# Patient Record
Sex: FEMALE | Race: BLACK OR AFRICAN AMERICAN | NOT HISPANIC OR LATINO | Employment: OTHER | URBAN - METROPOLITAN AREA
[De-identification: names, ages, dates, MRNs, and addresses within clinical notes are randomized per-mention and may not be internally consistent; named-entity substitution may affect disease eponyms.]

---

## 2017-04-27 ENCOUNTER — HISTORICAL (OUTPATIENT)
Dept: URGENT CARE | Facility: CLINIC | Age: 67
End: 2017-04-27

## 2017-07-05 ENCOUNTER — HISTORICAL (OUTPATIENT)
Dept: RADIOLOGY | Facility: HOSPITAL | Age: 67
End: 2017-07-05

## 2017-07-05 LAB — POC CREATININE: 0.9 MG/DL (ref 0.6–1.3)

## 2017-11-07 ENCOUNTER — HISTORICAL (OUTPATIENT)
Dept: ADMINISTRATIVE | Facility: HOSPITAL | Age: 67
End: 2017-11-07

## 2017-11-07 LAB
ALBUMIN SERPL-MCNC: 3.5 GM/DL (ref 3.4–5)
ALBUMIN/GLOB SERPL: 1 {RATIO}
ALP SERPL-CCNC: 63 UNIT/L (ref 38–126)
ALT SERPL-CCNC: 12 UNIT/L (ref 12–78)
APPEARANCE, UA: CLEAR
AST SERPL-CCNC: 7 UNIT/L (ref 15–37)
BACTERIA SPEC CULT: ABNORMAL /HPF
BILIRUB SERPL-MCNC: 0.3 MG/DL (ref 0.2–1)
BILIRUB UR QL STRIP: NEGATIVE
BILIRUBIN DIRECT+TOT PNL SERPL-MCNC: 0.1 MG/DL (ref 0–0.2)
BILIRUBIN DIRECT+TOT PNL SERPL-MCNC: 0.2 MG/DL (ref 0–0.8)
BUN SERPL-MCNC: 9 MG/DL (ref 7–18)
CALCIUM SERPL-MCNC: 9.3 MG/DL (ref 8.5–10.1)
CHLORIDE SERPL-SCNC: 106 MMOL/L (ref 98–107)
CHOLEST SERPL-MCNC: 203 MG/DL (ref 0–200)
CHOLEST/HDLC SERPL: 3.3 {RATIO} (ref 0–4)
CO2 SERPL-SCNC: 28 MMOL/L (ref 21–32)
COLOR UR: YELLOW
CREAT SERPL-MCNC: 0.78 MG/DL (ref 0.55–1.02)
CREAT UR-MCNC: 39.9 MG/DL
ERYTHROCYTE [DISTWIDTH] IN BLOOD BY AUTOMATED COUNT: 14.2 % (ref 11.5–17)
GLOBULIN SER-MCNC: 3.4 GM/DL (ref 2.4–3.5)
GLUCOSE (UA): NEGATIVE
GLUCOSE SERPL-MCNC: 84 MG/DL (ref 74–106)
HCT VFR BLD AUTO: 36 % (ref 37–47)
HDLC SERPL-MCNC: 61 MG/DL (ref 35–60)
HGB BLD-MCNC: 11.7 GM/DL (ref 12–16)
HGB UR QL STRIP: NEGATIVE
KETONES UR QL STRIP: NEGATIVE
LDLC SERPL CALC-MCNC: 121 MG/DL (ref 0–129)
LEUKOCYTE ESTERASE UR QL STRIP: NEGATIVE
MCH RBC QN AUTO: 29.8 PG (ref 27–31)
MCHC RBC AUTO-ENTMCNC: 32.5 GM/DL (ref 33–36)
MCV RBC AUTO: 91.8 FL (ref 80–94)
MICROALBUMIN UR-MCNC: <0.5 MG/DL
MICROALBUMIN/CREAT RATIO PNL UR: <12.5 MG/GM CR (ref 0–30)
NITRITE UR QL STRIP: NEGATIVE
PH UR STRIP: 6.5 [PH] (ref 5–9)
PLATELET # BLD AUTO: 360 X10(3)/MCL (ref 130–400)
PMV BLD AUTO: 8.5 FL (ref 9.4–12.4)
POTASSIUM SERPL-SCNC: 4.5 MMOL/L (ref 3.5–5.1)
PROT SERPL-MCNC: 6.9 GM/DL (ref 6.4–8.2)
PROT UR QL STRIP: NEGATIVE
RBC # BLD AUTO: 3.92 X10(6)/MCL (ref 4.2–5.4)
RBC #/AREA URNS HPF: ABNORMAL /[HPF]
SODIUM SERPL-SCNC: 143 MMOL/L (ref 136–145)
SP GR UR STRIP: 1.01 (ref 1–1.03)
SQUAMOUS EPITHELIAL, UA: ABNORMAL
TRIGL SERPL-MCNC: 104 MG/DL (ref 30–150)
UROBILINOGEN UR STRIP-ACNC: 0.2
VLDLC SERPL CALC-MCNC: 21 MG/DL
WBC # SPEC AUTO: 12.2 X10(3)/MCL (ref 4.5–11.5)
WBC #/AREA URNS HPF: ABNORMAL /[HPF]

## 2017-11-09 LAB — FINAL CULTURE: NO GROWTH

## 2017-12-11 LAB
INFLUENZA A ANTIGEN, POC: NEGATIVE
INFLUENZA B ANTIGEN, POC: NEGATIVE
RAPID GROUP A STREP (OHS): NEGATIVE

## 2017-12-20 ENCOUNTER — HISTORICAL (OUTPATIENT)
Dept: LAB | Facility: HOSPITAL | Age: 67
End: 2017-12-20

## 2017-12-21 LAB — GRAM STN SPEC: NORMAL

## 2017-12-23 LAB — FINAL CULTURE: NORMAL

## 2018-01-24 LAB
BILIRUB SERPL-MCNC: NEGATIVE MG/DL
BLOOD URINE, POC: NEGATIVE
CLARITY, POC UA: CLEAR
COLOR, POC UA: YELLOW
GLUCOSE UR QL STRIP: NEGATIVE
KETONES UR QL STRIP: NEGATIVE
LEUKOCYTE EST, POC UA: NEGATIVE
NITRITE, POC UA: NEGATIVE
PH, POC UA: 7
PROTEIN, POC: NEGATIVE
SPECIFIC GRAVITY, POC UA: 1
UROBILINOGEN, POC UA: NORMAL

## 2018-02-08 ENCOUNTER — HISTORICAL (OUTPATIENT)
Dept: LAB | Facility: HOSPITAL | Age: 68
End: 2018-02-08

## 2018-02-08 LAB
APPEARANCE, UA: ABNORMAL
BACTERIA #/AREA URNS AUTO: ABNORMAL /HPF
BILIRUB UR QL STRIP: NEGATIVE
COLOR UR: YELLOW
GLUCOSE (UA): NEGATIVE
HGB UR QL STRIP: NEGATIVE
KETONES UR QL STRIP: NEGATIVE
LEUKOCYTE ESTERASE UR QL STRIP: ABNORMAL
NITRITE UR QL STRIP.AUTO: NEGATIVE
PH UR STRIP: 6.5 [PH] (ref 5–9)
PROT UR QL STRIP: NEGATIVE
RBC #/AREA URNS HPF: 23 /HPF (ref 0–2)
SP GR UR STRIP: 1.01 (ref 1–1.03)
SQUAMOUS EPITHELIAL, UA: ABNORMAL
UROBILINOGEN UR STRIP-ACNC: 0.2
WBC #/AREA URNS AUTO: ABNORMAL /HPF (ref 0–3)

## 2018-02-10 LAB — FINAL CULTURE: NO GROWTH

## 2018-03-12 ENCOUNTER — HISTORICAL (OUTPATIENT)
Dept: RADIOLOGY | Facility: HOSPITAL | Age: 68
End: 2018-03-12

## 2018-03-16 ENCOUNTER — HISTORICAL (OUTPATIENT)
Dept: ADMINISTRATIVE | Facility: HOSPITAL | Age: 68
End: 2018-03-16

## 2018-03-16 LAB
ABS NEUT (OLG): 9.07 X10(3)/MCL (ref 2.1–9.2)
ALBUMIN SERPL-MCNC: 3.2 GM/DL (ref 3.4–5)
ALBUMIN/GLOB SERPL: 0.9 RATIO (ref 1.1–2)
ALP SERPL-CCNC: 50 UNIT/L (ref 38–126)
ALT SERPL-CCNC: 13 UNIT/L (ref 12–78)
AST SERPL-CCNC: 8 UNIT/L (ref 15–37)
BILIRUB SERPL-MCNC: 0.4 MG/DL (ref 0.2–1)
BILIRUBIN DIRECT+TOT PNL SERPL-MCNC: 0.1 MG/DL (ref 0–0.5)
BILIRUBIN DIRECT+TOT PNL SERPL-MCNC: 0.3 MG/DL (ref 0–0.8)
BUN SERPL-MCNC: 12 MG/DL (ref 7–18)
CALCIUM SERPL-MCNC: 9.3 MG/DL (ref 8.5–10.1)
CHLORIDE SERPL-SCNC: 105 MMOL/L (ref 98–107)
CO2 SERPL-SCNC: 33 MMOL/L (ref 21–32)
CREAT SERPL-MCNC: 0.88 MG/DL (ref 0.55–1.02)
ERYTHROCYTE [DISTWIDTH] IN BLOOD BY AUTOMATED COUNT: 14.6 % (ref 11.5–17)
GLOBULIN SER-MCNC: 3.4 GM/DL (ref 2.4–3.5)
GLUCOSE SERPL-MCNC: 85 MG/DL (ref 74–106)
HCT VFR BLD AUTO: 38.2 % (ref 37–47)
HGB BLD-MCNC: 12.1 GM/DL (ref 12–16)
LYMPHOCYTES NFR BLD MANUAL: 48 % (ref 13–40)
MCH RBC QN AUTO: 29.5 PG (ref 27–31)
MCHC RBC AUTO-ENTMCNC: 31.7 GM/DL (ref 33–36)
MCV RBC AUTO: 93.2 FL (ref 80–94)
MONOCYTES NFR BLD MANUAL: 1 % (ref 2–11)
NEUTROPHILS NFR BLD MANUAL: 52 % (ref 47–80)
PLATELET # BLD AUTO: 368 X10(3)/MCL (ref 130–400)
PLATELET # BLD EST: NORMAL 10*3/UL
PMV BLD AUTO: 8.3 FL (ref 7.4–10.4)
POTASSIUM SERPL-SCNC: 4.3 MMOL/L (ref 3.5–5.1)
PROT SERPL-MCNC: 6.6 GM/DL (ref 6.4–8.2)
RBC # BLD AUTO: 4.1 X10(6)/MCL (ref 4.2–5.4)
SODIUM SERPL-SCNC: 137 MMOL/L (ref 136–145)
TSH SERPL-ACNC: 1.66 MIU/ML (ref 0.36–3.74)
VIT B12 SERPL-MCNC: 370 PG/ML (ref 193–986)
WBC # SPEC AUTO: 15.6 X10(3)/MCL (ref 4.5–11.5)

## 2018-03-26 ENCOUNTER — HISTORICAL (OUTPATIENT)
Dept: ADMINISTRATIVE | Facility: HOSPITAL | Age: 68
End: 2018-03-26

## 2018-03-26 LAB
ABS NEUT (OLG): 8.43 X10(3)/MCL (ref 2.1–9.2)
BASOPHILS # BLD AUTO: 0 X10(3)/MCL (ref 0–0.2)
BASOPHILS NFR BLD AUTO: 0 %
BUN SERPL-MCNC: 15 MG/DL (ref 7–18)
CREAT SERPL-MCNC: 0.91 MG/DL (ref 0.55–1.02)
EOSINOPHIL # BLD AUTO: 0.1 X10(3)/MCL (ref 0–0.9)
EOSINOPHIL NFR BLD AUTO: 1 %
ERYTHROCYTE [DISTWIDTH] IN BLOOD BY AUTOMATED COUNT: 14.6 % (ref 11.5–17)
ERYTHROCYTE [SEDIMENTATION RATE] IN BLOOD: 14 MM/HR (ref 0–20)
HCT VFR BLD AUTO: 37 % (ref 37–47)
HGB BLD-MCNC: 11.6 GM/DL (ref 12–16)
LYMPHOCYTES # BLD AUTO: 2.8 X10(3)/MCL (ref 0.6–4.6)
LYMPHOCYTES NFR BLD AUTO: 22 %
MCH RBC QN AUTO: 29.2 PG (ref 27–31)
MCHC RBC AUTO-ENTMCNC: 31.4 GM/DL (ref 33–36)
MCV RBC AUTO: 93.2 FL (ref 80–94)
MONOCYTES # BLD AUTO: 1 X10(3)/MCL (ref 0.1–1.3)
MONOCYTES NFR BLD AUTO: 8 %
NEUTROPHILS # BLD AUTO: 8.43 X10(3)/MCL (ref 1.4–7.9)
NEUTROPHILS NFR BLD AUTO: 68 %
PLATELET # BLD AUTO: 344 X10(3)/MCL (ref 130–400)
PMV BLD AUTO: 8.6 FL (ref 9.4–12.4)
RBC # BLD AUTO: 3.97 X10(6)/MCL (ref 4.2–5.4)
WBC # SPEC AUTO: 12.4 X10(3)/MCL (ref 4.5–11.5)

## 2018-04-04 ENCOUNTER — HISTORICAL (OUTPATIENT)
Dept: LAB | Facility: HOSPITAL | Age: 68
End: 2018-04-04

## 2018-04-04 LAB
APPEARANCE, UA: ABNORMAL
BACTERIA SPEC CULT: ABNORMAL /HPF
BILIRUB UR QL STRIP: NEGATIVE
COLOR UR: YELLOW
GLUCOSE (UA): NEGATIVE
HGB UR QL STRIP: NEGATIVE
KETONES UR QL STRIP: NEGATIVE
LEUKOCYTE ESTERASE UR QL STRIP: ABNORMAL
NITRITE UR QL STRIP: NEGATIVE
PH UR STRIP: 7 [PH] (ref 5–9)
PROT UR QL STRIP: NEGATIVE
RBC #/AREA URNS HPF: ABNORMAL /[HPF]
SP GR UR STRIP: 1.01 (ref 1–1.03)
SQUAMOUS EPITHELIAL, UA: ABNORMAL
UA WBC MAN: ABNORMAL
UROBILINOGEN UR STRIP-ACNC: 0.2
WBC #/AREA URNS HPF: ABNORMAL /[HPF]

## 2018-04-06 LAB — FINAL CULTURE: NORMAL

## 2018-09-11 ENCOUNTER — HISTORICAL (OUTPATIENT)
Dept: LAB | Facility: HOSPITAL | Age: 68
End: 2018-09-11

## 2018-09-11 LAB
ABS NEUT (OLG): 6.98 X10(3)/MCL (ref 2.1–9.2)
APPEARANCE, UA: CLEAR
BACTERIA SPEC CULT: ABNORMAL /HPF
BASOPHILS # BLD AUTO: 0 X10(3)/MCL (ref 0–0.2)
BASOPHILS NFR BLD AUTO: 0 %
BILIRUB UR QL STRIP: NEGATIVE
COLOR UR: YELLOW
EOSINOPHIL # BLD AUTO: 0.1 X10(3)/MCL (ref 0–0.9)
EOSINOPHIL NFR BLD AUTO: 1 %
ERYTHROCYTE [DISTWIDTH] IN BLOOD BY AUTOMATED COUNT: 14.1 % (ref 11.5–17)
FERRITIN SERPL-MCNC: 62.3 NG/ML (ref 8–388)
GLUCOSE (UA): NEGATIVE
HCT VFR BLD AUTO: 40.6 % (ref 37–47)
HGB BLD-MCNC: 12.5 GM/DL (ref 12–16)
HGB UR QL STRIP: NEGATIVE
IRON SATN MFR SERPL: 16.8 % (ref 20–50)
IRON SERPL-MCNC: 48 MCG/DL (ref 50–175)
KETONES UR QL STRIP: NEGATIVE
LEUKOCYTE ESTERASE UR QL STRIP: ABNORMAL
LYMPHOCYTES # BLD AUTO: 2.4 X10(3)/MCL (ref 0.6–4.6)
LYMPHOCYTES NFR BLD AUTO: 24 %
MCH RBC QN AUTO: 29.4 PG (ref 27–31)
MCHC RBC AUTO-ENTMCNC: 30.8 GM/DL (ref 33–36)
MCV RBC AUTO: 95.5 FL (ref 80–94)
MONOCYTES # BLD AUTO: 0.7 X10(3)/MCL (ref 0.1–1.3)
MONOCYTES NFR BLD AUTO: 7 %
NEUTROPHILS # BLD AUTO: 6.98 X10(3)/MCL (ref 1.4–7.9)
NEUTROPHILS NFR BLD AUTO: 68 %
NITRITE UR QL STRIP: NEGATIVE
PH UR STRIP: 7 [PH] (ref 5–9)
PLATELET # BLD AUTO: 390 X10(3)/MCL (ref 130–400)
PMV BLD AUTO: 9.1 FL (ref 9.4–12.4)
PROT UR QL STRIP: NEGATIVE
RBC # BLD AUTO: 4.25 X10(6)/MCL (ref 4.2–5.4)
RBC #/AREA URNS HPF: ABNORMAL /[HPF]
SP GR UR STRIP: 1 (ref 1–1.03)
SQUAMOUS EPITHELIAL, UA: ABNORMAL
TIBC SERPL-MCNC: 285 MCG/DL (ref 250–450)
TRANSFERRIN SERPL-MCNC: 228 MG/DL (ref 200–360)
TSH SERPL-ACNC: 1.15 MIU/L (ref 0.36–3.74)
UROBILINOGEN UR STRIP-ACNC: 0.2
WBC # SPEC AUTO: 10.2 X10(3)/MCL (ref 4.5–11.5)
WBC #/AREA URNS HPF: ABNORMAL /[HPF]

## 2018-09-26 ENCOUNTER — HISTORICAL (OUTPATIENT)
Dept: ADMINISTRATIVE | Facility: HOSPITAL | Age: 68
End: 2018-09-26

## 2018-09-26 LAB
IRON SATN MFR SERPL: 27.3 % (ref 20–50)
IRON SERPL-MCNC: 72 MCG/DL (ref 50–175)
TIBC SERPL-MCNC: 264 MCG/DL (ref 250–450)
TRANSFERRIN SERPL-MCNC: 214 MG/DL (ref 200–360)

## 2018-10-03 ENCOUNTER — HISTORICAL (OUTPATIENT)
Dept: ADMINISTRATIVE | Facility: HOSPITAL | Age: 68
End: 2018-10-03

## 2018-10-03 LAB — VIT B12 SERPL-MCNC: 304 PG/ML (ref 193–986)

## 2018-10-05 ENCOUNTER — HISTORICAL (OUTPATIENT)
Dept: ADMINISTRATIVE | Facility: HOSPITAL | Age: 68
End: 2018-10-05

## 2018-10-08 ENCOUNTER — HISTORICAL (OUTPATIENT)
Dept: ADMINISTRATIVE | Facility: HOSPITAL | Age: 68
End: 2018-10-08

## 2018-10-27 LAB — RAPID GROUP A STREP (OHS): POSITIVE

## 2018-12-18 ENCOUNTER — HISTORICAL (OUTPATIENT)
Dept: ADMINISTRATIVE | Facility: HOSPITAL | Age: 68
End: 2018-12-18

## 2018-12-20 ENCOUNTER — HISTORICAL (OUTPATIENT)
Dept: ADMINISTRATIVE | Facility: HOSPITAL | Age: 68
End: 2018-12-20

## 2019-01-02 ENCOUNTER — HISTORICAL (OUTPATIENT)
Dept: LAB | Facility: HOSPITAL | Age: 69
End: 2019-01-02

## 2019-01-02 LAB
ABS NEUT (OLG): 7.21 X10(3)/MCL (ref 2.1–9.2)
ALBUMIN SERPL-MCNC: 2.7 GM/DL (ref 3.4–5)
ALBUMIN/GLOB SERPL: 0.5 RATIO (ref 1.1–2)
ALP SERPL-CCNC: 64 UNIT/L (ref 38–126)
ALT SERPL-CCNC: 12 UNIT/L (ref 12–78)
APPEARANCE, UA: CLEAR
AST SERPL-CCNC: 12 UNIT/L (ref 15–37)
BACTERIA SPEC CULT: ABNORMAL /HPF
BASOPHILS # BLD AUTO: 0 X10(3)/MCL (ref 0–0.2)
BASOPHILS NFR BLD AUTO: 0 %
BILIRUB SERPL-MCNC: 0.2 MG/DL (ref 0.2–1)
BILIRUB UR QL STRIP: NEGATIVE
BILIRUBIN DIRECT+TOT PNL SERPL-MCNC: 0.1 MG/DL (ref 0–0.5)
BILIRUBIN DIRECT+TOT PNL SERPL-MCNC: 0.1 MG/DL (ref 0–0.8)
BUN SERPL-MCNC: 9 MG/DL (ref 7–18)
CALCIUM SERPL-MCNC: 8.7 MG/DL (ref 8.5–10.1)
CHLORIDE SERPL-SCNC: 104 MMOL/L (ref 98–107)
CO2 SERPL-SCNC: 25 MMOL/L (ref 21–32)
COLOR UR: YELLOW
CREAT SERPL-MCNC: 0.74 MG/DL (ref 0.55–1.02)
EOSINOPHIL # BLD AUTO: 0 X10(3)/MCL (ref 0–0.9)
EOSINOPHIL NFR BLD AUTO: 0 %
ERYTHROCYTE [DISTWIDTH] IN BLOOD BY AUTOMATED COUNT: 13.5 % (ref 11.5–17)
FERRITIN SERPL-MCNC: 64.6 NG/ML (ref 8–388)
FOLATE SERPL-MCNC: 12.1 NG/ML (ref 3.1–17.5)
GLOBULIN SER-MCNC: 5 GM/DL (ref 2.4–3.5)
GLUCOSE (UA): NEGATIVE
GLUCOSE SERPL-MCNC: 65 MG/DL (ref 74–106)
HCT VFR BLD AUTO: 37.6 % (ref 37–47)
HGB BLD-MCNC: 11.8 GM/DL (ref 12–16)
HGB UR QL STRIP: NEGATIVE
IRON SATN MFR SERPL: 17.3 % (ref 20–50)
IRON SERPL-MCNC: 48 MCG/DL (ref 50–175)
KETONES UR QL STRIP: NEGATIVE
LEUKOCYTE ESTERASE UR QL STRIP: ABNORMAL
LYMPHOCYTES # BLD AUTO: 2.2 X10(3)/MCL (ref 0.6–4.6)
LYMPHOCYTES NFR BLD AUTO: 21 %
MCH RBC QN AUTO: 29.3 PG (ref 27–31)
MCHC RBC AUTO-ENTMCNC: 31.4 GM/DL (ref 33–36)
MCV RBC AUTO: 93.3 FL (ref 80–94)
MONOCYTES # BLD AUTO: 0.7 X10(3)/MCL (ref 0.1–1.3)
MONOCYTES NFR BLD AUTO: 7 %
NEUTROPHILS # BLD AUTO: 7.21 X10(3)/MCL (ref 2.1–9.2)
NEUTROPHILS NFR BLD AUTO: 71 %
NITRITE UR QL STRIP: NEGATIVE
PH UR STRIP: 7 [PH] (ref 5–9)
PLATELET # BLD AUTO: 359 X10(3)/MCL (ref 130–400)
PMV BLD AUTO: 9.7 FL (ref 9.4–12.4)
POTASSIUM SERPL-SCNC: 4.2 MMOL/L (ref 3.5–5.1)
PROT SERPL-MCNC: 7.7 GM/DL (ref 6.4–8.2)
PROT UR QL STRIP: NEGATIVE
RBC # BLD AUTO: 4.03 X10(6)/MCL (ref 4.2–5.4)
RBC #/AREA URNS HPF: 6 /HPF (ref 0–2)
SODIUM SERPL-SCNC: 132 MMOL/L (ref 136–145)
SP GR UR STRIP: 1.01 (ref 1–1.03)
SQUAMOUS EPITHELIAL, UA: 7 /HPF (ref 0–4)
TIBC SERPL-MCNC: 277 MCG/DL (ref 250–450)
TRANSFERRIN SERPL-MCNC: 164 MG/DL (ref 200–360)
TSH SERPL-ACNC: 0.51 MIU/L (ref 0.36–3.74)
UROBILINOGEN UR STRIP-ACNC: 0.2
VIT B12 SERPL-MCNC: 1261 PG/ML (ref 193–986)
WBC # SPEC AUTO: 10.2 X10(3)/MCL (ref 4.5–11.5)
WBC #/AREA URNS HPF: 5 /HPF (ref 0–3)

## 2019-01-04 ENCOUNTER — HISTORICAL (OUTPATIENT)
Dept: ADMINISTRATIVE | Facility: HOSPITAL | Age: 69
End: 2019-01-04

## 2019-01-04 LAB — FINAL CULTURE: NORMAL

## 2019-01-16 ENCOUNTER — HISTORICAL (OUTPATIENT)
Dept: RADIOLOGY | Facility: HOSPITAL | Age: 69
End: 2019-01-16

## 2019-01-30 ENCOUNTER — TELEPHONE (OUTPATIENT)
Dept: ENDOSCOPY | Facility: HOSPITAL | Age: 69
End: 2019-01-30

## 2019-01-30 DIAGNOSIS — K63.89 SMALL BOWEL MASS: Primary | ICD-10-CM

## 2019-01-30 NOTE — TELEPHONE ENCOUNTER
----- Message from Jodie Trevino sent at 1/30/2019 12:25 PM CST -----  Regarding: Outside patient referral  Patient being referred to Dr. Dorsey for eval for Double Balloon procedure. Referral and records scanned into .    Thanks!  Jodie

## 2019-01-31 NOTE — TELEPHONE ENCOUNTER
Records reviewed.  Patient had partial small bowel obstruction at the end of last year with CT showing small bowel mass.  VCE done following patency capsule evaluation.  The report indicates a large polypoid lesion in the mid-ileum with a couple of small lesions proximal to that.      Difficult to know that exact position of the lesion in the bowel.  I recommend we proceed with antegrade double-balloon enteroscopy to try and reach the lesion for diagnostic and marking purposes.      Having a digital copy of the VCE would be helpful, so we will request.

## 2019-01-31 NOTE — TELEPHONE ENCOUNTER
Spoke with patient's. Balloon EGD scheduled for 2/14 at 10a. Reviewed prep instructions. Mr Chan verbalized understanding.

## 2019-02-01 ENCOUNTER — TELEPHONE (OUTPATIENT)
Dept: ENDOSCOPY | Facility: HOSPITAL | Age: 69
End: 2019-02-01

## 2019-02-01 RX ORDER — FLUOXETINE 10 MG/1
10 CAPSULE ORAL DAILY
COMMUNITY
End: 2020-01-20

## 2019-02-01 RX ORDER — PANTOPRAZOLE SODIUM 20 MG/1
20 TABLET, DELAYED RELEASE ORAL DAILY
COMMUNITY

## 2019-02-01 RX ORDER — LAMOTRIGINE 5 MG/1
1 TABLET, CHEWABLE ORAL DAILY
COMMUNITY
End: 2019-02-01 | Stop reason: DRUGHIGH

## 2019-02-01 RX ORDER — CHOLECALCIFEROL (VITAMIN D3) 50 MCG
1 TABLET ORAL DAILY
COMMUNITY
End: 2020-01-20

## 2019-02-01 RX ORDER — DOCUSATE SODIUM 100 MG/1
100 CAPSULE, LIQUID FILLED ORAL DAILY PRN
COMMUNITY
End: 2020-01-20

## 2019-02-01 RX ORDER — SPIRONOLACTONE 25 MG/1
12.5 TABLET ORAL DAILY
COMMUNITY

## 2019-02-01 RX ORDER — CYANOCOBALAMIN 1000 UG/ML
1000 INJECTION, SOLUTION INTRAMUSCULAR; SUBCUTANEOUS
COMMUNITY

## 2019-02-01 RX ORDER — ESTRADIOL 10 UG/1
1 INSERT VAGINAL
COMMUNITY
End: 2020-01-20

## 2019-02-01 RX ORDER — LUBIPROSTONE 24 UG/1
24 CAPSULE ORAL
COMMUNITY
End: 2020-01-30 | Stop reason: SDUPTHER

## 2019-02-01 RX ORDER — PROPRANOLOL HYDROCHLORIDE 10 MG/1
10 TABLET ORAL DAILY PRN
COMMUNITY

## 2019-02-01 RX ORDER — ALBUTEROL SULFATE 90 UG/1
1 AEROSOL, METERED RESPIRATORY (INHALATION) EVERY 6 HOURS PRN
COMMUNITY
End: 2023-04-26 | Stop reason: SDUPTHER

## 2019-02-01 RX ORDER — CARVEDILOL 25 MG/1
25 TABLET ORAL 2 TIMES DAILY
COMMUNITY

## 2019-02-01 RX ORDER — LISINOPRIL 20 MG/1
10 TABLET ORAL DAILY
COMMUNITY

## 2019-02-01 RX ORDER — ALPRAZOLAM 2 MG/1
2 TABLET ORAL NIGHTLY
COMMUNITY

## 2019-02-01 RX ORDER — CARVEDILOL 3.12 MG/1
3.12 TABLET ORAL DAILY
COMMUNITY
End: 2019-02-01 | Stop reason: DRUGHIGH

## 2019-02-01 RX ORDER — POLYETHYLENE GLYCOL 3350 17 G/17G
17 POWDER, FOR SOLUTION ORAL 2 TIMES DAILY PRN
COMMUNITY
End: 2020-01-29

## 2019-02-01 RX ORDER — ESTRADIOL 0.1 MG/G
1 CREAM VAGINAL
COMMUNITY
End: 2020-01-20

## 2019-02-01 RX ORDER — LAMOTRIGINE 25 MG/1
75 TABLET ORAL NIGHTLY
COMMUNITY
End: 2019-02-01 | Stop reason: DRUGHIGH

## 2019-02-01 RX ORDER — DIGOXIN 125 MCG
125 TABLET ORAL DAILY
COMMUNITY
End: 2020-01-20

## 2019-02-01 RX ORDER — DICYCLOMINE HYDROCHLORIDE 20 MG/1
20 TABLET ORAL EVERY 6 HOURS PRN
COMMUNITY
End: 2020-02-17 | Stop reason: SDUPTHER

## 2019-02-01 RX ORDER — ONDANSETRON HYDROCHLORIDE 8 MG/1
4 TABLET, FILM COATED ORAL EVERY 4 HOURS PRN
COMMUNITY
End: 2020-05-08 | Stop reason: SDUPTHER

## 2019-02-01 RX ORDER — LAMOTRIGINE 100 MG/1
100 TABLET ORAL 2 TIMES DAILY
Status: ON HOLD | COMMUNITY
End: 2020-03-13 | Stop reason: HOSPADM

## 2019-02-01 NOTE — TELEPHONE ENCOUNTER
Spoke with patient about instructions for Device Assisted EGD scheduled 2/14/19 at 1000.  Instructions mailed.

## 2019-02-14 ENCOUNTER — HOSPITAL ENCOUNTER (OUTPATIENT)
Facility: HOSPITAL | Age: 69
Discharge: HOME | End: 2019-02-14
Attending: INTERNAL MEDICINE | Admitting: INTERNAL MEDICINE
Payer: MEDICARE

## 2019-02-14 ENCOUNTER — ANESTHESIA (OUTPATIENT)
Dept: ENDOSCOPY | Facility: HOSPITAL | Age: 69
End: 2019-02-14
Payer: MEDICARE

## 2019-02-14 ENCOUNTER — ANESTHESIA EVENT (OUTPATIENT)
Dept: ENDOSCOPY | Facility: HOSPITAL | Age: 69
End: 2019-02-14
Payer: MEDICARE

## 2019-02-14 VITALS
DIASTOLIC BLOOD PRESSURE: 63 MMHG | WEIGHT: 143 LBS | TEMPERATURE: 98 F | RESPIRATION RATE: 16 BRPM | OXYGEN SATURATION: 99 % | SYSTOLIC BLOOD PRESSURE: 123 MMHG | HEART RATE: 83 BPM | BODY MASS INDEX: 25.34 KG/M2 | HEIGHT: 63 IN

## 2019-02-14 DIAGNOSIS — K63.89 SMALL BOWEL MASS: Primary | ICD-10-CM

## 2019-02-14 PROCEDURE — 37000009 HC ANESTHESIA EA ADD 15 MINS: Performed by: INTERNAL MEDICINE

## 2019-02-14 PROCEDURE — D9220A PRA ANESTHESIA: Mod: ANES,,, | Performed by: ANESTHESIOLOGY

## 2019-02-14 PROCEDURE — 88305 TISSUE SPECIMEN TO PATHOLOGY - SURGERY: ICD-10-PCS | Mod: 26,,, | Performed by: PATHOLOGY

## 2019-02-14 PROCEDURE — 44377 SMALL BOWEL ENDOSCOPY/BIOPSY: CPT | Mod: ,,, | Performed by: INTERNAL MEDICINE

## 2019-02-14 PROCEDURE — 43236 UPPR GI SCOPE W/SUBMUC INJ: CPT | Performed by: INTERNAL MEDICINE

## 2019-02-14 PROCEDURE — 88305 TISSUE EXAM BY PATHOLOGIST: CPT | Performed by: PATHOLOGY

## 2019-02-14 PROCEDURE — 27201030 HC OVERTUBE: Performed by: INTERNAL MEDICINE

## 2019-02-14 PROCEDURE — 88305 TISSUE EXAM BY PATHOLOGIST: CPT | Mod: 26,,, | Performed by: PATHOLOGY

## 2019-02-14 PROCEDURE — 44377 PR SB SCOPE,TO ILEUM,BIOPSY: ICD-10-PCS | Mod: ,,, | Performed by: INTERNAL MEDICINE

## 2019-02-14 PROCEDURE — 88341 PR IHC OR ICC EACH ADD'L SINGLE ANTIBODY  STAINPR: ICD-10-PCS | Mod: 26,,, | Performed by: PATHOLOGY

## 2019-02-14 PROCEDURE — 63600175 PHARM REV CODE 636 W HCPCS: Performed by: NURSE ANESTHETIST, CERTIFIED REGISTERED

## 2019-02-14 PROCEDURE — 25000003 PHARM REV CODE 250: Performed by: NURSE ANESTHETIST, CERTIFIED REGISTERED

## 2019-02-14 PROCEDURE — 88342 IMHCHEM/IMCYTCHM 1ST ANTB: CPT | Mod: 26,,, | Performed by: PATHOLOGY

## 2019-02-14 PROCEDURE — 44377 SMALL BOWEL ENDOSCOPY/BIOPSY: CPT | Performed by: INTERNAL MEDICINE

## 2019-02-14 PROCEDURE — 27201028 HC NEEDLE, SCLERO: Performed by: INTERNAL MEDICINE

## 2019-02-14 PROCEDURE — D9220A PRA ANESTHESIA: ICD-10-PCS | Mod: CRNA,,, | Performed by: NURSE ANESTHETIST, CERTIFIED REGISTERED

## 2019-02-14 PROCEDURE — D9220A PRA ANESTHESIA: ICD-10-PCS | Mod: ANES,,, | Performed by: ANESTHESIOLOGY

## 2019-02-14 PROCEDURE — D9220A PRA ANESTHESIA: Mod: CRNA,,, | Performed by: NURSE ANESTHETIST, CERTIFIED REGISTERED

## 2019-02-14 PROCEDURE — 25000003 PHARM REV CODE 250: Performed by: INTERNAL MEDICINE

## 2019-02-14 PROCEDURE — 63600175 PHARM REV CODE 636 W HCPCS: Performed by: ANESTHESIOLOGY

## 2019-02-14 PROCEDURE — 37000008 HC ANESTHESIA 1ST 15 MINUTES: Performed by: INTERNAL MEDICINE

## 2019-02-14 PROCEDURE — 44799 UNLISTED PX SMALL INTESTINE: CPT | Performed by: INTERNAL MEDICINE

## 2019-02-14 PROCEDURE — 88341 IMHCHEM/IMCYTCHM EA ADD ANTB: CPT | Mod: 26,,, | Performed by: PATHOLOGY

## 2019-02-14 PROCEDURE — 88342 TISSUE SPECIMEN TO PATHOLOGY - SURGERY: ICD-10-PCS | Mod: 26,,, | Performed by: PATHOLOGY

## 2019-02-14 PROCEDURE — 27201012 HC FORCEPS, HOT/COLD, DISP: Performed by: INTERNAL MEDICINE

## 2019-02-14 RX ORDER — LIDOCAINE HCL/PF 100 MG/5ML
SYRINGE (ML) INTRAVENOUS
Status: DISCONTINUED | OUTPATIENT
Start: 2019-02-14 | End: 2019-02-14

## 2019-02-14 RX ORDER — FENTANYL CITRATE 50 UG/ML
INJECTION, SOLUTION INTRAMUSCULAR; INTRAVENOUS
Status: DISCONTINUED | OUTPATIENT
Start: 2019-02-14 | End: 2019-02-14

## 2019-02-14 RX ORDER — SODIUM CHLORIDE 0.9 % (FLUSH) 0.9 %
3 SYRINGE (ML) INJECTION
Status: DISCONTINUED | OUTPATIENT
Start: 2019-02-14 | End: 2019-02-14 | Stop reason: HOSPADM

## 2019-02-14 RX ORDER — ONDANSETRON 2 MG/ML
INJECTION INTRAMUSCULAR; INTRAVENOUS
Status: DISCONTINUED | OUTPATIENT
Start: 2019-02-14 | End: 2019-02-14

## 2019-02-14 RX ORDER — PROPOFOL 10 MG/ML
VIAL (ML) INTRAVENOUS
Status: DISCONTINUED | OUTPATIENT
Start: 2019-02-14 | End: 2019-02-14

## 2019-02-14 RX ORDER — SODIUM CHLORIDE 9 MG/ML
INJECTION, SOLUTION INTRAVENOUS CONTINUOUS
Status: DISCONTINUED | OUTPATIENT
Start: 2019-02-14 | End: 2019-02-14 | Stop reason: HOSPADM

## 2019-02-14 RX ORDER — SUCCINYLCHOLINE CHLORIDE 20 MG/ML
INJECTION INTRAMUSCULAR; INTRAVENOUS
Status: DISCONTINUED | OUTPATIENT
Start: 2019-02-14 | End: 2019-02-14

## 2019-02-14 RX ORDER — FENTANYL CITRATE 50 UG/ML
25 INJECTION, SOLUTION INTRAMUSCULAR; INTRAVENOUS EVERY 5 MIN PRN
Status: COMPLETED | OUTPATIENT
Start: 2019-02-14 | End: 2019-02-14

## 2019-02-14 RX ORDER — TRAMADOL HYDROCHLORIDE 50 MG/1
100 TABLET ORAL EVERY 6 HOURS PRN
Qty: 24 TABLET | Refills: 0 | Status: SHIPPED | OUTPATIENT
Start: 2019-02-14 | End: 2019-02-17

## 2019-02-14 RX ORDER — VASOPRESSIN 20 [USP'U]/ML
INJECTION, SOLUTION INTRAMUSCULAR; SUBCUTANEOUS
Status: DISCONTINUED | OUTPATIENT
Start: 2019-02-14 | End: 2019-02-14

## 2019-02-14 RX ADMIN — FENTANYL CITRATE 50 MCG: 50 INJECTION, SOLUTION INTRAMUSCULAR; INTRAVENOUS at 12:02

## 2019-02-14 RX ADMIN — VASOPRESSIN 2 UNITS: 20 INJECTION INTRAVENOUS at 02:02

## 2019-02-14 RX ADMIN — FENTANYL CITRATE 25 MCG: 50 INJECTION INTRAMUSCULAR; INTRAVENOUS at 03:02

## 2019-02-14 RX ADMIN — FENTANYL CITRATE 50 MCG: 50 INJECTION, SOLUTION INTRAMUSCULAR; INTRAVENOUS at 01:02

## 2019-02-14 RX ADMIN — PROPOFOL 55 MG: 10 INJECTION, EMULSION INTRAVENOUS at 12:02

## 2019-02-14 RX ADMIN — SODIUM CHLORIDE: 0.9 INJECTION, SOLUTION INTRAVENOUS at 10:02

## 2019-02-14 RX ADMIN — ONDANSETRON 4 MG: 2 INJECTION INTRAMUSCULAR; INTRAVENOUS at 02:02

## 2019-02-14 RX ADMIN — FENTANYL CITRATE 25 MCG: 50 INJECTION INTRAMUSCULAR; INTRAVENOUS at 04:02

## 2019-02-14 RX ADMIN — VASOPRESSIN 1 UNITS: 20 INJECTION INTRAVENOUS at 01:02

## 2019-02-14 RX ADMIN — SUCCINYLCHOLINE CHLORIDE 120 MG: 20 INJECTION, SOLUTION INTRAMUSCULAR; INTRAVENOUS at 12:02

## 2019-02-14 RX ADMIN — LIDOCAINE HYDROCHLORIDE 55 MG: 20 INJECTION, SOLUTION INTRAVENOUS at 12:02

## 2019-02-14 NOTE — DISCHARGE INSTRUCTIONS
Anesthesia: General Anesthesia     You are watched continuously during your procedure by your anesthesia provider.     Youre due to have surgery. During surgery, youll be given medicine called anesthesia or anesthetic. This will keep you comfortable and pain-free. Your anesthesia provider will use general anesthesia.  What is general anesthesia?  General anesthesia puts you into a state like deep sleep. It goes into the bloodstream (IV anesthetics), into the lungs (gas anesthetics), or both. You feel nothing during the procedure. You will not remember it. During the procedure, the anesthesia provider monitors you continuously. He or she checks your heart rate and rhythm, blood pressure, breathing, and blood oxygen.  · IV anesthetics. IV anesthetics are given through an IV line in your arm. Theyre often given first. This is so you are asleep before a gas anesthetic is started. Some kinds of IV anesthetics relieve pain. Others relax you. Your doctor will decide which kind is best in your case.  · Gas anesthetics. Gas anesthetics are breathed into the lungs. They are often used to keep you asleep. They can be given through a facemask or a tube placed in your larynx or trachea (breathing tube).  ¨ If you have a facemask, your anesthesia provider will most likely place it over your nose and mouth while youre still awake. Youll breathe oxygen through the mask as your IV anesthetic is started. Gas anesthetic may be added through the mask.  ¨ If you have a tube in the larynx or trachea, it will be inserted into your throat after youre asleep.  Anesthesia tools and medicines  You will likely have:  · IV anesthetics. These are put into an IV line into your bloodstream.  · Gas anesthetics. You breathe these anesthetics into your lungs, where they pass into your bloodstream.  · Pulse oximeter. This is a small clip that is attached to the end of your finger. This measures your blood oxygen level.  · Electrocardiography  leads (electrodes). These are small sticky pads that are placed on your chest. They record your heart rate and rhythm.  · Blood pressure cuff. This reads your blood pressure.  Risks and possible complications  General anesthesia has some risks. These include:  · Breathing problems  · Nausea and vomiting  · Sore throat or hoarseness (usually temporary)  · Allergic reaction to the anesthetic  · Irregular heartbeat (rare)  · Cardiac arrest (rare)   Anesthesia safety  · Follow all instructions you are given for how long not to eat or drink before your procedure.  · Be sure your doctor knows what medicines and drugs you take. This includes over-the-counter medicines, herbs, supplements, alcohol or other drugs. You will be asked when those were last taken.  · Have an adult family member or friend drive you home after the procedure.  · For the first 24 hours after your surgery:  ¨ Do not drive or use heavy equipment.  ¨ Do not make important decisions or sign legal documents. If important decisions or signing legal documents is necessary during the first 24 hours after surgery, have a trusted family member or spouse act on your behalf.  ¨ Avoid alcohol.  ¨ Have a responsible adult stay with you. He or she can watch for problems and help keep you safe.  Date Last Reviewed: 12/1/2016  © 2288-8372 [employer]. [address] [place] [address], [place] [address]. All rights reserved. This information is not intended as a substitute for professional medical care. Always follow your healthcare professional's instructions.        Upper GI Endoscopy     During endoscopy, a long, flexible tube is used to view the inside of your upper GI tract.      Upper GI endoscopy allows your healthcare provider to look directly into the beginning of your gastrointestinal (GI) tract. The esophagus, stomach, and duodenum (the first part of the small intestine) make up the upper GI tract.   Before the exam  Follow these and any other  instructions you are given before your endoscopy. If you dont follow the healthcare providers instructions carefully, the test may need to be canceled or done over:  · Don't eat or drink anything after midnight the night before your exam. If your exam is in the afternoon, drink only clear liquids in the morning. Don't eat or drink anything for 8 hours before the exam. In some cases, you may be able to take medicines with sips of water until 2 hours before the procedure. Speak with your healthcare provider about this.   · Bring your X-rays and any other test results you have.  · Because you will be sedated, arrange for an adult to drive you home after the exam.  · Tell your healthcare provider before the exam if you are taking any medicines or have any medical problems.  The procedure  Here is what to expect:  · You will lie on the endoscopy table. Usually patients lie on the left side.  · You will be monitored and given oxygen.  · Your throat may be numbed with a spray or gargle. You are given medicine through an intravenous (IV) line that will help you relax and remain comfortable. You may be awake or asleep during the procedure.  · The healthcare provider will put the endoscope in your mouth and down your esophagus. It is thinner than most pieces of food that you swallow. It will not affect your breathing. The medicine helps keep you from gagging.  · Air is put into your GI tract to expand it. It can make you burp.  · During the procedure, the healthcare provider can take biopsies (tissue samples), remove abnormalities, such as polyps, or treat abnormalities through a variety of devices placed through the endoscope. You will not feel this.   · The endoscope carries images of your upper GI tract to a video screen. If you are awake, you may be able to look at the images.  · After the procedure is done, you will rest for a time. An adult must drive you home.  When to call your healthcare provider  Contact your  healthcare provider if you have:  · Black or tarry stools, or blood in your stool  · Fever  · Pain in your belly that does not go away  · Nausea and vomiting, or vomiting blood   Date Last Reviewed: 7/1/2016 © 2000-2017 [employer]. [address] [place] [id number]. All rights reserved. This information is not intended as a substitute for professional medical care. Always follow your healthcare professional's instructions.

## 2019-02-14 NOTE — ANESTHESIA PREPROCEDURE EVALUATION
02/14/2019  Christiana Chan is a 68 y.o., female.    Anesthesia Evaluation    I have reviewed the Patient Summary Reports.    I have reviewed the Nursing Notes.   I have reviewed the Medications.     Review of Systems  Anesthesia Hx:  No problems with previous Anesthesia  History of prior surgery of interest to airway management or planning: Denies Family Hx of Anesthesia complications.   Denies Personal Hx of Anesthesia complications.   Hematology/Oncology:  Hematology Normal   Oncology Normal     EENT/Dental:EENT/Dental Normal   Cardiovascular:   Exercise tolerance: good Hypertension LBBB  Non-ischemic dilated Cardiomyopathy - 3/18 ECHO w/ EF 55%, DD, mild MR/TR   Pulmonary:   Asthma    Renal/:  Renal/ Normal     Hepatic/GI:   PUD, Hiatal Hernia, GERD    Musculoskeletal:   Arthritis     Neurological:  Neurology Normal    Endocrine:  Endocrine Normal    Dermatological:  Skin Normal    Psych:  Psychiatric Normal           Physical Exam  General:  Well nourished    Airway/Jaw/Neck:  Airway Findings: Mouth Opening: Normal Tongue: Normal  General Airway Assessment: Adult  Mallampati: II  TM Distance: Normal, at least 6 cm        Eyes/Ears/Nose:  EYES/EARS/NOSE FINDINGS: Normal   Dental:  DENTAL FINDINGS: Normal   Chest/Lungs:  Chest/Lungs Clear    Heart/Vascular:  Heart Findings: Normal Heart murmur: negative Vascular Findings: Normal    Abdomen:  Abdomen Findings: Normal    Musculoskeletal:  Musculoskeletal Findings: Normal   Skin:  Skin Findings: Normal    Mental Status:  Mental Status Findings: Normal        Anesthesia Plan  Type of Anesthesia, risks & benefits discussed:  Anesthesia Type:  general  Patient's Preference:   Intra-op Monitoring Plan: standard ASA monitors  Intra-op Monitoring Plan Comments:   Post Op Pain Control Plan:   Post Op Pain Control Plan Comments:   Induction:   IV  Beta Blocker:   Patient is on a Beta-Blocker and has received one dose within the past 24 hours (No further documentation required).       Informed Consent: Patient understands risks and agrees with Anesthesia plan.  Questions answered. Anesthesia consent signed with patient.  ASA Score: 3     Day of Surgery Review of History & Physical:    H&P update referred to the provider.     Anesthesia Plan Notes: Last cardiology clinic note and echo requested.        Ready For Surgery From Anesthesia Perspective.

## 2019-02-14 NOTE — H&P
Short Stay Endoscopy History and Physical      Procedure - Antegrade double-balloon enteroscopy  ASA - per anesthesia  Mallampati - per anesthesia  History of Anesthesia problems - no  Family history Anesthesia problems - no   Plan of anesthesia - General    HPI:  This is a 68 y.o. female here for evaluation of a small bowel polypoid lesion or mass seen by VCE and also by CT abdomen.  She also has intermittent obstructive symptoms possibly related to this lesion.  Difficult to know exact location in the bowel, but may be a challenge to reach.      ROS:  Constitutional: No fevers, chills, No weight loss  CV: No chest pain  Pulm: No cough, No shortness of breath  GI: see HPI    Medical History:  has a past medical history of Abdominal bloating, Abdominal pain, Asthma, Atrial fibrillation, Colon polyps, Constipation, Diverticulosis of colon, Esophageal ulcer, Gastritis, GERD (gastroesophageal reflux disease), Heartburn, Hiatal hernia, HTN (hypertension), Intermittent diarrhea, Iron deficiency anemia, Osteoarthritis, Osteoporosis, Rectal bleeding, Ruptured lumbar disc, Small bowel lesion, Vitamin B12 deficiency anemia, and Vitamin D deficiency.    Surgical History:  has a past surgical history that includes Esophagogastroduodenoscopy; Colonoscopy; Total knee arthroplasty (Right, 2013); Shoulder surgery (Right, 2013); Cholecystectomy (); Hysterectomy ();  section ( & ); and Breast biopsy (Bilateral).    Family History: family history includes Diabetes in her mother; Multiple myeloma in her father.    Social History:  reports that  has never smoked. she has never used smokeless tobacco. She reports that she drinks alcohol. She reports that she does not use drugs.    Review of patient's allergies indicates:   Allergen Reactions    Amoxicillin Other (See Comments)     Taking digoxin.    Sulfa (sulfonamide antibiotics) Itching and Swelling    Codeine Nausea Only and Other (See  Comments)     Nausea and constipation.    Demerol [meperidine] Nausea Only and Other (See Comments)     Nausea and constipation.    Hydrocodone Nausea Only and Other (See Comments)     Nausea and constipation.       Medications:   Medications Prior to Admission   Medication Sig Dispense Refill Last Dose    albuterol (PROAIR HFA) 90 mcg/actuation inhaler Inhale 1 puff into the lungs every 6 (six) hours as needed for Wheezing. Rescue   Past Week at Unknown time    ALPRAZolam (XANAX) 2 MG Tab Take 2 mg by mouth nightly as needed.   2/13/2019 at Unknown time    carvedilol (COREG) 25 MG tablet Take 25 mg by mouth 2 (two) times daily.   2/14/2019 at Unknown time    cholecalciferol, vitamin D3, (VITAMIN D3) 2,000 unit Tab Take 1 tablet by mouth once daily.   Past Month at Unknown time    cyanocobalamin (VITAMIN B-12) 1,000 mcg/mL injection Inject 1,000 mcg into the muscle every 14 (fourteen) days.   Past Week at Unknown time    dicyclomine (BENTYL) 20 mg tablet Take 20 mg by mouth every 6 (six) hours as needed (abdomenal pain and cramping.).   2/13/2019 at Unknown time    digoxin (LANOXIN) 125 mcg tablet Take 125 mcg by mouth once daily.   2/13/2019 at Unknown time    docusate sodium (COLACE) 100 MG capsule Take 100 mg by mouth daily as needed for Constipation.   2/13/2019 at Unknown time    estradiol (VAGIFEM) 10 mcg Tab Place 1 tablet vaginally twice a week.    Past Week at Unknown time    FLUoxetine 10 MG capsule Take 10 mg by mouth once daily.   2/13/2019 at Unknown time    lamoTRIgine (LAMICTAL) 100 MG tablet Take 100 mg by mouth 2 (two) times daily.   2/13/2019 at Unknown time    lisinopril (PRINIVIL,ZESTRIL) 20 MG tablet Take 20 mg by mouth 2 (two) times daily.   2/14/2019 at Unknown time    lubiprostone (AMITIZA) 24 MCG Cap Take 24 mcg by mouth once daily.   2/13/2019 at Unknown time    ondansetron (ZOFRAN) 8 MG tablet Take 4 mg by mouth every 4 (four) hours as needed for Nausea.    2/13/2019 at  Unknown time    pantoprazole (PROTONIX) 20 MG tablet Take 20 mg by mouth once daily.   2/13/2019 at Unknown time    polyethylene glycol (GLYCOLAX) 17 gram PwPk Take 17 g by mouth 2 (two) times daily as needed (for constipation).    2/14/2019 at Unknown time    ranitidine (ZANTAC) 300 MG tablet Take 300 mg by mouth nightly as needed for Heartburn.   2/13/2019 at Unknown time    spironolactone (ALDACTONE) 25 MG tablet Take 12.5 mg by mouth once daily.   2/13/2019 at Unknown time    denosumab (PROLIA) 60 mg/mL Syrg Inject 60 mg into the skin every 6 (six) months.   More than a month at Unknown time    estradiol (ESTRACE) 0.01 % (0.1 mg/gram) vaginal cream Place 1 g vaginally twice a week.   Taking    propranolol (INDERAL) 10 MG tablet Take 10 mg by mouth daily as needed.   More than a month at Unknown time         Physical Exam:    Vital Signs:   Vitals:    02/14/19 1019   BP: 113/62   Pulse: 67   Resp: 18   Temp: 98.2 °F (36.8 °C)       General Appearance: Well appearing in no acute distress  Eyes:    No scleral icterus  ENT: Neck supple, Lips, mucosa, and tongue normal; teeth and gums normal  Lungs: CTA bilaterally  Heart:  S1, S2 normal, no murmurs heard  Abdomen: Soft, non tender, non distended with positive bowel sounds. No hepatosplenomegaly, ascites, or mass.      Labs:  No results found for: WBC, HGB, HCT, PLT, CHOL, TRIG, HDL, LDLDIRECT, ALT, AST, NA, K, CL, CREATININE, BUN, CO2, TSH, PSA, INR, GLUF, HGBA1C, MICROALBUR      Assessment:  68 y.o. female with small bowel lesion/mass seen by VCE and CT abdomen.    Plan:  Proceed with antegrade double-balloon enteroscopy today.  I have explained the risks and benefits of endoscopy procedures to the patient including but not limited to bleeding, perforation, infection, and death.  All questions and answered.        Sivakumar Dorsey MD

## 2019-02-14 NOTE — [employer] PATIENT INSTRUCTIONS
Discharge Summary/Instructions after an Endoscopic Procedure  Patient Name: Christiana Chan  Patient MRN: [id number]  Patient YOB: 1950 Thursday, February 14, 2019  Sivakumar Dorsey MD  RESTRICTIONS:  During your procedure today, you received medications for sedation.  These   medications may affect your judgment, balance and coordination.  Therefore,   for 24 hours, you have the following restrictions:   - DO NOT drive a car, operate machinery, make legal/financial decisions,   sign important papers or drink alcohol.    ACTIVITY:  Today: no heavy lifting, straining or running due to procedural   sedation/anesthesia.  The following day: return to full activity including work.  DIET:  Eat and drink normally unless instructed otherwise.     TREATMENT FOR COMMON SIDE EFFECTS:  - Mild abdominal pain, nausea, belching, bloating or excessive gas:  rest,   eat lightly and use a heating pad.  - Sore Throat: treat with throat lozenges and/or gargle with warm salt   water.  - Because air was used during the procedure, expelling large amounts of air   from your rectum or belching is normal.  - If a bowel prep was taken, you may not have a bowel movement for 1-3 days.    This is normal.  SYMPTOMS TO WATCH FOR AND REPORT TO YOUR PHYSICIAN:  1. Abdominal pain or bloating, other than gas cramps.  2. Chest pain.  3. Back pain.  4. Signs of infection such as: chills or fever occurring within 24 hours   after the procedure.  5. Rectal bleeding, which would show as bright red, maroon, or black stools.   (A tablespoon of blood from the rectum is not serious, especially if   hemorrhoids are present.)  6. Vomiting.  7. Weakness or dizziness.  GO DIRECTLY TO THE NEAREST EMERGENCY ROOM IF YOU HAVE ANY OF THE FOLLOWING:      Difficulty breathing              Chills and/or fever over 101 F   Persistent vomiting and/or vomiting blood   Severe abdominal pain   Severe chest pain   Black, tarry stools   Bleeding- more than one  tablespoon   Any other symptom or condition that you feel may need urgent attention  Your doctor recommends these additional instructions:  If any biopsies were taken, your doctors clinic will contact you in 1 to 2   weeks with any results.  - Discharge patient to home.   - Patient has a contact number available for emergencies.  The signs and   symptoms of potential delayed complications were discussed with the   patient.  Return to normal activities tomorrow.  Written discharge   instructions were provided to the patient.   - Resume previous diet.   - Await pathology results.   - Telephone GI clinic for pathology results in 1 week. If pathology is   non-diagnostic, then she will need surgical resection of the larger lesion   to determine etiology.    For questions, problems or results please call your physician - Sivakumar Dorsey MD at Work:  [phone].  [facility], EMERGENCY ROOM PHONE NUMBER: [phone]  IF A COMPLICATION OR EMERGENCY SITUATION ARISES AND YOU ARE UNABLE TO REACH   YOUR PHYSICIAN - GO DIRECTLY TO THE EMERGENCY ROOM.  Sivakumar Dorsey MD  2/14/2019 2:55:52 PM  This report has been verified and signed electronically.  [employer]

## 2019-02-14 NOTE — TRANSFER OF CARE
"Anesthesia Transfer of Care Note    Patient: Christiana Chan    Procedure(s) Performed: Procedure(s) (LRB):  ENTEROSCOPY, DOUBLE BALLOON, ANTEGRADE (N/A)    Patient location: [facility]    Anesthesia Type: general    Transport from OR: Transported from OR on 6-10 L/min O2 by face mask with adequate spontaneous ventilation    Post pain: adequate analgesia    Post assessment: no apparent anesthetic complications and tolerated procedure well    Post vital signs: stable    Level of consciousness: awake    Nausea/Vomiting: no nausea/vomiting    Complications: none    Transfer of care protocol was followed      Last vitals:   Visit Vitals  /62 (BP Location: Left arm, Patient Position: Lying)   Pulse 67   Temp 36.8 °C (98.2 °F) (Temporal)   Resp 18   Ht 5' 3" (1.6 m)   Wt 64.9 kg (143 lb)   SpO2 100%   Breastfeeding? No   BMI 25.33 kg/m²     "

## 2019-02-14 NOTE — PLAN OF CARE
Dr Leopold notified of patients complaint of nausea. Pt was last medicated with 4mg of zofran around 2pm. No new orders given at present.

## 2019-02-15 NOTE — PLAN OF CARE
"Patient was treated for nausea. Pt states that nausea subsided. Patient was treated for pain; pain maintains 7/10 to the LUQ. Dr Dorsey at bedside; sending patient home with a pain medication (tramadol)prescription. Per Dr Dorsey patients abdomen is soft to touch and nondistended; perforation is not suspected. Per MD likely some "bruising" in the LUQ area from the enteroscopy procedure. MD gives patient the option to go to the ER and have a CT done. Pt decides to go home and try the pain meds. Pt and  have the discharge instrution packet and MD contact number. Also instructed to go to nearest ED for any emergency. Patient appears comfortable and is not grimacing  "

## 2019-02-15 NOTE — ANESTHESIA POSTPROCEDURE EVALUATION
"Anesthesia Post Evaluation    Patient: Christiana Chan    Procedure(s) Performed: Procedure(s) (LRB):  ENTEROSCOPY, DOUBLE BALLOON, ANTEGRADE (N/A)    Final Anesthesia Type: general  Patient location during evaluation: [facility]  Patient participation: Yes- Able to Participate  Level of consciousness: awake and alert  Post-procedure vital signs: reviewed and stable  Pain management: adequate  Airway patency: patent  PONV status at discharge: nausea (controlled)  Anesthetic complications: no      Cardiovascular status: blood pressure returned to baseline and stable  Respiratory status: unassisted, spontaneous ventilation and room air  Hydration status: euvolemic  Follow-up not needed.        Visit Vitals  /63   Pulse 83   Temp 36.7 °C (98 °F) (Temporal)   Resp 16   Ht 5' 3" (1.6 m)   Wt 64.9 kg (143 lb)   SpO2 99%   Breastfeeding? No   BMI 25.33 kg/m²       Pain/Trey Score: Pain Rating Prior to Med Admin: 8 (2/14/2019  4:49 PM)  Pain Rating Post Med Admin: 5 (2/14/2019  6:00 PM)  Trey Score: 9 (2/14/2019  2:58 PM)        "

## 2019-02-22 ENCOUNTER — TELEPHONE (OUTPATIENT)
Dept: GASTROENTEROLOGY | Facility: CLINIC | Age: 69
End: 2019-02-22

## 2019-02-22 DIAGNOSIS — D3A.019 CARCINOID TUMOR DETERMINED BY BIOPSY OF SMALL INTESTINE: Primary | ICD-10-CM

## 2019-02-22 NOTE — TELEPHONE ENCOUNTER
Spoke with the patient by phone to discuss results of the biopsies done during the enteroscopy of the small bowel masses.  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  I recommend that she be referred to the neuroendocrine clinic at [facility] or further evaluation and treatment options.    She also informed me today that she was admitted later in the day, after my procedure, to [facility] for acute pancreatitis.  This appears to have been a complication of the procedure (known potential complication).  I will alert our staff so that this can be documented and recorded.

## 2019-02-25 ENCOUNTER — TELEPHONE (OUTPATIENT)
Dept: GASTROENTEROLOGY | Facility: CLINIC | Age: 69
End: 2019-02-25

## 2019-02-25 NOTE — TELEPHONE ENCOUNTER
Spoke with patient, she was informed Dr. Dorsey is referring her to see neuroendocrine at our [place] location.     Patient stated, she was not aware that she was being referred to neuroendocrine and would like for Dr. Dorsey to give her a call before anything is scheduled.     Message routed to Dr. Dorsey.

## 2019-02-25 NOTE — TELEPHONE ENCOUNTER
Spoke with the patient by phone.  I also discussed the finding with Dr. Ramirez, her referring physician (GI in [place]).  He is going to see her this week to discuss the diagnosis in more detail and determine the best plan of care for her.  We will wait on making the appointment in the Neuroendocrine clinic until he has a chance to discuss with her.

## 2019-02-27 NOTE — TELEPHONE ENCOUNTER
Patient will follow up with her local MD and re-refer if she decides to proceed w/ referral to NET clinic.

## 2019-03-06 ENCOUNTER — HISTORICAL (OUTPATIENT)
Dept: CARDIOLOGY | Facility: HOSPITAL | Age: 69
End: 2019-03-06

## 2019-03-08 ENCOUNTER — TELEPHONE (OUTPATIENT)
Dept: NEUROLOGY | Facility: HOSPITAL | Age: 69
End: 2019-03-08

## 2019-03-08 DIAGNOSIS — C7A.8 PRIMARY MALIGNANT NEUROENDOCRINE NEOPLASM OF ILEUM: Primary | ICD-10-CM

## 2019-03-08 NOTE — TELEPHONE ENCOUNTER
New NET ref from Dr. Ramirez.  Pt with TI carcinoid.  Ref for surgery.  Ordered Ga 68 PET/CT scan, MRI and tumor markers per protocol.  Appointment made with MD, info sent to patient.

## 2019-03-08 NOTE — TELEPHONE ENCOUNTER
----- Message from Shilpi Davenport sent at 2/28/2019 11:28 AM CST -----  Regarding: NEW  Contact: [phone]  New patient - Who called: Referral faxed from [facility] in [place] [phone] and referral placed in Epic on 2/22/19    Referred by: Sivakumar Dorsey    Why do you need to be seen? 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 Oksana    You may contact patient back to schedule at: [phone]

## 2019-03-08 NOTE — LETTER
March 8, 2019        Ashok Ramirez MD  [address]  [address]  [place] [facility] [address]             [facility]  [address]  [place] [address]  Phone: [phone]  Fax: [phone]   Patient: Christiana Chan   MR Number: [id number]   YOB: 1950   Date of Visit: 3/8/2019     Dear Dr. Ramirez,     We contacted Mrs. Chan regarding an evaluation at our center.  We scheduled a Ga 68 PET/CT scan, MRI, blood tumor markers and a pathology re read over the next couple of weeks.  We also scheduled an appointment with Dr. MICHELLE Fernandez on 4/4/19 for recommendations.  We will forward you a copy of the clinic note after the visit.      Thank you for considering our program and for referring this patient.  If you have any questions, please do not hesitate to contact us.        Sincerely,      RAJWINDER SiddiqiN, RN, ONN-  Nurse Navigator Neuroendocrine Tumor Program

## 2019-03-08 NOTE — TELEPHONE ENCOUNTER
----- Message from Shilpi Davenport sent at 3/8/2019 10:05 AM CST -----  Regarding: FW: NEW  Contact: [phone]  Patients   John called because the patient has not been contacted for an appointment. Patient would like a call back today at [phone] or call John at  [phone].  ----- Message -----  From: Shilpi Davenport  Sent: 2/28/2019  11:28 AM  To: [facility] Neuroendocrine Clinical Support  Subject: NEW                                              New patient - Who called: Referral faxed from [facility] in [place] [phone] and referral placed in Epic on 2/22/19    Referred by: Sivakumar Dorsey    Why do you need to be seen? 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 Oksana    You may contact patient back to schedule at: [phone]

## 2019-03-08 NOTE — LETTER
March 8, 2019    Christiana Chan  [address] [facility] [place] [address]             [facility] [address] [place] [address]  Phone: [phone]  Fax: [phone] Dear Mrs. Chan:        Thank you for your interest in our program. It was my pleasure speaking with you about potential options for your future.      Our Multidisciplinary Neuroendocrine Tumor program is a collaboration between [facility] and [facility].  We offer local, regional and systemic forms of therapy.  Our team is actively involved in the development of new agents that can effectively shrink the disease while maintaining quality of life.  In addition to aggressive surgery, liver directed therapy and 68 Ga PET/CT scanning, our team offers many other specialized treatment options including 177 [address] DOTATATE therapy, 131 MIBG therapy, chemotherapy, targeted agents and clinical trials.          Please arrive at [facility] [place] campus at 1:15pm on 3/29/19.      The address is [address] [place] [address], first floor- [facility].  Please bring the CD of the CT scan to our office in [address], same building.    Also, please obtain a copy of the cardiology notes and /or echocardiogram and bring to this appointment.      If you have a NM Gallium Positron (Ga 68 PET/CT scan) scheduled, you will need to fast for 6 hours prior to the test.  If you have a CT or MRI scheduled, you must fast at least 4 hours prior to your test.    You will receive appointment reminders in the mail with specific preparation instruction and locations.  [facility] has may different campuses.  Pay attention to the addresses on the appointment reminders.    It is recommended that you sign up for the [facility] electronic Patient Portal. This will allow you to view your upcoming appointments and details regarding them.  You can create an account by logging into  [email or web address].                          Your first visit to our center may include testing and a consultation with one or more of our physicians. Your schedule will be created and personalized for you and discussed in detail.  Your visit can be 1-3 days.  If flight arrangements are made, plan to make the return flight after 6 pm, if possible.  [place] [place] from the [facility].  A taxi or Uber can get you to our facility quickly.    In preparation for your appointment, we ask that you gather the information below and send to us ASAP. This will enable us to review all pertinent information at the time of your visit so that recommendations can be made, and a plan of care developed for you. You case may be presented at our NET Tumor Board for final recommendations.  Please fax, email or mail the followin. Current medicine list with name of medicine, dosage and frequency  2. Name, address & phone # of your physicians for correspondence  3. Authorization for Release of Information- complete the top and sign the bottom (attached)  4. Nutritional screening - (attached)  5. A recent clinic note from your treating physician  6. Medical Records - (see guidelines below)   Lab Work:  Send the last set of basic lab work performed by your local physician (CBC, CMP).  Specific lab tests called tumor markers, such as serotonin, 5HIAA, pancreastatin etc. will be ordered.  I will attach an Outpatient Order form. You can have the labs drawn at [employer], [facility], a local hospital or your doctors office (whichever works).  Some of the special lab tests require special tubes that must be ordered by the ordering facility.  The name and phone # of the Send Out lab that performs the special labs tests is on the order.  Some labs test takes 3-4 weeks to get results so please get labs drawn asap.  When you get your lab work drawn, notify us of the date, location and phone number of the lab, for  easy follow up.  Scans:  Please mail copies of CD's and reports of your last scans to the office asap. DO NOT RE BURN the CD FROM ORIGINAL.  It is not compatible with our system. We recommend sending the cds overnight with a tracking number.  If you need updated scans, we will coordinate this with you at our facility.   Tissue Biopsy/Pathology:  If you had a tissue biopsy or surgery, with your permission, we will request to have your slides and/or tissue blocks sent to our facility for a re-read and special testing.    This testing will be billed to your insurance company.  Please provide us with a pathology report asap.  Operative or Procedure reports:  All surgery or procedure reports related to your neuroendocrine tumor should be sent to us.  Insurance Company:  You should contact your insurance company to inquire about your insurance coverage and benefits.  Ask about co-payments and deductibles when seeing a specialist.  Ask if this visit will be in Network or Out of Network.  We may be able to work with you if this visit is out of network.  Lodging:  Information on local lodging options is attached.  If interested in the [facility], it is run by the [employer] and is free of charge.  If you would like to stay there, you must call my office and coordinate with Chelsey.  She will process your completed application and forward it to the [facility].  They will check availability.  Apply EARLY, they fill up quickly.  This option is on a first come, first served bases.  You may contact the [facility] a week later to confirm your reservation and ask any questions regarding the facility. You may only stay at the [facility] 24 hours prior to your appointment and up to 24 hours after your appointment.  (THIS CAN ONLY BE USED IF YOU LIVE MORE THAN 40 MILES FROM OUR FACILITY).  Call me if you have any questions, email is not the best way to communicate with our office.    Please like us on Facebook @  [email or web address] for up to date information about what's going on in our Neuroendocrine Tumor Clinic in [place]!!  We are looking forward to meeting and taking care of you.  Sincerely,    RAJWINDER SiddiqiN, RN, ONN-  Neuroendocrine Nurse Navigator  [address]  [place]  [address]  Ph: [phone] ~ [phone] ~ fax [phone]  [email or web address]

## 2019-03-11 LAB
EXT 24 HR UR METANEPHRINE: ABNORMAL
EXT 24 HR UR NORMETANEPHRINE: ABNORMAL
EXT 24 HR UR NORMETANEPHRINE: ABNORMAL
EXT 25 HYDROXY VIT D2: ABNORMAL
EXT 25 HYDROXY VIT D3: ABNORMAL
EXT 5 HIAA 24 HR URINE: ABNORMAL
EXT 5 HIAA BLOOD: 9.2 NG/ML (ref 0–22)
EXT ACTH: ABNORMAL
EXT AFP: ABNORMAL
EXT ALBUMIN: 4.3 G/DL (ref 3.5–4.8)
EXT ALKALINE PHOSPHATASE: ABNORMAL
EXT ALT: 10 IU/L (ref 0–32)
EXT AMYLASE: ABNORMAL
EXT ANTI ISLET CELL AB: ABNORMAL
EXT ANTI PARIETAL CELL AB: ABNORMAL
EXT ANTI THYROID AB: ABNORMAL
EXT AST: 9 IU/L (ref 0–40)
EXT BILIRUBIN DIRECT: 61 IU/L (ref 39–117)
EXT BILIRUBIN TOTAL: 0.3 MG/DL (ref 0–1.2)
EXT BK VIRUS DNA QN PCR: ABNORMAL
EXT BUN: 14 MG/DL (ref 8–27)
EXT C PEPTIDE: ABNORMAL
EXT CA 125: ABNORMAL
EXT CA 19-9: ABNORMAL
EXT CA 27-29: ABNORMAL
EXT CALCITONIN: ABNORMAL
EXT CALCIUM: 9.7 MG/DL (ref 8.7–10.3)
EXT CEA: ABNORMAL
EXT CHLORIDE: 100 MMOL/L (ref 96–106)
EXT CHOLESTEROL: ABNORMAL
EXT CHROMOGRANIN A: ABNORMAL
EXT CO2: 27 MMOL/L (ref 20–29)
EXT CREATININE UA: ABNORMAL
EXT CREATININE: 0.85 MG/DL (ref 0.57–1)
EXT CYCLOSPORONE LEVEL: ABNORMAL
EXT DOPAMINE: ABNORMAL
EXT EBV DNA BY PCR: ABNORMAL
EXT EPINEPHRINE: ABNORMAL
EXT FOLATE: ABNORMAL
EXT FREE T3: ABNORMAL
EXT FREE T4: ABNORMAL
EXT FSH: ABNORMAL
EXT GASTRIN RELEASING PEPTIDE: ABNORMAL
EXT GASTRIN RELEASING PEPTIDE: ABNORMAL
EXT GASTRIN: ABNORMAL
EXT GGT: ABNORMAL
EXT GHRELIN: ABNORMAL
EXT GLUCAGON: ABNORMAL
EXT GLUCOSE: 82 MG/DL (ref 65–99)
EXT GROWTH HORMONE: ABNORMAL
EXT HCV RNA QUANT PCR: ABNORMAL
EXT HDL: ABNORMAL
EXT HEMATOCRIT: 41.2 % (ref 34–46.6)
EXT HEMOGLOBIN A1C: ABNORMAL
EXT HEMOGLOBIN: 12.7 G/DL (ref 11.1–15.9)
EXT HISTAMINE 24 HR URINE: ABNORMAL
EXT HISTAMINE: ABNORMAL
EXT IGF-1: ABNORMAL
EXT IMMUNKNOW (NON-STIMULATED): ABNORMAL
EXT IMMUNKNOW (STIMULATED): ABNORMAL
EXT INR: ABNORMAL
EXT INSULIN: ABNORMAL
EXT LANREOTIDE LEVEL: ABNORMAL
EXT LDH, TOTAL: ABNORMAL
EXT LDL CHOLESTEROL: ABNORMAL
EXT LIPASE: ABNORMAL
EXT MAGNESIUM: ABNORMAL
EXT METANEPHRINE FREE PLASMA: ABNORMAL
EXT MOTILIN: ABNORMAL
EXT NEUROKININ A CAMB: ABNORMAL
EXT NEUROKININ A ISI: <10 PG/ML (ref 0–40)
EXT NEUROTENSIN: ABNORMAL
EXT NOREPINEPHRINE: ABNORMAL
EXT NORMETANEPHRINE: ABNORMAL
EXT NSE: ABNORMAL
EXT OCTREOTIDE LEVEL: ABNORMAL
EXT PANCREASTATIN CAMB: ABNORMAL
EXT PANCREASTATIN ISI: 115 PG/ML (ref 10–135)
EXT PANCREATIC POLYPEPTIDE: ABNORMAL
EXT PHOSPHORUS: ABNORMAL
EXT PLATELETS: 434 X10E3/UL (ref 150–379)
EXT POTASSIUM: 3.9 MMOL/L (ref 3.5–5.2)
EXT PROGRAF LEVEL: ABNORMAL
EXT PROLACTIN: ABNORMAL
EXT PROTEIN TOTAL: 6.8 G/DL (ref 6–8.5)
EXT PROTEIN UA: ABNORMAL
EXT PT: ABNORMAL
EXT PTH, INTACT: ABNORMAL
EXT PTT: ABNORMAL
EXT RAPAMUNE LEVEL: ABNORMAL
EXT SEROTONIN: 72 NG/ML (ref 0–420)
EXT SODIUM: 143 MMOL/L (ref 134–144)
EXT SOMATOSTATIN: ABNORMAL
EXT SUBSTANCE P: ABNORMAL
EXT TRIGLYCERIDES: ABNORMAL
EXT TRYPTASE: ABNORMAL
EXT TSH: ABNORMAL
EXT URIC ACID: ABNORMAL
EXT URINE AMYLASE U/HR: ABNORMAL
EXT URINE AMYLASE U/L: ABNORMAL
EXT VASOACTIVE INTESTINAL POLYPEPTIDE: ABNORMAL
EXT VITAMIN B12: ABNORMAL
EXT VMA 24 HR URINE: ABNORMAL
EXT WBC: 14.7 X10E3/UL (ref 3.4–10.8)
NEURON SPECIFIC ENOLASE: ABNORMAL

## 2019-03-12 ENCOUNTER — PATIENT MESSAGE (OUTPATIENT)
Dept: NEUROLOGY | Facility: HOSPITAL | Age: 69
End: 2019-03-12

## 2019-03-21 ENCOUNTER — PATIENT MESSAGE (OUTPATIENT)
Dept: NEUROLOGY | Facility: HOSPITAL | Age: 69
End: 2019-03-21

## 2019-03-25 ENCOUNTER — PATIENT MESSAGE (OUTPATIENT)
Dept: NEUROLOGY | Facility: HOSPITAL | Age: 69
End: 2019-03-25

## 2019-03-26 ENCOUNTER — TELEPHONE (OUTPATIENT)
Dept: NEUROLOGY | Facility: HOSPITAL | Age: 69
End: 2019-03-26

## 2019-03-26 NOTE — TELEPHONE ENCOUNTER
----- Message from Florentino Pink sent at 3/26/2019  3:21 PM CDT -----  Contact: licha/John  [phone]  Patient  is returning your call.

## 2019-03-26 NOTE — TELEPHONE ENCOUNTER
----- Message from Shilpi Davenport sent at 3/20/2019  8:58 AM CDT -----  Regarding: NEW  Contact: [phone]  NEW-Patient is having trouble with back pain that shoots down both legs and has fallen several times. Patient would like a call back about this issue at [phone]

## 2019-03-29 ENCOUNTER — HOSPITAL ENCOUNTER (OUTPATIENT)
Dept: RADIOLOGY | Facility: HOSPITAL | Age: 69
Discharge: HOME | End: 2019-03-29
Attending: SURGERY
Payer: MEDICARE

## 2019-03-29 DIAGNOSIS — C7A.8 PRIMARY MALIGNANT NEUROENDOCRINE NEOPLASM OF ILEUM: ICD-10-CM

## 2019-03-29 PROCEDURE — 74183 MRI ABD W/O CNTR FLWD CNTR: CPT | Mod: 26,,, | Performed by: RADIOLOGY

## 2019-03-29 PROCEDURE — 74183 MRI ABD W/O CNTR FLWD CNTR: CPT | Mod: TC

## 2019-03-29 PROCEDURE — 74183 MRI ABDOMEN W WO CONTRAST: ICD-10-PCS | Mod: 26,,, | Performed by: RADIOLOGY

## 2019-03-29 PROCEDURE — 78815 NM PET 68GA DOTATATE WHOLE BODY: ICD-10-PCS | Mod: 26,PI,, | Performed by: RADIOLOGY

## 2019-03-29 PROCEDURE — A9585 GADOBUTROL INJECTION: HCPCS | Performed by: SURGERY

## 2019-03-29 PROCEDURE — 25500020 PHARM REV CODE 255: Performed by: SURGERY

## 2019-03-29 PROCEDURE — 78815 PET IMAGE W/CT SKULL-THIGH: CPT | Mod: 26,PI,, | Performed by: RADIOLOGY

## 2019-03-29 PROCEDURE — A9587 GALLIUM GA-68: HCPCS | Mod: TB

## 2019-03-29 PROCEDURE — 78815 PET IMAGE W/CT SKULL-THIGH: CPT | Mod: TC,PI

## 2019-03-29 RX ORDER — GADOBUTROL 604.72 MG/ML
10 INJECTION INTRAVENOUS
Status: COMPLETED | OUTPATIENT
Start: 2019-03-29 | End: 2019-03-29

## 2019-03-29 RX ADMIN — GADOBUTROL 10 ML: 604.72 INJECTION INTRAVENOUS at 02:03

## 2019-04-01 ENCOUNTER — PATIENT MESSAGE (OUTPATIENT)
Dept: NEUROLOGY | Facility: HOSPITAL | Age: 69
End: 2019-04-01

## 2019-04-04 ENCOUNTER — TELEPHONE (OUTPATIENT)
Dept: GASTROENTEROLOGY | Facility: CLINIC | Age: 69
End: 2019-04-04

## 2019-04-04 ENCOUNTER — OFFICE VISIT (OUTPATIENT)
Dept: NEUROLOGY | Facility: HOSPITAL | Age: 69
End: 2019-04-04
Attending: SURGERY
Payer: MEDICARE

## 2019-04-04 ENCOUNTER — TELEPHONE (OUTPATIENT)
Dept: NEUROLOGY | Facility: HOSPITAL | Age: 69
End: 2019-04-04

## 2019-04-04 VITALS
TEMPERATURE: 97 F | SYSTOLIC BLOOD PRESSURE: 106 MMHG | WEIGHT: 130.19 LBS | HEART RATE: 107 BPM | DIASTOLIC BLOOD PRESSURE: 72 MMHG | BODY MASS INDEX: 23.07 KG/M2 | HEIGHT: 63 IN

## 2019-04-04 DIAGNOSIS — D3A.019 CARCINOID TUMOR DETERMINED BY BIOPSY OF SMALL INTESTINE: Primary | ICD-10-CM

## 2019-04-04 DIAGNOSIS — C79.51 SPINE METASTASIS: ICD-10-CM

## 2019-04-04 DIAGNOSIS — C7A.012 MALIGNANT CARCINOID TUMOR OF ILEUM: Primary | ICD-10-CM

## 2019-04-04 PROCEDURE — 99213 OFFICE O/P EST LOW 20 MIN: CPT | Performed by: SURGERY

## 2019-04-04 RX ORDER — KETOROLAC TROMETHAMINE 10 MG/1
TABLET, FILM COATED ORAL
COMMUNITY
Start: 2019-03-17 | End: 2020-01-20

## 2019-04-04 RX ORDER — CYCLOBENZAPRINE HCL 5 MG
TABLET ORAL
Refills: 0 | COMMUNITY
Start: 2019-03-26 | End: 2020-01-20

## 2019-04-04 RX ORDER — VALACYCLOVIR HYDROCHLORIDE 500 MG/1
TABLET, FILM COATED ORAL
Refills: 1 | Status: ON HOLD | COMMUNITY
Start: 2019-01-11 | End: 2019-04-12 | Stop reason: CLARIF

## 2019-04-04 NOTE — TELEPHONE ENCOUNTER
Your Upper EUS is scheduled for 04/09/2019 at 900am       At [facility] which is located at [address] [place] [address].  You will check in at the Hospital Admit Desk located on the 1st floor of the hospital. Please call the the office if you have any questions at [phone]      Upper Endoscopic Ultrasound    Endoscopic ultrasound(EUS) is a procedure used to image the digestive tract, including pancreas, lesions in esophagus and stomach.  It is used to diagnose and stage cancers of the digestive tract.  If necessary, your doctor may need to take samples during the procedure.    A responsible adult (family member or friend) must come with you and transport you home.  You are not allowed to drive, take a taxi or bus or leave the Endoscopy Center alone.  If you do not have a responsible adult with you to take you home, your exam will be cancelled.     If you have questions about the cost of your procedure, you should contact your insurance company as soon as possible.  Please bring a picture ID and your insurance card.  You will sign  treatment authorization forms at check in.  It is necessary for you to sign these forms again even if you recently signed these at the time of your clinic visit.    Please follow instructions of  if you are taking anticoagulant/blood thinning medications such as Aggrenox, aspirin, Brilinta, Effient, Eliquis, Lovenox, Plavix, Pletal, Pradaxa, Ticilid, Xarelto or Coumadin.     Take blood pressure, heart, anti-rejection and or seizure medication at 600 am the morning of the procedure.    Skip your morning dose of insulin or other oral medications for diabetes the morning of the procedure unless instructed otherwise by your doctor.      Day Before The Procedure    Eat a light evening meal and eat no solid food after 7 pm.  You may drink clear liquids including:    Water, Coffee or decaffeinated coffee (no milk or cream)  Tea, Herbal tea  Carbonated beverages  (soft drinks), regular and sugar free  Gelatin  Apple Juice, grape juice, white cranberry juice  Gatorade, Power Aid, Crystal Light, Vahid Aid  Lemonade and Limeade  Bouillon, clear consomme'  Snowball, popsicles  DO NOT DRINK ANY LIQUIDS CONTAINING RED DYE  DO NOT DRINK ANY LIQUIDS NOT SPECIFICALLY LISTED  DO NOT DRINK ALCOHOL  NOTHING BY MOUTH AFTER MIDNIGHT    Day of Procedure    600 am take last dose of any medications you are allowed to take with a small amount of clear liquid

## 2019-04-04 NOTE — TELEPHONE ENCOUNTER
Per Paul, [facility], mri thoracic, cervical and lumbar orders must be  due to scheduling slots.  MRIs reordered.

## 2019-04-04 NOTE — H&P (VIEW-ONLY)
"NOLANETS:  [facility]  A collaboration between [facility] and [facility]      PATIENT: Christiana Chan  MRN: [id number]  DATE: 2019    Subjective:      Chief Complaint: Consult (TI primary /ref by Dr Ramirez)  multiple episodes of hospital admission for small bowel obstruction since  every other year. Had NGt decompression and discharged later  last time started to have cramping abdominal pain  No bloody BM  Has nausea sometimes vomiting    Also she has been having imbalance and she falls down several times.  She is scheduled to undergo EMG on Monday and see neurologist a week after    Currently she has cramping abdominal pain    Vitals:   Vitals:    19 1011   BP: 106/72   Pulse: 107   Temp: 97.2 °F (36.2 °C)   TempSrc: Oral   Weight: 59.1 kg (130 lb 2.9 oz)   Height: 5' 3" (1.6 m)        Karnofsky Score:     Diagnosis: No diagnosis found.     Oncologic History:     Interval History:     Past Medical History:  Past Medical History:   Diagnosis Date    Abdominal bloating     Abdominal pain     Asthma     Atrial fibrillation     Colon polyps     Constipation     Diverticulosis of colon     Esophageal ulcer     Gastritis     GERD (gastroesophageal reflux disease)     Heartburn     Hiatal hernia     HTN (hypertension)     Intermittent diarrhea     Iron deficiency anemia     Osteoarthritis     Osteoporosis     Rectal bleeding     Ruptured lumbar disc     Small bowel lesion     Vitamin B12 deficiency anemia     Vitamin D deficiency        Past Surgical History:  Past Surgical History:   Procedure Laterality Date    BREAST BIOPSY Bilateral      SECTION   &     CHOLECYSTECTOMY      COLONOSCOPY      outside facility    ENTEROSCOPY, DOUBLE BALLOON, ANTEGRADE N/A 2019    Performed by Sivakumar Dorsey MD at [facility] (2ND FLR)    ESOPHAGOGASTRODUODENOSCOPY      outside " facility    HYSTERECTOMY  1991    SHOULDER SURGERY Right 03/13/2013    TOTAL KNEE ARTHROPLASTY Right 09/16/2013       Family History:  Family History   Problem Relation Age of Onset    Diabetes Mother     Multiple myeloma Father        Allergies:  Review of patient's allergies indicates:   Allergen Reactions    Amoxicillin Other (See Comments)     Taking digoxin.    Sulfa (sulfonamide antibiotics) Itching and Swelling    Codeine Nausea Only and Other (See Comments)     Nausea and constipation.    Demerol [meperidine] Nausea Only and Other (See Comments)     Nausea and constipation.    Hydrocodone Nausea Only and Other (See Comments)     Nausea and constipation.    Epinephrine      Neuroendocrine Tumor patient      Nitrofurantoin monohyd/m-cryst        Medications:  Current Outpatient Medications   Medication Sig Dispense Refill    albuterol (PROAIR HFA) 90 mcg/actuation inhaler Inhale 1 puff into the lungs every 6 (six) hours as needed for Wheezing. Rescue      ALPRAZolam (XANAX) 2 MG Tab Take 2 mg by mouth nightly as needed.      carvedilol (COREG) 25 MG tablet Take 25 mg by mouth 2 (two) times daily.      cholecalciferol, vitamin D3, (VITAMIN D3) 2,000 unit Tab Take 1 tablet by mouth once daily.      cyanocobalamin (VITAMIN B-12) 1,000 mcg/mL injection Inject 1,000 mcg into the muscle every 14 (fourteen) days.      denosumab (PROLIA) 60 mg/mL Syrg Inject 60 mg into the skin every 6 (six) months.      dicyclomine (BENTYL) 20 mg tablet Take 20 mg by mouth every 6 (six) hours as needed (abdomenal pain and cramping.).      digoxin (LANOXIN) 125 mcg tablet Take 125 mcg by mouth once daily.      docusate sodium (COLACE) 100 MG capsule Take 100 mg by mouth daily as needed for Constipation.      estradiol (ESTRACE) 0.01 % (0.1 mg/gram) vaginal cream Place 1 g vaginally as needed.       estradiol (VAGIFEM) 10 mcg Tab Place 1 tablet vaginally twice a week.       FLUoxetine 10 MG capsule Take 10 mg by  mouth once daily.      ketorolac (TORADOL) 10 mg tablet Take by mouth.      lamoTRIgine (LAMICTAL) 100 MG tablet Take 100 mg by mouth 2 (two) times daily.      lisinopril (PRINIVIL,ZESTRIL) 20 MG tablet Take 20 mg by mouth 2 (two) times daily.      lubiprostone (AMITIZA) 24 MCG Cap Take 24 mcg by mouth as needed.       ondansetron (ZOFRAN) 8 MG tablet Take 4 mg by mouth every 4 (four) hours as needed for Nausea.       pantoprazole (PROTONIX) 20 MG tablet Take 20 mg by mouth once daily.      polyethylene glycol (GLYCOLAX) 17 gram PwPk Take 17 g by mouth 2 (two) times daily as needed (for constipation).       propranolol (INDERAL) 10 MG tablet Take 10 mg by mouth daily as needed.      spironolactone (ALDACTONE) 25 MG tablet Take 12.5 mg by mouth once daily.      cyclobenzaprine (FLEXERIL) 5 MG tablet TK 1 T PO TID FOR 10 DAYS PRF MUSCLE SPASM  0    ranitidine (ZANTAC) 300 MG tablet Take 300 mg by mouth nightly as needed for Heartburn.      valACYclovir (VALTREX) 500 MG tablet TK 1 T PO D  1     No current facility-administered medications for this visit.        Review of Systems   Constitutional: Positive for activity change, appetite change, fatigue and unexpected weight change. Negative for diaphoresis and fever.   HENT: Positive for trouble swallowing. Negative for congestion, drooling, ear discharge, ear pain, facial swelling, mouth sores, postnasal drip, rhinorrhea, sinus pressure, sinus pain, sneezing and voice change.    Respiratory: Positive for wheezing. Negative for cough, choking, chest tightness, shortness of breath and stridor.    Cardiovascular: Negative for palpitations and leg swelling.   Gastrointestinal: Positive for constipation, nausea and vomiting. Negative for abdominal pain, anal bleeding and blood in stool.   Endocrine: Negative.    Genitourinary: Negative.    Musculoskeletal: Positive for arthralgias and back pain. Negative for gait problem, joint swelling, neck pain and neck  stiffness.   Skin: Negative for pallor and rash.   Allergic/Immunologic: Negative.    Neurological: Positive for tremors and headaches. Negative for seizures, facial asymmetry, weakness and numbness.   Hematological: Negative for adenopathy. Does not bruise/bleed easily.   Psychiatric/Behavioral: Negative for behavioral problems and confusion.      Objective:      Physical Exam   Constitutional: She is oriented to person, place, and time. No distress.   HENT:   Head: Normocephalic.   Right Ear: External ear normal.   Left Ear: External ear normal.   Eyes: Pupils are equal, round, and reactive to light. Conjunctivae and EOM are normal.   Neck: Normal range of motion.   Cardiovascular: Normal rate and regular rhythm.   Pulmonary/Chest: Effort normal and breath sounds normal.   Abdominal: Soft. Bowel sounds are normal. She exhibits no mass. There is no tenderness. There is no rebound. No hernia.   Musculoskeletal: Normal range of motion.   Neurological: She is alert and oriented to person, place, and time.   Skin: Skin is warm and dry. She is not diaphoretic.   Psychiatric: She has a normal mood and affect. Her behavior is normal.      Assessment:       No diagnosis found.    Laboratory Data:   Results for EBONY ROBERTSON (MRN [id number]) as of 4/4/2019 11:27   Ref. Range 3/11/2019 00:00   EXT 5 HIAA BLOOD Latest Ref Range: 0 - 22 ng/ml 9.2   EXT Albumin Latest Ref Range: 3.5 - 4.8 g/dl 4.3   EXT ALT Latest Ref Range: 0 - 32 iu/l 10   EXT AST Latest Ref Range: 0 - 40 iu/l 9   EXT Bilirubin Direct Latest Ref Range: 39 - 117 iu/l 61   EXT BilirubiN Total Latest Ref Range: 0.0 - 1.2 mg/dl 0.3   EXT BUN Latest Ref Range: 8 - 27 mg/dl 14   EXT Calcium Latest Ref Range: 8.7 - 10.3 mg/dl 9.7   EXT Chloride Latest Ref Range: 96 - 106 mmol/l 100 (A)   EXT CO2 Latest Ref Range: 20 - 29 mmol/l 27   EXT Creatinine Latest Ref Range: 0.57 - 1.00 mg/dl 0.85   EXT Glucose Latest Ref Range: 65 - 99 mg/dl 82   EXT Hematocrit Latest Ref  Range: 34.0 - 46.6 % 41.2   EXT Hemoglobin Latest Ref Range: 11.1 - 15.9 g/dl 12.7   EXT NEUROKININ A DEEPTHI Latest Ref Range: 0 - 40.0 pg/ml <10.0   EXT PANCREASTATIN DEEPTHI Latest Ref Range: 10 - 135 pg/ml 115   EXT Platelets Latest Ref Range: 150 - 379 x10e3/ul 434 (A)   EXT Potassium Latest Ref Range: 3.5 - 5.2 mmol/l 3.9   EXT Protein total Latest Ref Range: 6.0 - 8.5 g/dl 6.8   EXT SEROTONIN Latest Ref Range: 0 - 420 ng/ml 72   EXT Sodium Latest Ref Range: 134 - 144 mmol/l 143   EXT WBC Latest Ref Range: 3.4 - 10.8 x10e3/ul 14.7 (A)     Ga68 Dotatate Whole Body   All Reviewers List     Rebecca Valdez MD on 4/1/2019 15:02   NM PET Ga68 Dotatate Whole Body   Order: [id number]   Status:  Final result   Visible to patient:  Yes (Patient Portal) Next appt:  04/05/2019 at 01:00 PM in Radiology ([facility]) Dx:  Primary malignant neuroendocrine neop...   Details     Reading Physician Reading Date Result Priority   Brooke Goldberg MD 4/1/2019    Ashok Godoy MD 4/1/2019       Narrative     EXAMINATION:  NM PET 68GA DOTATATE WHOLE BODY    CLINICAL HISTORY:  Other malignant neuroendocrine tumors    TECHNIQUE:  5.15 mCi of GA68 Dotatate was administered intravenously in the left antecubital fossa. After an approximately 60 min distribution time, PET/CT images were acquired from the skull vertex to the mid thigh. Transmission images were acquired to correct for attenuation using a whole body low-dose CT scan without contrast with the arms positioned above the head.    COMPARISON:  MRI abdomen with and without contrast 03/29/2019    FINDINGS:  Quality of the study: Adequate.    In this patient with known multifocal small bowel carcinoid tumor, the patient's small bowel carcinoid is not able to be differentiated from underlying small bowel activity.    Midline small-bowel hypermetabolism with SUV max 9.47.  Right sided focal small-bowel activity with SUV max 4.82.    Two additional foci of increased  radiotracer uptake are noted, and these are indeterminate.  Left inferior zygomatic arch SUV max 3.99. Left inguinal lymph node SUV max 2.87.  These may be non neoplastic in etiology.  Attention on follow-up examination is recommended.    Physiologic uptake of the tracer is seen within the pituitary, liver, spleen, kidneys, adrenal glands and bowel.    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  Pancreatic cystic lesion not as well delineated as on the prior MRI examination.  T12 vertebral body lesion without definite increased radiotracer uptake.  Ground-glass opacification throughout the lungs in a non masslike distribution.      Impression       This patient with known multifocal small bowel carcinoid tumor the patient small bowel carcinoid midline small bowel hypermetabolism focal masslike area with SUV max 9.47.  Right-sided small-bowel hypermetabolism with SUV max 4.82.  These do not correspond to the terminal ileum and may represent neoplasm or normal small bowel            All Reviewers List     Rebecca Valdez MD on 3/31/2019 09:07   Maricruz James RN on 3/29/2019 17:27   MRI Abdomen W WO Contrast   Order: [id number]   Status:  Final result   Visible to patient:  Yes (Patient Portal) Next appt:  04/05/2019 at 01:00 PM in Radiology ([facility]) Dx:  Primary malignant neuroendocrine neop...   Details     Reading Physician Reading Date Result Priority   Flor Olson MD 3/29/2019       Narrative     EXAMINATION:  MRI ABDOMEN W WO CONTRAST    CLINICAL HISTORY:  TI NET;  Other malignant neuroendocrine tumors    TECHNIQUE:  Multiplanar, multisequence images are performed through the liver and upper abdomen.  Images were obtained before and after administration of IV Gadavist, 10 cc.    COMPARISON:  CT abdomen pelvis 02/17/2019    FINDINGS:  Heart size is normal.  No pericardial effusion.  Lung bases are clear.    No hepatic iron or fat deposition.  Liver is normal in size,  morphology and with smooth contour.  No focal hepatic lesion.  Moderate intra and extrahepatic biliary ductal dilatation, similar to prior exam.  The common bile duct demonstrates smooth tapering to the level of the ampulla.  No intraductal filling defect.  Gallbladder surgically absent.  Portal, splenic and superior mesenteric veins are patent.    Spleen and adrenal glands are normal.  Kidneys enhance symmetrically without hydronephrosis.  Multiple bilateral simple cysts.    Pancreas demonstrates normal T1 signal hyperintensity proximally, however the distal pancreas demonstrates decreased T1 signal.  T2 hyperintense 6 mm lesion within the pancreatic head (image 16, series 9).  No peripancreatic fluid.  Pancreas appears normal in caliber.    No bowel dilatation.  Abdominal aorta is normal in caliber.  No enlarged retroperitoneal or mesenteric lymph nodes.    Heterogeneously enhancing lesion within the T12 vertebral body without dropout on fat saturation images.  No aggressive features such as cortical breakthrough or mass effect.      Impression       Interval resolution of pancreatitis.    Pancreatic head 6 mm cystic lesion, most likely a side branch IPMN.  Recommend 2 year imaging follow-up.    T12 vertebral body lesion, although may represent an atypical hemangioma, is strictly indeterminate.    Moderate intra and extrahepatic biliary ductal dilatation likely due to post cholecystectomy state.      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  She has symptoms of impending obstruction, abdominal pain, weakness anemia and malabsorption.  In the MRI and the gallium scan there is no evidence of any metastatic disease.    There is a lesion in the T12 area this needs to be workup to rule out old fracture versus  metastatic disease versus benign lesion in the vertebrae.    She was accompanied by her spouse and her daughter.  I had talked to her about the need for surgery the natural course of the disease the natural history of carcinoid disease and the need for long follow-up and different surgical and medical options.  I went over with him the the risk of surgery such as bleeding infection DVT PE MI anastomotic leaks requiring more surgery prolonged recovery time.    I told her there is no evidence of any metastatic disease at this point however I during exploration my a.m. plan is to examine her the whole inside of the abdominal cavity. 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 She needs to EUS.    One of the significant complaint is ambulation in coordination and weakness resulting in falls.  She is scheduled for EMG and follow up with neurologist.  Plan:       Will coordinate EMG, EUS and MRI    She needs ex lap for small bowel resection for multicentric a carcinoid tumors.  Will need cardiac workup prior to assess surgical intervention  Spent more than 45 min in talking to him about the disease natural history treatment options and overall plan                  MICHELLE Fernandez MD, FACS   Associate Professor of Surgery, [facility]   Neuroendocrine Surgery, Hepatic/Pancreatic & General Surgery   [address]   [place] [address]   ph. [phone]; [phone]   fax. [phone]

## 2019-04-04 NOTE — PATIENT INSTRUCTIONS
MRI of spine on Friday, 4/5/19 [facility], [address] [facility]    Endoscopy staff will contact with date and time of EUS.    Dr. Laguerre will contact you with date of surgery.

## 2019-04-04 NOTE — TELEPHONE ENCOUNTER
----- Message from Ly Tapia sent at 4/4/2019 12:34 PM CDT -----  Contact: [phone]/ Rohan with [facility]  Called in stating orders for MRI have to be split, as she was informed by tech.

## 2019-04-04 NOTE — PROGRESS NOTES
"NOLANETS:  [facility]  A collaboration between [facility] and [facility]      PATIENT: Christiana Chan  MRN: [id number]  DATE: 2019    Subjective:      Chief Complaint: Consult (TI primary /ref by Dr Ramirez)  multiple episodes of hospital admission for small bowel obstruction since  every other year. Had NGt decompression and discharged later  last time started to have cramping abdominal pain  No bloody BM  Has nausea sometimes vomiting    Also she has been having imbalance and she falls down several times.  She is scheduled to undergo EMG on Monday and see neurologist a week after    Currently she has cramping abdominal pain    Vitals:   Vitals:    19 1011   BP: 106/72   Pulse: 107   Temp: 97.2 °F (36.2 °C)   TempSrc: Oral   Weight: 59.1 kg (130 lb 2.9 oz)   Height: 5' 3" (1.6 m)        Karnofsky Score:     Diagnosis: No diagnosis found.     Oncologic History:     Interval History:     Past Medical History:  Past Medical History:   Diagnosis Date    Abdominal bloating     Abdominal pain     Asthma     Atrial fibrillation     Colon polyps     Constipation     Diverticulosis of colon     Esophageal ulcer     Gastritis     GERD (gastroesophageal reflux disease)     Heartburn     Hiatal hernia     HTN (hypertension)     Intermittent diarrhea     Iron deficiency anemia     Osteoarthritis     Osteoporosis     Rectal bleeding     Ruptured lumbar disc     Small bowel lesion     Vitamin B12 deficiency anemia     Vitamin D deficiency        Past Surgical History:  Past Surgical History:   Procedure Laterality Date    BREAST BIOPSY Bilateral      SECTION   &     CHOLECYSTECTOMY      COLONOSCOPY      outside facility    ENTEROSCOPY, DOUBLE BALLOON, ANTEGRADE N/A 2019    Performed by Sivakumar Dorsey MD at [facility] (2ND FLR)    ESOPHAGOGASTRODUODENOSCOPY      outside " facility    HYSTERECTOMY  1991    SHOULDER SURGERY Right 03/13/2013    TOTAL KNEE ARTHROPLASTY Right 09/16/2013       Family History:  Family History   Problem Relation Age of Onset    Diabetes Mother     Multiple myeloma Father        Allergies:  Review of patient's allergies indicates:   Allergen Reactions    Amoxicillin Other (See Comments)     Taking digoxin.    Sulfa (sulfonamide antibiotics) Itching and Swelling    Codeine Nausea Only and Other (See Comments)     Nausea and constipation.    Demerol [meperidine] Nausea Only and Other (See Comments)     Nausea and constipation.    Hydrocodone Nausea Only and Other (See Comments)     Nausea and constipation.    Epinephrine      Neuroendocrine Tumor patient      Nitrofurantoin monohyd/m-cryst        Medications:  Current Outpatient Medications   Medication Sig Dispense Refill    albuterol (PROAIR HFA) 90 mcg/actuation inhaler Inhale 1 puff into the lungs every 6 (six) hours as needed for Wheezing. Rescue      ALPRAZolam (XANAX) 2 MG Tab Take 2 mg by mouth nightly as needed.      carvedilol (COREG) 25 MG tablet Take 25 mg by mouth 2 (two) times daily.      cholecalciferol, vitamin D3, (VITAMIN D3) 2,000 unit Tab Take 1 tablet by mouth once daily.      cyanocobalamin (VITAMIN B-12) 1,000 mcg/mL injection Inject 1,000 mcg into the muscle every 14 (fourteen) days.      denosumab (PROLIA) 60 mg/mL Syrg Inject 60 mg into the skin every 6 (six) months.      dicyclomine (BENTYL) 20 mg tablet Take 20 mg by mouth every 6 (six) hours as needed (abdomenal pain and cramping.).      digoxin (LANOXIN) 125 mcg tablet Take 125 mcg by mouth once daily.      docusate sodium (COLACE) 100 MG capsule Take 100 mg by mouth daily as needed for Constipation.      estradiol (ESTRACE) 0.01 % (0.1 mg/gram) vaginal cream Place 1 g vaginally as needed.       estradiol (VAGIFEM) 10 mcg Tab Place 1 tablet vaginally twice a week.       FLUoxetine 10 MG capsule Take 10 mg by  mouth once daily.      ketorolac (TORADOL) 10 mg tablet Take by mouth.      lamoTRIgine (LAMICTAL) 100 MG tablet Take 100 mg by mouth 2 (two) times daily.      lisinopril (PRINIVIL,ZESTRIL) 20 MG tablet Take 20 mg by mouth 2 (two) times daily.      lubiprostone (AMITIZA) 24 MCG Cap Take 24 mcg by mouth as needed.       ondansetron (ZOFRAN) 8 MG tablet Take 4 mg by mouth every 4 (four) hours as needed for Nausea.       pantoprazole (PROTONIX) 20 MG tablet Take 20 mg by mouth once daily.      polyethylene glycol (GLYCOLAX) 17 gram PwPk Take 17 g by mouth 2 (two) times daily as needed (for constipation).       propranolol (INDERAL) 10 MG tablet Take 10 mg by mouth daily as needed.      spironolactone (ALDACTONE) 25 MG tablet Take 12.5 mg by mouth once daily.      cyclobenzaprine (FLEXERIL) 5 MG tablet TK 1 T PO TID FOR 10 DAYS PRF MUSCLE SPASM  0    ranitidine (ZANTAC) 300 MG tablet Take 300 mg by mouth nightly as needed for Heartburn.      valACYclovir (VALTREX) 500 MG tablet TK 1 T PO D  1     No current facility-administered medications for this visit.        Review of Systems   Constitutional: Positive for activity change, appetite change, fatigue and unexpected weight change. Negative for diaphoresis and fever.   HENT: Positive for trouble swallowing. Negative for congestion, drooling, ear discharge, ear pain, facial swelling, mouth sores, postnasal drip, rhinorrhea, sinus pressure, sinus pain, sneezing and voice change.    Respiratory: Positive for wheezing. Negative for cough, choking, chest tightness, shortness of breath and stridor.    Cardiovascular: Negative for palpitations and leg swelling.   Gastrointestinal: Positive for constipation, nausea and vomiting. Negative for abdominal pain, anal bleeding and blood in stool.   Endocrine: Negative.    Genitourinary: Negative.    Musculoskeletal: Positive for arthralgias and back pain. Negative for gait problem, joint swelling, neck pain and neck  stiffness.   Skin: Negative for pallor and rash.   Allergic/Immunologic: Negative.    Neurological: Positive for tremors and headaches. Negative for seizures, facial asymmetry, weakness and numbness.   Hematological: Negative for adenopathy. Does not bruise/bleed easily.   Psychiatric/Behavioral: Negative for behavioral problems and confusion.      Objective:      Physical Exam   Constitutional: She is oriented to person, place, and time. No distress.   HENT:   Head: Normocephalic.   Right Ear: External ear normal.   Left Ear: External ear normal.   Eyes: Pupils are equal, round, and reactive to light. Conjunctivae and EOM are normal.   Neck: Normal range of motion.   Cardiovascular: Normal rate and regular rhythm.   Pulmonary/Chest: Effort normal and breath sounds normal.   Abdominal: Soft. Bowel sounds are normal. She exhibits no mass. There is no tenderness. There is no rebound. No hernia.   Musculoskeletal: Normal range of motion.   Neurological: She is alert and oriented to person, place, and time.   Skin: Skin is warm and dry. She is not diaphoretic.   Psychiatric: She has a normal mood and affect. Her behavior is normal.      Assessment:       No diagnosis found.    Laboratory Data:   Results for EBONY ROBERTSON (MRN [id number]) as of 4/4/2019 11:27   Ref. Range 3/11/2019 00:00   EXT 5 HIAA BLOOD Latest Ref Range: 0 - 22 ng/ml 9.2   EXT Albumin Latest Ref Range: 3.5 - 4.8 g/dl 4.3   EXT ALT Latest Ref Range: 0 - 32 iu/l 10   EXT AST Latest Ref Range: 0 - 40 iu/l 9   EXT Bilirubin Direct Latest Ref Range: 39 - 117 iu/l 61   EXT BilirubiN Total Latest Ref Range: 0.0 - 1.2 mg/dl 0.3   EXT BUN Latest Ref Range: 8 - 27 mg/dl 14   EXT Calcium Latest Ref Range: 8.7 - 10.3 mg/dl 9.7   EXT Chloride Latest Ref Range: 96 - 106 mmol/l 100 (A)   EXT CO2 Latest Ref Range: 20 - 29 mmol/l 27   EXT Creatinine Latest Ref Range: 0.57 - 1.00 mg/dl 0.85   EXT Glucose Latest Ref Range: 65 - 99 mg/dl 82   EXT Hematocrit Latest Ref  Range: 34.0 - 46.6 % 41.2   EXT Hemoglobin Latest Ref Range: 11.1 - 15.9 g/dl 12.7   EXT NEUROKININ A DEEPTHI Latest Ref Range: 0 - 40.0 pg/ml <10.0   EXT PANCREASTATIN DEEPTHI Latest Ref Range: 10 - 135 pg/ml 115   EXT Platelets Latest Ref Range: 150 - 379 x10e3/ul 434 (A)   EXT Potassium Latest Ref Range: 3.5 - 5.2 mmol/l 3.9   EXT Protein total Latest Ref Range: 6.0 - 8.5 g/dl 6.8   EXT SEROTONIN Latest Ref Range: 0 - 420 ng/ml 72   EXT Sodium Latest Ref Range: 134 - 144 mmol/l 143   EXT WBC Latest Ref Range: 3.4 - 10.8 x10e3/ul 14.7 (A)     Ga68 Dotatate Whole Body   All Reviewers List     Rebecca Valdez MD on 4/1/2019 15:02   NM PET Ga68 Dotatate Whole Body   Order: [id number]   Status:  Final result   Visible to patient:  Yes (Patient Portal) Next appt:  04/05/2019 at 01:00 PM in Radiology ([facility]) Dx:  Primary malignant neuroendocrine neop...   Details     Reading Physician Reading Date Result Priority   Brooke Goldberg MD 4/1/2019    Ashok Godoy MD 4/1/2019       Narrative     EXAMINATION:  NM PET 68GA DOTATATE WHOLE BODY    CLINICAL HISTORY:  Other malignant neuroendocrine tumors    TECHNIQUE:  5.15 mCi of GA68 Dotatate was administered intravenously in the left antecubital fossa. After an approximately 60 min distribution time, PET/CT images were acquired from the skull vertex to the mid thigh. Transmission images were acquired to correct for attenuation using a whole body low-dose CT scan without contrast with the arms positioned above the head.    COMPARISON:  MRI abdomen with and without contrast 03/29/2019    FINDINGS:  Quality of the study: Adequate.    In this patient with known multifocal small bowel carcinoid tumor, the patient's small bowel carcinoid is not able to be differentiated from underlying small bowel activity.    Midline small-bowel hypermetabolism with SUV max 9.47.  Right sided focal small-bowel activity with SUV max 4.82.    Two additional foci of increased  radiotracer uptake are noted, and these are indeterminate.  Left inferior zygomatic arch SUV max 3.99. Left inguinal lymph node SUV max 2.87.  These may be non neoplastic in etiology.  Attention on follow-up examination is recommended.    Physiologic uptake of the tracer is seen within the pituitary, liver, spleen, kidneys, adrenal glands and bowel.    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  Pancreatic cystic lesion not as well delineated as on the prior MRI examination.  T12 vertebral body lesion without definite increased radiotracer uptake.  Ground-glass opacification throughout the lungs in a non masslike distribution.      Impression       This patient with known multifocal small bowel carcinoid tumor the patient small bowel carcinoid midline small bowel hypermetabolism focal masslike area with SUV max 9.47.  Right-sided small-bowel hypermetabolism with SUV max 4.82.  These do not correspond to the terminal ileum and may represent neoplasm or normal small bowel            All Reviewers List     Rebecca Valdez MD on 3/31/2019 09:07   Maricruz James RN on 3/29/2019 17:27   MRI Abdomen W WO Contrast   Order: [id number]   Status:  Final result   Visible to patient:  Yes (Patient Portal) Next appt:  04/05/2019 at 01:00 PM in Radiology ([facility]) Dx:  Primary malignant neuroendocrine neop...   Details     Reading Physician Reading Date Result Priority   Flor Olson MD 3/29/2019       Narrative     EXAMINATION:  MRI ABDOMEN W WO CONTRAST    CLINICAL HISTORY:  TI NET;  Other malignant neuroendocrine tumors    TECHNIQUE:  Multiplanar, multisequence images are performed through the liver and upper abdomen.  Images were obtained before and after administration of IV Gadavist, 10 cc.    COMPARISON:  CT abdomen pelvis 02/17/2019    FINDINGS:  Heart size is normal.  No pericardial effusion.  Lung bases are clear.    No hepatic iron or fat deposition.  Liver is normal in size,  morphology and with smooth contour.  No focal hepatic lesion.  Moderate intra and extrahepatic biliary ductal dilatation, similar to prior exam.  The common bile duct demonstrates smooth tapering to the level of the ampulla.  No intraductal filling defect.  Gallbladder surgically absent.  Portal, splenic and superior mesenteric veins are patent.    Spleen and adrenal glands are normal.  Kidneys enhance symmetrically without hydronephrosis.  Multiple bilateral simple cysts.    Pancreas demonstrates normal T1 signal hyperintensity proximally, however the distal pancreas demonstrates decreased T1 signal.  T2 hyperintense 6 mm lesion within the pancreatic head (image 16, series 9).  No peripancreatic fluid.  Pancreas appears normal in caliber.    No bowel dilatation.  Abdominal aorta is normal in caliber.  No enlarged retroperitoneal or mesenteric lymph nodes.    Heterogeneously enhancing lesion within the T12 vertebral body without dropout on fat saturation images.  No aggressive features such as cortical breakthrough or mass effect.      Impression       Interval resolution of pancreatitis.    Pancreatic head 6 mm cystic lesion, most likely a side branch IPMN.  Recommend 2 year imaging follow-up.    T12 vertebral body lesion, although may represent an atypical hemangioma, is strictly indeterminate.    Moderate intra and extrahepatic biliary ductal dilatation likely due to post cholecystectomy state.      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  She has symptoms of impending obstruction, abdominal pain, weakness anemia and malabsorption.  In the MRI and the gallium scan there is no evidence of any metastatic disease.    There is a lesion in the T12 area this needs to be workup to rule out old fracture versus  metastatic disease versus benign lesion in the vertebrae.    She was accompanied by her spouse and her daughter.  I had talked to her about the need for surgery the natural course of the disease the natural history of carcinoid disease and the need for long follow-up and different surgical and medical options.  I went over with him the the risk of surgery such as bleeding infection DVT PE MI anastomotic leaks requiring more surgery prolonged recovery time.    I told her there is no evidence of any metastatic disease at this point however I during exploration my a.m. plan is to examine her the whole inside of the abdominal cavity. 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 She needs to EUS.    One of the significant complaint is ambulation in coordination and weakness resulting in falls.  She is scheduled for EMG and follow up with neurologist.  Plan:       Will coordinate EMG, EUS and MRI    She needs ex lap for small bowel resection for multicentric a carcinoid tumors.  Will need cardiac workup prior to assess surgical intervention  Spent more than 45 min in talking to him about the disease natural history treatment options and overall plan                  MICHELLE Fernandez MD, FACS   Associate Professor of Surgery, [facility]   Neuroendocrine Surgery, Hepatic/Pancreatic & General Surgery   [address]   [place] [address]   ph. [phone]; [phone]   fax. [phone]

## 2019-04-04 NOTE — TELEPHONE ENCOUNTER
----- Message from Shabnam Menchaca LPN sent at 4/4/2019 12:00 PM CDT -----  EUS required on Tuesday, 4/9/19.  Please contact pt to schedule.  Please let me know what you can do.  Thanks.

## 2019-04-05 ENCOUNTER — HOSPITAL ENCOUNTER (OUTPATIENT)
Dept: RADIOLOGY | Facility: HOSPITAL | Age: 69
Discharge: HOME | End: 2019-04-05
Attending: SURGERY
Payer: MEDICARE

## 2019-04-05 ENCOUNTER — TELEPHONE (OUTPATIENT)
Dept: ENDOSCOPY | Facility: HOSPITAL | Age: 69
End: 2019-04-05

## 2019-04-05 DIAGNOSIS — C79.51 SPINE METASTASIS: ICD-10-CM

## 2019-04-05 DIAGNOSIS — C7A.012 MALIGNANT CARCINOID TUMOR OF ILEUM: ICD-10-CM

## 2019-04-05 PROCEDURE — A9585 GADOBUTROL INJECTION: HCPCS | Mod: PO | Performed by: SURGERY

## 2019-04-05 PROCEDURE — 72157 MRI CHEST SPINE W/O & W/DYE: CPT | Mod: TC,PO

## 2019-04-05 PROCEDURE — 72158 MRI LUMBAR SPINE W/O & W/DYE: CPT | Mod: TC,PO

## 2019-04-05 PROCEDURE — 25500020 PHARM REV CODE 255: Mod: PO | Performed by: SURGERY

## 2019-04-05 PROCEDURE — 72156 MRI NECK SPINE W/O & W/DYE: CPT | Mod: TC,PO

## 2019-04-05 RX ORDER — GADOBUTROL 604.72 MG/ML
10 INJECTION INTRAVENOUS
Status: COMPLETED | OUTPATIENT
Start: 2019-04-05 | End: 2019-04-05

## 2019-04-05 RX ORDER — GADOBUTROL 604.72 MG/ML
10 INJECTION INTRAVENOUS ONCE
Status: DISCONTINUED | OUTPATIENT
Start: 2019-04-05 | End: 2019-04-05 | Stop reason: SDUPTHER

## 2019-04-05 RX ADMIN — GADOBUTROL 10 ML: 604.72 INJECTION INTRAVENOUS at 02:04

## 2019-04-05 NOTE — TELEPHONE ENCOUNTER
Spoke with patient about 0900 tuesday arrival time. Clear liquids past 7pm the day before the procedure. NPO past midnight. Please take blood pressure, heart seizure medication the morning of the procedure. Hold all diabetic medication the morning of the procedure. Leave all valuable at home.  Please have a ride available the day of the procedure.

## 2019-04-06 RX ORDER — FAMOTIDINE 10 MG/ML
20 INJECTION INTRAVENOUS
Status: CANCELLED | OUTPATIENT
Start: 2019-04-06

## 2019-04-06 RX ORDER — CIPROFLOXACIN 2 MG/ML
400 INJECTION, SOLUTION INTRAVENOUS
Status: CANCELLED | OUTPATIENT
Start: 2019-04-06

## 2019-04-06 RX ORDER — PREGABALIN 75 MG/1
75 CAPSULE ORAL
Status: CANCELLED | OUTPATIENT
Start: 2019-04-06

## 2019-04-06 RX ORDER — ENOXAPARIN SODIUM 100 MG/ML
30 INJECTION SUBCUTANEOUS
Status: CANCELLED | OUTPATIENT
Start: 2019-04-06

## 2019-04-06 RX ORDER — ONDANSETRON 2 MG/ML
8 INJECTION INTRAMUSCULAR; INTRAVENOUS
Status: CANCELLED | OUTPATIENT
Start: 2019-04-06

## 2019-04-06 RX ORDER — KETOROLAC TROMETHAMINE 15 MG/ML
15 INJECTION, SOLUTION INTRAMUSCULAR; INTRAVENOUS
Status: CANCELLED | OUTPATIENT
Start: 2019-04-06 | End: 2019-04-09

## 2019-04-06 RX ORDER — METRONIDAZOLE 500 MG/100ML
500 INJECTION, SOLUTION INTRAVENOUS
Status: CANCELLED | OUTPATIENT
Start: 2019-04-06

## 2019-04-06 RX ORDER — ACETAMINOPHEN 500 MG
1000 TABLET ORAL
Status: CANCELLED | OUTPATIENT
Start: 2019-04-06 | End: 2019-04-06

## 2019-04-08 ENCOUNTER — TELEPHONE (OUTPATIENT)
Dept: ENDOSCOPY | Facility: HOSPITAL | Age: 69
End: 2019-04-08

## 2019-04-08 ENCOUNTER — TELEPHONE (OUTPATIENT)
Dept: NEUROLOGY | Facility: HOSPITAL | Age: 69
End: 2019-04-08

## 2019-04-08 NOTE — TELEPHONE ENCOUNTER
----- Message from Chela Rodas sent at 4/8/2019  8:39 AM CDT -----  Contact: self / [phone]  Patient is requesting a call back regarding, release of information paper work to insurance company. Please advise

## 2019-04-08 NOTE — TELEPHONE ENCOUNTER
Spoke with patient about NEW arrival time @ 0830.     NPO status reviewed: Patient must have nothing to eat after midnight.  Pt may have CLEAR liquids ONLY until completely NPO 3 hrs @ 0530.    Medications: Do not take Insulin or oral diabetic medications the day of the procedure.  Take as prescribed: heart, seizure and blood pressure medication in the morning with a sip of water (less than an ounce).  Take any breathing medications and bring inhalers to hospital with you Leave all valuables and jewelry at home.     Wear comfortable clothes to procedure to change into hospital gown You cannot drive for 24 hours after your procedure because you will receive sedation for your procedure to make you comfortable.  A ride must be provided at discharge.

## 2019-04-08 NOTE — TELEPHONE ENCOUNTER
----- Message from Renetta Gomez sent at 4/8/2019 12:40 PM CDT -----  Contact: marcus gallagher -  - [phone]  Sanju    Needs Advice    Reason for call: has questions re her appt tomorrow        Communication Preference:marcus aileen -  - [phone]    Additional Information:

## 2019-04-08 NOTE — TELEPHONE ENCOUNTER
----- Message from Haritha Lemus sent at 4/8/2019 12:49 PM CDT -----  Contact: pt  Pt would like to be called back regarding her surgery    Pt can be reached at [phone]

## 2019-04-08 NOTE — TELEPHONE ENCOUNTER
Sukhwinder, , notified pt is to have clear liquids on Wednesday, 4/10/19 with nothing to eat or drink after midnight.   repeated instructions correctly.

## 2019-04-08 NOTE — TELEPHONE ENCOUNTER
John, , notified surgery scheduled on 4/11/19.   notified he will be contacted with prep instruction on Tuesday, 4/9/19.

## 2019-04-09 ENCOUNTER — ANESTHESIA (OUTPATIENT)
Dept: ENDOSCOPY | Facility: HOSPITAL | Age: 69
DRG: 330 | End: 2019-04-09
Payer: MEDICARE

## 2019-04-09 ENCOUNTER — ANESTHESIA EVENT (OUTPATIENT)
Dept: ENDOSCOPY | Facility: HOSPITAL | Age: 69
DRG: 330 | End: 2019-04-09
Payer: MEDICARE

## 2019-04-09 PROBLEM — K86.2 PANCREATIC CYST: Status: ACTIVE | Noted: 2019-04-09

## 2019-04-09 PROCEDURE — 63600175 PHARM REV CODE 636 W HCPCS: Performed by: NURSE ANESTHETIST, CERTIFIED REGISTERED

## 2019-04-09 RX ORDER — PROPOFOL 10 MG/ML
VIAL (ML) INTRAVENOUS
Status: DISCONTINUED | OUTPATIENT
Start: 2019-04-09 | End: 2019-04-09

## 2019-04-09 RX ORDER — LIDOCAINE HCL/PF 100 MG/5ML
SYRINGE (ML) INTRAVENOUS
Status: DISCONTINUED | OUTPATIENT
Start: 2019-04-09 | End: 2019-04-09

## 2019-04-09 RX ORDER — PROPOFOL 10 MG/ML
VIAL (ML) INTRAVENOUS CONTINUOUS PRN
Status: DISCONTINUED | OUTPATIENT
Start: 2019-04-09 | End: 2019-04-09

## 2019-04-09 RX ADMIN — PROPOFOL 150 MCG/KG/MIN: 10 INJECTION, EMULSION INTRAVENOUS at 11:04

## 2019-04-09 RX ADMIN — LIDOCAINE HYDROCHLORIDE 50 MG: 20 INJECTION, SOLUTION INTRAVENOUS at 11:04

## 2019-04-09 RX ADMIN — PROPOFOL 40 MG: 10 INJECTION, EMULSION INTRAVENOUS at 11:04

## 2019-04-09 RX ADMIN — PROPOFOL 30 MG: 10 INJECTION, EMULSION INTRAVENOUS at 11:04

## 2019-04-09 NOTE — TRANSFER OF CARE
"Anesthesia Transfer of Care Note    Patient: Christiana Chan    Procedure(s) Performed: Procedure(s) (LRB):  ULTRASOUND-ENDOSCOPIC-UPPER (N/A)    Patient location: GI    Anesthesia Type: MAC    Transport from OR: Transported from OR on room air with adequate spontaneous ventilation    Post pain: adequate analgesia    Post assessment: no apparent anesthetic complications    Post vital signs: stable    Level of consciousness: awake, alert and oriented    Nausea/Vomiting: no nausea/vomiting    Complications: none    Transfer of care protocol was followed      Last vitals:   Visit Vitals  /70   Pulse 88   Temp 36.7 °C (98.1 °F)   Resp 18   Ht 5' 3" (1.6 m)   Wt 59 kg (130 lb)   SpO2 99%   Breastfeeding? No   BMI 23.03 kg/m²     "

## 2019-04-09 NOTE — ANESTHESIA POSTPROCEDURE EVALUATION
Anesthesia Post Evaluation    Patient: Christiana Chan    Procedure(s) Performed: Procedure(s) (LRB):  ULTRASOUND-ENDOSCOPIC-UPPER (N/A)    Final Anesthesia Type: MAC  Patient location during evaluation: GI PACU  Patient participation: Yes- Able to Participate  Level of consciousness: awake and alert and oriented  Post-procedure vital signs: reviewed and stable  Pain management: adequate  Airway patency: patent  PONV status at discharge: No PONV  Anesthetic complications: no      Cardiovascular status: blood pressure returned to baseline and hemodynamically stable  Respiratory status: unassisted, spontaneous ventilation and room air  Hydration status: euvolemic  Follow-up not needed.          Vitals Value Taken Time   /70 4/9/2019  8:59 AM   Temp 36.7 °C (98.1 °F) 4/9/2019  8:59 AM   Pulse 88 4/9/2019  8:59 AM   Resp 18 4/9/2019  8:59 AM   SpO2 99 % 4/9/2019  8:59 AM         No case tracking events are documented in the log.      Pain/Trey Score: No data recorded

## 2019-04-09 NOTE — ANESTHESIA PREPROCEDURE EVALUATION
04/09/2019  Christiana Chan is a 68 y.o., female with carcinoid her for EUS.    Pre-op Assessment    I have reviewed the Patient Summary Reports.     I have reviewed the Nursing Notes.   I have reviewed the Medications.     Review of Systems  Anesthesia Hx:  No problems with previous Anesthesia  History of prior surgery of interest to airway management or planning: Denies Family Hx of Anesthesia complications.   Denies Personal Hx of Anesthesia complications.   Hematology/Oncology:  Hematology Normal   Oncology Normal     EENT/Dental:EENT/Dental Normal   Cardiovascular:   Exercise tolerance: good Hypertension LBBB  Non-ischemic dilated Cardiomyopathy - 3/18 ECHO w/ EF 55%, DD, mild MR/TR   Pulmonary:   Asthma    Renal/:  Renal/ Normal     Hepatic/GI:   PUD, Hiatal Hernia, GERD    Musculoskeletal:   Arthritis     Neurological:  Neurology Normal    Endocrine:  Endocrine Normal    Dermatological:  Skin Normal    Psych:  Psychiatric Normal           Physical Exam  General:  Well nourished    Airway/Jaw/Neck:  Airway Findings: Mouth Opening: Normal Tongue: Normal  General Airway Assessment: Adult  Mallampati: II  TM Distance: Normal, at least 6 cm        Eyes/Ears/Nose:  EYES/EARS/NOSE FINDINGS: Normal   Dental:  DENTAL FINDINGS: Normal   Chest/Lungs:  Chest/Lungs Clear    Heart/Vascular:  Heart Findings: Normal Heart murmur: negative Vascular Findings: Normal    Abdomen:  Abdomen Findings: Normal    Musculoskeletal:  Musculoskeletal Findings: Normal   Skin:  Skin Findings: Normal    Mental Status:  Mental Status Findings: Normal        Anesthesia Plan  Type of Anesthesia, risks & benefits discussed:  Anesthesia Type:  general  Patient's Preference:   Intra-op Monitoring Plan: standard ASA monitors  Intra-op Monitoring Plan Comments:   Post Op Pain Control Plan:   Post Op Pain Control Plan Comments:    Induction:   IV  Beta Blocker:  Patient is on a Beta-Blocker and has received one dose within the past 24 hours (No further documentation required).       Informed Consent: Patient understands risks and agrees with Anesthesia plan.  Questions answered. Anesthesia consent signed with patient.  ASA Score: 3     Day of Surgery Review of History & Physical:    H&P update referred to the provider.     Anesthesia Plan Notes: Last cardiology clinic note and echo requested.        Ready For Surgery From Anesthesia Perspective.

## 2019-04-10 ENCOUNTER — TELEPHONE (OUTPATIENT)
Dept: NEUROLOGY | Facility: HOSPITAL | Age: 69
End: 2019-04-10

## 2019-04-10 NOTE — TELEPHONE ENCOUNTER
----- Message from Jennifer Urias sent at 4/10/2019  8:19 AM CDT -----  Contact:  [phone]  Patient needs explanation about seeing two different appointments and needs surgery time. Please call back to assist at [phone]

## 2019-04-10 NOTE — TELEPHONE ENCOUNTER
LM on 's VM that surgery is 4/11/2019 for 8:30 and to be here for 7 am however, surgery should call them today with a definite time and to follow Sx time.

## 2019-04-11 ENCOUNTER — ANESTHESIA (OUTPATIENT)
Dept: SURGERY | Facility: HOSPITAL | Age: 69
DRG: 330 | End: 2019-04-11
Payer: MEDICARE

## 2019-04-11 ENCOUNTER — HOSPITAL ENCOUNTER (INPATIENT)
Facility: HOSPITAL | Age: 69
LOS: 4 days | Discharge: HOME | DRG: 330 | End: 2019-04-15
Attending: SURGERY | Admitting: SURGERY
Payer: MEDICARE

## 2019-04-11 ENCOUNTER — ANESTHESIA EVENT (OUTPATIENT)
Dept: SURGERY | Facility: HOSPITAL | Age: 69
DRG: 330 | End: 2019-04-11
Payer: MEDICARE

## 2019-04-11 DIAGNOSIS — K36 CHRONIC APPENDICITIS: ICD-10-CM

## 2019-04-11 DIAGNOSIS — Z01.810 PREOP CARDIOVASCULAR EXAM: ICD-10-CM

## 2019-04-11 DIAGNOSIS — C7A.012 MALIGNANT CARCINOID TUMOR OF ILEUM: ICD-10-CM

## 2019-04-11 DIAGNOSIS — Z01.818 PREOP TESTING: ICD-10-CM

## 2019-04-11 DIAGNOSIS — K63.89 SMALL BOWEL MASS: Primary | ICD-10-CM

## 2019-04-11 LAB
ANION GAP SERPL CALC-SCNC: 10 MMOL/L (ref 8–16)
AORTIC ROOT ANNULUS: 2.67 CM
AORTIC VALVE CUSP SEPERATION: 1.76 CM
AV INDEX (PROSTH): 0.61
AV MEAN GRADIENT: 2.93 MMHG
AV PEAK GRADIENT: 6.55 MMHG
AV VALVE AREA: 2.06 CM2
AV VELOCITY RATIO: 0.6
BASOPHILS # BLD AUTO: 0 K/UL (ref 0–0.2)
BASOPHILS NFR BLD: 0 % (ref 0–1.9)
BSA FOR ECHO PROCEDURE: 1.62 M2
BUN SERPL-MCNC: 11 MG/DL (ref 8–23)
CALCIUM SERPL-MCNC: 8.6 MG/DL (ref 8.7–10.5)
CHLORIDE SERPL-SCNC: 106 MMOL/L (ref 95–110)
CO2 SERPL-SCNC: 24 MMOL/L (ref 23–29)
CREAT SERPL-MCNC: 0.8 MG/DL (ref 0.5–1.4)
CV ECHO LV RWT: 0.52 CM
DIFFERENTIAL METHOD: ABNORMAL
DOP CALC AO PEAK VEL: 1.28 M/S
DOP CALC AO VTI: 21.54 CM
DOP CALC LVOT AREA: 3.36 CM2
DOP CALC LVOT DIAMETER: 2.07 CM
DOP CALC LVOT PEAK VEL: 0.76 M/S
DOP CALC LVOT STROKE VOLUME: 44.37 CM3
DOP CALCLVOT PEAK VEL VTI: 13.19 CM
E WAVE DECELERATION TIME: 100.58 MSEC
E/A RATIO: 0.49
ECHO LV POSTERIOR WALL: 1.06 CM (ref 0.6–1.1)
EOSINOPHIL # BLD AUTO: 0 K/UL (ref 0–0.5)
EOSINOPHIL NFR BLD: 0 % (ref 0–8)
ERYTHROCYTE [DISTWIDTH] IN BLOOD BY AUTOMATED COUNT: 14.2 % (ref 11.5–14.5)
EST. GFR  (AFRICAN AMERICAN): >60 ML/MIN/1.73 M^2
EST. GFR  (NON AFRICAN AMERICAN): >60 ML/MIN/1.73 M^2
FRACTIONAL SHORTENING: 14 % (ref 28–44)
GLUCOSE SERPL-MCNC: 113 MG/DL (ref 70–110)
HCT VFR BLD AUTO: 31.8 % (ref 37–48.5)
HGB BLD-MCNC: 10.3 G/DL (ref 12–16)
INTERVENTRICULAR SEPTUM: 1.57 CM (ref 0.6–1.1)
IVRT: 0.11 MSEC
LEFT ATRIUM SIZE: 2.9 CM
LEFT INTERNAL DIMENSION IN SYSTOLE: 3.52 CM (ref 2.1–4)
LEFT VENTRICLE DIASTOLIC VOLUME INDEX: 45.82 ML/M2
LEFT VENTRICLE DIASTOLIC VOLUME: 73.79 ML
LEFT VENTRICLE MASS INDEX: 121.8 G/M2
LEFT VENTRICLE SYSTOLIC VOLUME INDEX: 32.1 ML/M2
LEFT VENTRICLE SYSTOLIC VOLUME: 51.71 ML
LEFT VENTRICULAR INTERNAL DIMENSION IN DIASTOLE: 4.09 CM (ref 3.5–6)
LEFT VENTRICULAR MASS: 196.16 G
LV LATERAL E/E' RATIO: 8.6
LYMPHOCYTES # BLD AUTO: 1.2 K/UL (ref 1–4.8)
LYMPHOCYTES NFR BLD: 7.8 % (ref 18–48)
MCH RBC QN AUTO: 28.1 PG (ref 27–31)
MCHC RBC AUTO-ENTMCNC: 32.4 G/DL (ref 32–36)
MCV RBC AUTO: 87 FL (ref 82–98)
MONOCYTES # BLD AUTO: 0.9 K/UL (ref 0.3–1)
MONOCYTES NFR BLD: 5.7 % (ref 4–15)
MV PEAK A VEL: 0.88 M/S
MV PEAK E VEL: 0.43 M/S
NEUTROPHILS # BLD AUTO: 12.8 K/UL (ref 1.8–7.7)
NEUTROPHILS NFR BLD: 86.2 % (ref 38–73)
PISA TR MAX VEL: 2.16 M/S
PLATELET # BLD AUTO: 297 K/UL (ref 150–350)
PMV BLD AUTO: 8.4 FL (ref 9.2–12.9)
POTASSIUM SERPL-SCNC: 3.8 MMOL/L (ref 3.5–5.1)
RA PRESSURE: 3 MMHG
RBC # BLD AUTO: 3.66 M/UL (ref 4–5.4)
RIGHT VENTRICULAR END-DIASTOLIC DIMENSION: 2.35 CM
SODIUM SERPL-SCNC: 140 MMOL/L (ref 136–145)
TDI LATERAL: 0.05
TR MAX PG: 18.66 MMHG
TV REST PULMONARY ARTERY PRESSURE: 22 MMHG
WBC # BLD AUTO: 14.81 K/UL (ref 3.9–12.7)

## 2019-04-11 PROCEDURE — 88307 TISSUE EXAM BY PATHOLOGIST: CPT | Mod: 26,,, | Performed by: PATHOLOGY

## 2019-04-11 PROCEDURE — 37000009 HC ANESTHESIA EA ADD 15 MINS: Performed by: SURGERY

## 2019-04-11 PROCEDURE — 25000003 PHARM REV CODE 250: Performed by: SURGERY

## 2019-04-11 PROCEDURE — 88304 TISSUE EXAM BY PATHOLOGIST: CPT | Mod: 26,,, | Performed by: PATHOLOGY

## 2019-04-11 PROCEDURE — 88307 TISSUE EXAM BY PATHOLOGIST: CPT | Performed by: PATHOLOGY

## 2019-04-11 PROCEDURE — 88305 TISSUE EXAM BY PATHOLOGIST: CPT | Mod: 26,,, | Performed by: PATHOLOGY

## 2019-04-11 PROCEDURE — 11000001 HC ACUTE MED/SURG PRIVATE ROOM

## 2019-04-11 PROCEDURE — 88305 TISSUE EXAM BY PATHOLOGIST: CPT | Performed by: PATHOLOGY

## 2019-04-11 PROCEDURE — 88309 TISSUE SPECIMEN TO PATHOLOGY - SURGERY: ICD-10-PCS | Mod: 26,,, | Performed by: PATHOLOGY

## 2019-04-11 PROCEDURE — 27201423 OPTIME MED/SURG SUP & DEVICES STERILE SUPPLY: Performed by: SURGERY

## 2019-04-11 PROCEDURE — 36000709 HC OR TIME LEV III EA ADD 15 MIN: Performed by: SURGERY

## 2019-04-11 PROCEDURE — S0030 INJECTION, METRONIDAZOLE: HCPCS | Performed by: SURGERY

## 2019-04-11 PROCEDURE — 36000711: Performed by: SURGERY

## 2019-04-11 PROCEDURE — 88304 TISSUE SPECIMEN TO PATHOLOGY - SURGERY: ICD-10-PCS | Mod: 26,,, | Performed by: PATHOLOGY

## 2019-04-11 PROCEDURE — 71000033 HC RECOVERY, INTIAL HOUR: Performed by: SURGERY

## 2019-04-11 PROCEDURE — 25000003 PHARM REV CODE 250: Performed by: NURSE ANESTHETIST, CERTIFIED REGISTERED

## 2019-04-11 PROCEDURE — 63600175 PHARM REV CODE 636 W HCPCS: Mod: JG | Performed by: NURSE ANESTHETIST, CERTIFIED REGISTERED

## 2019-04-11 PROCEDURE — 88360 TUMOR IMMUNOHISTOCHEM/MANUAL: CPT | Mod: 26,,, | Performed by: PATHOLOGY

## 2019-04-11 PROCEDURE — 36000710: Performed by: SURGERY

## 2019-04-11 PROCEDURE — 71000039 HC RECOVERY, EACH ADD'L HOUR: Performed by: SURGERY

## 2019-04-11 PROCEDURE — 88305 TISSUE SPECIMEN TO PATHOLOGY - SURGERY: ICD-10-PCS | Mod: 26,,, | Performed by: PATHOLOGY

## 2019-04-11 PROCEDURE — 94761 N-INVAS EAR/PLS OXIMETRY MLT: CPT

## 2019-04-11 PROCEDURE — 37000008 HC ANESTHESIA 1ST 15 MINUTES: Performed by: SURGERY

## 2019-04-11 PROCEDURE — 88307 TISSUE SPECIMEN TO PATHOLOGY - SURGERY: ICD-10-PCS | Mod: 26,,, | Performed by: PATHOLOGY

## 2019-04-11 PROCEDURE — 93010 EKG 12-LEAD: ICD-10-PCS | Mod: ,,, | Performed by: STUDENT IN AN ORGANIZED HEALTH CARE EDUCATION/TRAINING PROGRAM

## 2019-04-11 PROCEDURE — S0028 INJECTION, FAMOTIDINE, 20 MG: HCPCS | Performed by: SURGERY

## 2019-04-11 PROCEDURE — 94799 UNLISTED PULMONARY SVC/PX: CPT

## 2019-04-11 PROCEDURE — 63600175 PHARM REV CODE 636 W HCPCS: Performed by: SURGERY

## 2019-04-11 PROCEDURE — 93005 ELECTROCARDIOGRAM TRACING: CPT

## 2019-04-11 PROCEDURE — 36000708 HC OR TIME LEV III 1ST 15 MIN: Performed by: SURGERY

## 2019-04-11 PROCEDURE — P9045 ALBUMIN (HUMAN), 5%, 250 ML: HCPCS | Mod: JG | Performed by: NURSE ANESTHETIST, CERTIFIED REGISTERED

## 2019-04-11 PROCEDURE — 85025 COMPLETE CBC W/AUTO DIFF WBC: CPT

## 2019-04-11 PROCEDURE — 80048 BASIC METABOLIC PNL TOTAL CA: CPT

## 2019-04-11 PROCEDURE — 93010 ELECTROCARDIOGRAM REPORT: CPT | Mod: ,,, | Performed by: STUDENT IN AN ORGANIZED HEALTH CARE EDUCATION/TRAINING PROGRAM

## 2019-04-11 PROCEDURE — 88360 TISSUE SPECIMEN TO PATHOLOGY - SURGERY: ICD-10-PCS | Mod: 26,,, | Performed by: PATHOLOGY

## 2019-04-11 PROCEDURE — C9290 INJ, BUPIVACAINE LIPOSOME: HCPCS | Performed by: SURGERY

## 2019-04-11 PROCEDURE — 63600175 PHARM REV CODE 636 W HCPCS: Performed by: NURSE ANESTHETIST, CERTIFIED REGISTERED

## 2019-04-11 PROCEDURE — 36415 COLL VENOUS BLD VENIPUNCTURE: CPT

## 2019-04-11 PROCEDURE — 88309 TISSUE EXAM BY PATHOLOGIST: CPT | Mod: 26,,, | Performed by: PATHOLOGY

## 2019-04-11 RX ORDER — ENOXAPARIN SODIUM 100 MG/ML
30 INJECTION SUBCUTANEOUS EVERY 24 HOURS
Status: DISCONTINUED | OUTPATIENT
Start: 2019-04-11 | End: 2019-04-12

## 2019-04-11 RX ORDER — ACETAMINOPHEN 500 MG
1000 TABLET ORAL
Status: COMPLETED | OUTPATIENT
Start: 2019-04-11 | End: 2019-04-11

## 2019-04-11 RX ORDER — OXYCODONE HYDROCHLORIDE 5 MG/1
5 TABLET ORAL
Status: DISCONTINUED | OUTPATIENT
Start: 2019-04-11 | End: 2019-04-11 | Stop reason: HOSPADM

## 2019-04-11 RX ORDER — DIPHENHYDRAMINE HYDROCHLORIDE 50 MG/ML
25 INJECTION INTRAMUSCULAR; INTRAVENOUS EVERY 6 HOURS PRN
Status: DISCONTINUED | OUTPATIENT
Start: 2019-04-11 | End: 2019-04-11 | Stop reason: HOSPADM

## 2019-04-11 RX ORDER — METRONIDAZOLE 500 MG/100ML
500 INJECTION, SOLUTION INTRAVENOUS
Status: DISCONTINUED | OUTPATIENT
Start: 2019-04-11 | End: 2019-04-11 | Stop reason: HOSPADM

## 2019-04-11 RX ORDER — HYDROMORPHONE HCL IN 0.9% NACL 6 MG/30 ML
PATIENT CONTROLLED ANALGESIA SYRINGE INTRAVENOUS CONTINUOUS
Status: DISCONTINUED | OUTPATIENT
Start: 2019-04-11 | End: 2019-04-12

## 2019-04-11 RX ORDER — LIDOCAINE HCL/PF 100 MG/5ML
SYRINGE (ML) INTRAVENOUS
Status: DISCONTINUED | OUTPATIENT
Start: 2019-04-11 | End: 2019-04-11

## 2019-04-11 RX ORDER — PREGABALIN 75 MG/1
75 CAPSULE ORAL
Status: DISCONTINUED | OUTPATIENT
Start: 2019-04-11 | End: 2019-04-11 | Stop reason: HOSPADM

## 2019-04-11 RX ORDER — ALBUTEROL SULFATE 2.5 MG/.5ML
2.5 SOLUTION RESPIRATORY (INHALATION) EVERY 4 HOURS PRN
Status: DISCONTINUED | OUTPATIENT
Start: 2019-04-11 | End: 2019-04-15 | Stop reason: HOSPADM

## 2019-04-11 RX ORDER — HYDROMORPHONE HYDROCHLORIDE 2 MG/ML
0.5 INJECTION, SOLUTION INTRAMUSCULAR; INTRAVENOUS; SUBCUTANEOUS EVERY 5 MIN PRN
Status: DISCONTINUED | OUTPATIENT
Start: 2019-04-11 | End: 2019-04-11 | Stop reason: HOSPADM

## 2019-04-11 RX ORDER — HYDROMORPHONE HYDROCHLORIDE 2 MG/ML
0.5 INJECTION, SOLUTION INTRAMUSCULAR; INTRAVENOUS; SUBCUTANEOUS ONCE
Status: COMPLETED | OUTPATIENT
Start: 2019-04-11 | End: 2019-04-11

## 2019-04-11 RX ORDER — KETOROLAC TROMETHAMINE 30 MG/ML
15 INJECTION, SOLUTION INTRAMUSCULAR; INTRAVENOUS
Status: DISCONTINUED | OUTPATIENT
Start: 2019-04-11 | End: 2019-04-11 | Stop reason: HOSPADM

## 2019-04-11 RX ORDER — NEOSTIGMINE METHYLSULFATE 1 MG/ML
INJECTION, SOLUTION INTRAVENOUS
Status: DISCONTINUED | OUTPATIENT
Start: 2019-04-11 | End: 2019-04-11

## 2019-04-11 RX ORDER — CARVEDILOL 25 MG/1
25 TABLET ORAL 2 TIMES DAILY
Status: DISCONTINUED | OUTPATIENT
Start: 2019-04-11 | End: 2019-04-15 | Stop reason: HOSPADM

## 2019-04-11 RX ORDER — SODIUM CHLORIDE 0.9 % (FLUSH) 0.9 %
10 SYRINGE (ML) INJECTION
Status: DISCONTINUED | OUTPATIENT
Start: 2019-04-11 | End: 2019-04-11 | Stop reason: HOSPADM

## 2019-04-11 RX ORDER — PROPRANOLOL HYDROCHLORIDE 10 MG/1
10 TABLET ORAL 2 TIMES DAILY
Status: DISCONTINUED | OUTPATIENT
Start: 2019-04-11 | End: 2019-04-15 | Stop reason: HOSPADM

## 2019-04-11 RX ORDER — NALOXONE HCL 0.4 MG/ML
0.02 VIAL (ML) INJECTION
Status: DISCONTINUED | OUTPATIENT
Start: 2019-04-11 | End: 2019-04-15 | Stop reason: HOSPADM

## 2019-04-11 RX ORDER — PREGABALIN 75 MG/1
75 CAPSULE ORAL 2 TIMES DAILY
Status: DISCONTINUED | OUTPATIENT
Start: 2019-04-11 | End: 2019-04-15 | Stop reason: HOSPADM

## 2019-04-11 RX ORDER — SODIUM CHLORIDE 9 MG/ML
INJECTION, SOLUTION INTRAVENOUS CONTINUOUS PRN
Status: DISCONTINUED | OUTPATIENT
Start: 2019-04-11 | End: 2019-04-11

## 2019-04-11 RX ORDER — LABETALOL HYDROCHLORIDE 5 MG/ML
5 INJECTION, SOLUTION INTRAVENOUS
Status: DISCONTINUED | OUTPATIENT
Start: 2019-04-11 | End: 2019-04-15 | Stop reason: HOSPADM

## 2019-04-11 RX ORDER — LUBIPROSTONE 24 UG/1
24 CAPSULE ORAL
Status: DISCONTINUED | OUTPATIENT
Start: 2019-04-11 | End: 2019-04-15 | Stop reason: HOSPADM

## 2019-04-11 RX ORDER — METHYLENE BLUE 10 MG/ML
INJECTION INTRAVENOUS
Status: DISCONTINUED | OUTPATIENT
Start: 2019-04-11 | End: 2019-04-11 | Stop reason: HOSPADM

## 2019-04-11 RX ORDER — PANTOPRAZOLE SODIUM 20 MG/1
20 TABLET, DELAYED RELEASE ORAL DAILY
Status: DISCONTINUED | OUTPATIENT
Start: 2019-04-12 | End: 2019-04-15 | Stop reason: HOSPADM

## 2019-04-11 RX ORDER — ONDANSETRON 2 MG/ML
4 INJECTION INTRAMUSCULAR; INTRAVENOUS EVERY 6 HOURS PRN
Status: DISCONTINUED | OUTPATIENT
Start: 2019-04-11 | End: 2019-04-15 | Stop reason: HOSPADM

## 2019-04-11 RX ORDER — GLYCOPYRROLATE 0.2 MG/ML
INJECTION INTRAMUSCULAR; INTRAVENOUS
Status: DISCONTINUED | OUTPATIENT
Start: 2019-04-11 | End: 2019-04-11

## 2019-04-11 RX ORDER — DEXTROSE MONOHYDRATE, SODIUM CHLORIDE, AND POTASSIUM CHLORIDE 50; 1.49; 4.5 G/1000ML; G/1000ML; G/1000ML
INJECTION, SOLUTION INTRAVENOUS CONTINUOUS
Status: DISCONTINUED | OUTPATIENT
Start: 2019-04-11 | End: 2019-04-14

## 2019-04-11 RX ORDER — ALPRAZOLAM 1 MG/1
2 TABLET ORAL NIGHTLY PRN
Status: DISCONTINUED | OUTPATIENT
Start: 2019-04-11 | End: 2019-04-15 | Stop reason: HOSPADM

## 2019-04-11 RX ORDER — FENTANYL CITRATE 50 UG/ML
INJECTION, SOLUTION INTRAMUSCULAR; INTRAVENOUS
Status: DISCONTINUED | OUTPATIENT
Start: 2019-04-11 | End: 2019-04-11

## 2019-04-11 RX ORDER — CIPROFLOXACIN 2 MG/ML
400 INJECTION, SOLUTION INTRAVENOUS
Status: COMPLETED | OUTPATIENT
Start: 2019-04-11 | End: 2019-04-11

## 2019-04-11 RX ORDER — MAGNESIUM SULFATE HEPTAHYDRATE 40 MG/ML
INJECTION, SOLUTION INTRAVENOUS
Status: DISCONTINUED | OUTPATIENT
Start: 2019-04-11 | End: 2019-04-11

## 2019-04-11 RX ORDER — ROCURONIUM BROMIDE 10 MG/ML
INJECTION, SOLUTION INTRAVENOUS
Status: DISCONTINUED | OUTPATIENT
Start: 2019-04-11 | End: 2019-04-11

## 2019-04-11 RX ORDER — BUPIVACAINE HYDROCHLORIDE 2.5 MG/ML
INJECTION, SOLUTION EPIDURAL; INFILTRATION; INTRACAUDAL
Status: DISCONTINUED | OUTPATIENT
Start: 2019-04-11 | End: 2019-04-11 | Stop reason: HOSPADM

## 2019-04-11 RX ORDER — CYCLOBENZAPRINE HCL 5 MG
5 TABLET ORAL NIGHTLY
Status: DISCONTINUED | OUTPATIENT
Start: 2019-04-11 | End: 2019-04-13

## 2019-04-11 RX ORDER — HYDRALAZINE HYDROCHLORIDE 20 MG/ML
5 INJECTION INTRAMUSCULAR; INTRAVENOUS
Status: DISCONTINUED | OUTPATIENT
Start: 2019-04-11 | End: 2019-04-15 | Stop reason: HOSPADM

## 2019-04-11 RX ORDER — LISINOPRIL 20 MG/1
20 TABLET ORAL 2 TIMES DAILY
Status: DISCONTINUED | OUTPATIENT
Start: 2019-04-11 | End: 2019-04-15 | Stop reason: HOSPADM

## 2019-04-11 RX ORDER — FLUOXETINE 10 MG/1
10 CAPSULE ORAL DAILY
Status: DISCONTINUED | OUTPATIENT
Start: 2019-04-12 | End: 2019-04-15 | Stop reason: HOSPADM

## 2019-04-11 RX ORDER — KETOROLAC TROMETHAMINE 30 MG/ML
10 INJECTION, SOLUTION INTRAMUSCULAR; INTRAVENOUS EVERY 6 HOURS
Status: COMPLETED | OUTPATIENT
Start: 2019-04-11 | End: 2019-04-13

## 2019-04-11 RX ORDER — SUCCINYLCHOLINE CHLORIDE 20 MG/ML
INJECTION INTRAMUSCULAR; INTRAVENOUS
Status: DISCONTINUED | OUTPATIENT
Start: 2019-04-11 | End: 2019-04-11

## 2019-04-11 RX ORDER — MIDAZOLAM HYDROCHLORIDE 1 MG/ML
INJECTION INTRAMUSCULAR; INTRAVENOUS
Status: DISCONTINUED | OUTPATIENT
Start: 2019-04-11 | End: 2019-04-11

## 2019-04-11 RX ORDER — ENOXAPARIN SODIUM 100 MG/ML
30 INJECTION SUBCUTANEOUS
Status: COMPLETED | OUTPATIENT
Start: 2019-04-11 | End: 2019-04-11

## 2019-04-11 RX ORDER — SODIUM CHLORIDE, SODIUM LACTATE, POTASSIUM CHLORIDE, CALCIUM CHLORIDE 600; 310; 30; 20 MG/100ML; MG/100ML; MG/100ML; MG/100ML
INJECTION, SOLUTION INTRAVENOUS CONTINUOUS PRN
Status: DISCONTINUED | OUTPATIENT
Start: 2019-04-11 | End: 2019-04-11

## 2019-04-11 RX ORDER — LAMOTRIGINE 100 MG/1
100 TABLET ORAL 2 TIMES DAILY
Status: DISCONTINUED | OUTPATIENT
Start: 2019-04-11 | End: 2019-04-15 | Stop reason: HOSPADM

## 2019-04-11 RX ORDER — ONDANSETRON 2 MG/ML
4 INJECTION INTRAMUSCULAR; INTRAVENOUS DAILY PRN
Status: DISCONTINUED | OUTPATIENT
Start: 2019-04-11 | End: 2019-04-11 | Stop reason: HOSPADM

## 2019-04-11 RX ORDER — DIGOXIN 125 MCG
125 TABLET ORAL DAILY
Status: DISCONTINUED | OUTPATIENT
Start: 2019-04-12 | End: 2019-04-15 | Stop reason: HOSPADM

## 2019-04-11 RX ORDER — ALBUMIN HUMAN 50 G/1000ML
SOLUTION INTRAVENOUS CONTINUOUS PRN
Status: DISCONTINUED | OUTPATIENT
Start: 2019-04-11 | End: 2019-04-11

## 2019-04-11 RX ORDER — FAMOTIDINE 10 MG/ML
20 INJECTION INTRAVENOUS
Status: COMPLETED | OUTPATIENT
Start: 2019-04-11 | End: 2019-04-11

## 2019-04-11 RX ORDER — ONDANSETRON 2 MG/ML
8 INJECTION INTRAMUSCULAR; INTRAVENOUS
Status: COMPLETED | OUTPATIENT
Start: 2019-04-11 | End: 2019-04-11

## 2019-04-11 RX ORDER — ONDANSETRON HYDROCHLORIDE 2 MG/ML
INJECTION, SOLUTION INTRAMUSCULAR; INTRAVENOUS
Status: DISCONTINUED | OUTPATIENT
Start: 2019-04-11 | End: 2019-04-11

## 2019-04-11 RX ORDER — BACITRACIN 50000 [IU]/1
INJECTION, POWDER, FOR SOLUTION INTRAMUSCULAR
Status: DISCONTINUED | OUTPATIENT
Start: 2019-04-11 | End: 2019-04-11 | Stop reason: HOSPADM

## 2019-04-11 RX ORDER — PROPOFOL 10 MG/ML
VIAL (ML) INTRAVENOUS
Status: DISCONTINUED | OUTPATIENT
Start: 2019-04-11 | End: 2019-04-11

## 2019-04-11 RX ORDER — DIPHENHYDRAMINE HYDROCHLORIDE 50 MG/ML
12.5 INJECTION INTRAMUSCULAR; INTRAVENOUS EVERY 4 HOURS PRN
Status: DISCONTINUED | OUTPATIENT
Start: 2019-04-11 | End: 2019-04-15 | Stop reason: HOSPADM

## 2019-04-11 RX ADMIN — NEOSTIGMINE METHYLSULFATE 4 MG: 1 INJECTION INTRAVENOUS at 01:04

## 2019-04-11 RX ADMIN — Medication: at 03:04

## 2019-04-11 RX ADMIN — KETOROLAC TROMETHAMINE 10 MG: 30 INJECTION, SOLUTION INTRAMUSCULAR at 06:04

## 2019-04-11 RX ADMIN — HYDROMORPHONE HYDROCHLORIDE 0.5 MG: 2 INJECTION, SOLUTION INTRAMUSCULAR; INTRAVENOUS; SUBCUTANEOUS at 03:04

## 2019-04-11 RX ADMIN — ROCURONIUM BROMIDE 45 MG: 10 INJECTION, SOLUTION INTRAVENOUS at 10:04

## 2019-04-11 RX ADMIN — SODIUM CHLORIDE: 0.9 INJECTION, SOLUTION INTRAVENOUS at 11:04

## 2019-04-11 RX ADMIN — OCTREOTIDE ACETATE 250 MCG/HR: 1000 INJECTION, SOLUTION INTRAVENOUS; SUBCUTANEOUS at 09:04

## 2019-04-11 RX ADMIN — GLYCOPYRROLATE 0.6 MG: 0.2 INJECTION, SOLUTION INTRAMUSCULAR; INTRAVENOUS at 01:04

## 2019-04-11 RX ADMIN — ONDANSETRON 8 MG: 2 INJECTION INTRAMUSCULAR; INTRAVENOUS at 08:04

## 2019-04-11 RX ADMIN — PREGABALIN 75 MG: 75 CAPSULE ORAL at 09:04

## 2019-04-11 RX ADMIN — ONDANSETRON 8 MG: 2 INJECTION, SOLUTION INTRAMUSCULAR; INTRAVENOUS at 12:04

## 2019-04-11 RX ADMIN — LAMOTRIGINE 100 MG: 100 TABLET ORAL at 09:04

## 2019-04-11 RX ADMIN — CYCLOBENZAPRINE HYDROCHLORIDE 5 MG: 5 TABLET, FILM COATED ORAL at 09:04

## 2019-04-11 RX ADMIN — IRON SUCROSE 100 MG: 20 INJECTION, SOLUTION INTRAVENOUS at 06:04

## 2019-04-11 RX ADMIN — OCTREOTIDE ACETATE 250 MCG/HR: 1000 INJECTION, SOLUTION INTRAVENOUS; SUBCUTANEOUS at 08:04

## 2019-04-11 RX ADMIN — DEXTROSE, SODIUM CHLORIDE, AND POTASSIUM CHLORIDE: 5; .45; .15 INJECTION INTRAVENOUS at 03:04

## 2019-04-11 RX ADMIN — SODIUM CHLORIDE: 0.9 INJECTION, SOLUTION INTRAVENOUS at 09:04

## 2019-04-11 RX ADMIN — SODIUM CHLORIDE, SODIUM LACTATE, POTASSIUM CHLORIDE, AND CALCIUM CHLORIDE: .6; .31; .03; .02 INJECTION, SOLUTION INTRAVENOUS at 11:04

## 2019-04-11 RX ADMIN — ROCURONIUM BROMIDE 5 MG: 10 INJECTION, SOLUTION INTRAVENOUS at 10:04

## 2019-04-11 RX ADMIN — LIDOCAINE HYDROCHLORIDE 100 MG: 20 INJECTION, SOLUTION INTRAVENOUS at 10:04

## 2019-04-11 RX ADMIN — CARVEDILOL 25 MG: 25 TABLET, FILM COATED ORAL at 09:04

## 2019-04-11 RX ADMIN — MIDAZOLAM HYDROCHLORIDE 1 MG: 1 INJECTION, SOLUTION INTRAMUSCULAR; INTRAVENOUS at 10:04

## 2019-04-11 RX ADMIN — OCTREOTIDE ACETATE: 500 INJECTION, SOLUTION INTRAVENOUS; SUBCUTANEOUS at 08:04

## 2019-04-11 RX ADMIN — FENTANYL CITRATE 100 MCG: 50 INJECTION, SOLUTION INTRAMUSCULAR; INTRAVENOUS at 10:04

## 2019-04-11 RX ADMIN — ENOXAPARIN SODIUM 30 MG: 100 INJECTION SUBCUTANEOUS at 08:04

## 2019-04-11 RX ADMIN — FAMOTIDINE 20 MG: 10 INJECTION, SOLUTION INTRAVENOUS at 08:04

## 2019-04-11 RX ADMIN — MIDAZOLAM HYDROCHLORIDE 1 MG: 1 INJECTION, SOLUTION INTRAMUSCULAR; INTRAVENOUS at 09:04

## 2019-04-11 RX ADMIN — ALBUMIN (HUMAN): 2.5 SOLUTION INTRAVENOUS at 10:04

## 2019-04-11 RX ADMIN — SUCCINYLCHOLINE CHLORIDE 100 MG: 20 INJECTION, SOLUTION INTRAMUSCULAR; INTRAVENOUS at 10:04

## 2019-04-11 RX ADMIN — ALBUMIN (HUMAN): 2.5 SOLUTION INTRAVENOUS at 12:04

## 2019-04-11 RX ADMIN — PROPRANOLOL HYDROCHLORIDE 10 MG: 10 TABLET ORAL at 09:04

## 2019-04-11 RX ADMIN — PREGABALIN 75 MG: 75 CAPSULE ORAL at 08:04

## 2019-04-11 RX ADMIN — PROPOFOL 90 MG: 10 INJECTION, EMULSION INTRAVENOUS at 10:04

## 2019-04-11 RX ADMIN — HYDROCORTISONE SODIUM SUCCINATE 50 MG: 100 INJECTION, POWDER, FOR SOLUTION INTRAMUSCULAR; INTRAVENOUS at 08:04

## 2019-04-11 RX ADMIN — METRONIDAZOLE 500 MG: 500 SOLUTION INTRAVENOUS at 10:04

## 2019-04-11 RX ADMIN — KETOROLAC TROMETHAMINE 15 MG: 30 INJECTION, SOLUTION INTRAMUSCULAR at 08:04

## 2019-04-11 RX ADMIN — CIPROFLOXACIN 400 MG: 2 INJECTION, SOLUTION INTRAVENOUS at 10:04

## 2019-04-11 RX ADMIN — MAGNESIUM SULFATE IN WATER 2 G: 40 INJECTION, SOLUTION INTRAVENOUS at 10:04

## 2019-04-11 RX ADMIN — FENTANYL CITRATE 50 MCG: 50 INJECTION, SOLUTION INTRAMUSCULAR; INTRAVENOUS at 10:04

## 2019-04-11 RX ADMIN — ENOXAPARIN SODIUM 30 MG: 100 INJECTION SUBCUTANEOUS at 06:04

## 2019-04-11 RX ADMIN — ACETAMINOPHEN 1000 MG: 500 TABLET ORAL at 08:04

## 2019-04-11 RX ADMIN — LISINOPRIL 20 MG: 20 TABLET ORAL at 09:04

## 2019-04-11 NOTE — OP NOTE
DATE OF PROCEDURE:  04/11/2019.    PREOPERATIVE DIAGNOSES:  1.  Malignant carcinoid of the ileum.  2.  Partial small bowel obstruction, recurrent.    POSTOPERATIVE DIAGNOSES:  1.  Carcinoid tumor, multifocal more than 20 number in mid ileum.  2.  Simple cyst in the liver, segment 7, 8 and 4.  3.  Chronic pelvic appendicitis.    PROCEDURES DONE:  1.  Laparoscopic small bowel resection, staple and handsewn technique, resection   of about 70 cm of small bowel.  2.  [place] lymph node mapping.  3.  Appendectomy.  4.  Liver ultrasound.  5.  Liver biopsy.  6.  Intralesional chemo with 5-FU placement.    SURGEON:  Bari Fernandez M.D.    SURGERY RESIDENT:  Ibrahima Aguilar    BLOOD LOSS:  Minimal.    The patient is stable and transferred to Recovery Room in stable condition with   no complication.    HISTORY AND INDICATION:  This is a 68-year-old female who has had a history of   multiple partial small-bowel obstruction requiring hospital admission.  Finally,   she was referred for double-balloon enteroscopy.  During double-balloon   enteroscopy she was noted to have three or four lesions in the mid ileum, which   was tattooed by Dr. Vazquez and the biopsy was done, which was consistent with a   neuroendocrine tumor and she was referred to us for further management.  The   patient has had carcinoid labs done.  She had an MRI and gallium scan.  Gallium   scan showed lesions intraabdominally with no evidence of disease elsewhere.  MRA   showed normal liver with no evidence of any metastatic disease.  On detailed   examination, the patient has had multiple episodes of excruciating abdominal   pain.  In the last few years, she has had few admissions for partial small-bowel   obstruction.  She also had generalized weakness from neuropathy.  She was   having pain in the back and initial MRI showed some questionable lesion in the   spine; however, detailed MRI did not show any evidence of any metastatic   disease.  Her gallium  scan did not show any evidence of disease other than most   likely multifocal small bowel neuroendocrine tumor.    I had a long discussion with her.  She was accompanied by her spouse.  I   discussed with them that she has a carcinoid tumor, most likely more than five   than what was seen in double-balloon enteroscopy.  I also talked to them about   the possibility of lesions in the lymph nodes and liver, the details of the   surgery, the hospitalization and the recovery time.  I also talked to them about   the risk of surgery such as bleeding, infection, DVT, PE, MI, stroke, leaks   requiring re-explorations, prolonged hospital stay.  She has significant   neuropathy for which she had a EMG; however, the patient was having excruciating   abdominal pain, especially in the last one week, which was getting worse.  She   had a granddaughter, high school graduation; however, she could not wait for   that.  Because of excruciating pain, she wanted to proceed with that.  After   going over all the risk and benefits, consent was obtained.    FINDINGS:  The patient had a multifocal small bowel carcinoid in the mid ileum   about 20 in number,  partially obstructing and almost obstructing.  There was no obvious   lymphadenopathy.  There was no lesion elsewhere in the abdomen.  The left   diaphragm, right diaphragm, pelvic floor all looked okay.  There was no evidence   of any disease elsewhere.  The liver looked normal.  However, there were some   small cysts that were noticed in segment 4, 7 and 8.  The appendix seemed to be   in the pelvic position and was long.  Because of the atypical location to avoid   any confusion in the future, decision was made to do the appendectomy.    PROCEDURE IN DETAIL:  The patient was brought to the Operating Room with   Sandostatin on board.  She was placed in supine position.  She was then sedated   and intubated.  An arterial line was placed by Anesthesia staff.  A Kumar   catheter was  placed in the bladder.  Abdomen was prepped and draped in the usual   sterile manner.  A time-out was done to verify the ID of the patient and   procedure and everyone concurred.    A small incision of about 4 inches was made just above the umbilicus.  The   fascia was incised and abdominal cavity was entered without any problem.    Following this, the deep port of the Gelport was placed into the abdominal   cavity and was rolled over, so that it was tightly held against the abdominal   wall.  The Gelport was placed over the deep port along with some trocars.  The   intra-abdominal examination was carried out.  There was no obvious evidence of   any metastatic disease over the liver or left and right diaphragm.  The patient   was then placed in Trendelenburg position and the pelvis looked clean with no   other evidence of implant.  On examination, there was a small lesion in segment   4.  At this point, I decided to take the Gelport off and explore the small   bowel.  The Gelport was taken off.    The small bowel was completely eviscerated.  The small bowel was run more than   four times over all from ligament of Treitz to the ileocecal valve.  At the mid   ileum at about 220 cm from the ligament of Treitz, the first carcinoid tumor was   felt and it was tagged with a 4-0 PDS stitch.  The small bowel was run further   more than 20 tumors were felt.  The place where the small bowel was tattooed was   also noted and it also had concentration of tumors there.  Just after the   tattooing of the small bowel, there was no more carcinoid tumor.  At this point   about 120 cm distally, there was no tumor until the ileocecal valve was reached.    All around the pelvis, the appendix was found to be curled and pelvic in   position.  Because of this unique position, I decided to proceed with   appendectomy.  The mesoappendix was taken down using 2-0 silk ties.  The base of   the appendix was double ligated with 2-0 silk tie and  was taken out to be   intact without any problem.  Following this, I again ran the small bowel.  There   was no evidence of any other disease elsewhere.  I examined the pelvis.  Pelvis   did not have any evidence of implant.  The patient has had a previous   oophorectomy.  The sites of the abdominal wall, retroperitoneum, upper abdomen,   the left and right diaphragms were examined.  There was found to be no more   evidence of lesion.  Also, I examined the liver.  There were found to be some   nodular spots which on careful examination found to be cysts.  There was a cyst   in segment 4 and the same induration was felt in segment 8 and 7.  This was also   consistent with a cyst.  There was no any nodular lesion that was felt.    Ultrasound was done.  Ultrasound did not show any evidence of deep lesion in   these areas where cysts were felt.  The ultrasound also confirmed a hypoechoic   lesion consistent with a cyst.  The liver otherwise looked normal.  However,   there was evidence of fatty change.  Therefore, a wedge biopsy was done and the   biopsy edges were securely cauterized.    A 1 mL of Lymphazurin blue was injected proximal and distal to the most proximal   and mid tumor.  After some time since there was no evidence of lymphatic   spread, the small bowel was transected most proximal to the proximal lesion and   distal to the distal lesion.  Using the EnSeal, mesentery was taken all the way   up to the base of the mesentery and the whole specimen was taken out.    Dissection was done on the base of the mesentery.  The lymphatics along with the   peritoneum was dissected over the SMA and SMV and the strip of peritoneum along   with the lymph nodes were resected.  These lymph nodes were not enlarged.    Following this, the small bowel was brought close to each other in a   side-to-side fashion and anastomosis was accomplished in a staple and handsewn   technique.  Following this, the mesentery was closed in  two layers with a   sandwich of 5-FU and Gelfoam with a marker in between.  Following this, the   whole abdomen was irrigated with lots of antibiotic solution.  The small bowel   was placed back in the abdomen.  The omentum was placed over the infracolic   compartment.  The abdominal wall fascia was infiltrated with a dilute mixture of   Marcaine, Exparel and normal saline.  The fascia was closed with a running #1   PDS stitch.  Instrument and sponge counts were correct.  KUB did not show any   evidence of foreign body.  The skin was closed using 4-0 Monocryl.  Dermabond   was applied over the skin incision.  The patient was stable, was extubated,   taken to Recovery Room in stable condition.  Blood loss was less than 10 mL.      TR/IN  dd: 04/11/2019 17:30:44 (CDT)  td: 04/11/2019 18:02:09 (CDT)  Doc ID   #[id number]  Job ID #[id number]    CC:

## 2019-04-11 NOTE — PLAN OF CARE
Patient has met PACU discharge criteria, VSS, pain well controlled. PCA in use. Family updated by phone. Released from PACU by Dr. Nascimento. Call placed to Dr. Fernandez to report lab results, message left on voice mail with call back # and room# pt going to from pacu.

## 2019-04-11 NOTE — OP NOTE
[facility]  Surgery Department  Operative Note    SUMMARY     Date of Procedure: 4/11/2019     Procedure:     1. laparoscopic Small bowel resection with stapled and hand sewn anastamosis 70 cms resected   3. [place] lymph node mapping  4. Appendectomy  5. Liver US  6. Liver biopy  7. Intralesional chemo with 5 FU  8. ETTA BLOCK  Surgeon(s) and Role:     * Rebecca Valdez MD - Primary    Assisting Surgeon: Ibrahima Aguilar  [id number]  Pre-Operative Diagnosis: Malignant carcinoid tumor of ileum [C7A.012]  Spine metastasis [C79.51]    Post-Operative Diagnosis: Post-Op Diagnosis Codes:     * Malignant carcinoid tumor of ileum [C7A.012], multiple small bowel tumour> 20,  with partial obstruction  2. Liver simple liver cyst segment 7, 8 and 4  3, chronic pelvic appendicitis    Anesthesia: General      Complications: no    Estimated Blood Loss (EBL):minimal         Implants: none  Specimens: small bowel, liver, lymph node, appendix  Specimen (12h ago, onward)    None                  Condition: Stable    Disposition: PACU - hemodynamically stable.    Attestation: I was present and scrubbed for the entire procedure.     Operation Form for [place] [facility]    Name __ _Christiana Chan                    Date of Birth __1950 _____    Patient Admission Date to hospital:   4/11/2019    Surgeon(s):  Rebecca Valdez MD                     Other ___________       Length of Operation: 2 hours 25 mts  Type of Operation (check all that apply)     Procedure(s):  LAPAROTOMY, EXPLORATORY  EXCISION, SMALL INTESTINE  LYMPHADENECTOMY     Gastric Resection                   x Small bowel resection                  Colon resection    Hepatic resection                     Pancreatic resection                    Whipple                   Lymph node resection             Cholecystectomy                          Adrenalectomy       Lung resection                          Mesenteric dissection                    Nephrectomy         Thyroidectomy                         RFA                                             Peritoneal stripping    Explorative                               Lysis of Adhesions              Diaphramatic Resection   Other  _____________________________________        Type of vascular encasements (check all that apply)      SMA        Renal        Aorta/Cava     Iliac       Gissel  Gissel Hepatis    Celiac      Was tumor collected?           Yes                 No          If yes, tumor form must be completed by Data staff.                   Neoprobe Used          Yes           No    Was it helpful in finding the tumor?      Yes        No     Operative Findings   Vascular Encasement                                       Mesenteric Extension    x Bowel Obstruction - complete or partial    Bowel Ischemia                                                 Carcinomatosis     Location of Primary Tumor    Mid ileum                                 Primary Resected     xYes      No   How many primary sites? >20        % of Tumor Debulked 100%                   %of Mesenteric Tumor Debulked__100%______  Was sentinel lymph node done?     x Yes       No    Number of [place] [address] Sent _1_____         Number of [place] [address] Positive   __        Evidence of lymph obstruction:    Yes      xNo    Mets to other organs:       Yes         x No                                            If yes, Nodes and Metastases form must be completed  Number of tumors found: _>20 in small bowel_____________   Was tumor left behind? ____no__________   How much small bowel removed (cm)?  70 cms anastamosis 220 cms distal to ligament of treitz and 120 cms proximal to ileocecal valve_________    Seprafilm used:    Yes      x No  Visible ovarian masses:             Yes       x No   Visible retroperitoneal nodes:   Yes        x No    Preop Sandostatin used:          x Yes        No    Intraop chemotherapy used:     x Yes         No      Gallstones  present :   Yes          No    x N/A  Visible liver mets:       Yes        x No      Blood transfusion needed:    Yes        x No  Complications______none___________________________________    Nodes & Metastases Form    Nodes    Resected   Mets  Tissue Resected?      Solid organ/soft     Cervical   Yes No                      Liver   Yes No     Supraclavicular  Yes No   Bone   Yes No     Hilar           Yes No Brain   Yes No        Mediastinal  Yes No  Lung    Yes No     Auxiliary     Yes No Colon   Yes No  x   Mesenteric xYes No Pancreas  Yes No      Periportal Yes No Appendix  Yes No     Retroperitoneal Yes No  Thyroid     Yes No     Celiac Yes No Chest wall  Yes No     Inguinal Yes No  Kidney       Yes   No     Iliac Yes No Breast       Yes   No     Other Yes No Ovary        Yes No   ___________________ Pelvic Floor   Yes   No   Uterus         Yes No   Ureter         Yes   No   Seminal Vesicle YesNo      Spleen     Yes No   L Diaphram   Yes No   R Diaphram   Yes No    Other             Yes No   ____________________    Radiofrequency Ablation Form      Mode: Site:      Open    Liver- R Lobe                        Percutaneous   Liver- L Lobe     Laproscopic    Kidney        Bone        Pelvis         Other  ____________________                                      #of Lesions      Tumor Sizes  ________________ _______________  ________________ _______________  ________________ _______________  ________________ _______________    Complications?   YES  NO  UNKNOWN    Complications____________________________________________________________________________________________________________________________________      Debbie Knife Form      Indication: ________________________________________________________________________________________________________________   LIVER   R lobe size   L lobe size          Hepatic jett tumor sizes  _______       _______      _______   _______      Left tumor size_______      _______        _______      _______      _______      Right tumor size_______       _______       _______      _______  _______      Middle tumor size ______     Single probe                  2 Probe           3 Probes            4 Probes   Overlaping ablation       1        2         3        4     HILUM   Central duct-- tumor sizes ________     ________     ________   R duct--     tumor sizes _______       ________      ________   L duct--  tumor sizes _______      ________       ________       MESENTERIC ROOT NODES--tumor sizes _____________      URETER-- tumor sizes _____________     PERIAORTIC-- tumor sizes _______     ________    ________     PELVIC TUMOR  Obturator node         tumor sizes ________     ________     ________   Pres  tumor sizes ________     ________     _______   Other  tumor size _____________      PANCREAS--  tumor size ______                              Head     tumor size _______                            Body      tumor size _______                                                         Tail        tumor size _______     KIDNEY-- tumor size ________   Right        Left       OTHER SITES   _________________        size_____      _________________         size_____     _____ ____________        size_____    Ablation time___________        Complications?        Yes      No   Unknown    Complications/Comments:__________________________________________________________________________________________________________________________

## 2019-04-11 NOTE — ANESTHESIA PREPROCEDURE EVALUATION
2019  Christiana Chan is a 68 y.o., female for exploratory laparotomy    Review of patient's allergies indicates:   Allergen Reactions    Amoxicillin Other (See Comments)     Taking digoxin.    Sulfa (sulfonamide antibiotics) Itching and Swelling    Codeine Nausea Only and Other (See Comments)     Nausea and constipation.    Demerol [meperidine] Nausea Only and Other (See Comments)     Nausea and constipation.    Hydrocodone Nausea Only and Other (See Comments)     Nausea and constipation.    Epinephrine      Neuroendocrine Tumor patient      Nitrofurantoin monohyd/m-cryst      Past Medical History:   Diagnosis Date    Abdominal bloating     Abdominal pain     Asthma     Atrial fibrillation     Colon polyps     Constipation     Diverticulosis of colon     Esophageal ulcer     Gastritis     GERD (gastroesophageal reflux disease)     Heartburn     Hiatal hernia     HTN (hypertension)     Intermittent diarrhea     Iron deficiency anemia     Osteoarthritis     Osteoporosis     Rectal bleeding     Ruptured lumbar disc     Small bowel lesion     Vitamin B12 deficiency anemia     Vitamin D deficiency      Past Surgical History:   Procedure Laterality Date    BREAST BIOPSY Bilateral      SECTION   &     CHOLECYSTECTOMY      COLONOSCOPY      outside facility    ENTEROSCOPY, DOUBLE BALLOON, ANTEGRADE N/A 2019    Performed by [facility] Dorsey [facility] at [facility] (2ND FLR)    ESOPHAGOGASTRODUODENOSCOPY      outside facility    HYSTERECTOMY  1991    SHOULDER SURGERY Right 2013    TOTAL KNEE ARTHROPLASTY Right 2013    ULTRASOUND-ENDOSCOPIC-UPPER N/A 2019    Performed by Armando Bills MD at [facility]O     Patient Active Problem List   Diagnosis    Small bowel mass    Pancreatic cyst         Anesthesia Evaluation    I have reviewed the  Patient Summary Reports.        Review of Systems      Physical Exam  General:  Well nourished, Obesity    Airway/Jaw/Neck:  Airway Findings: Mouth Opening: Normal Tongue: Normal  General Airway Assessment: Adult  Mallampati: II  Improves to II with phonation.  TM Distance: Normal, at least 6 cm       Chest/Lungs:  Chest/Lungs Findings: Clear to auscultation, Normal Respiratory Rate     Heart/Vascular:  Heart Findings: Rate: Normal  Rhythm: Regular Rhythm  Sounds: Normal        Mental Status:  Mental Status Findings:  Cooperative, Alert and Oriented     No results found for: WBC, HGB, HCT, MCV, PLT    Chemistry    No results found for: NA, K, CL, CO2, BUN, CREATININE, GLU No results found for: CALCIUM, ALKPHOS, AST, ALT, BILITOT, ESTGFRAFRICA, EGFRNONAA         Anesthesia Plan  Type of Anesthesia, risks & benefits discussed:  Anesthesia Type:  general  Patient's Preference:   Intra-op Monitoring Plan:   Intra-op Monitoring Plan Comments:   Post Op Pain Control Plan:   Post Op Pain Control Plan Comments:   Induction:   IV  Beta Blocker:         Informed Consent: Patient understands risks and agrees with Anesthesia plan.  Questions answered. Anesthesia consent signed with patient.  ASA Score: 3     Day of Surgery Review of History & Physical: I have interviewed and examined the patient. I have reviewed the patient's H&P dated:  There are no significant changes.  H&P update referred to the provider.  H&P completed by Anesthesiologist.       Ready For Surgery From Anesthesia Perspective.

## 2019-04-11 NOTE — ANESTHESIA POSTPROCEDURE EVALUATION
Anesthesia Post Evaluation    Patient: Christiana Chan    Procedure(s) Performed: Procedure(s) (LRB):  LAPAROTOMY, EXPLORATORY (N/A)  EXCISION, SMALL INTESTINE (N/A)  LYMPHADENECTOMY (N/A)    Final Anesthesia Type: general  Patient location during evaluation: PACU  Patient participation: Yes- Able to Participate  Level of consciousness: awake  Post-procedure vital signs: reviewed and stable  Pain management: adequate  Airway patency: patent  PONV status at discharge: No PONV  Anesthetic complications: no      Cardiovascular status: stable  Respiratory status: unassisted  Hydration status: euvolemic  Follow-up not needed.          Vitals Value Taken Time   /61 4/11/2019  4:10 PM   Temp 36.8 °C (98.2 °F) 4/11/2019  3:10 PM   Pulse 94 4/11/2019  4:11 PM   Resp 13 4/11/2019  4:11 PM   SpO2 99 % 4/11/2019  4:11 PM   Vitals shown include unvalidated device data.      No case tracking events are documented in the log.      Pain/Trey Score: Pain Rating Prior to Med Admin: 7 (4/11/2019  3:54 PM)  Trey Score: 8 (4/11/2019  3:50 PM)

## 2019-04-11 NOTE — TRANSFER OF CARE
"Anesthesia Transfer of Care Note    Patient: Christiana Chan    Procedure(s) Performed: Procedure(s) (LRB):  LAPAROTOMY, EXPLORATORY (N/A)  EXCISION, SMALL INTESTINE (N/A)  LYMPHADENECTOMY (N/A)    Patient location: PACU    Anesthesia Type: general    Transport from OR: Transported from OR on 6-10 L/min O2 by face mask with adequate spontaneous ventilation    Post pain: adequate analgesia    Post assessment: no apparent anesthetic complications    Post vital signs: stable    Level of consciousness: awake and sedated    Nausea/Vomiting: no nausea/vomiting    Complications: none    Transfer of care protocol was followed      Last vitals:   Visit Vitals  BP (!) 109/55   Pulse 84   Temp 36.1 °C (97 °F) (Skin)   Resp 20   Ht 5' 3" (1.6 m)   Wt 59 kg (130 lb)   SpO2 100%   Breastfeeding? No   BMI 23.03 kg/m²     "

## 2019-04-11 NOTE — PLAN OF CARE
Pt rec'd to PACU from OR with CRNA and RN, pt connected to monitors and assessed. Report rec'd. See flowsheets for VS and assessments.

## 2019-04-11 NOTE — INTERVAL H&P NOTE
The patient has been examined and the H&P has been reviewed:    I concur with the findings and no changes have occurred since H&P was written.    Anesthesia/Surgery risks, benefits and alternative options discussed and understood by patient/family.          Active Hospital Problems    Diagnosis  POA    Malignant carcinoid tumor of ileum [C7A.012]  Yes      Resolved Hospital Problems   No resolved problems to display.

## 2019-04-11 NOTE — ANESTHESIA RELEASE NOTE
"Anesthesia Release from PACU Note    Patient: Christiana Chan    Procedure(s) Performed: Procedure(s) (LRB):  LAPAROTOMY, EXPLORATORY (N/A)  EXCISION, SMALL INTESTINE (N/A)  LYMPHADENECTOMY (N/A)    Anesthesia type: general    Post pain: Adequate analgesia    Post assessment: no apparent anesthetic complications    Last Vitals:   Visit Vitals  /67   Pulse 88   Temp 36.8 °C (98.2 °F) (Skin)   Resp 19   Ht 5' 3" (1.6 m)   Wt 59 kg (130 lb)   SpO2 100%   Breastfeeding? No   BMI 23.03 kg/m²       Post vital signs: stable    Level of consciousness: awake and alert     Nausea/Vomiting: no nausea/no vomiting    Complications: none    Airway Patency: patent    Respiratory: unassisted, spontaneous ventilation    Cardiovascular: stable and blood pressure at baseline    Hydration: euvolemic  "

## 2019-04-12 LAB
ALBUMIN SERPL BCP-MCNC: 2.8 G/DL (ref 3.5–5.2)
ALP SERPL-CCNC: 81 U/L (ref 55–135)
ALT SERPL W/O P-5'-P-CCNC: 49 U/L (ref 10–44)
ANION GAP SERPL CALC-SCNC: 5 MMOL/L (ref 8–16)
AST SERPL-CCNC: 34 U/L (ref 10–40)
BASOPHILS # BLD AUTO: 0.01 K/UL (ref 0–0.2)
BASOPHILS NFR BLD: 0.1 % (ref 0–1.9)
BILIRUB SERPL-MCNC: 0.4 MG/DL (ref 0.1–1)
BUN SERPL-MCNC: 10 MG/DL (ref 8–23)
CALCIUM SERPL-MCNC: 8.4 MG/DL (ref 8.7–10.5)
CHLORIDE SERPL-SCNC: 105 MMOL/L (ref 95–110)
CO2 SERPL-SCNC: 27 MMOL/L (ref 23–29)
CREAT SERPL-MCNC: 0.8 MG/DL (ref 0.5–1.4)
DIFFERENTIAL METHOD: ABNORMAL
EOSINOPHIL # BLD AUTO: 0.1 K/UL (ref 0–0.5)
EOSINOPHIL NFR BLD: 0.4 % (ref 0–8)
ERYTHROCYTE [DISTWIDTH] IN BLOOD BY AUTOMATED COUNT: 14.5 % (ref 11.5–14.5)
EST. GFR  (AFRICAN AMERICAN): >60 ML/MIN/1.73 M^2
EST. GFR  (NON AFRICAN AMERICAN): >60 ML/MIN/1.73 M^2
GLUCOSE SERPL-MCNC: 146 MG/DL (ref 70–110)
HCT VFR BLD AUTO: 31 % (ref 37–48.5)
HGB BLD-MCNC: 9.9 G/DL (ref 12–16)
LYMPHOCYTES # BLD AUTO: 1 K/UL (ref 1–4.8)
LYMPHOCYTES NFR BLD: 7.3 % (ref 18–48)
MAGNESIUM SERPL-MCNC: 1.8 MG/DL (ref 1.6–2.6)
MCH RBC QN AUTO: 28.1 PG (ref 27–31)
MCHC RBC AUTO-ENTMCNC: 31.9 G/DL (ref 32–36)
MCV RBC AUTO: 88 FL (ref 82–98)
MONOCYTES # BLD AUTO: 1 K/UL (ref 0.3–1)
MONOCYTES NFR BLD: 7.6 % (ref 4–15)
NEUTROPHILS # BLD AUTO: 11.5 K/UL (ref 1.8–7.7)
NEUTROPHILS NFR BLD: 84.4 % (ref 38–73)
PHOSPHATE SERPL-MCNC: 3 MG/DL (ref 2.7–4.5)
PLATELET # BLD AUTO: 286 K/UL (ref 150–350)
PMV BLD AUTO: 8.7 FL (ref 9.2–12.9)
POTASSIUM SERPL-SCNC: 4.1 MMOL/L (ref 3.5–5.1)
PROT SERPL-MCNC: 5.3 G/DL (ref 6–8.4)
RBC # BLD AUTO: 3.52 M/UL (ref 4–5.4)
SODIUM SERPL-SCNC: 137 MMOL/L (ref 136–145)
WBC # BLD AUTO: 13.6 K/UL (ref 3.9–12.7)

## 2019-04-12 PROCEDURE — 25000003 PHARM REV CODE 250: Performed by: SURGERY

## 2019-04-12 PROCEDURE — 94761 N-INVAS EAR/PLS OXIMETRY MLT: CPT

## 2019-04-12 PROCEDURE — 97116 GAIT TRAINING THERAPY: CPT

## 2019-04-12 PROCEDURE — 97165 OT EVAL LOW COMPLEX 30 MIN: CPT

## 2019-04-12 PROCEDURE — 80053 COMPREHEN METABOLIC PANEL: CPT

## 2019-04-12 PROCEDURE — 85025 COMPLETE CBC W/AUTO DIFF WBC: CPT

## 2019-04-12 PROCEDURE — 83735 ASSAY OF MAGNESIUM: CPT

## 2019-04-12 PROCEDURE — 94799 UNLISTED PULMONARY SVC/PX: CPT

## 2019-04-12 PROCEDURE — 36415 COLL VENOUS BLD VENIPUNCTURE: CPT

## 2019-04-12 PROCEDURE — 25000003 PHARM REV CODE 250: Performed by: STUDENT IN AN ORGANIZED HEALTH CARE EDUCATION/TRAINING PROGRAM

## 2019-04-12 PROCEDURE — 97530 THERAPEUTIC ACTIVITIES: CPT

## 2019-04-12 PROCEDURE — 99900038 HC OT GENERIC THERAPY SCREENING (STAT)

## 2019-04-12 PROCEDURE — 63600175 PHARM REV CODE 636 W HCPCS: Performed by: SURGERY

## 2019-04-12 PROCEDURE — 97535 SELF CARE MNGMENT TRAINING: CPT

## 2019-04-12 PROCEDURE — 94770 HC EXHALED C02 TEST: CPT

## 2019-04-12 PROCEDURE — 97161 PT EVAL LOW COMPLEX 20 MIN: CPT

## 2019-04-12 PROCEDURE — 11000001 HC ACUTE MED/SURG PRIVATE ROOM

## 2019-04-12 PROCEDURE — 63600175 PHARM REV CODE 636 W HCPCS: Performed by: STUDENT IN AN ORGANIZED HEALTH CARE EDUCATION/TRAINING PROGRAM

## 2019-04-12 PROCEDURE — 99900035 HC TECH TIME PER 15 MIN (STAT)

## 2019-04-12 PROCEDURE — 84100 ASSAY OF PHOSPHORUS: CPT

## 2019-04-12 PROCEDURE — 97168 OT RE-EVAL EST PLAN CARE: CPT

## 2019-04-12 RX ORDER — ACETAMINOPHEN 325 MG/1
650 TABLET ORAL EVERY 6 HOURS
Status: DISCONTINUED | OUTPATIENT
Start: 2019-04-12 | End: 2019-04-13

## 2019-04-12 RX ORDER — ENOXAPARIN SODIUM 100 MG/ML
40 INJECTION SUBCUTANEOUS EVERY 24 HOURS
Status: DISCONTINUED | OUTPATIENT
Start: 2019-04-12 | End: 2019-04-15 | Stop reason: HOSPADM

## 2019-04-12 RX ORDER — TRAMADOL HYDROCHLORIDE 50 MG/1
50 TABLET ORAL EVERY 6 HOURS PRN
Status: DISCONTINUED | OUTPATIENT
Start: 2019-04-12 | End: 2019-04-15 | Stop reason: HOSPADM

## 2019-04-12 RX ORDER — POLYETHYLENE GLYCOL 3350 17 G/17G
17 POWDER, FOR SOLUTION ORAL DAILY
Status: DISCONTINUED | OUTPATIENT
Start: 2019-04-12 | End: 2019-04-15 | Stop reason: HOSPADM

## 2019-04-12 RX ADMIN — KETOROLAC TROMETHAMINE 10 MG: 30 INJECTION, SOLUTION INTRAMUSCULAR at 12:04

## 2019-04-12 RX ADMIN — DIGOXIN 125 MCG: 0.12 TABLET ORAL at 10:04

## 2019-04-12 RX ADMIN — DEXTROSE, SODIUM CHLORIDE, AND POTASSIUM CHLORIDE: 5; .45; .15 INJECTION INTRAVENOUS at 04:04

## 2019-04-12 RX ADMIN — PROPRANOLOL HYDROCHLORIDE 10 MG: 10 TABLET ORAL at 10:04

## 2019-04-12 RX ADMIN — ACETAMINOPHEN 650 MG: 325 TABLET ORAL at 12:04

## 2019-04-12 RX ADMIN — PREGABALIN 75 MG: 75 CAPSULE ORAL at 09:04

## 2019-04-12 RX ADMIN — TRAMADOL HYDROCHLORIDE 50 MG: 50 TABLET ORAL at 10:04

## 2019-04-12 RX ADMIN — CYCLOBENZAPRINE HYDROCHLORIDE 5 MG: 5 TABLET, FILM COATED ORAL at 09:04

## 2019-04-12 RX ADMIN — ACETAMINOPHEN 650 MG: 325 TABLET ORAL at 06:04

## 2019-04-12 RX ADMIN — POLYETHYLENE GLYCOL 3350 17 G: 17 POWDER, FOR SOLUTION ORAL at 04:04

## 2019-04-12 RX ADMIN — LISINOPRIL 20 MG: 20 TABLET ORAL at 09:04

## 2019-04-12 RX ADMIN — OCTREOTIDE ACETATE 125 MCG/HR: 1000 INJECTION, SOLUTION INTRAVENOUS; SUBCUTANEOUS at 08:04

## 2019-04-12 RX ADMIN — PANTOPRAZOLE SODIUM 20 MG: 20 TABLET, DELAYED RELEASE ORAL at 10:04

## 2019-04-12 RX ADMIN — SPIRONOLACTONE 12.5 MG: 25 TABLET, FILM COATED ORAL at 09:04

## 2019-04-12 RX ADMIN — PROPRANOLOL HYDROCHLORIDE 10 MG: 10 TABLET ORAL at 09:04

## 2019-04-12 RX ADMIN — LAMOTRIGINE 100 MG: 100 TABLET ORAL at 10:04

## 2019-04-12 RX ADMIN — CARVEDILOL 25 MG: 25 TABLET, FILM COATED ORAL at 10:04

## 2019-04-12 RX ADMIN — FLUOXETINE 10 MG: 10 CAPSULE ORAL at 10:04

## 2019-04-12 RX ADMIN — LISINOPRIL 20 MG: 20 TABLET ORAL at 10:04

## 2019-04-12 RX ADMIN — TRAMADOL HYDROCHLORIDE 50 MG: 50 TABLET ORAL at 04:04

## 2019-04-12 RX ADMIN — PREGABALIN 75 MG: 75 CAPSULE ORAL at 10:04

## 2019-04-12 RX ADMIN — ENOXAPARIN SODIUM 40 MG: 100 INJECTION SUBCUTANEOUS at 04:04

## 2019-04-12 RX ADMIN — CALCIUM GLUCONATE 1000 MG: 94 INJECTION, SOLUTION INTRAVENOUS at 10:04

## 2019-04-12 RX ADMIN — LAMOTRIGINE 100 MG: 100 TABLET ORAL at 09:04

## 2019-04-12 RX ADMIN — CARVEDILOL 25 MG: 25 TABLET, FILM COATED ORAL at 09:04

## 2019-04-12 RX ADMIN — DEXTROSE, SODIUM CHLORIDE, AND POTASSIUM CHLORIDE: 5; .45; .15 INJECTION INTRAVENOUS at 01:04

## 2019-04-12 RX ADMIN — SODIUM CHLORIDE, SODIUM LACTATE, POTASSIUM CHLORIDE, AND CALCIUM CHLORIDE 500 ML: .6; .31; .03; .02 INJECTION, SOLUTION INTRAVENOUS at 07:04

## 2019-04-12 RX ADMIN — IRON SUCROSE 100 MG: 20 INJECTION, SOLUTION INTRAVENOUS at 12:04

## 2019-04-12 RX ADMIN — KETOROLAC TROMETHAMINE 10 MG: 30 INJECTION, SOLUTION INTRAMUSCULAR at 05:04

## 2019-04-12 RX ADMIN — KETOROLAC TROMETHAMINE 10 MG: 30 INJECTION, SOLUTION INTRAMUSCULAR at 06:04

## 2019-04-12 NOTE — PLAN OF CARE
POD #1 S/P SBR   HX: Malignant carcinoid tumor of ileum     Pt reports she lives with her spouse and was independent prior to admit; no hh, no dme.  Spouse or sin are able to provide assistance and transportation when d/c.    Tn gave d/c folder, brochure, and card and encouraged to call for any needs/concerns.    Tn to follow for post op needs..     04/12/19 1025   Discharge Assessment   Assessment Type Discharge Planning Assessment   Confirmed/corrected address and phone number on facesheet? Yes   Assessment information obtained from? Patient   Expected Length of Stay (days) 4   Communicated expected length of stay with patient/caregiver yes   Prior to hospitilization cognitive status: Alert/Oriented   Prior to hospitalization functional status: Independent   Current cognitive status: Alert/Oriented   Current Functional Status: Needs Assistance   Lives With spouse   Able to Return to Prior Arrangements yes   Is patient able to care for self after discharge? Yes   Who are your caregiver(s) and their phone number(s)? John (spouse) [phone]   Patient's perception of discharge disposition home or selfcare   Readmission Within the Last 30 Days no previous admission in last 30 days   Patient currently being followed by outpatient case management? No   Patient currently receives any other outside agency services? No   Equipment Currently Used at Home none   Do you have any problems affording any of your prescribed medications? No   Is the patient taking medications as prescribed? yes   Does the patient have transportation home? Yes   Transportation Anticipated family or friend will provide   Does the patient receive services at the [facility]? No   Discharge Plan A Home with family   Discharge Plan B Home with family;Home Health   DME Needed Upon Discharge    (tbd)   Patient/Family in Agreement with Plan yes

## 2019-04-12 NOTE — PLAN OF CARE
Problem: Occupational Therapy Goal  Goal: Occupational Therapy Goal  Goals to be met by: 5/12     Patient will increase functional independence with ADLs by performing:    UE Dressing with Modified [place].  LE Dressing with Stand-by Assistance.  Grooming while standing with Stand-by Assistance.  Toileting from toilet with Stand-by Assistance for hygiene and clothing management.   Toilet transfer to toilet with Stand-by Assistance.  Upper extremity exercise program x10 reps per handout, with independence.    Outcome: Ongoing (interventions implemented as appropriate)  OT eval performed, report to follow    Rec home w/HH, RW, TTB, BSC

## 2019-04-12 NOTE — PT/OT/SLP EVAL
Occupational Therapy   Evaluation    Name: Christiana Chan  MRN: [id number]  Admitting Diagnosis:  Malignant carcinoid tumor of ileum 1 Day Post-Op    Recommendations:     Discharge Recommendations: home health PT, home health OT  Discharge Equipment Recommendations:  walker, rolling  Barriers to discharge:  None    Assessment:     Christiana Chan is a 68 y.o. female with a medical diagnosis of Malignant carcinoid tumor of ileum.  She presents with performance deficits affecting function: weakness, impaired endurance, impaired functional mobilty, gait instability, pain, impaired coordination, impaired self care skills, impaired balance, decreased lower extremity function, decreased ROM.      Rehab Prognosis: Good; patient would benefit from acute skilled OT services to address these deficits and reach maximum level of function.       Plan:     Patient to be seen 5 x/week to address the above listed problems via self-care/home management, therapeutic activities, therapeutic exercises  · Plan of Care Expires: 05/12/19  · Plan of Care Reviewed with: patient    Subjective     Chief Complaint: abdominal pain; weakness  Patient/Family Comments/goals: increased function, decreased pain    Occupational Profile:  Living Environment: Lives w/spouse in [facility], [place], [facility] w/built in seat  Previous level of function: Due to progressive weakness and frequent falls, has required supervision and some assist  Roles and Routines: had been cooking, cleaning; not driven in several weeks  Equipment Used at Home:  bedside commode, crutches, axillary, shower chair  Assistance upon Discharge: family    Pain/Comfort:  Pain Rating 1: 7/10  Location - Orientation 1: medial  Location 1: abdomen  Pain Addressed 1: Reposition, Nurse notified, Distraction, Cessation of Activity  Pain Rating Post-Intervention 1: 7/10    Patients cultural, spiritual, Mormonism conflicts given the current situation: no    Objective:     Communicated with: nurse prior  to session.  Patient found HOB elevated with peripheral IV, NG tube upon OT entry to room.    General Precautions: Standard, fall, NPO   Orthopedic Precautions:    Braces:       Occupational Performance:    Bed Mobility:    · Patient completed Rolling/Turning to Right with contact guard assistance  · Patient completed Scooting/Bridging with stand by assistance  · Patient completed Supine to Sit with minimum assistance  · Patient completed Sit to Supine with contact guard assistance and minimum assistance    Functional Mobility/Transfers:  · Patient completed Sit <> Stand Transfer with contact guard assistance and minimum assistance  with  no assistive device   · Functional Mobility: min-mod A HHA to/from sink    Activities of Daily Living:  · Grooming: SBA for grooming, however min-mod A for balance    · Bathing: minimum assistance    · Upper Body Dressing: stand by assistance      Cognitive/Visual Perceptual:  AO4    Physical Exam:  BUE AROM WFL, strength 3+/5  Fair+ sit balance, poor stand balance 2/2 spinal issues( in process of work up)    AMPAC 6 Click ADL:  AMPAC Total Score: 19    Treatment & Education:  Pt educated on role of OT/POC, Performed dynamic standing activity at sink and required increased assistance as activity progressed.  Pt performed ADLs and mobility as above.  Education:    Patient left HOB elevated with all lines intact, call button in reach, bed alarm on and nurse notified    GOALS:   Multidisciplinary Problems     Occupational Therapy Goals        Problem: Occupational Therapy Goal    Goal Priority Disciplines Outcome Interventions   Occupational Therapy Goal     OT, PT/OT Ongoing (interventions implemented as appropriate)    Description:  Goals to be met by: 5/12     Patient will increase functional independence with ADLs by performing:    UE Dressing with Modified [place].  LE Dressing with Stand-by Assistance.  Grooming while standing with Stand-by Assistance.  Toileting from  toilet with Stand-by Assistance for hygiene and clothing management.   Toilet transfer to toilet with Stand-by Assistance.  Upper extremity exercise program x10 reps per handout, with independence.                      History:     Past Medical History:   Diagnosis Date    Abdominal bloating     Abdominal pain     Asthma     Atrial fibrillation     Colon polyps     Constipation     Diverticulosis of colon     Esophageal ulcer     Gastritis     GERD (gastroesophageal reflux disease)     Heartburn     Hiatal hernia     HTN (hypertension)     Intermittent diarrhea     Iron deficiency anemia     Osteoarthritis     Osteoporosis     Rectal bleeding     Ruptured lumbar disc     Small bowel lesion     Vitamin B12 deficiency anemia     Vitamin D deficiency          Past Surgical History:   Procedure Laterality Date    APPENDECTOMY, LAPAROSCOPIC N/A 2019    [facility]    BREAST BIOPSY Bilateral      SECTION   &     CHOLECYSTECTOMY      COLONOSCOPY      outside facility    ENTEROSCOPY, DOUBLE BALLOON, ANTEGRADE N/A 2019    Performed by [facility] at [facility] (2ND FLR)    ESOPHAGOGASTRODUODENOSCOPY      outside facility    EXCISION, SMALL INTESTINE, LAPAROSCOPIC N/A 2019    Performed by [facility] at [facility] OR    HYSTERECTOMY  1991    SHOULDER SURGERY Right 2013    TOTAL KNEE ARTHROPLASTY Right 2013    ULTRASOUND-ENDOSCOPIC-UPPER N/A 2019    Performed by Armando Bills MD at [facility] ENDO       Time Tracking:     OT Date of Treatment: 19  OT Start Time: 1311  OT Stop Time: 1345  OT Total Time (min): 34 min    Billable Minutes:Evaluation 14  Self Care/Home Management 10  Therapeutic Activity 9    Luca Neil, OT  2019

## 2019-04-12 NOTE — PLAN OF CARE
Pt arrived to unit from Preop. Admission questions completed by VN. Introduced self as VN for this shift. Educated pt on VTE risk, safety precautions, and VN's role in pt care. Opportunity given for pt's questions. All questions answered.

## 2019-04-12 NOTE — PROGRESS NOTES
Surgery Progress Note    S:  Patient is POD#1 from SBO.  No acute events overnight.  Patient's largest complaint is muscle cramps around the incision.  UOP marginal overnight.  NGT in place, putting out approximately 200 of thin bilious contents.      O:  Temp:  [97 °F (36.1 °C)-99.6 °F (37.6 °C)] 99.6 °F (37.6 °C)  Pulse:  [] 91  Resp:  [12-23] 18  SpO2:  [92 %-100 %] 92 %  BP: (106-137)/(55-67) 132/60    Physical Exam:  Gen: no acute distress. Alert and oriented x3.  HEENT: normocephalic and atraumatic. EOMI. NGT in place  Resp: unlabored respirations  CV: regular rate  Abd: soft, appropriately tender to palpation.  Nondistended  Ext: warm and well perfused  MSK: normal range of motion, normal strength  Neuro: no focal deficits, normal sensation    A/P:  69 yo F with h/o carcinoid tumor of the small bowel now s/p SBR    -Will de-escalate paincontrol   -Clamp NG this AM  -F/u labs to help determine fluid status  -PT/OT for ambulation    Braeden Pérez MD  [employer] General Surgery

## 2019-04-12 NOTE — PLAN OF CARE
VN cued into room, permission received to turn camera towards patient.  Patient is lying in bed, NG tube present, clamped at this time.  Patient complains of pain 5/10 to incisional abdomen, states that bedside nurse has given her pain medications.  She denies any questions, concerns or needs at this time.  Will continue to monitor.     04/12/19 8706   Type of Frequent Check   Type Other (see comments)  (VN)   Safety/Activity   Patient Rounds bed in low position;bed wheels locked;call light in patient/parent reach;clutter free environment maintained;ID band on;placement of personal items at bedside;visualized patient   Safety Promotion/Fall Prevention bed alarm set;Fall Risk reviewed with patient/family;Fall Risk signage in place;side rails raised x 2;instructed to call staff for mobility   Safety Precautions emergency equipment at bedside   Positioning   Body Position positioned/repositioned independently   Head of Bed (HOB) HOB at 30-45 degrees   Positioning/Transfer Devices pillows;in use   Pain/Comfort/Sleep   Preferred Pain Scale number (Numeric Rating Pain Scale)   Comfort/Acceptable Pain Level 4   Pain Body Location - Orientation incisional   Pain Body Location abdomen   Pain Rating (0-10): Rest 5   Pain Reassessment   Pain Rating Post Med Admin 5   Headache   Headache No   [place] Coma Scale   Best Eye Response 4-->(E4) spontaneous   Best Motor Response 6-->(M6) obeys commands   Best Verbal Response 5-->(V5) oriented   [place] Coma Scale Score 15   Assessment Qualifiers patient not sedated/intubated;no eye obstruction present   Assessments (Pre/Post)   Level of Consciousness (AVPU) alert

## 2019-04-12 NOTE — PT/OT/SLP EVAL
"Physical Therapy Evaluation and Treatment    Patient Name:  Christiana Chan   MRN:  [id number]    Recommendations:     Discharge Recommendations:  home health OT, home health PT   Discharge Equipment Recommendations: walker, rolling   Barriers to discharge: instability with gait     Assessment:     Christiana Chan is a 68 y.o. female admitted with a medical diagnosis of Malignant carcinoid tumor of ileum.  She presents with the following impairments/functional limitations:  weakness, impaired endurance, impaired self care skills, impaired functional mobilty, gait instability, impaired balance, decreased lower extremity function, decreased coordination, impaired coordination, pain, impaired skin, decreased ROM. Pt with abdominal pain/discomfort and presents with baseline back pain from "bulging discs" which pt has been dealing with for months leading to LLE instability and several falls the last few weeks. Pt ambulated 30 ft with RW and CGA/min A this date. Recommending continued PT services to address pt's functional limitations and barriers to d/c. DME needs: RW.    Rehab Prognosis: Good; patient would benefit from acute skilled PT services to address these deficits and reach maximum level of function.    Recent Surgery: Procedure(s) (LRB):  EXCISION, SMALL INTESTINE, LAPAROSCOPIC (N/A)  APPENDECTOMY, LAPAROSCOPIC (N/A) 1 Day Post-Op    Plan:     During this hospitalization, patient to be seen 6 x/week to address the identified rehab impairments via gait training, therapeutic activities, therapeutic exercises and progress toward the following goals:    · Plan of Care Expires:  05/12/19    Subjective     Chief Complaint: abdomen pain  Patient/Family Comments/goals: pt reports her abdomen feels "extended and hard"  Pain/Comfort:  Pain Rating 1: 6/10  Location - Orientation 1: medial  Location 1: abdomen  Pain Addressed 1: Pre-medicate for activity, Reposition, Distraction, Nurse notified  Pain Rating " Post-Intervention 1: 6/10  Pain Rating 2: (reports no pain in her back during session)    Patients cultural, spiritual, Jainism conflicts given the current situation: no    Living Environment:  Pt lives with her  and son in a [facility] with [place] and [place] with built in shower bench.  Prior to admission, patients level of function was supervision for ambulation without AD but pt with occasional instability (2/2 back isues), pt has not driven in ~3 weeks, pt cooks, does laundry, and requires occasional assist for dressing. Pt and  reporting pt with worsening of back pain and LLE weakness/instability for a few weeks and pt is seeing a specialist for her back problems.  Equipment used at home: bedside commode, crutches, axillary(built in shower bench).  DME owned (not currently used): bedside commode and crutches.  Upon discharge, patient will have assistance from her  and son.    Objective:     Communicated with CARMENZA Feliciano prior to session.  Patient found HOB elevated with bed alarm, telemetry, NG tube, peripheral IV, PCA, kwan catheter  upon PT entry to room.    General Precautions: Standard, fall   Orthopedic Precautions:N/A   Braces: N/A     Exams:  · Gross Motor Coordination:  impaired LLE coordination with gait  · Postural Exam:  Patient presented with the following abnormalities:    · -       Rounded shoulders  · Sensation:    · -       Impaired  light/touch dorsum of L foot  · Skin Integrity/Edema:      · -       Skin integrity: abdominal surgical incision  · RLE ROM: WFL  · RLE Strength: ankle DF 5/5, knee ext 4+/5, hip flexion 4+/5  · LLE ROM: WFL  · LLE Strength: ankle DF 5/5, knee ext 4-/5, hip flexion 4-/5 (limited by abdominal pain)    Functional Mobility:  · Bed Mobility:     · Rolling Right: contact guard assistance  · Scooting: stand by assistance  · Supine to Sit: minimum assistance  · Transfers:     · Sit to Stand:  contact guard assistance and minimum assistance with rolling  walker  · Bed to Chair: contact guard assistance and minimum assistance with  rolling walker  using  Step Transfer  · Gait: 30 ft with RW and CGA for majority of the time and min A 1x 2/2 instability when pt raised L hand off RW. Pt ambulates with decreased step length and decreased R foot clearance and limited LLE WB. Decreased step length with foot flat.      Therapeutic Activities and Exercises:  Pt sits EOB with supervision and completed BLE APs and LAQs.  Completed stand without AD initially and presents with instability during stepping in place, requiring min A for balance.   Ambulated within room then ambulated to sit in chair.  LEs reclined and educated pt on APs, QS, hip abduction slides, and heel slides.    AM-PAC 6 CLICK MOBILITY  Total Score:17     Patient left up in chair with all lines intact, call button in reach, RN notified and pt's family present.    GOALS:   Multidisciplinary Problems     Physical Therapy Goals        Problem: Physical Therapy Goal    Goal Priority Disciplines Outcome Goal Variances Interventions   Physical Therapy Goal     PT, PT/OT Ongoing (interventions implemented as appropriate)     Description:  Goals to be met by: 19     Patient will increase functional independence with mobility by performin. Supine <> sit with Stand-by Assistance  2. Sit to stand transfer with Stand-by Assistance  3. Bed to chair transfer with Stand-by Assistance using Rolling Walker  4. Gait  x 150 feet with Stand-by Assistance using Rolling Walker.   5. Stand for 5 minutes with Stand-by Assistance using Rolling Walker                      History:     Past Medical History:   Diagnosis Date    Abdominal bloating     Abdominal pain     Asthma     Atrial fibrillation     Colon polyps     Constipation     Diverticulosis of colon     Esophageal ulcer     Gastritis     GERD (gastroesophageal reflux disease)     Heartburn     Hiatal hernia     HTN (hypertension)     Intermittent  diarrhea     Iron deficiency anemia     Osteoarthritis     Osteoporosis     Rectal bleeding     Ruptured lumbar disc     Small bowel lesion     Vitamin B12 deficiency anemia     Vitamin D deficiency        Past Surgical History:   Procedure Laterality Date    APPENDECTOMY, LAPAROSCOPIC N/A 2019    [facility] OR    BREAST BIOPSY Bilateral      SECTION   &     CHOLECYSTECTOMY      COLONOSCOPY      outside facility    ENTEROSCOPY, DOUBLE BALLOON, ANTEGRADE N/A 2019    Performed by [facility] at [facility] ENDO (2ND FLR)    ESOPHAGOGASTRODUODENOSCOPY      outside facility    EXCISION, SMALL INTESTINE, LAPAROSCOPIC N/A 2019    Performed by Rebecca Valdez MD at [facility] OR    HYSTERECTOMY  1991    SHOULDER SURGERY Right 2013    TOTAL KNEE ARTHROPLASTY Right 2013    ULTRASOUND-ENDOSCOPIC-UPPER N/A 2019    Performed by Armando Bills MD at [facility] ENDO       Time Tracking:     PT Received On: 19  PT Start Time: 0959     PT Stop Time: 1034  PT Total Time (min): 35 min     Billable Minutes: Evaluation 20 and Gait Training 15      Sharlene Goode, PT  2019

## 2019-04-12 NOTE — PLAN OF CARE
"Problem: Physical Therapy Goal  Goal: Physical Therapy Goal  Goals to be met by: 19     Patient will increase functional independence with mobility by performin. Supine <> sit with Stand-by Assistance  2. Sit to stand transfer with Stand-by Assistance  3. Bed to chair transfer with Stand-by Assistance using Rolling Walker  4. Gait  x 150 feet with Stand-by Assistance using Rolling Walker.   5. Stand for 5 minutes with Stand-by Assistance using Rolling Walker    Outcome: Ongoing (interventions implemented as appropriate)  PT evaluation completed, note to follow. Pt with abdominal pain/discomfort and presents with baseline back pain from "bulging discs" which pt has been dealing with for months leading to LLE instability and several falls the last few weeks. Pt ambulated 30 ft with RW and CGA/min A this date. Recommending continued PT services to address pt's functional limitations and barriers to d/c. DME needs: RW.      "

## 2019-04-12 NOTE — PROGRESS NOTES
Pharmacist Renal Dose Adjustment Note    Christiana Chan is a 68 y.o. female being treated with the medication enoxaparin    Patient Data:    Vital Signs (Most Recent):  Temp: 99.3 °F (37.4 °C) (04/12/19 1216)  Pulse: 82 (04/12/19 1216)  Resp: 16 (04/12/19 1216)  BP: (!) 121/58 (04/12/19 1216)  SpO2: (!) 93 % (04/12/19 1127)   Vital Signs (72h Range):  Temp:  [97 °F (36.1 °C)-99.6 °F (37.6 °C)]   Pulse:  []   Resp:  [12-23]   BP: (106-137)/(55-67)   SpO2:  [92 %-100 %]      Recent Labs   Lab 04/11/19  1530 04/12/19  0623   CREATININE 0.8 0.8     Serum creatinine: 0.8 mg/dL 04/12/19 0623  Estimated creatinine clearance: 59.4 mL/min    Medication enoxaparin dose: 30 mg  frequency q24h will be changed to medication enoxaparin dose 40 mg frequency q24h    Pharmacist's Name: Nemo Ramires  Pharmacist's Extension: [phone]

## 2019-04-12 NOTE — PLAN OF CARE
Problem: Adult Inpatient Plan of Care  Goal: Plan of Care Review  Outcome: Ongoing (interventions implemented as appropriate)  Patient resting in bed, AAOx4. Daughter at the bedside, attentive to patient. IVF infusing as ordered. Dilaudid PCA in use. Kumar in place draining green tinged urine. Midline incision remains CDI. Patient with minimal complaints of pain overnight. Asleep on and off through the shift. Encouraged to call with needs or concerns. Will continue to monitor.

## 2019-04-12 NOTE — PLAN OF CARE
Problem: Adult Inpatient Plan of Care  Goal: Plan of Care Review  Outcome: Ongoing (interventions implemented as appropriate)  Patient on RA with sats as documented.  Hurting too bad to do the IS right now. Will continue to monitor.

## 2019-04-13 LAB
ANION GAP SERPL CALC-SCNC: 6 MMOL/L (ref 8–16)
BASOPHILS # BLD AUTO: 0.01 K/UL (ref 0–0.2)
BASOPHILS NFR BLD: 0.1 % (ref 0–1.9)
BUN SERPL-MCNC: 4 MG/DL (ref 8–23)
CALCIUM SERPL-MCNC: 8.7 MG/DL (ref 8.7–10.5)
CHLORIDE SERPL-SCNC: 104 MMOL/L (ref 95–110)
CO2 SERPL-SCNC: 28 MMOL/L (ref 23–29)
CREAT SERPL-MCNC: 0.7 MG/DL (ref 0.5–1.4)
DIFFERENTIAL METHOD: ABNORMAL
EOSINOPHIL # BLD AUTO: 0.4 K/UL (ref 0–0.5)
EOSINOPHIL NFR BLD: 2.7 % (ref 0–8)
ERYTHROCYTE [DISTWIDTH] IN BLOOD BY AUTOMATED COUNT: 14.6 % (ref 11.5–14.5)
EST. GFR  (AFRICAN AMERICAN): >60 ML/MIN/1.73 M^2
EST. GFR  (NON AFRICAN AMERICAN): >60 ML/MIN/1.73 M^2
GLUCOSE SERPL-MCNC: 122 MG/DL (ref 70–110)
HCT VFR BLD AUTO: 30.8 % (ref 37–48.5)
HGB BLD-MCNC: 10 G/DL (ref 12–16)
LYMPHOCYTES # BLD AUTO: 1.3 K/UL (ref 1–4.8)
LYMPHOCYTES NFR BLD: 9.8 % (ref 18–48)
MAGNESIUM SERPL-MCNC: 1.6 MG/DL (ref 1.6–2.6)
MCH RBC QN AUTO: 28.7 PG (ref 27–31)
MCHC RBC AUTO-ENTMCNC: 32.5 G/DL (ref 32–36)
MCV RBC AUTO: 88 FL (ref 82–98)
MONOCYTES # BLD AUTO: 1 K/UL (ref 0.3–1)
MONOCYTES NFR BLD: 7.5 % (ref 4–15)
NEUTROPHILS # BLD AUTO: 10.4 K/UL (ref 1.8–7.7)
NEUTROPHILS NFR BLD: 79.5 % (ref 38–73)
PHOSPHATE SERPL-MCNC: 2.5 MG/DL (ref 2.7–4.5)
PLATELET # BLD AUTO: 272 K/UL (ref 150–350)
PMV BLD AUTO: 9.1 FL (ref 9.2–12.9)
POTASSIUM SERPL-SCNC: 3.9 MMOL/L (ref 3.5–5.1)
RBC # BLD AUTO: 3.49 M/UL (ref 4–5.4)
SODIUM SERPL-SCNC: 138 MMOL/L (ref 136–145)
WBC # BLD AUTO: 13.04 K/UL (ref 3.9–12.7)

## 2019-04-13 PROCEDURE — 11000001 HC ACUTE MED/SURG PRIVATE ROOM

## 2019-04-13 PROCEDURE — 84100 ASSAY OF PHOSPHORUS: CPT

## 2019-04-13 PROCEDURE — 80048 BASIC METABOLIC PNL TOTAL CA: CPT

## 2019-04-13 PROCEDURE — 83735 ASSAY OF MAGNESIUM: CPT

## 2019-04-13 PROCEDURE — 25000003 PHARM REV CODE 250: Performed by: SURGERY

## 2019-04-13 PROCEDURE — 94799 UNLISTED PULMONARY SVC/PX: CPT

## 2019-04-13 PROCEDURE — 36415 COLL VENOUS BLD VENIPUNCTURE: CPT

## 2019-04-13 PROCEDURE — 25000003 PHARM REV CODE 250: Performed by: STUDENT IN AN ORGANIZED HEALTH CARE EDUCATION/TRAINING PROGRAM

## 2019-04-13 PROCEDURE — 63600175 PHARM REV CODE 636 W HCPCS: Performed by: SURGERY

## 2019-04-13 PROCEDURE — 85025 COMPLETE CBC W/AUTO DIFF WBC: CPT

## 2019-04-13 PROCEDURE — 94761 N-INVAS EAR/PLS OXIMETRY MLT: CPT

## 2019-04-13 PROCEDURE — 63600175 PHARM REV CODE 636 W HCPCS: Performed by: STUDENT IN AN ORGANIZED HEALTH CARE EDUCATION/TRAINING PROGRAM

## 2019-04-13 RX ORDER — MAGNESIUM SULFATE HEPTAHYDRATE 40 MG/ML
2 INJECTION, SOLUTION INTRAVENOUS ONCE
Status: COMPLETED | OUTPATIENT
Start: 2019-04-13 | End: 2019-04-13

## 2019-04-13 RX ORDER — METOCLOPRAMIDE HYDROCHLORIDE 5 MG/ML
10 INJECTION INTRAMUSCULAR; INTRAVENOUS EVERY 6 HOURS
Status: DISCONTINUED | OUTPATIENT
Start: 2019-04-13 | End: 2019-04-15 | Stop reason: HOSPADM

## 2019-04-13 RX ORDER — AMOXICILLIN 250 MG
2 CAPSULE ORAL 2 TIMES DAILY
Status: DISCONTINUED | OUTPATIENT
Start: 2019-04-13 | End: 2019-04-15 | Stop reason: HOSPADM

## 2019-04-13 RX ORDER — ACETAMINOPHEN 500 MG
1000 TABLET ORAL EVERY 6 HOURS
Status: DISCONTINUED | OUTPATIENT
Start: 2019-04-13 | End: 2019-04-15 | Stop reason: HOSPADM

## 2019-04-13 RX ORDER — CYCLOBENZAPRINE HCL 5 MG
5 TABLET ORAL 3 TIMES DAILY
Status: DISCONTINUED | OUTPATIENT
Start: 2019-04-13 | End: 2019-04-15 | Stop reason: HOSPADM

## 2019-04-13 RX ADMIN — PROPRANOLOL HYDROCHLORIDE 10 MG: 10 TABLET ORAL at 08:04

## 2019-04-13 RX ADMIN — IRON SUCROSE 100 MG: 20 INJECTION, SOLUTION INTRAVENOUS at 08:04

## 2019-04-13 RX ADMIN — DIGOXIN 125 MCG: 0.12 TABLET ORAL at 08:04

## 2019-04-13 RX ADMIN — FLUOXETINE 10 MG: 10 CAPSULE ORAL at 08:04

## 2019-04-13 RX ADMIN — MAGNESIUM SULFATE IN WATER 2 G: 40 INJECTION, SOLUTION INTRAVENOUS at 11:04

## 2019-04-13 RX ADMIN — POTASSIUM PHOSPHATE, MONOBASIC AND POTASSIUM PHOSPHATE, DIBASIC 30 MMOL: 224; 236 INJECTION, SOLUTION INTRAVENOUS at 11:04

## 2019-04-13 RX ADMIN — DEXTROSE, SODIUM CHLORIDE, AND POTASSIUM CHLORIDE: 5; .45; .15 INJECTION INTRAVENOUS at 10:04

## 2019-04-13 RX ADMIN — ONDANSETRON 4 MG: 2 INJECTION INTRAMUSCULAR; INTRAVENOUS at 04:04

## 2019-04-13 RX ADMIN — DEXTROSE, SODIUM CHLORIDE, AND POTASSIUM CHLORIDE: 5; .45; .15 INJECTION INTRAVENOUS at 03:04

## 2019-04-13 RX ADMIN — ACETAMINOPHEN 1000 MG: 500 TABLET ORAL at 12:04

## 2019-04-13 RX ADMIN — POLYETHYLENE GLYCOL 3350 17 G: 17 POWDER, FOR SOLUTION ORAL at 08:04

## 2019-04-13 RX ADMIN — TRAMADOL HYDROCHLORIDE 50 MG: 50 TABLET ORAL at 08:04

## 2019-04-13 RX ADMIN — TRAMADOL HYDROCHLORIDE 50 MG: 50 TABLET ORAL at 04:04

## 2019-04-13 RX ADMIN — STANDARDIZED SENNA CONCENTRATE AND DOCUSATE SODIUM 2 TABLET: 8.6; 5 TABLET, FILM COATED ORAL at 08:04

## 2019-04-13 RX ADMIN — ENOXAPARIN SODIUM 40 MG: 100 INJECTION SUBCUTANEOUS at 04:04

## 2019-04-13 RX ADMIN — CARVEDILOL 25 MG: 25 TABLET, FILM COATED ORAL at 08:04

## 2019-04-13 RX ADMIN — SPIRONOLACTONE 12.5 MG: 25 TABLET, FILM COATED ORAL at 08:04

## 2019-04-13 RX ADMIN — CYCLOBENZAPRINE HYDROCHLORIDE 5 MG: 5 TABLET, FILM COATED ORAL at 08:04

## 2019-04-13 RX ADMIN — METOCLOPRAMIDE 10 MG: 5 INJECTION, SOLUTION INTRAMUSCULAR; INTRAVENOUS at 04:04

## 2019-04-13 RX ADMIN — KETOROLAC TROMETHAMINE 10 MG: 30 INJECTION, SOLUTION INTRAMUSCULAR at 12:04

## 2019-04-13 RX ADMIN — PREGABALIN 75 MG: 75 CAPSULE ORAL at 08:04

## 2019-04-13 RX ADMIN — ACETAMINOPHEN 650 MG: 325 TABLET ORAL at 06:04

## 2019-04-13 RX ADMIN — KETOROLAC TROMETHAMINE 10 MG: 30 INJECTION, SOLUTION INTRAMUSCULAR at 11:04

## 2019-04-13 RX ADMIN — TRAMADOL HYDROCHLORIDE 50 MG: 50 TABLET ORAL at 10:04

## 2019-04-13 RX ADMIN — ACETAMINOPHEN 650 MG: 325 TABLET ORAL at 12:04

## 2019-04-13 RX ADMIN — LAMOTRIGINE 100 MG: 100 TABLET ORAL at 08:04

## 2019-04-13 RX ADMIN — STANDARDIZED SENNA CONCENTRATE AND DOCUSATE SODIUM 2 TABLET: 8.6; 5 TABLET, FILM COATED ORAL at 11:04

## 2019-04-13 RX ADMIN — PANTOPRAZOLE SODIUM 20 MG: 20 TABLET, DELAYED RELEASE ORAL at 08:04

## 2019-04-13 RX ADMIN — CYCLOBENZAPRINE HYDROCHLORIDE 5 MG: 5 TABLET, FILM COATED ORAL at 03:04

## 2019-04-13 RX ADMIN — KETOROLAC TROMETHAMINE 10 MG: 30 INJECTION, SOLUTION INTRAMUSCULAR at 06:04

## 2019-04-13 RX ADMIN — LISINOPRIL 20 MG: 20 TABLET ORAL at 08:04

## 2019-04-13 NOTE — PROGRESS NOTES
Progress notes reviewed. Rounds completed. Introduced self as VN for this shift. Educated pt on VN's role in patient's care.  Plan of care reviewed with patient. Opportunity given for pt's questions. No questions or concerns expressed at this time. Safety precautions explained, instructed to call for assistance. Patient verbalizes understanding.  VN to continue to monitor.       04/13/19 1542   Type of Frequent Check   Type Patient Rounds   Safety/Activity   Patient Rounds bed in low position;bed wheels locked;call light in patient/parent reach;ID band on;visualized patient   Safety Promotion/Fall Prevention assistive device/personal item within reach;Fall Risk reviewed with patient/family;family to remain at bedside;instructed to call staff for mobility   Activity Management activity adjusted per tolerance   Positioning   Body Position positioned/repositioned independently   Pain/Comfort/Sleep   Preferred Pain Scale number (Numeric Rating Pain Scale)   Pain Body Location abdomen   Pain Rating (0-10): Rest 2  (denies pain at this time)   RASS ([place] Agitation-Sedation Scale) 0-->alert and calm   Headache   Headache No   Nausea/Vomiting   Nausea/Vomiting No Nausea and Vomiting   Assessments (Pre/Post)   Level of Consciousness (AVPU) alert

## 2019-04-13 NOTE — PROGRESS NOTES
[facility] - Neuroendocrine Surgery Service  Daily Progress Note     HD#2  POD#2 Days Post-Op    Subjective  АННА. Pain well controlled. Tolerated NGT clamping x 24h with no N/V/belching/hiccups. Denies flatus/BM, but feels urge. Voiding. Ambulating in room and down huston.      Scheduled Meds:   acetaminophen  1,000 mg Oral Q6H    carvedilol  25 mg Oral BID    cyclobenzaprine  5 mg Oral TID    digoxin  125 mcg Oral Daily    enoxaparin  40 mg Subcutaneous Daily    FLUoxetine  10 mg Oral Daily    iron sucrose (VENOFER) IVPB  100 mg Intravenous Daily    ketorolac  9.999 mg Intravenous Q6H    lamoTRIgine  100 mg Oral BID    lisinopril  20 mg Oral BID    pantoprazole  20 mg Oral Daily    polyethylene glycol  17 g Per NG tube Daily    pregabalin  75 mg Oral BID    propranolol  10 mg Oral BID    spironolactone  12.5 mg Oral Daily       Continuous Infusions:   dextrose 5 % and 0.45 % NaCl with KCl 20 mEq 100 mL/hr at 04/13/19 0353    octreotide infusion (50 mcg/mL) 125 mcg/hr (04/12/19 0832)       PRN Meds:albuterol sulfate, ALPRAZolam, diphenhydrAMINE, hydrALAZINE, labetalol, lubiprostone, naloxone, ondansetron, traMADol     Objective  Temp:  [98.6 °F (37 °C)-101.5 °F (38.6 °C)] 99.8 °F (37.7 °C)  Pulse:  [80-91] 87  Resp:  [16-20] 18  SpO2:  [90 %-95 %] 95 %  BP: (121-140)/(58-67) 140/63     I/O last 3 completed shifts:  In: 3798.5 [I.V.:3168.5; NG/GT:430; IV Piggyback:200]  Out: 2620 [Urine:2430; Drains:190]  I/O this shift:  In: -   Out: 300 [Urine:300]     GEN: NAD  NEURO: AAOx3  RESP: BELKIS  CV: Reg rate, normotensive  ABD: soft, NT, ND, incisions healing well, without evidence of infection  : No Kumar     Labs  Recent Labs     04/11/19  1530 04/12/19  0623 04/13/19  0439   WBC 14.81* 13.60* 13.04*   HGB 10.3* 9.9* 10.0*   HCT 31.8* 31.0* 30.8*    286 272     Recent Labs     04/11/19  1530 04/12/19  0623 04/13/19  0438    137 138   K 3.8 4.1 3.9    105 104   CO2 24 27 28   BUN 11 10  4*   CREATININE 0.8 0.8 0.7   * 146* 122*   CALCIUM 8.6* 8.4* 8.7   MG  --  1.8 1.6   PHOS  --  3.0 2.5*   PROT  --  5.3*  --    ALBUMIN  --  2.8*  --    BILITOT  --  0.4  --    AST  --  34  --    ALKPHOS  --  81  --    ALT  --  49*  --        Imaging  None     Assessment/Plan  68F with hx NET, s/p dx lap, open SBR.   -Multimodal pain control: APAP, Flexeril, Toradol, Tramadol. Minimize narcotics.  -DC NGT. Advance to CLD. Monitor return of bowel fxn. Continue Miralax, senna/colace  -Lyte repletion  -Ambulate, OOB. PT/OT.  -DVT ppx: Lovenox     Ibrahima Aguilar MD  [facility], PGY4  c [phone]  4/13/2019  9:32 AM

## 2019-04-13 NOTE — NURSING
Elevated temp reported to Dr. Aguilar patient received sched tylenol . Will continue to monitor. Floor nurse notified.

## 2019-04-13 NOTE — PLAN OF CARE
Problem: Adult Inpatient Plan of Care  Goal: Plan of Care Review  Outcome: Ongoing (interventions implemented as appropriate)  Pt is AAOx3 with complaints of pain in abd with relief from tramadol and flexeril. Pt with NG tube d/c'd today and placed on clear liquids. Some nausea seen after meals but with relief from zofran. Midline incision well approximated. Abd soft and non tender. Pt ambulating in halls with no issues.

## 2019-04-13 NOTE — PLAN OF CARE
Problem: Adult Inpatient Plan of Care  Goal: Plan of Care Review  Outcome: Ongoing (interventions implemented as appropriate)  Patient resting in bed, AAOx4.  at the bedside, attentive to patient. Medications administered as ordered. Pain well controlled with scheduled pain medications. Ambulated to bathroom with assistance, safety maintained. NGT in place, clamped. Patient spiked temp overnight of 101.5, tylenol administered and MD notified. Encouraged to call with needs or concerns. Will continue to monitor.

## 2019-04-14 LAB
ANION GAP SERPL CALC-SCNC: 8 MMOL/L (ref 8–16)
BASOPHILS # BLD AUTO: 0.01 K/UL (ref 0–0.2)
BASOPHILS NFR BLD: 0.1 % (ref 0–1.9)
BUN SERPL-MCNC: 2 MG/DL (ref 8–23)
CALCIUM SERPL-MCNC: 9.1 MG/DL (ref 8.7–10.5)
CHLORIDE SERPL-SCNC: 102 MMOL/L (ref 95–110)
CO2 SERPL-SCNC: 27 MMOL/L (ref 23–29)
CREAT SERPL-MCNC: 0.7 MG/DL (ref 0.5–1.4)
DIFFERENTIAL METHOD: ABNORMAL
EOSINOPHIL # BLD AUTO: 0.4 K/UL (ref 0–0.5)
EOSINOPHIL NFR BLD: 3.6 % (ref 0–8)
ERYTHROCYTE [DISTWIDTH] IN BLOOD BY AUTOMATED COUNT: 14.5 % (ref 11.5–14.5)
EST. GFR  (AFRICAN AMERICAN): >60 ML/MIN/1.73 M^2
EST. GFR  (NON AFRICAN AMERICAN): >60 ML/MIN/1.73 M^2
GLUCOSE SERPL-MCNC: 109 MG/DL (ref 70–110)
HCT VFR BLD AUTO: 31.2 % (ref 37–48.5)
HGB BLD-MCNC: 9.9 G/DL (ref 12–16)
LYMPHOCYTES # BLD AUTO: 1.5 K/UL (ref 1–4.8)
LYMPHOCYTES NFR BLD: 12.1 % (ref 18–48)
MAGNESIUM SERPL-MCNC: 1.5 MG/DL (ref 1.6–2.6)
MCH RBC QN AUTO: 27.7 PG (ref 27–31)
MCHC RBC AUTO-ENTMCNC: 31.7 G/DL (ref 32–36)
MCV RBC AUTO: 87 FL (ref 82–98)
MONOCYTES # BLD AUTO: 0.9 K/UL (ref 0.3–1)
MONOCYTES NFR BLD: 7.5 % (ref 4–15)
NEUTROPHILS # BLD AUTO: 9.3 K/UL (ref 1.8–7.7)
NEUTROPHILS NFR BLD: 76.7 % (ref 38–73)
PHOSPHATE SERPL-MCNC: 3.5 MG/DL (ref 2.7–4.5)
PLATELET # BLD AUTO: 314 K/UL (ref 150–350)
PMV BLD AUTO: 9.2 FL (ref 9.2–12.9)
POTASSIUM SERPL-SCNC: 4.1 MMOL/L (ref 3.5–5.1)
RBC # BLD AUTO: 3.57 M/UL (ref 4–5.4)
SODIUM SERPL-SCNC: 137 MMOL/L (ref 136–145)
WBC # BLD AUTO: 12.17 K/UL (ref 3.9–12.7)

## 2019-04-14 PROCEDURE — 11000001 HC ACUTE MED/SURG PRIVATE ROOM

## 2019-04-14 PROCEDURE — 84100 ASSAY OF PHOSPHORUS: CPT

## 2019-04-14 PROCEDURE — 36415 COLL VENOUS BLD VENIPUNCTURE: CPT

## 2019-04-14 PROCEDURE — 25000003 PHARM REV CODE 250: Performed by: STUDENT IN AN ORGANIZED HEALTH CARE EDUCATION/TRAINING PROGRAM

## 2019-04-14 PROCEDURE — 63600175 PHARM REV CODE 636 W HCPCS: Performed by: SURGERY

## 2019-04-14 PROCEDURE — 94761 N-INVAS EAR/PLS OXIMETRY MLT: CPT

## 2019-04-14 PROCEDURE — 80048 BASIC METABOLIC PNL TOTAL CA: CPT

## 2019-04-14 PROCEDURE — 94799 UNLISTED PULMONARY SVC/PX: CPT

## 2019-04-14 PROCEDURE — 25000003 PHARM REV CODE 250: Performed by: SURGERY

## 2019-04-14 PROCEDURE — 85025 COMPLETE CBC W/AUTO DIFF WBC: CPT

## 2019-04-14 PROCEDURE — 83735 ASSAY OF MAGNESIUM: CPT

## 2019-04-14 RX ORDER — GLYCERIN 1 G/1
1 SUPPOSITORY RECTAL ONCE
Status: COMPLETED | OUTPATIENT
Start: 2019-04-14 | End: 2019-04-14

## 2019-04-14 RX ORDER — SODIUM CHLORIDE 0.9 % (FLUSH) 0.9 %
10 SYRINGE (ML) INJECTION
Status: DISCONTINUED | OUTPATIENT
Start: 2019-04-14 | End: 2019-04-15 | Stop reason: HOSPADM

## 2019-04-14 RX ADMIN — PREGABALIN 75 MG: 75 CAPSULE ORAL at 09:04

## 2019-04-14 RX ADMIN — TRAMADOL HYDROCHLORIDE 50 MG: 50 TABLET ORAL at 05:04

## 2019-04-14 RX ADMIN — ACETAMINOPHEN 1000 MG: 500 TABLET ORAL at 05:04

## 2019-04-14 RX ADMIN — LISINOPRIL 20 MG: 20 TABLET ORAL at 09:04

## 2019-04-14 RX ADMIN — METOCLOPRAMIDE 10 MG: 5 INJECTION, SOLUTION INTRAMUSCULAR; INTRAVENOUS at 12:04

## 2019-04-14 RX ADMIN — DIGOXIN 125 MCG: 0.12 TABLET ORAL at 09:04

## 2019-04-14 RX ADMIN — ENOXAPARIN SODIUM 40 MG: 100 INJECTION SUBCUTANEOUS at 06:04

## 2019-04-14 RX ADMIN — PANTOPRAZOLE SODIUM 20 MG: 20 TABLET, DELAYED RELEASE ORAL at 09:04

## 2019-04-14 RX ADMIN — PROPRANOLOL HYDROCHLORIDE 10 MG: 10 TABLET ORAL at 09:04

## 2019-04-14 RX ADMIN — TRAMADOL HYDROCHLORIDE 50 MG: 50 TABLET ORAL at 10:04

## 2019-04-14 RX ADMIN — GLYCERIN 1 SUPPOSITORY: 2.1 SUPPOSITORY RECTAL at 10:04

## 2019-04-14 RX ADMIN — ACETAMINOPHEN 1000 MG: 500 TABLET ORAL at 06:04

## 2019-04-14 RX ADMIN — STANDARDIZED SENNA CONCENTRATE AND DOCUSATE SODIUM 2 TABLET: 8.6; 5 TABLET, FILM COATED ORAL at 09:04

## 2019-04-14 RX ADMIN — METOCLOPRAMIDE 10 MG: 5 INJECTION, SOLUTION INTRAMUSCULAR; INTRAVENOUS at 05:04

## 2019-04-14 RX ADMIN — LAMOTRIGINE 100 MG: 100 TABLET ORAL at 09:04

## 2019-04-14 RX ADMIN — POLYETHYLENE GLYCOL 3350 17 G: 17 POWDER, FOR SOLUTION ORAL at 09:04

## 2019-04-14 RX ADMIN — FLUOXETINE 10 MG: 10 CAPSULE ORAL at 09:04

## 2019-04-14 RX ADMIN — ONDANSETRON 4 MG: 2 INJECTION INTRAMUSCULAR; INTRAVENOUS at 12:04

## 2019-04-14 RX ADMIN — CARVEDILOL 25 MG: 25 TABLET, FILM COATED ORAL at 09:04

## 2019-04-14 RX ADMIN — CYCLOBENZAPRINE HYDROCHLORIDE 5 MG: 5 TABLET, FILM COATED ORAL at 03:04

## 2019-04-14 RX ADMIN — CYCLOBENZAPRINE HYDROCHLORIDE 5 MG: 5 TABLET, FILM COATED ORAL at 09:04

## 2019-04-14 RX ADMIN — ACETAMINOPHEN 1000 MG: 500 TABLET ORAL at 12:04

## 2019-04-14 RX ADMIN — IRON SUCROSE 100 MG: 20 INJECTION, SOLUTION INTRAVENOUS at 09:04

## 2019-04-14 RX ADMIN — SPIRONOLACTONE 12.5 MG: 25 TABLET, FILM COATED ORAL at 09:04

## 2019-04-14 NOTE — PLAN OF CARE
Cued into patient's room.  Permission received per patient to turn camera to view patient.  Introduced as VN for night shift that will be working with floor nurse and nursing assistant.  Family member at bedside.  Educated patient on VN's role in patient care. Plan of care reviewed with patient. Education per flowsheet; accurate ins and outs.  Opportunity given for questions and questions answered.  C/o joni horner 7/10; Anamika MOORE notified.  Instructed to call for assistance.  Will cont to monitor.

## 2019-04-14 NOTE — DOWNTIME EVENT NOTE
The EMR was down from 0700 to 1830 on 4/14/2019.    CARMENZA Collins was responsible for completing the paper charting during this time period.     Refer to the manual downtime form/flowsheet for documentation during this time period.    Karian Soto  4/14/2019

## 2019-04-15 VITALS
WEIGHT: 145.31 LBS | HEART RATE: 86 BPM | OXYGEN SATURATION: 97 % | BODY MASS INDEX: 25.75 KG/M2 | SYSTOLIC BLOOD PRESSURE: 139 MMHG | DIASTOLIC BLOOD PRESSURE: 71 MMHG | TEMPERATURE: 98 F | RESPIRATION RATE: 18 BRPM | HEIGHT: 63 IN

## 2019-04-15 PROBLEM — K63.89 SMALL BOWEL MASS: Status: RESOLVED | Noted: 2019-02-14 | Resolved: 2019-04-15

## 2019-04-15 LAB
ANION GAP SERPL CALC-SCNC: 10 MMOL/L (ref 8–16)
BUN SERPL-MCNC: 5 MG/DL (ref 8–23)
CALCIUM SERPL-MCNC: 9.2 MG/DL (ref 8.7–10.5)
CHLORIDE SERPL-SCNC: 100 MMOL/L (ref 95–110)
CO2 SERPL-SCNC: 28 MMOL/L (ref 23–29)
CREAT SERPL-MCNC: 0.7 MG/DL (ref 0.5–1.4)
EST. GFR  (AFRICAN AMERICAN): >60 ML/MIN/1.73 M^2
EST. GFR  (NON AFRICAN AMERICAN): >60 ML/MIN/1.73 M^2
GLUCOSE SERPL-MCNC: 81 MG/DL (ref 70–110)
MAGNESIUM SERPL-MCNC: 1.4 MG/DL (ref 1.6–2.6)
PHOSPHATE SERPL-MCNC: 4.2 MG/DL (ref 2.7–4.5)
POTASSIUM SERPL-SCNC: 3.9 MMOL/L (ref 3.5–5.1)
SODIUM SERPL-SCNC: 138 MMOL/L (ref 136–145)

## 2019-04-15 PROCEDURE — 63600175 PHARM REV CODE 636 W HCPCS: Performed by: SURGERY

## 2019-04-15 PROCEDURE — 25000003 PHARM REV CODE 250: Performed by: STUDENT IN AN ORGANIZED HEALTH CARE EDUCATION/TRAINING PROGRAM

## 2019-04-15 PROCEDURE — 83735 ASSAY OF MAGNESIUM: CPT

## 2019-04-15 PROCEDURE — 94761 N-INVAS EAR/PLS OXIMETRY MLT: CPT

## 2019-04-15 PROCEDURE — 36415 COLL VENOUS BLD VENIPUNCTURE: CPT

## 2019-04-15 PROCEDURE — 84100 ASSAY OF PHOSPHORUS: CPT

## 2019-04-15 PROCEDURE — 97110 THERAPEUTIC EXERCISES: CPT

## 2019-04-15 PROCEDURE — 80048 BASIC METABOLIC PNL TOTAL CA: CPT

## 2019-04-15 PROCEDURE — 97116 GAIT TRAINING THERAPY: CPT

## 2019-04-15 PROCEDURE — 94799 UNLISTED PULMONARY SVC/PX: CPT

## 2019-04-15 PROCEDURE — 25000003 PHARM REV CODE 250: Performed by: SURGERY

## 2019-04-15 RX ORDER — PREGABALIN 75 MG/1
75 CAPSULE ORAL 2 TIMES DAILY
Qty: 60 CAPSULE | Refills: 3 | Status: SHIPPED | OUTPATIENT
Start: 2019-04-15 | End: 2020-01-20

## 2019-04-15 RX ORDER — GABAPENTIN 100 MG/1
100 CAPSULE ORAL 3 TIMES DAILY
Qty: 90 CAPSULE | Refills: 11 | Status: SHIPPED | OUTPATIENT
Start: 2019-04-15 | End: 2019-05-16 | Stop reason: SDUPTHER

## 2019-04-15 RX ORDER — TRAMADOL HYDROCHLORIDE 50 MG/1
50 TABLET ORAL EVERY 6 HOURS PRN
Qty: 20 TABLET | Refills: 0 | Status: SHIPPED | OUTPATIENT
Start: 2019-04-15 | End: 2020-01-20

## 2019-04-15 RX ADMIN — SPIRONOLACTONE 12.5 MG: 25 TABLET, FILM COATED ORAL at 09:04

## 2019-04-15 RX ADMIN — PANTOPRAZOLE SODIUM 20 MG: 20 TABLET, DELAYED RELEASE ORAL at 09:04

## 2019-04-15 RX ADMIN — METOCLOPRAMIDE 10 MG: 5 INJECTION, SOLUTION INTRAMUSCULAR; INTRAVENOUS at 12:04

## 2019-04-15 RX ADMIN — PREGABALIN 75 MG: 75 CAPSULE ORAL at 09:04

## 2019-04-15 RX ADMIN — FLUOXETINE 10 MG: 10 CAPSULE ORAL at 09:04

## 2019-04-15 RX ADMIN — CARVEDILOL 25 MG: 25 TABLET, FILM COATED ORAL at 09:04

## 2019-04-15 RX ADMIN — ACETAMINOPHEN 1000 MG: 500 TABLET ORAL at 12:04

## 2019-04-15 RX ADMIN — DIGOXIN 125 MCG: 0.12 TABLET ORAL at 09:04

## 2019-04-15 RX ADMIN — CYCLOBENZAPRINE HYDROCHLORIDE 5 MG: 5 TABLET, FILM COATED ORAL at 03:04

## 2019-04-15 RX ADMIN — METOCLOPRAMIDE 10 MG: 5 INJECTION, SOLUTION INTRAMUSCULAR; INTRAVENOUS at 06:04

## 2019-04-15 RX ADMIN — TRAMADOL HYDROCHLORIDE 50 MG: 50 TABLET ORAL at 12:04

## 2019-04-15 RX ADMIN — PROPRANOLOL HYDROCHLORIDE 10 MG: 10 TABLET ORAL at 09:04

## 2019-04-15 RX ADMIN — ACETAMINOPHEN 1000 MG: 500 TABLET ORAL at 06:04

## 2019-04-15 RX ADMIN — LAMOTRIGINE 100 MG: 100 TABLET ORAL at 09:04

## 2019-04-15 RX ADMIN — CYCLOBENZAPRINE HYDROCHLORIDE 5 MG: 5 TABLET, FILM COATED ORAL at 09:04

## 2019-04-15 RX ADMIN — LISINOPRIL 20 MG: 20 TABLET ORAL at 09:04

## 2019-04-15 RX ADMIN — IRON SUCROSE 100 MG: 20 INJECTION, SOLUTION INTRAVENOUS at 09:04

## 2019-04-15 NOTE — PLAN OF CARE
Problem: Adult Inpatient Plan of Care  Goal: Plan of Care Review  Outcome: Ongoing (interventions implemented as appropriate)  AAOX3. Bed in low position. Verbalized understanding to call for any needs or assistance. Pt has complaints of pain in abd with relief from tramadol and flexeril. Some nausea seen after meals but with relief from zofran. Midline incision well approximated. Abd soft and non tender. Pt ambulating in halls with no issues.

## 2019-04-15 NOTE — PT/OT/SLP PROGRESS
Physical Therapy Treatment    Patient Name:  Christiana Chan   MRN:  [id number]    Recommendations:     Discharge Recommendations:  home health PT, home health OT   Discharge Equipment Recommendations: walker, rolling   Barriers to discharge: unsteady gait, decreased strength.    Assessment:     Christiana Chan is a 68 y.o. female admitted with a medical diagnosis of Malignant carcinoid tumor of ileum.  She presents with the following impairments/functional limitations:  weakness, impaired endurance, impaired functional mobilty, impaired self care skills, impaired balance, gait instability, decreased lower extremity function, decreased upper extremity function, decreased safety awareness.  Pt demonstrated an increase in ambulation distance today without LOB, but still needs CGA/SBA with RW use for increased safety.  Pt would continue to benefit from P.T. To address impairments listed above.    Rehab Prognosis: Fair; patient would benefit from acute skilled PT services to address these deficits and reach maximum level of function.    Recent Surgery: Procedure(s) (LRB):  EXCISION, SMALL INTESTINE, LAPAROSCOPIC (N/A)  APPENDECTOMY, LAPAROSCOPIC (N/A) 4 Days Post-Op    Plan:     During this hospitalization, patient to be seen 6 x/week to address the identified rehab impairments via gait training, therapeutic activities, therapeutic exercises and progress toward the following goals:    · Plan of Care Expires:  05/12/19    Subjective       Patient/Family Comments/goals: Pt agreed to tx.  Pain/Comfort:  · Pain Rating 1: 0/10  · Pain Rating Post-Intervention 1: 0/10      Objective:     Communicated with RN(Edith) prior to session.  Patient found supine with   upon PT entry to room.     General Precautions: Standard, fall   Orthopedic Precautions:N/A   Braces:       Functional Mobility:  · Bed Mobility:     · Scooting: stand by assistance and to EOB  · Supine to Sit: supervision  · Sit to Supine: supervision  · Transfers:      · Sit to Stand:  contact guard assistance with no AD and rolling walker  · Gait: 135ft x 2 with RW and CGA.  VC' for hand placement on RW for increased stability with gait.  No LOB but would benefit from CGA/SBA for increased safety at this time.  · Balance: sitting good, standing fair+ RW, gait fair      AM-PAC 6 CLICK MOBILITY  Turning over in bed (including adjusting bedclothes, sheets and blankets)?: 3  Moving from lying on back to sitting on the side of the bed?: 3  Moving to and from a bed to a chair (including a wheelchair)?: 3  Need to walk in hospital room?: 3  Climbing 3-5 steps with a railing?: 3       Therapeutic Activities and Exercises:   SEated BLE therex: AP, LAQ, hip flexion/ABD/ADD, glut sets 10 x 2 reps. VC'/TC's for proper technique.    Patient left supine with all lines intact, call button in reach, bed alarm on and RN notified..    GOALS:   Multidisciplinary Problems     Physical Therapy Goals        Problem: Physical Therapy Goal    Goal Priority Disciplines Outcome Goal Variances Interventions   Physical Therapy Goal     PT, PT/OT Ongoing (interventions implemented as appropriate)     Description:  Goals to be met by: 19     Patient will increase functional independence with mobility by performin. Supine <> sit with Stand-by AssistanceMet 4/15/19  2. Sit to stand transfer with Stand-by Assistance  3. Bed to chair transfer with Stand-by Assistance using Rolling Walker  4. Gait  x 150 feet with Stand-by Assistance using Rolling Walker.   5. Stand for 5 minutes with Stand-by Assistance using Rolling Walker                       Time Tracking:     PT Received On: 04/15/19  PT Start Time: 1040     PT Stop Time: 1106  PT Total Time (min): 26 min     Billable Minutes: Gait Training 16 and Therapeutic Exercise 10    Treatment Type: Treatment  PT/PTA: PTA     PTA Visit Number: 1     Elva Owen PTA  04/15/2019

## 2019-04-15 NOTE — PLAN OF CARE
Discharge rounds on patient. Discussed followup appointments, blue discharge folder, discharge nurse will go over home medications and reasons for medications and final discharge instructions. All patient/caregiver questions answered. Patient verbalized understanding.    TN met with patient and informed her on discharge today. Pt states that she is aware and that her  is here to bring her home but is packing the car. Pt has shower chair, BSC, and crutches at home. Pt also has a rolling walker but states that she has to find it. Follow up appointment scheduled. No other needs verbalized.    Future Appointments   Date Time Provider Department Center   5/16/2019 11:00 AM Rebecca Valdez MD [facility]        04/15/19 1529   Final Note   Assessment Type Final Discharge Note   Anticipated Discharge Disposition Home   What phone number can be called within the next 1-3 days to see how you are doing after discharge? [phone]   Hospital Follow Up  Appt(s) scheduled? Yes   Discharge plans and expectations educations in teach back method with documentation complete? Yes   Right Care Referral Info   Post Acute Recommendation Home-care   Referral Type Home

## 2019-04-15 NOTE — PROGRESS NOTES
Surgery Progress Note    S:  NAEO. AFVSS.  Patient having bowel movements, tolerating a full liquid diet.  Ambulating herself,  Urinating appropriately.  Denies pain, nausea, vomiting.    O:  Temp:  [97.3 °F (36.3 °C)-98.9 °F (37.2 °C)] 98.3 °F (36.8 °C)  Pulse:  [54-85] 79  Resp:  [16-18] 18  SpO2:  [94 %-95 %] 94 %  BP: (118-172)/(58-76) 126/69    Physical Exam:  Gen: no acute distress. Alert and oriented x3.  HEENT: normocephalic and atraumatic. EOMI.   Resp: unlabored respirations  CV: regular rate  Abd: soft, nontender.  Incision healing well.  Ext: warm and well perfused  MSK: normal range of motion, normal strength  Neuro: no focal deficits, normal sensation    A/P:  68F with hx NET, s/p dx lap, open SBR.   -Multimodal pain control: APAP, Flexeril, Toradol, Tramadol. Minimize narcotics.  -Lyte repletion  -Ambulate, OOB. PT/OT.  -DVT ppx: Lovenox  -Discharge Planning        Braeden Pérez MD  [facility]

## 2019-04-15 NOTE — PLAN OF CARE
VN cued into room, received permission to turn camera towards patient. Patient is sitting up in bed with HOB at 60 degrees.  Spouse is at bedside.  Patient denies any questions, concerns or needs at this time.  Will continue to monitor.     04/15/19 1006   Type of Frequent Check   Type Other (see comments)  (VN)   Safety/Activity   Patient Rounds bed in low position;bed wheels locked;call light in patient/parent reach;clutter free environment maintained;ID band on;placement of personal items at bedside;visualized patient   Safety Promotion/Fall Prevention assistive device/personal item within reach;bed alarm set;instructed to call staff for mobility;side rails raised x 2   Safety Precautions emergency equipment at bedside   Positioning   Body Position positioned/repositioned independently   Head of Bed (HOB) HOB at 60-90 degrees   Positioning/Transfer Devices pillows;in use   Pain/Comfort/Sleep   Preferred Pain Scale number (Numeric Rating Pain Scale)   Comfort/Acceptable Pain Level 4   Pain Rating (0-10): Rest 0   Headache   Headache No   Gerri Coma Scale   Best Eye Response 4-->(E4) spontaneous   Best Motor Response 6-->(M6) obeys commands   Best Verbal Response 5-->(V5) oriented   Gerri Coma Scale Score 15   Assessment Qualifiers patient not sedated/intubated;no eye obstruction present   Assessments (Pre/Post)   Level of Consciousness (AVPU) alert

## 2019-04-15 NOTE — PLAN OF CARE
Problem: Adult Inpatient Plan of Care  Goal: Plan of Care Review  Outcome: Ongoing (interventions implemented as appropriate)  Pt on RA with documented sats.  Patient does IS well.  Ready for self instruct.  Will continue to monitor.

## 2019-04-15 NOTE — PT/OT/SLP PROGRESS
Occupational Therapy  Missed visit    Patient Name:  Christiana Chan   MRN:  [id number]    Patient not seen today secondary to eating lunch & stating that she just finished walking down the huston w/ PT. Will follow-up as able.    ALFREDO Cruz  4/15/2019

## 2019-04-15 NOTE — PLAN OF CARE
Problem: Physical Therapy Goal  Goal: Physical Therapy Goal  Goals to be met by: 19     Patient will increase functional independence with mobility by performin. Supine <> sit with Stand-by AssistanceMet 4/15/19  2. Sit to stand transfer with Stand-by Assistance  3. Bed to chair transfer with Stand-by Assistance using Rolling Walker  4. Gait  x 150 feet with Stand-by Assistance using Rolling Walker.   5. Stand for 5 minutes with Stand-by Assistance using Rolling Walker     Outcome: Ongoing (interventions implemented as appropriate)  Continue working toward goals.

## 2019-04-16 ENCOUNTER — PATIENT MESSAGE (OUTPATIENT)
Dept: NEUROLOGY | Facility: HOSPITAL | Age: 69
End: 2019-04-16

## 2019-04-16 ENCOUNTER — TELEPHONE (OUTPATIENT)
Dept: NEUROLOGY | Facility: HOSPITAL | Age: 69
End: 2019-04-16

## 2019-04-16 NOTE — DISCHARGE SUMMARY
[facility]  General Surgery  Discharge Summary      Patient Name: Christiana Chan  MRN: [id number]  Admission Date: 4/11/2019  Hospital Length of Stay: 4 days  Discharge Date and Time: 4/15/2019  4:52 PM  Attending Physician: No att. providers found   Discharging Provider: Braeden Pérez MD  Primary Care Provider: Wihtley Orozco MD     HPI: Patient admitted for resection of known carcinoid tumor.    Procedure(s) (LRB):  EXCISION, SMALL INTESTINE, LAPAROSCOPIC (N/A)  APPENDECTOMY, LAPAROSCOPIC (N/A)     Hospital Course: Patient is a 69 yo F with known carcinoid tumor.  She went to the OR for a SBR and anastomosis.  Patient did well postoperatively, was promptly transferred out of the ICU and was given a diet.  Her pain was well controlled on PO pain medication, she was tolerating a diet, and she was having appropriate bowel function.  Patient was discharged on POD#4.  She was given appropriate follow up in 3-4 weeks.    Consults: None    Significant Diagnostic Studies: None    Pending Diagnostic Studies:     None        Final Active Diagnoses:    Diagnosis Date Noted POA    PRINCIPAL PROBLEM:  Malignant carcinoid tumor of ileum [C7A.012] 04/11/2019 Yes      Problems Resolved During this Admission:    Diagnosis Date Noted Date Resolved POA    Small bowel mass [K63.89] 02/14/2019 04/15/2019 Yes      Discharged Condition: good    Disposition: Home or Self Care    Follow Up:  Follow-up Information     Rebecca Valdez MD On 5/16/2019.    Specialty:  General Surgery  Why:  Appt at 1100am  Contact information:  [address]  [address]  [place] [address]  [phone]                 Patient Instructions:      CBC auto differential   Standing Status: Future Standing Exp. Date: 06/09/20     Comprehensive metabolic panel   Standing Status: Future Standing Exp. Date: 06/09/20     Diet Adult Regular     Notify your health care provider if you experience any of the following:  temperature  >100.4     Notify your health care provider if you experience any of the following:  persistent nausea and vomiting or diarrhea     Notify your health care provider if you experience any of the following:  severe uncontrolled pain     Notify your health care provider if you experience any of the following:  redness, tenderness, or signs of infection (pain, swelling, redness, odor or green/yellow discharge around incision site)     Notify your health care provider if you experience any of the following:  difficulty breathing or increased cough     Type And Screen Preop   Standing Status: Future Standing Exp. Date: 06/09/20     Activity as tolerated     Medications:  Reconciled Home Medications:      Medication List      START taking these medications    gabapentin 100 MG capsule  Commonly known as:  NEURONTIN  Take 1 capsule (100 mg total) by mouth 3 (three) times daily.     pregabalin 75 MG capsule  Commonly known as:  LYRICA  Take 1 capsule (75 mg total) by mouth 2 (two) times daily.        CHANGE how you take these medications    * traMADol 100 MG Tb24  Commonly known as:  ULTRAM-ER  Take 50 mg by mouth daily as needed.  What changed:  Another medication with the same name was added. Make sure you understand how and when to take each.     * traMADol 50 mg tablet  Commonly known as:  ULTRAM  Take 1 tablet (50 mg total) by mouth every 6 (six) hours as needed for Pain.  What changed:  You were already taking a medication with the same name, and this prescription was added. Make sure you understand how and when to take each.         * This list has 2 medication(s) that are the same as other medications prescribed for you. Read the directions carefully, and ask your doctor or other care provider to review them with you.            CONTINUE taking these medications    ALPRAZolam 2 MG Tab  Commonly known as:  XANAX  Take 2 mg by mouth nightly as needed.     carvedilol 25 MG tablet  Commonly known as:  COREG  Take 25 mg  by mouth 2 (two) times daily.     cyclobenzaprine 5 MG tablet  Commonly known as:  FLEXERIL  TK 1 T PO TID FOR 10 DAYS PRF MUSCLE SPASM     dicyclomine 20 mg tablet  Commonly known as:  BENTYL  Take 20 mg by mouth every 6 (six) hours as needed (abdomenal pain and cramping.).     digoxin 125 mcg tablet  Commonly known as:  LANOXIN  Take 125 mcg by mouth once daily.     docusate sodium 100 MG capsule  Commonly known as:  COLACE  Take 100 mg by mouth daily as needed for Constipation.     FLUoxetine 10 MG capsule  Take 10 mg by mouth once daily.     ketorolac 10 mg tablet  Commonly known as:  TORADOL  Take by mouth.     lamoTRIgine 100 MG tablet  Commonly known as:  LAMICTAL  Take 100 mg by mouth 2 (two) times daily.     lisinopril 20 MG tablet  Commonly known as:  PRINIVIL,ZESTRIL  Take 20 mg by mouth 2 (two) times daily.     lubiprostone 24 MCG Cap  Commonly known as:  AMITIZA  Take 24 mcg by mouth as needed.     ondansetron 8 MG tablet  Commonly known as:  ZOFRAN  Take 4 mg by mouth every 4 (four) hours as needed for Nausea.     pantoprazole 20 MG tablet  Commonly known as:  PROTONIX  Take 20 mg by mouth once daily.     polyethylene glycol 17 gram Pwpk  Commonly known as:  GLYCOLAX  Take 17 g by mouth 2 (two) times daily as needed (for constipation).     PROAIR HFA 90 mcg/actuation inhaler  Generic drug:  albuterol  Inhale 1 puff into the lungs every 6 (six) hours as needed for Wheezing. Rescue     PROLIA 60 mg/mL Syrg  Generic drug:  denosumab  Inject 60 mg into the skin every 6 (six) months.     propranolol 10 MG tablet  Commonly known as:  INDERAL  Take 10 mg by mouth daily as needed.     spironolactone 25 MG tablet  Commonly known as:  ALDACTONE  Take 12.5 mg by mouth once daily.     * VAGIFEM 10 mcg Tab  Generic drug:  estradiol  Place 1 tablet vaginally twice a week.     * estradiol 0.01 % (0.1 mg/gram) vaginal cream  Commonly known as:  ESTRACE  Place 1 g vaginally as needed.     VITAMIN B-12 1,000 mcg/mL  injection  Generic drug:  cyanocobalamin  Inject 1,000 mcg into the muscle every 14 (fourteen) days.     VITAMIN D3 2,000 unit Tab  Generic drug:  cholecalciferol (vitamin D3)  Take 1 tablet by mouth once daily.         * This list has 2 medication(s) that are the same as other medications prescribed for you. Read the directions carefully, and ask your doctor or other care provider to review them with you.                Braeden Pérez MD  General Surgery  [facility]

## 2019-04-16 NOTE — PHYSICIAN QUERY
PT Name: Christiana Chan  MR #: [id number]     PHYSICIAN QUERY -  ELECTROLYTE CLARIFICATION      CDS/: Brandy E Capley               Contact information: [email or web address]  This form is a permanent document in the medical record.     Query Date: April 16, 2019    By submitting this query, we are merely seeking further clarification of documentation to reflect the severity of illness of your patient. Please utilize your independent clinical judgment when addressing the question(s) below.    The Medical record reflects the following:     Indicators   Supporting Clinical Findings Location in Medical Record   X Lab Value(s)   Phosphorus: 2.5 (L)    Calcium: 8.6 (L)  Calcium: 8.4 (L)     Labs 4/13/2019 04:38    Labs 4/11/2019 15:30  Labs 4/12/2019 06:23   X Treatment                                 Medication potassium phosphate 30 mmol in dextrose 5 % 500 mL infusion Admin Date04/13/2019  11:24     calcium gluconate 1g in dextrose 5% 100mL (ready to mix system) Admin Date 04/12/2019  10:39 MAR 4/13    MAR 4/12    Other       Provider, please specify the diagnosis or diagnoses that correspond(s) to the above indicators. Angus all that apply:    [  xx ] Hypocalcemia   [  xx ] Hypophosphatemia   [   ] Other electrolyte disturbance (please specify): _______   [   ]  Clinically Undetermined       Please document in your progress notes daily for the duration of treatment until resolved, and include in your discharge summary.

## 2019-04-16 NOTE — PT/OT/SLP DISCHARGE
Physical Therapy Discharge Summary    Name: Christiana Chan  MRN: [id number]   Principal Problem: Malignant carcinoid tumor of ileum     Patient Discharged from acute Physical Therapy on 4/15/2019.  Please refer to prior PT noted date on 4/15/2019 for functional status.     Assessment:     Patient appropriate for care in another setting.    Objective:     GOALS:   Multidisciplinary Problems     Physical Therapy Goals     Not on file          Multidisciplinary Problems (Resolved)        Problem: Physical Therapy Goal    Goal Priority Disciplines Outcome Goal Variances Interventions   Physical Therapy Goal   (Resolved)     PT, PT/OT Outcome(s) achieved     Description:  Goals to be met by: 19     Patient will increase functional independence with mobility by performin. Supine <> sit with Stand-by AssistanceMet 4/15/19  2. Sit to stand transfer with Stand-by Assistance  3. Bed to chair transfer with Stand-by Assistance using Rolling Walker  4. Gait  x 150 feet with Stand-by Assistance using Rolling Walker.   5. Stand for 5 minutes with Stand-by Assistance using Rolling Walker                       Reasons for Discontinuation of Therapy Services  Transfer to alternate level of care.      Plan:     Patient Discharged to: Home with Home Health Service.    Justin Glasgow, PT  2019

## 2019-04-16 NOTE — TELEPHONE ENCOUNTER
----- Message from Hyun Cook sent at 4/16/2019 10:20 AM CDT -----  Contact: [phone]/ John (pt )  John would like a callback concerning patients medications. Please call

## 2019-04-16 NOTE — TELEPHONE ENCOUNTER
Sukhwinder,  states pt is missing lyrica ordered on discharge.  Pt received tramadol and gabapentin but not given lyrica per discharge paperwork.  Pt notified orders for gabapentin and lyrica only.  Pt acknowledged understanding.

## 2019-04-18 ENCOUNTER — TELEPHONE (OUTPATIENT)
Dept: NEUROLOGY | Facility: HOSPITAL | Age: 69
End: 2019-04-18

## 2019-04-18 ENCOUNTER — PATIENT MESSAGE (OUTPATIENT)
Dept: NEUROLOGY | Facility: HOSPITAL | Age: 69
End: 2019-04-18

## 2019-04-18 NOTE — TELEPHONE ENCOUNTER
----- Message from Chela Rodas sent at 4/18/2019  1:58 PM CDT -----  Contact: Gracie Acevedo  with [employer] [phone]  BRENT: needs CPT code that was done on April 11 th , 2019. Please advise

## 2019-04-26 ENCOUNTER — PATIENT MESSAGE (OUTPATIENT)
Dept: NEUROLOGY | Facility: HOSPITAL | Age: 69
End: 2019-04-26

## 2019-05-16 ENCOUNTER — OFFICE VISIT (OUTPATIENT)
Dept: NEUROLOGY | Facility: HOSPITAL | Age: 69
End: 2019-05-16
Attending: SURGERY
Payer: MEDICARE

## 2019-05-16 VITALS
TEMPERATURE: 98 F | HEART RATE: 78 BPM | WEIGHT: 120.56 LBS | DIASTOLIC BLOOD PRESSURE: 57 MMHG | SYSTOLIC BLOOD PRESSURE: 89 MMHG | BODY MASS INDEX: 21.36 KG/M2 | HEIGHT: 63 IN

## 2019-05-16 DIAGNOSIS — Z09 POSTOP CHECK: Primary | ICD-10-CM

## 2019-05-16 PROCEDURE — 99213 OFFICE O/P EST LOW 20 MIN: CPT | Performed by: SURGERY

## 2019-05-16 RX ORDER — ACETAMINOPHEN 500 MG
500 TABLET ORAL EVERY 6 HOURS PRN
COMMUNITY

## 2019-05-16 RX ORDER — GABAPENTIN 100 MG/1
100 CAPSULE ORAL 2 TIMES DAILY
Qty: 60 CAPSULE | Refills: 11 | Status: SHIPPED | OUTPATIENT
Start: 2019-05-16 | End: 2019-08-15 | Stop reason: SDUPTHER

## 2019-05-16 RX ORDER — CHOLESTYRAMINE 4 G/9G
4 POWDER, FOR SUSPENSION ORAL
Qty: 270 PACKET | Refills: 3 | Status: ON HOLD | OUTPATIENT
Start: 2019-05-16 | End: 2020-03-13 | Stop reason: HOSPADM

## 2019-05-16 NOTE — PROGRESS NOTES
S/p small bowel resection    Doing well    Incision looks good  intermitttent abdominal pain    Bm immedialtely after eating    Will add questran take antacids prn

## 2019-06-10 ENCOUNTER — HISTORICAL (OUTPATIENT)
Dept: LAB | Facility: HOSPITAL | Age: 69
End: 2019-06-10

## 2019-06-10 LAB
ABS NEUT (OLG): 6.04 X10(3)/MCL (ref 2.1–9.2)
ALBUMIN SERPL-MCNC: 3.4 GM/DL (ref 3.4–5)
ALBUMIN/GLOB SERPL: 1 RATIO (ref 1.1–2)
ALP SERPL-CCNC: 54 UNIT/L (ref 38–126)
ALT SERPL-CCNC: 10 UNIT/L (ref 12–78)
APPEARANCE, UA: ABNORMAL
AST SERPL-CCNC: 9 UNIT/L (ref 15–37)
BACTERIA SPEC CULT: ABNORMAL /HPF
BASOPHILS # BLD AUTO: 0 X10(3)/MCL (ref 0–0.2)
BASOPHILS NFR BLD AUTO: 0 %
BILIRUB SERPL-MCNC: 0.4 MG/DL (ref 0.2–1)
BILIRUB UR QL STRIP: NEGATIVE
BILIRUBIN DIRECT+TOT PNL SERPL-MCNC: 0.1 MG/DL (ref 0–0.5)
BILIRUBIN DIRECT+TOT PNL SERPL-MCNC: 0.3 MG/DL (ref 0–0.8)
BUN SERPL-MCNC: 14 MG/DL (ref 7–18)
CALCIUM SERPL-MCNC: 9.6 MG/DL (ref 8.5–10.1)
CHLORIDE SERPL-SCNC: 105 MMOL/L (ref 98–107)
CO2 SERPL-SCNC: 30 MMOL/L (ref 21–32)
COLOR UR: YELLOW
CREAT SERPL-MCNC: 0.86 MG/DL (ref 0.55–1.02)
DEPRECATED CALCIDIOL+CALCIFEROL SERPL-MC: 44.67 NG/ML (ref 30–80)
EOSINOPHIL # BLD AUTO: 0.1 X10(3)/MCL (ref 0–0.9)
EOSINOPHIL NFR BLD AUTO: 1 %
ERYTHROCYTE [DISTWIDTH] IN BLOOD BY AUTOMATED COUNT: 14.4 % (ref 11.5–17)
FERRITIN SERPL-MCNC: 426 NG/ML (ref 8–388)
FOLATE SERPL-MCNC: 6.1 NG/ML (ref 3.1–17.5)
GLOBULIN SER-MCNC: 3.5 GM/DL (ref 2.4–3.5)
GLUCOSE (UA): NEGATIVE
GLUCOSE SERPL-MCNC: 86 MG/DL (ref 74–106)
HCT VFR BLD AUTO: 37.1 % (ref 37–47)
HGB BLD-MCNC: 11.4 GM/DL (ref 12–16)
HGB UR QL STRIP: NEGATIVE
IRON SATN MFR SERPL: 28.9 % (ref 20–50)
IRON SERPL-MCNC: 72 MCG/DL (ref 50–175)
KETONES UR QL STRIP: NEGATIVE
LEUKOCYTE ESTERASE UR QL STRIP: ABNORMAL
LYMPHOCYTES # BLD AUTO: 2.1 X10(3)/MCL (ref 0.6–4.6)
LYMPHOCYTES NFR BLD AUTO: 24 %
MCH RBC QN AUTO: 29.5 PG (ref 27–31)
MCHC RBC AUTO-ENTMCNC: 30.7 GM/DL (ref 33–36)
MCV RBC AUTO: 96.1 FL (ref 80–94)
MONOCYTES # BLD AUTO: 0.6 X10(3)/MCL (ref 0.1–1.3)
MONOCYTES NFR BLD AUTO: 7 %
NEUTROPHILS # BLD AUTO: 6.04 X10(3)/MCL (ref 2.1–9.2)
NEUTROPHILS NFR BLD AUTO: 68 %
NITRITE UR QL STRIP: NEGATIVE
PH UR STRIP: 8.5 [PH] (ref 5–9)
PLATELET # BLD AUTO: 353 X10(3)/MCL (ref 130–400)
PMV BLD AUTO: 10.2 FL (ref 9.4–12.4)
POTASSIUM SERPL-SCNC: 4.7 MMOL/L (ref 3.5–5.1)
PROT SERPL-MCNC: 6.9 GM/DL (ref 6.4–8.2)
PROT UR QL STRIP: NEGATIVE
RBC # BLD AUTO: 3.86 X10(6)/MCL (ref 4.2–5.4)
RBC #/AREA URNS HPF: ABNORMAL /[HPF]
SODIUM SERPL-SCNC: 140 MMOL/L (ref 136–145)
SP GR UR STRIP: 1.01 (ref 1–1.03)
SQUAMOUS EPITHELIAL, UA: ABNORMAL
TIBC SERPL-MCNC: 249 MCG/DL (ref 250–450)
TRANSFERRIN SERPL-MCNC: 209 MG/DL (ref 200–360)
TSH SERPL-ACNC: 0.79 MIU/L (ref 0.36–3.74)
UROBILINOGEN UR STRIP-ACNC: 0.2
VIT B12 SERPL-MCNC: 809 PG/ML (ref 193–986)
WBC # SPEC AUTO: 8.8 X10(3)/MCL (ref 4.5–11.5)
WBC #/AREA URNS HPF: ABNORMAL /[HPF]

## 2019-06-26 ENCOUNTER — TELEPHONE (OUTPATIENT)
Dept: NEUROLOGY | Facility: HOSPITAL | Age: 69
End: 2019-06-26

## 2019-06-26 NOTE — TELEPHONE ENCOUNTER
----- Message from Cehla Rodas sent at 6/25/2019  1:31 PM CDT -----  Contact: Lillian with Dr. Todd- [phone]  BRENT: Needs a copy of patients MRI fax to the below number. Please advise       Fax # [phone]

## 2019-06-26 NOTE — TELEPHONE ENCOUNTER
----- Message from Chela Rodas sent at 6/26/2019  8:08 AM CDT -----  Contact: Danna with Dr. Ramirez - [phone]  BRENT:  Patient is in the office now. They need all office notes, scans, . Please advise

## 2019-06-26 NOTE — TELEPHONE ENCOUNTER
As per patients request, copies of most recent scans and physician visits were faxed to Dr. Ramirez's office at [phone].

## 2019-06-28 ENCOUNTER — HISTORICAL (OUTPATIENT)
Dept: HEPATOLOGY | Facility: HOSPITAL | Age: 69
End: 2019-06-28

## 2019-06-28 LAB
ABS NEUT (OLG): 4.87 X10(3)/MCL (ref 2.1–9.2)
ALBUMIN SERPL-MCNC: 3.3 GM/DL (ref 3.4–5)
ALBUMIN/GLOB SERPL: 0.9 {RATIO}
ALP SERPL-CCNC: 60 UNIT/L (ref 38–126)
ALT SERPL-CCNC: 11 UNIT/L (ref 12–78)
AST SERPL-CCNC: 6 UNIT/L (ref 15–37)
BASOPHILS # BLD AUTO: 0 X10(3)/MCL (ref 0–0.2)
BASOPHILS NFR BLD AUTO: 0 %
BILIRUB SERPL-MCNC: 0.4 MG/DL (ref 0.2–1)
BILIRUBIN DIRECT+TOT PNL SERPL-MCNC: 0.1 MG/DL (ref 0–0.2)
BILIRUBIN DIRECT+TOT PNL SERPL-MCNC: 0.3 MG/DL (ref 0–0.8)
BUN SERPL-MCNC: 9 MG/DL (ref 7–18)
CALCIUM SERPL-MCNC: 8.8 MG/DL (ref 8.5–10.1)
CHLORIDE SERPL-SCNC: 110 MMOL/L (ref 98–107)
CO2 SERPL-SCNC: 28 MMOL/L (ref 21–32)
CORTIS SERPL-SCNC: 12 MCG/DL
CREAT SERPL-MCNC: 0.73 MG/DL (ref 0.55–1.02)
EOSINOPHIL # BLD AUTO: 0 X10(3)/MCL (ref 0–0.9)
EOSINOPHIL NFR BLD AUTO: 1 %
ERYTHROCYTE [DISTWIDTH] IN BLOOD BY AUTOMATED COUNT: 13.9 % (ref 11.5–17)
ERYTHROCYTE [SEDIMENTATION RATE] IN BLOOD: 28 MM/HR (ref 0–20)
GLOBULIN SER-MCNC: 3.5 GM/DL (ref 2.4–3.5)
GLUCOSE SERPL-MCNC: 77 MG/DL (ref 74–106)
HCT VFR BLD AUTO: 37.3 % (ref 37–47)
HGB BLD-MCNC: 11.5 GM/DL (ref 12–16)
LYMPHOCYTES # BLD AUTO: 1.9 X10(3)/MCL (ref 0.6–4.6)
LYMPHOCYTES NFR BLD AUTO: 26 %
MAGNESIUM SERPL-MCNC: 2.8 MG/DL (ref 1.8–2.4)
MCH RBC QN AUTO: 29.1 PG (ref 27–31)
MCHC RBC AUTO-ENTMCNC: 30.8 GM/DL (ref 33–36)
MCV RBC AUTO: 94.4 FL (ref 80–94)
MONOCYTES # BLD AUTO: 0.4 X10(3)/MCL (ref 0.1–1.3)
MONOCYTES NFR BLD AUTO: 6 %
NEUTROPHILS # BLD AUTO: 4.87 X10(3)/MCL (ref 2.1–9.2)
NEUTROPHILS NFR BLD AUTO: 66 %
PHOSPHATE SERPL-MCNC: 3 MG/DL (ref 2.5–4.9)
PLATELET # BLD AUTO: 357 X10(3)/MCL (ref 130–400)
PMV BLD AUTO: 9.1 FL (ref 9.4–12.4)
POTASSIUM SERPL-SCNC: 4.2 MMOL/L (ref 3.5–5.1)
PROT SERPL-MCNC: 6.8 GM/DL (ref 6.4–8.2)
RBC # BLD AUTO: 3.95 X10(6)/MCL (ref 4.2–5.4)
SODIUM SERPL-SCNC: 142 MMOL/L (ref 136–145)
TSH SERPL-ACNC: 1.07 MIU/L (ref 0.36–3.74)
WBC # SPEC AUTO: 7.3 X10(3)/MCL (ref 4.5–11.5)

## 2019-08-08 ENCOUNTER — HISTORICAL (OUTPATIENT)
Dept: ADMINISTRATIVE | Facility: HOSPITAL | Age: 69
End: 2019-08-08

## 2019-08-08 LAB
APPEARANCE, UA: CLEAR
BACTERIA SPEC CULT: NORMAL /HPF
BILIRUB UR QL STRIP: NEGATIVE
COLOR UR: YELLOW
DEPRECATED CALCIDIOL+CALCIFEROL SERPL-MC: 30.8 NG/ML (ref 30–80)
FOLATE SERPL-MCNC: 11.9 NG/ML (ref 3.1–17.5)
GLUCOSE (UA): NEGATIVE
HGB UR QL STRIP: NEGATIVE
KETONES UR QL STRIP: NEGATIVE
LEUKOCYTE ESTERASE UR QL STRIP: NEGATIVE
NITRITE UR QL STRIP: NEGATIVE
PH UR STRIP: 7 [PH] (ref 5–9)
PROT UR QL STRIP: NEGATIVE
RBC #/AREA URNS HPF: NORMAL /[HPF]
SP GR UR STRIP: 1.01 (ref 1–1.03)
SQUAMOUS EPITHELIAL, UA: NORMAL
UROBILINOGEN UR STRIP-ACNC: 0.2
VIT B12 SERPL-MCNC: >6000 PG/ML (ref 193–986)
WBC #/AREA URNS HPF: NORMAL /[HPF]

## 2019-08-09 ENCOUNTER — HISTORICAL (OUTPATIENT)
Dept: RADIOLOGY | Facility: HOSPITAL | Age: 69
End: 2019-08-09

## 2019-08-09 ENCOUNTER — HISTORICAL (OUTPATIENT)
Dept: LAB | Facility: HOSPITAL | Age: 69
End: 2019-08-09

## 2019-08-13 NOTE — PROGRESS NOTES
"NOLANETS:  [facility]  A collaboration between [facility] and [facility]      PATIENT: Christiana Chan  MRN: [id number]  DATE: 8/15/2019    Subjective:      Chief Complaint: Follow-up (x 3 months )  taking ensure now only  Not eating much  Not able to gain weight  No diarrhea    Eating only small portions    Also scared of gaining weight    Vitals:   Vitals:    08/15/19 1133   BP: 139/75   BP Location: Right arm   Patient Position: Sitting   BP Method: Medium (Automatic)   Pulse: 64   Temp: 98.6 °F (37 °C)   TempSrc: Oral   Weight: 54.2 kg (119 lb 6.1 oz)   Height: 5' 3" (1.6 m)        ECOG Score:     Diagnosis: No diagnosis found.     Oncologic History:     Interval History:     Past Medical History:  Past Medical History:   Diagnosis Date    Abdominal bloating     Abdominal pain     Asthma     Atrial fibrillation     Colon polyps     Constipation     Diverticulosis of colon     Esophageal ulcer     Gastritis     GERD (gastroesophageal reflux disease)     Heartburn     Hiatal hernia     HTN (hypertension)     Intermittent diarrhea     Iron deficiency anemia     Osteoarthritis     Osteoporosis     Rectal bleeding     Ruptured lumbar disc     Small bowel lesion     Vitamin B12 deficiency anemia     Vitamin D deficiency        Past Surgical History:  Past Surgical History:   Procedure Laterality Date    APPENDECTOMY, LAPAROSCOPIC N/A 2019    Performed by Rebecca Valdez [facility] at [facility] OR    BREAST BIOPSY Bilateral      SECTION   &     CHOLECYSTECTOMY      COLONOSCOPY      outside facility    ENTEROSCOPY, DOUBLE BALLOON, ANTEGRADE N/A 2019    Performed [facility]ivakumar Dorsey [facility] at [facility] (2ND F[facility])    ESOPHAGOGASTRODUODENOSCOPY      outside facility    EXCISION, SMALL INTESTINE, LAPAROSCOPIC N/A 2019    Performed by Rebecca Valdez MD at [facility] OR    " HYSTERECTOMY  1991    SHOULDER SURGERY Right 03/13/2013    TOTAL KNEE ARTHROPLASTY Right 09/16/2013    ULTRASOUND-ENDOSCOPIC-UPPER N/A 4/9/2019    Performed by Armando Bills [facility] at [facility]O       Family History:  Family History   Problem Relation Age of Onset    Diabetes Mother     Multiple myeloma Father        Allergies:  Review of patient's allergies indicates:   Allergen Reactions    Amoxicillin Other (See Comments)     Taking digoxin.    Sulfa (sulfonamide antibiotics) Itching and Swelling    Codeine Nausea Only and Other (See Comments)     Nausea and constipation.    Demerol [meperidine] Nausea Only and Other (See Comments)     Nausea and constipation.    Hydrocodone Nausea Only and Other (See Comments)     Nausea and constipation.    Epinephrine      Neuroendocrine Tumor patient      Nitrofurantoin monohyd/m-cryst        Medications:   Current Outpatient Medications   Medication Sig    acetaminophen (TYLENOL) 500 MG tablet Take 500 mg by mouth every 6 (six) hours as needed for Pain.    albuterol (PROAIR HFA) 90 mcg/actuation inhaler Inhale 1 puff into the lungs every 6 (six) hours as needed for Wheezing. Rescue    ALPRAZolam (XANAX) 2 MG Tab Take 2 mg by mouth nightly.     azithromycin (Z-AMBREEN) 250 MG tablet TK UTD    carvedilol (COREG) 25 MG tablet Take 25 mg by mouth 2 (two) times daily.    cholecalciferol, vitamin D3, (VITAMIN D3) 2,000 unit Tab Take 1 tablet by mouth once daily.    cholestyramine (QUESTRAN) 4 gram packet Take 1 packet (4 g total) by mouth 3 (three) times daily with meals.    citalopram (CELEXA) 10 MG tablet Take 10 mg by mouth.    COMBIVENT RESPIMAT  mcg/actuation inhaler INL 2 PFS PO BID    cyanocobalamin (VITAMIN B-12) 1,000 mcg/mL injection Inject 1,000 mcg into the muscle every 14 (fourteen) days.    cyanocobalamin, vitamin B-12, 1,000 mcg/mL Kit   q2 weeks. due 3/7/19    cyclobenzaprine (FLEXERIL) 5 MG tablet TK 1 T PO TID FOR 10 DAYS PRF MUSCLE SPASM     denosumab (PROLIA) 60 mg/mL Syrg Inject 60 mg into the skin every 6 (six) months.    dicyclomine (BENTYL) 20 mg tablet Take 20 mg by mouth every 6 (six) hours as needed (abdomenal pain and cramping.).    digoxin (LANOXIN) 125 mcg tablet Take 125 mcg by mouth once daily.    docusate sodium (COLACE) 100 MG capsule Take 100 mg by mouth daily as needed for Constipation.    doxycycline (MONODOX) 100 MG capsule TK 1 C PO BID FOR 10 DAYS    estradiol (ESTRACE) 0.01 % (0.1 mg/gram) vaginal cream Place 1 g vaginally as needed.     FLUoxetine 10 MG capsule Take 10 mg by mouth once daily.    gabapentin (NEURONTIN) 100 MG capsule Take 1 capsule (100 mg total) by mouth 2 (two) times daily.    ipratropium-albuterol (COMBIVENT RESPIMAT)  mcg/actuation inhaler Inhale into the lungs.    lamoTRIgine (LAMICTAL) 100 MG tablet Take 100 mg by mouth 2 (two) times daily.    lisinopril (PRINIVIL,ZESTRIL) 20 MG tablet Take 20 mg by mouth Daily.     lubiprostone (AMITIZA) 24 MCG Cap Take 24 mcg by mouth as needed.     ondansetron (ZOFRAN) 8 MG tablet Take 4 mg by mouth every 4 (four) hours as needed for Nausea.     pantoprazole (PROTONIX) 20 MG tablet Take 20 mg by mouth once daily.    polyethylene glycol (GLYCOLAX) 17 gram PwPk Take 17 g by mouth 2 (two) times daily as needed (for constipation).     pregabalin (LYRICA) 75 MG capsule Take 1 capsule (75 mg total) by mouth 2 (two) times daily.    propranolol (INDERAL) 10 MG tablet Take 10 mg by mouth daily as needed.    spironolactone (ALDACTONE) 25 MG tablet Take 12.5 mg by mouth once daily.    terconazole (TERAZOL 7) 0.4 % Crea U 1 APPLICATION VAGINALLY QD HS    valACYclovir (VALTREX) 500 MG tablet TK 1 T PO D    citalopram (CELEXA) 10 MG tablet TK 1 T PO HS    estradiol (VAGIFEM) 10 mcg Tab Place 1 tablet vaginally twice a week.     ketorolac (TORADOL) 10 mg tablet Take by mouth.    ondansetron (ZOFRAN-ODT) 8 MG TbDL Place under the tongue.    ondansetron  (ZOFRAN-ODT) 8 MG TbDL DIS 1/2 TO 1 T UNT Q 4 H PRF NAUSEA    ranitidine (ZANTAC) 300 MG tablet TAKE 1 TABLET BY MOUTH IN THE EVENING AS NEEDED FOR REFLUX    traMADol (ULTRAM) 50 mg tablet Take 1 tablet (50 mg total) by mouth every 6 (six) hours as needed for Pain.    traMADol (ULTRAM-ER) 100 MG Tb24 Take 50 mg by mouth daily as needed.     No current facility-administered medications for this visit.         Review of Systems   Constitutional: Positive for activity change, appetite change, fatigue and unexpected weight change. Negative for fever.   HENT: Negative for dental problem, drooling, ear discharge, hearing loss, mouth sores, nosebleeds, postnasal drip, rhinorrhea, sinus pressure, sinus pain, sneezing and sore throat.    Eyes: Negative for pain, discharge, redness, itching and visual disturbance.   Respiratory: Negative.  Negative for cough, choking, chest tightness and stridor.    Cardiovascular: Negative for chest pain, palpitations and leg swelling.   Gastrointestinal: Negative for abdominal pain, anal bleeding, blood in stool, constipation, diarrhea, nausea, rectal pain and vomiting.   Endocrine: Negative.    Genitourinary: Negative.    Musculoskeletal: Negative for back pain, gait problem and joint swelling.   Skin: Negative for pallor and wound.   Allergic/Immunologic: Negative.    Neurological: Negative for tremors, seizures, syncope, speech difficulty and light-headedness.   Hematological: Negative for adenopathy. Does not bruise/bleed easily.   Psychiatric/Behavioral: Negative for behavioral problems, confusion and decreased concentration.      Objective:      Physical Exam   Constitutional: She appears well-developed.   HENT:   Head: Normocephalic and atraumatic.   Right Ear: External ear normal.   Left Ear: External ear normal.   Eyes: Pupils are equal, round, and reactive to light. Conjunctivae and EOM are normal.   Neck: Normal range of motion.   Cardiovascular: Normal rate and regular  rhythm.   Pulmonary/Chest: Effort normal and breath sounds normal.   Abdominal: Soft. Bowel sounds are normal.   Incisional hernia   Musculoskeletal: Normal range of motion.   Neurological: She is alert.   Skin: Skin is warm.   Psychiatric: She has a normal mood and affect. Her behavior is normal.      Assessment:       No diagnosis found.    Laboratory Data:   Results for orders placed or performed during the hospital encounter of 04/11/19   CBC auto differential   Result Value Ref Range    WBC 14.81 (H) 3.90 - 12.70 K/uL    RBC 3.66 (L) 4.00 - 5.40 M/uL    Hemoglobin 10.3 (L) 12.0 - 16.0 g/dL    Hematocrit 31.8 (L) 37.0 - 48.5 %    Mean Corpuscular Volume 87 82 - 98 fL    Mean Corpuscular Hemoglobin 28.1 27.0 - 31.0 pg    Mean Corpuscular Hemoglobin Conc 32.4 32.0 - 36.0 g/dL    RDW 14.2 11.5 - 14.5 %    Platelets 297 150 - 350 K/uL    MPV 8.4 (L) 9.2 - 12.9 fL    Gran # (ANC) 12.8 (H) 1.8 - 7.7 K/uL    Lymph # 1.2 1.0 - 4.8 K/uL    Mono # 0.9 0.3 - 1.0 K/uL    Eos # 0.0 0.0 - 0.5 K/uL    Baso # 0.00 0.00 - 0.20 K/uL    Gran% 86.2 (H) 38.0 - 73.0 %    Lymph% 7.8 (L) 18.0 - 48.0 %    Mono% 5.7 4.0 - 15.0 %    Eosinophil% 0.0 0.0 - 8.0 %    Basophil% 0.0 0.0 - 1.9 %    Differential Method Automated    Basic metabolic panel   Result Value Ref Range    Sodium 140 136 - 145 mmol/L    Potassium 3.8 3.5 - 5.1 mmol/L    Chloride 106 95 - 110 mmol/L    CO2 24 23 - 29 mmol/L    Glucose 113 (H) 70 - 110 mg/dL    BUN, Bld 11 8 - 23 mg/dL    Creatinine 0.8 0.5 - 1.4 mg/dL    Calcium 8.6 (L) 8.7 - 10.5 mg/dL    Anion Gap 10 8 - 16 mmol/L    eGFR if African American >60 >60 mL/min/1.73 m^2    eGFR if non African American >60 >60 mL/min/1.73 m^2   CBC auto differential   Result Value Ref Range    WBC 13.60 (H) 3.90 - 12.70 K/uL    RBC 3.52 (L) 4.00 - 5.40 M/uL    Hemoglobin 9.9 (L) 12.0 - 16.0 g/dL    Hematocrit 31.0 (L) 37.0 - 48.5 %    Mean Corpuscular Volume 88 82 - 98 fL    Mean Corpuscular Hemoglobin 28.1 27.0 - 31.0 pg     Mean Corpuscular Hemoglobin Conc 31.9 (L) 32.0 - 36.0 g/dL    RDW 14.5 11.5 - 14.5 %    Platelets 286 150 - 350 K/uL    MPV 8.7 (L) 9.2 - 12.9 fL    Gran # (ANC) 11.5 (H) 1.8 - 7.7 K/uL    Lymph # 1.0 1.0 - 4.8 K/uL    Mono # 1.0 0.3 - 1.0 K/uL    Eos # 0.1 0.0 - 0.5 K/uL    Baso # 0.01 0.00 - 0.20 K/uL    Gran% 84.4 (H) 38.0 - 73.0 %    Lymph% 7.3 (L) 18.0 - 48.0 %    Mono% 7.6 4.0 - 15.0 %    Eosinophil% 0.4 0.0 - 8.0 %    Basophil% 0.1 0.0 - 1.9 %    Differential Method Automated    Comprehensive metabolic panel   Result Value Ref Range    Sodium 137 136 - 145 mmol/L    Potassium 4.1 3.5 - 5.1 mmol/L    Chloride 105 95 - 110 mmol/L    CO2 27 23 - 29 mmol/L    Glucose 146 (H) 70 - 110 mg/dL    BUN, Bld 10 8 - 23 mg/dL    Creatinine 0.8 0.5 - 1.4 mg/dL    Calcium 8.4 (L) 8.7 - 10.5 mg/dL    Total Protein 5.3 (L) 6.0 - 8.4 g/dL    Albumin 2.8 (L) 3.5 - 5.2 g/dL    Total Bilirubin 0.4 0.1 - 1.0 mg/dL    Alkaline Phosphatase 81 55 - 135 U/L    AST 34 10 - 40 U/L    ALT 49 (H) 10 - 44 U/L    Anion Gap 5 (L) 8 - 16 mmol/L    eGFR if African American >60 >60 mL/min/1.73 m^2    eGFR if non African American >60 >60 mL/min/1.73 m^2   Magnesium   Result Value Ref Range    Magnesium 1.8 1.6 - 2.6 mg/dL   Phosphorus   Result Value Ref Range    Phosphorus 3.0 2.7 - 4.5 mg/dL   CBC auto differential   Result Value Ref Range    WBC 13.04 (H) 3.90 - 12.70 K/uL    RBC 3.49 (L) 4.00 - 5.40 M/uL    Hemoglobin 10.0 (L) 12.0 - 16.0 g/dL    Hematocrit 30.8 (L) 37.0 - 48.5 %    Mean Corpuscular Volume 88 82 - 98 fL    Mean Corpuscular Hemoglobin 28.7 27.0 - 31.0 pg    Mean Corpuscular Hemoglobin Conc 32.5 32.0 - 36.0 g/dL    RDW 14.6 (H) 11.5 - 14.5 %    Platelets 272 150 - 350 K/uL    MPV 9.1 (L) 9.2 - 12.9 fL    Gran # (ANC) 10.4 (H) 1.8 - 7.7 K/uL    Lymph # 1.3 1.0 - 4.8 K/uL    Mono # 1.0 0.3 - 1.0 K/uL    Eos # 0.4 0.0 - 0.5 K/uL    Baso # 0.01 0.00 - 0.20 K/uL    Gran% 79.5 (H) 38.0 - 73.0 %    Lymph% 9.8 (L) 18.0 - 48.0 %     Mono% 7.5 4.0 - 15.0 %    Eosinophil% 2.7 0.0 - 8.0 %    Basophil% 0.1 0.0 - 1.9 %    Differential Method Automated    Magnesium   Result Value Ref Range    Magnesium 1.6 1.6 - 2.6 mg/dL   Phosphorus   Result Value Ref Range    Phosphorus 2.5 (L) 2.7 - 4.5 mg/dL   Basic metabolic panel   Result Value Ref Range    Sodium 138 136 - 145 mmol/L    Potassium 3.9 3.5 - 5.1 mmol/L    Chloride 104 95 - 110 mmol/L    CO2 28 23 - 29 mmol/L    Glucose 122 (H) 70 - 110 mg/dL    BUN, Bld 4 (L) 8 - 23 mg/dL    Creatinine 0.7 0.5 - 1.4 mg/dL    Calcium 8.7 8.7 - 10.5 mg/dL    Anion Gap 6 (L) 8 - 16 mmol/L    eGFR if African American >60 >60 mL/min/1.73 m^2    eGFR if non African American >60 >60 mL/min/1.73 m^2   Magnesium   Result Value Ref Range    Magnesium 1.5 (L) 1.6 - 2.6 mg/dL   Phosphorus   Result Value Ref Range    Phosphorus 3.5 2.7 - 4.5 mg/dL   Basic metabolic panel   Result Value Ref Range    Sodium 137 136 - 145 mmol/L    Potassium 4.1 3.5 - 5.1 mmol/L    Chloride 102 95 - 110 mmol/L    CO2 27 23 - 29 mmol/L    Glucose 109 70 - 110 mg/dL    BUN, Bld 2 (L) 8 - 23 mg/dL    Creatinine 0.7 0.5 - 1.4 mg/dL    Calcium 9.1 8.7 - 10.5 mg/dL    Anion Gap 8 8 - 16 mmol/L    eGFR if African American >60 >60 mL/min/1.73 m^2    eGFR if non African American >60 >60 mL/min/1.73 m^2   CBC auto differential   Result Value Ref Range    WBC 12.17 3.90 - 12.70 K/uL    RBC 3.57 (L) 4.00 - 5.40 M/uL    Hemoglobin 9.9 (L) 12.0 - 16.0 g/dL    Hematocrit 31.2 (L) 37.0 - 48.5 %    Mean Corpuscular Volume 87 82 - 98 fL    Mean Corpuscular Hemoglobin 27.7 27.0 - 31.0 pg    Mean Corpuscular Hemoglobin Conc 31.7 (L) 32.0 - 36.0 g/dL    RDW 14.5 11.5 - 14.5 %    Platelets 314 150 - 350 K/uL    MPV 9.2 9.2 - 12.9 fL    Gran # (ANC) 9.3 (H) 1.8 - 7.7 K/uL    Lymph # 1.5 1.0 - 4.8 K/uL    Mono # 0.9 0.3 - 1.0 K/uL    Eos # 0.4 0.0 - 0.5 K/uL    Baso # 0.01 0.00 - 0.20 K/uL    Gran% 76.7 (H) 38.0 - 73.0 %    Lymph% 12.1 (L) 18.0 - 48.0 %    Mono% 7.5  4.0 - 15.0 %    Eosinophil% 3.6 0.0 - 8.0 %    Basophil% 0.1 0.0 - 1.9 %    Differential Method Automated    Magnesium   Result Value Ref Range    Magnesium 1.4 (L) 1.6 - 2.6 mg/dL   Phosphorus   Result Value Ref Range    Phosphorus 4.2 2.7 - 4.5 mg/dL   Basic metabolic panel   Result Value Ref Range    Sodium 138 136 - 145 mmol/L    Potassium 3.9 3.5 - 5.1 mmol/L    Chloride 100 95 - 110 mmol/L    CO2 28 23 - 29 mmol/L    Glucose 81 70 - 110 mg/dL    BUN, Bld 5 (L) 8 - 23 mg/dL    Creatinine 0.7 0.5 - 1.4 mg/dL    Calcium 9.2 8.7 - 10.5 mg/dL    Anion Gap 10 8 - 16 mmol/L    eGFR if African American >60 >60 mL/min/1.73 m^2    eGFR if non African American >60 >60 mL/min/1.73 m^2   Transthoracic echo (TTE) complete (Cupid Only)   Result Value Ref Range    BSA 1.62 m2    AORTIC VALVE CUSP SEPERATION 1.76 cm    LVIDD 4.09 3.5 - 6.0 cm    IVS 1.57 (A) 0.6 - 1.1 cm    PW 1.06 0.6 - 1.1 cm    Ao root annulus 2.67 cm    LVIDS 3.52 2.1 - 4.0 cm    FS 14 28 - 44 %    LV mass 196.16 g    LA size 2.90 cm    RVDD 2.35 cm    Left Ventricle Relative Wall Thickness 0.52 cm    AV mean gradient 2.93 mmHg    AV valve area 2.06 cm2    AV Velocity Ratio 0.60     AV index (prosthetic) 0.61     E/A ratio 0.49     E wave decelartion time 100.58 msec    IVRT 0.11 msec    LVOT diameter 2.07 cm    LVOT area 3.36 cm2    LVOT peak chris 0.95388849940 m/s    LVOT peak VTI 13.19 cm    Ao peak chris 1.28 m/s    Ao VTI 21.54 cm    LVOT stroke volume 44.37 cm3    AV peak gradient 6.55 mmHg    MV Peak E Chris 0.43 m/s    TR Max Chris 2.16 m/s    MV Peak A Chris 0.88 m/s    LV Systolic Volume 51.71 mL    LV Systolic Volume Index 32.1 mL/m2    LV Diastolic Volume 73.79 mL    LV Diastolic Volume Index 45.82 mL/m2    LV Mass Index 121.8 g/m2    Triscuspid Valve Regurgitation Peak Gradient 18.66 mmHg    LV LATERAL E/E' RATIO 8.60     Right Atrial Pressure (from IVC) 3 mmHg    TDI LATERAL 0.05     TV rest pulmonary artery pressure 22 mmHg        Impression:  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  She did not have any lymph darryn disease or any metastatic disease elsewhere.  However she has not been able to gain weight.  She was recently seen by her gastroenterologist was ordered for all essential minerals and vitamins level. The plan is also to pursue EGD and colonoscopy.    She states she does not have appetite therefore I have prescribe her Megace. I also recommended small frequent meals.  Most likely the problem is deliberately not wanting to eat to prevent any weight gain.  There is a past history of obesity in the family and she had lost significant weight and now she is scared to gain any weight.  Regarding GI functions she does not have any issues made have any nausea or vomiting.    I will order for carcinoid labs today and also order for gallium scan and MRI to assess the status of the carcinoid tumor in the abdomen. I emphasized the importance of nutrition and physical activity.    Plan:       Follow-up in 4 weeks time  Carcinoid labs today, MRI and gallium scan  Megace prescribed  Her neuropathy is significantly improved with gabapentin will renew her gabapentin.  I had a long discussion with her and her .  I went over with her the operative report and the pathology report.  The time of surgery she had resection of only about 70 cm which should not be short gut syndrome causing her issues which her gastroenterologist and them were concerned about.    Will assist nutrition when she comes back.  She also has a incisional hernia which is not symptomatic will continue to observe the                MICHELLE Fernandez MD, FACS   Associate Professor of Surgery, [facility]   Neuroendocrine Surgery, Hepatic/Pancreatic & General Surgery   [address]   [place] [address]   ph. [phone]; [phone]   fax. [phone]

## 2019-08-15 ENCOUNTER — OFFICE VISIT (OUTPATIENT)
Dept: NEUROLOGY | Facility: HOSPITAL | Age: 69
End: 2019-08-15
Attending: SURGERY
Payer: MEDICARE

## 2019-08-15 ENCOUNTER — TELEPHONE (OUTPATIENT)
Dept: NEUROLOGY | Facility: HOSPITAL | Age: 69
End: 2019-08-15

## 2019-08-15 VITALS
TEMPERATURE: 99 F | HEIGHT: 63 IN | BODY MASS INDEX: 21.15 KG/M2 | HEART RATE: 64 BPM | SYSTOLIC BLOOD PRESSURE: 139 MMHG | DIASTOLIC BLOOD PRESSURE: 75 MMHG | WEIGHT: 119.38 LBS

## 2019-08-15 DIAGNOSIS — C7A.012 MALIGNANT CARCINOID TUMOR OF ILEUM: Primary | ICD-10-CM

## 2019-08-15 LAB
EXT 24 HR UR METANEPHRINE: ABNORMAL
EXT 24 HR UR NORMETANEPHRINE: ABNORMAL
EXT 24 HR UR NORMETANEPHRINE: ABNORMAL
EXT 25 HYDROXY VIT D2: ABNORMAL
EXT 25 HYDROXY VIT D3: ABNORMAL
EXT 5 HIAA 24 HR URINE: ABNORMAL
EXT 5 HIAA BLOOD: 11 NG/ML (ref 0–22)
EXT ACTH: ABNORMAL
EXT AFP: ABNORMAL
EXT ALBUMIN: ABNORMAL
EXT ALKALINE PHOSPHATASE: ABNORMAL
EXT ALT: ABNORMAL
EXT AMYLASE: ABNORMAL
EXT ANTI ISLET CELL AB: ABNORMAL
EXT ANTI PARIETAL CELL AB: ABNORMAL
EXT ANTI THYROID AB: ABNORMAL
EXT AST: ABNORMAL
EXT BILIRUBIN DIRECT: ABNORMAL MG/DL
EXT BILIRUBIN TOTAL: ABNORMAL
EXT BK VIRUS DNA QN PCR: ABNORMAL
EXT BUN: ABNORMAL
EXT C PEPTIDE: ABNORMAL
EXT CA 125: ABNORMAL
EXT CA 19-9: ABNORMAL
EXT CA 27-29: ABNORMAL
EXT CALCITONIN: ABNORMAL
EXT CALCIUM: ABNORMAL
EXT CEA: ABNORMAL
EXT CHLORIDE: ABNORMAL
EXT CHOLESTEROL: ABNORMAL
EXT CHROMOGRANIN A: ABNORMAL
EXT CO2: ABNORMAL
EXT CREATININE UA: ABNORMAL
EXT CREATININE: ABNORMAL MG/DL
EXT CYCLOSPORONE LEVEL: ABNORMAL
EXT DOPAMINE: ABNORMAL
EXT EBV DNA BY PCR: ABNORMAL
EXT EPINEPHRINE: ABNORMAL
EXT FOLATE: ABNORMAL
EXT FREE T3: ABNORMAL
EXT FREE T4: ABNORMAL
EXT FSH: ABNORMAL
EXT GASTRIN RELEASING PEPTIDE: ABNORMAL
EXT GASTRIN RELEASING PEPTIDE: ABNORMAL
EXT GASTRIN: ABNORMAL
EXT GGT: ABNORMAL
EXT GHRELIN: ABNORMAL
EXT GLUCAGON: ABNORMAL
EXT GLUCOSE: ABNORMAL
EXT GROWTH HORMONE: ABNORMAL
EXT HCV RNA QUANT PCR: ABNORMAL
EXT HDL: ABNORMAL
EXT HEMATOCRIT: ABNORMAL
EXT HEMOGLOBIN A1C: ABNORMAL %
EXT HEMOGLOBIN: ABNORMAL
EXT HISTAMINE 24 HR URINE: ABNORMAL
EXT HISTAMINE: ABNORMAL
EXT IGF-1: ABNORMAL
EXT IMMUNKNOW (NON-STIMULATED): ABNORMAL
EXT IMMUNKNOW (STIMULATED): ABNORMAL
EXT INR: ABNORMAL
EXT INSULIN: ABNORMAL
EXT LANREOTIDE LEVEL: ABNORMAL
EXT LDH, TOTAL: ABNORMAL
EXT LDL CHOLESTEROL: ABNORMAL
EXT LIPASE: ABNORMAL
EXT MAGNESIUM: ABNORMAL
EXT METANEPHRINE FREE PLASMA: ABNORMAL
EXT MOTILIN: ABNORMAL
EXT NEUROKININ A CAMB: ABNORMAL
EXT NEUROKININ A ISI: ABNORMAL PG/ML (ref 0–40)
EXT NEUROTENSIN: ABNORMAL
EXT NOREPINEPHRINE: ABNORMAL
EXT NORMETANEPHRINE: ABNORMAL
EXT NSE: ABNORMAL
EXT OCTREOTIDE LEVEL: ABNORMAL
EXT PANCREASTATIN CAMB: ABNORMAL
EXT PANCREASTATIN ISI: 51 PG/ML (ref 10–135)
EXT PANCREATIC POLYPEPTIDE: ABNORMAL
EXT PHOSPHORUS: ABNORMAL
EXT PLATELETS: ABNORMAL
EXT POTASSIUM: ABNORMAL
EXT PROGRAF LEVEL: ABNORMAL
EXT PROLACTIN: ABNORMAL
EXT PROTEIN TOTAL: ABNORMAL
EXT PROTEIN UA: ABNORMAL
EXT PT: ABNORMAL
EXT PTH, INTACT: ABNORMAL
EXT PTT: ABNORMAL
EXT RAPAMUNE LEVEL: ABNORMAL
EXT SEROTONIN: 26 NG/ML (ref 56–244)
EXT SODIUM: ABNORMAL MMOL/L
EXT SOMATOSTATIN: ABNORMAL
EXT SUBSTANCE P: ABNORMAL
EXT TRIGLYCERIDES: ABNORMAL
EXT TRYPTASE: ABNORMAL
EXT TSH: ABNORMAL
EXT URIC ACID: ABNORMAL
EXT URINE AMYLASE U/HR: ABNORMAL
EXT URINE AMYLASE U/L: ABNORMAL
EXT VASOACTIVE INTESTINAL POLYPEPTIDE: ABNORMAL
EXT VITAMIN B12: ABNORMAL
EXT VMA 24 HR URINE: ABNORMAL
EXT WBC: ABNORMAL
NEURON SPECIFIC ENOLASE: ABNORMAL

## 2019-08-15 PROCEDURE — 99213 OFFICE O/P EST LOW 20 MIN: CPT | Performed by: SURGERY

## 2019-08-15 RX ORDER — AZITHROMYCIN 250 MG/1
TABLET, FILM COATED ORAL
Refills: 0 | COMMUNITY
Start: 2019-08-08 | End: 2020-01-20

## 2019-08-15 RX ORDER — GABAPENTIN 100 MG/1
100 CAPSULE ORAL 2 TIMES DAILY
Qty: 60 CAPSULE | Refills: 4 | Status: ON HOLD | OUTPATIENT
Start: 2019-08-15 | End: 2020-03-13 | Stop reason: HOSPADM

## 2019-08-15 RX ORDER — IPRATROPIUM BROMIDE AND ALBUTEROL 20; 100 UG/1; UG/1
2 SPRAY, METERED RESPIRATORY (INHALATION)
Refills: 0 | COMMUNITY
Start: 2019-08-05 | End: 2023-04-17 | Stop reason: SDUPTHER

## 2019-08-15 RX ORDER — ONDANSETRON 8 MG/1
TABLET, ORALLY DISINTEGRATING ORAL
COMMUNITY
Start: 2019-01-02 | End: 2020-01-20

## 2019-08-15 RX ORDER — VALACYCLOVIR HYDROCHLORIDE 500 MG/1
TABLET, FILM COATED ORAL
Refills: 1 | COMMUNITY
Start: 2019-07-25 | End: 2023-02-28 | Stop reason: ALTCHOICE

## 2019-08-15 RX ORDER — ONDANSETRON 8 MG/1
TABLET, ORALLY DISINTEGRATING ORAL
Refills: 1 | COMMUNITY
Start: 2019-07-10 | End: 2020-01-20

## 2019-08-15 RX ORDER — TERCONAZOLE 4 MG/G
CREAM VAGINAL
Refills: 1 | COMMUNITY
Start: 2019-07-09 | End: 2022-06-13

## 2019-08-15 RX ORDER — CITALOPRAM 10 MG/1
20 TABLET ORAL DAILY
COMMUNITY
Start: 2019-08-03 | End: 2022-06-13

## 2019-08-15 RX ORDER — MEGESTROL ACETATE 40 MG/ML
400 SUSPENSION ORAL 2 TIMES DAILY
Qty: 600 ML | Refills: 11 | Status: SHIPPED | OUTPATIENT
Start: 2019-08-15 | End: 2019-08-15 | Stop reason: SDUPTHER

## 2019-08-15 RX ORDER — DOXYCYCLINE 100 MG/1
CAPSULE ORAL
Refills: 0 | COMMUNITY
Start: 2019-08-03 | End: 2020-01-20

## 2019-08-15 RX ORDER — CITALOPRAM 10 MG/1
TABLET ORAL
Refills: 2 | COMMUNITY
Start: 2019-08-01 | End: 2020-01-20

## 2019-08-15 RX ORDER — MEGESTROL ACETATE 40 MG/ML
400 SUSPENSION ORAL 2 TIMES DAILY
Qty: 600 ML | Refills: 11 | Status: ON HOLD | OUTPATIENT
Start: 2019-08-15 | End: 2020-03-13 | Stop reason: HOSPADM

## 2019-08-15 NOTE — PATIENT INSTRUCTIONS
Follow up appt scheduled on Thursday, September 12, 2019 at 1115pm.    Gallium scheduled on Friday, September 6, 2019 at 3pm with Mri to follow up at 5pm.    [facility] today, [address].

## 2019-08-23 ENCOUNTER — TELEPHONE (OUTPATIENT)
Dept: NEUROLOGY | Facility: HOSPITAL | Age: 69
End: 2019-08-23

## 2019-08-23 NOTE — TELEPHONE ENCOUNTER
----- Message from Chela Rodas sent at 8/21/2019  4:27 PM CDT -----  Contact: self / [phone]  BRENT: Patient is requesting a call back regarding, her test results. Please advise

## 2019-08-27 ENCOUNTER — TELEPHONE (OUTPATIENT)
Dept: NEUROLOGY | Facility: HOSPITAL | Age: 69
End: 2019-08-27

## 2019-08-27 LAB — CRC RECOMMENDATION EXT: NORMAL

## 2019-08-27 NOTE — TELEPHONE ENCOUNTER
Dr. Laguerre ordered megace, a appetite stimulant.   Pt's local gi recommend chromium picolinate for appetite enhancement.  Pt is concerned that gi recommendations are otc, would like Dr. Laguerre's to discuss.  Pt also has appt on 9/12/19 with Dr. Laguerre.

## 2019-08-27 NOTE — TELEPHONE ENCOUNTER
----- Message from Dimas Tapia sent at 8/23/2019  8:53 AM CDT -----  Contact: patient  Patient called to speak with your office in regard to some medication and instructions that her Gastro doctor advised her of on her last visit.    Please call [phone] to discuss today.

## 2019-09-05 LAB
5-HIAA, PLASMA (NEUROEND): 11 NG/ML
ALBUMIN SERPL-MCNC: 4.1 G/DL (ref 3.6–5.1)
ALBUMIN/GLOB SERPL: 1.6 (CALC) (ref 1–2.5)
ALP SERPL-CCNC: 48 U/L (ref 33–130)
ALT SERPL-CCNC: 6 U/L (ref 6–29)
AST SERPL-CCNC: 10 U/L (ref 10–35)
BASOPHILS # BLD AUTO: 31 CELLS/UL (ref 0–200)
BASOPHILS NFR BLD AUTO: 0.3 %
BILIRUB SERPL-MCNC: 0.4 MG/DL (ref 0.2–1.2)
BUN SERPL-MCNC: 11 MG/DL (ref 7–25)
BUN/CREAT SERPL: NORMAL (CALC) (ref 6–22)
CALCIUM SERPL-MCNC: 9.7 MG/DL (ref 8.6–10.4)
CHLORIDE SERPL-SCNC: 101 MMOL/L (ref 98–110)
CO2 SERPL-SCNC: 30 MMOL/L (ref 20–32)
CREAT SERPL-MCNC: 0.76 MG/DL (ref 0.6–0.93)
EOSINOPHIL # BLD AUTO: 41 CELLS/UL (ref 15–500)
EOSINOPHIL NFR BLD AUTO: 0.4 %
ERYTHROCYTE [DISTWIDTH] IN BLOOD BY AUTOMATED COUNT: 12.9 % (ref 11–15)
GFRSERPLBLD MDRD-ARVRAT: 79 ML/MIN/1.73M2
GLOBULIN SER CALC-MCNC: 2.6 G/DL (CALC) (ref 1.9–3.7)
GLUCOSE SERPL-MCNC: 77 MG/DL (ref 65–99)
HCT VFR BLD AUTO: 38.1 % (ref 35–45)
HGB BLD-MCNC: 12.6 G/DL (ref 11.7–15.5)
LYMPHOCYTES # BLD AUTO: 2956 CELLS/UL (ref 850–3900)
LYMPHOCYTES NFR BLD AUTO: 28.7 %
MCH RBC QN AUTO: 29.9 PG (ref 27–33)
MCHC RBC AUTO-ENTMCNC: 33.1 G/DL (ref 32–36)
MCV RBC AUTO: 90.5 FL (ref 80–100)
MONOCYTES # BLD AUTO: 690 CELLS/UL (ref 200–950)
MONOCYTES NFR BLD AUTO: 6.7 %
NEUROKININ A: NORMAL PG/ML
NEUTROPHILS # BLD AUTO: 6582 CELLS/UL (ref 1500–7800)
NEUTROPHILS NFR BLD AUTO: 63.9 %
PANCREASTATIN: 51 PG/ML (ref 10–135)
PLATELET # BLD AUTO: 372 THOUSAND/UL (ref 140–400)
PMV BLD REES-ECKER: 9.5 FL (ref 7.5–12.5)
POTASSIUM SERPL-SCNC: 4.2 MMOL/L (ref 3.5–5.3)
PROT SERPL-MCNC: 6.7 G/DL (ref 6.1–8.1)
RBC # BLD AUTO: 4.21 MILLION/UL (ref 3.8–5.1)
SEROTONIN SER-MCNC: 26 NG/ML (ref 56–244)
SODIUM SERPL-SCNC: 140 MMOL/L (ref 135–146)
WBC # BLD AUTO: 10.3 THOUSAND/UL (ref 3.8–10.8)

## 2019-09-06 ENCOUNTER — HOSPITAL ENCOUNTER (OUTPATIENT)
Dept: RADIOLOGY | Facility: HOSPITAL | Age: 69
Discharge: HOME | End: 2019-09-06
Attending: SURGERY
Payer: MEDICARE

## 2019-09-06 DIAGNOSIS — C7A.012 MALIGNANT CARCINOID TUMOR OF ILEUM: ICD-10-CM

## 2019-09-06 PROCEDURE — 72197 MRI PELVIS W/O & W/DYE: CPT | Mod: TC

## 2019-09-06 PROCEDURE — 72197 MRI ABDOMEN-PELVIS W W/O CONTRAST (XPD): ICD-10-PCS | Mod: 26,,, | Performed by: RADIOLOGY

## 2019-09-06 PROCEDURE — 78815 NM PET 68GA DOTATATE WHOLE BODY: ICD-10-PCS | Mod: 26,PI,, | Performed by: RADIOLOGY

## 2019-09-06 PROCEDURE — 78815 PET IMAGE W/CT SKULL-THIGH: CPT | Mod: TC

## 2019-09-06 PROCEDURE — A9585 GADOBUTROL INJECTION: HCPCS | Performed by: SURGERY

## 2019-09-06 PROCEDURE — 72197 MRI PELVIS W/O & W/DYE: CPT | Mod: 26,,, | Performed by: RADIOLOGY

## 2019-09-06 PROCEDURE — 74183 MRI ABD W/O CNTR FLWD CNTR: CPT | Mod: 26,,, | Performed by: RADIOLOGY

## 2019-09-06 PROCEDURE — 74183 MRI ABDOMEN-PELVIS W W/O CONTRAST (XPD): ICD-10-PCS | Mod: 26,,, | Performed by: RADIOLOGY

## 2019-09-06 PROCEDURE — A9587 GALLIUM GA-68: HCPCS | Mod: TB

## 2019-09-06 PROCEDURE — 25500020 PHARM REV CODE 255: Performed by: SURGERY

## 2019-09-06 PROCEDURE — 78815 PET IMAGE W/CT SKULL-THIGH: CPT | Mod: 26,PI,, | Performed by: RADIOLOGY

## 2019-09-06 RX ORDER — GADOBUTROL 604.72 MG/ML
10 INJECTION INTRAVENOUS
Status: COMPLETED | OUTPATIENT
Start: 2019-09-06 | End: 2019-09-06

## 2019-09-06 RX ADMIN — GADOBUTROL 10 ML: 604.72 INJECTION INTRAVENOUS at 01:09

## 2019-09-12 ENCOUNTER — OFFICE VISIT (OUTPATIENT)
Dept: NEUROLOGY | Facility: HOSPITAL | Age: 69
End: 2019-09-12
Attending: SURGERY
Payer: MEDICARE

## 2019-09-12 VITALS
WEIGHT: 121.25 LBS | HEART RATE: 82 BPM | SYSTOLIC BLOOD PRESSURE: 130 MMHG | DIASTOLIC BLOOD PRESSURE: 63 MMHG | HEIGHT: 63 IN | TEMPERATURE: 98 F | BODY MASS INDEX: 21.48 KG/M2

## 2019-09-12 DIAGNOSIS — Z98.890 S/P LAPAROTOMY: ICD-10-CM

## 2019-09-12 DIAGNOSIS — C7A.012 MALIGNANT CARCINOID TUMOR OF THE ILEUM: Primary | ICD-10-CM

## 2019-09-12 DIAGNOSIS — K43.2 INCISIONAL HERNIA, WITHOUT OBSTRUCTION OR GANGRENE: ICD-10-CM

## 2019-09-12 PROCEDURE — 99215 OFFICE O/P EST HI 40 MIN: CPT | Performed by: SURGERY

## 2019-09-12 RX ORDER — METOCLOPRAMIDE 10 MG/1
10 TABLET ORAL 4 TIMES DAILY
Qty: 30 TABLET | Refills: 1 | Status: SHIPPED | OUTPATIENT
Start: 2019-09-12 | End: 2020-01-20

## 2019-09-12 NOTE — PROGRESS NOTES
"NOLANETS:  [facility]  A collaboration between [facility] and [facility]      PATIENT: Christiana Chan  MRN: [id number]  DATE: 2019    Subjective:      Chief Complaint: Follow-up (x 4 weeks )  still c/o tiredness  Feeling bloated and fullness of abdomen  No diarrhea has constipation  Vitals: Blood pressure 130/63, pulse 82, temperature 97.5 °F (36.4 °C), temperature source Oral, height 5' 3" (1.6 m), weight 55 kg (121 lb 4.1 oz).     ECOG Score: 1 - Symptomatic but completely ambulatory    Diagnosis: No diagnosis found.     Oncologic History:     Interval History:     Past Medical History:  Past Medical History:   Diagnosis Date    Abdominal bloating     Abdominal pain     Asthma     Atrial fibrillation     Colon polyps     Constipation     Diverticulosis of colon     Esophageal ulcer     Gastritis     GERD (gastroesophageal reflux disease)     Heartburn     Hiatal hernia     HTN (hypertension)     Intermittent diarrhea     Iron deficiency anemia     Osteoarthritis     Osteoporosis     Rectal bleeding     Ruptured lumbar disc     Small bowel lesion     Vitamin B12 deficiency anemia     Vitamin D deficiency        Past Surgical History:  Past Surgical History:   Procedure Laterality Date    APPENDECTOMY, LAPAROSCOPIC N/A 2019    Performed by Rebecca Valdez MD at [facility] OR    BREAST BIOPSY Bilateral      SECTION   &     CHOLECYSTECTOMY      COLONOSCOPY      outside facility    ENTEROSCOPY, DOUBLE BALLOON, ANTEGRADE N/A 2019    Performed by [facility]umar Dorsey [facility] at [facility] (2ND F[facility])    ESOPHAGOGASTRODUODENOSCOPY      outside facility    EXCISION, SMALL INTESTINE, LAPAROSCOPIC N/A 2019    Performed by Rebecca Valdez MD at [facility] OR    HYSTERECTOMY      SHOULDER SURGERY Right 2013    TOTAL KNEE ARTHROPLASTY Right 2013    " ULTRASOUND-ENDOSCOPIC-UPPER N/A 4/9/2019    Performed by Armando Bills MD at [facility] ENDO       Family History:  Family History   Problem Relation Age of Onset    Diabetes Mother     Multiple myeloma Father        Allergies:  Amoxicillin; Sulfa (sulfonamide antibiotics); Codeine; Demerol [meperidine]; Hydrocodone; Epinephrine; and Nitrofurantoin monohyd/m-cryst    Medications:   Current Outpatient Medications   Medication Sig    acetaminophen (TYLENOL) 500 MG tablet Take 500 mg by mouth every 6 (six) hours as needed for Pain.    albuterol (PROAIR HFA) 90 mcg/actuation inhaler Inhale 1 puff into the lungs every 6 (six) hours as needed for Wheezing. Rescue    ALPRAZolam (XANAX) 2 MG Tab Take 1 mg by mouth nightly.     carvedilol (COREG) 25 MG tablet Take 25 mg by mouth 2 (two) times daily.    cholecalciferol, vitamin D3, (VITAMIN D3) 2,000 unit Tab Take 1 tablet by mouth once daily.    citalopram (CELEXA) 10 MG tablet Take 10 mg by mouth.    COMBIVENT RESPIMAT  mcg/actuation inhaler INL 2 PFS PO BID    cyanocobalamin (VITAMIN B-12) 1,000 mcg/mL injection Inject 1,000 mcg into the muscle every 14 (fourteen) days.    cyanocobalamin, vitamin B-12, 1,000 mcg/mL Kit   q2 weeks. due 3/7/19    denosumab (PROLIA) 60 mg/mL Syrg Inject 60 mg into the skin every 6 (six) months.    dicyclomine (BENTYL) 20 mg tablet Take 20 mg by mouth every 6 (six) hours as needed (abdomenal pain and cramping.).    docusate sodium (COLACE) 100 MG capsule Take 100 mg by mouth daily as needed for Constipation.    estradiol (ESTRACE) 0.01 % (0.1 mg/gram) vaginal cream Place 1 g vaginally as needed.     estradiol (VAGIFEM) 10 mcg Tab Place 1 tablet vaginally twice a week.     gabapentin (NEURONTIN) 100 MG capsule Take 1 capsule (100 mg total) by mouth 2 (two) times daily.    ipratropium-albuterol (COMBIVENT RESPIMAT)  mcg/actuation inhaler Inhale into the lungs.    lamoTRIgine (LAMICTAL) 100 MG tablet Take 100 mg by mouth  2 (two) times daily.    lisinopril (PRINIVIL,ZESTRIL) 20 MG tablet Take 20 mg by mouth Daily.     megestrol (MEGACE) 400 mg/10 mL (40 mg/mL) Susp Take 10 mLs (400 mg total) by mouth 2 (two) times daily.    ondansetron (ZOFRAN) 8 MG tablet Take 4 mg by mouth every 4 (four) hours as needed for Nausea.     pantoprazole (PROTONIX) 20 MG tablet Take 20 mg by mouth once daily.    polyethylene glycol (GLYCOLAX) 17 gram PwPk Take 17 g by mouth 2 (two) times daily as needed (for constipation).     propranolol (INDERAL) 10 MG tablet Take 10 mg by mouth daily as needed.    ranitidine (ZANTAC) 300 MG tablet TAKE 1 TABLET BY MOUTH IN THE EVENING AS NEEDED FOR REFLUX    spironolactone (ALDACTONE) 25 MG tablet Take 12.5 mg by mouth once daily.    valACYclovir (VALTREX) 500 MG tablet TK 1 T PO D    azithromycin (Z-AMBREEN) 250 MG tablet TK UTD    cholestyramine (QUESTRAN) 4 gram packet Take 1 packet (4 g total) by mouth 3 (three) times daily with meals.    citalopram (CELEXA) 10 MG tablet TK 1 T PO HS    cyclobenzaprine (FLEXERIL) 5 MG tablet TK 1 T PO TID FOR 10 DAYS PRF MUSCLE SPASM    digoxin (LANOXIN) 125 mcg tablet Take 125 mcg by mouth once daily.    doxycycline (MONODOX) 100 MG capsule TK 1 C PO BID FOR 10 DAYS    FLUoxetine 10 MG capsule Take 10 mg by mouth once daily.    ketorolac (TORADOL) 10 mg tablet Take by mouth.    lubiprostone (AMITIZA) 24 MCG Cap Take 24 mcg by mouth as needed.     ondansetron (ZOFRAN-ODT) 8 MG TbDL Place under the tongue.    ondansetron (ZOFRAN-ODT) 8 MG TbDL DIS 1/2 TO 1 T UNT Q 4 H PRF NAUSEA    pregabalin (LYRICA) 75 MG capsule Take 1 capsule (75 mg total) by mouth 2 (two) times daily.    terconazole (TERAZOL 7) 0.4 % Crea U 1 APPLICATION VAGINALLY QD HS    traMADol (ULTRAM) 50 mg tablet Take 1 tablet (50 mg total) by mouth every 6 (six) hours as needed for Pain.    traMADol (ULTRAM-ER) 100 MG Tb24 Take 50 mg by mouth daily as needed.     No current facility-administered  medications for this visit.         Review of Systems   Constitutional: Positive for fatigue. Negative for activity change, appetite change, chills, diaphoresis, fever and unexpected weight change.   HENT: Negative for congestion, dental problem, drooling, ear discharge, hearing loss, mouth sores, nosebleeds, postnasal drip, rhinorrhea, sinus pressure, sinus pain, sneezing, sore throat, tinnitus and trouble swallowing.    Eyes: Negative for pain, discharge, redness, itching and visual disturbance.   Respiratory: Negative for choking, chest tightness, shortness of breath, wheezing and stridor.    Cardiovascular: Negative for chest pain, palpitations and leg swelling.   Gastrointestinal: Positive for abdominal distention and constipation. Negative for abdominal pain, anal bleeding, blood in stool, rectal pain and vomiting.   Endocrine: Negative.    Genitourinary: Negative.    Musculoskeletal: Negative for arthralgias, back pain, joint swelling and neck stiffness.   Skin: Negative for color change, pallor and wound.   Allergic/Immunologic: Negative.    Neurological: Negative for tremors, syncope, facial asymmetry, speech difficulty, weakness, light-headedness, numbness and headaches.   Hematological: Negative.    Psychiatric/Behavioral: Negative.       Objective:      Physical Exam   Constitutional: She is oriented to person, place, and time. No distress.   HENT:   Head: Normocephalic and atraumatic.   Right Ear: External ear normal.   Left Ear: External ear normal.   Eyes: Pupils are equal, round, and reactive to light. Conjunctivae and EOM are normal.   Neck: Normal range of motion.   Cardiovascular: Normal rate and regular rhythm.   Pulmonary/Chest: Effort normal and breath sounds normal.   Abdominal: Soft. Bowel sounds are normal. She exhibits no distension and no mass. There is no tenderness. There is no rebound and no guarding. A hernia is present.   Musculoskeletal: Normal range of motion.   Neurological: She is  alert and oriented to person, place, and time.   Skin: Skin is warm and dry. She is not diaphoretic.   Psychiatric: She has a normal mood and affect.      Assessment:       No diagnosis found.    Laboratory Data:   Results for orders placed or performed in visit on 09/09/19   NEURO ENDO LABS   Result Value Ref Range    EXT AFP      EXT CEA      EXT CA 19-9      EXT CA 27-29      EXT       EXT CYCLOSPORONE LEVEL      EXT PROGRAF LEVEL      EXT RAPAMUNE LEVEL      EXT 5 HIAA 24 HR URINE      EXT 5 HIAA BLOOD 11 0 - 22 ng/mL    EXT GASTRIN      EXT SEROTONIN 26 (A) 56 - 244 ng/mL    EXT CHROMOGRANIN A      EXT PANCREASTATIN DEEPTHI 51 10 - 135 pg/mL    EXT PANCREASTATIN CAMB      EXT NEUROKININ A DEEPTHI less than 10 0 - 40 pg/mL    EXT NEUROKININ A CAMB      EXT OCTREOTIDE LEVEL      EXT LANREOTIDE LEVEL      EXT ANTI PARIETAL CELL AB      EXT ANTI THYROID AB      EXT ANTI ISLET CELL AB      EXT Folate      EXT VITAMIN B12      EXT GLUCAGON      EXT PANCREATIC POLYPEPTIDE      EXT VASOACTIVE INTESTINAL POLYPEPTIDE      EXT C PEPTIDE      EXT INSULIN      EXT SOMATOSTATIN      EXT SUBSTANCE P      EXT NEUROTENSIN      EXT CALCITONIN      EXT HISTAMINE      EXT TRYPTASE      EXT GASTRIN RELEASING PEPTIDE      EXT PTH, Intact      EXT FSH      EXT PROLACTIN      EXT MOTILIN      EXT GHRELIN      EXT IGF-1      EXT GROWTH HORMONE      EXT ACTH      EXT GASTRIN RELEASING PEPTIDE      EXT VMA 24 HR URINE      EXT HISTAMINE 24 HR URINE      EXT FREE T4      EXT FREE T3      EXT TSH      EXT 25 HYDROXY VIT D2      EXT 25 HYDROXY VIT D3      EXT WBC      EXT Hemoglobin      EXT Hematocrit      EXT Platelets      EXT PT      EXT PTT      EXT INR      EXT Glucose      EXT BUN      EXT Creatinine  mg/dL    EXT Sodium  mmol/L    EXT Potassium      EXT Chloride      EXT CO2      EXT Calcium      EXT Protein total      EXT Phosphorus      EXT Albumin      EXT Uric Acid      EXT BilirubiN Total      EXT Bilirubin Direct  mg/dL    EXT  Alkaline Phosphatase      EXT GGT      EXT AST      EXT ALT      EXT Magnesium      EXT Lipase      EXT Amylase      EXT LDH, Total      EXT Triglycerides      EXT Cholesterol      EXT HDL      EXT LDL Cholesterol      EXT URINE AMYLASE U/HR      EXT URINE AMYLASE U/L      EXT Protein UA      EXT CREATININE UA      EXT Immunknow (stimulated)      EXT Immunknow (non-stimulated)      EXT EBV DNA by PCR      EXT BK Virus DNA QN PCR      EXT Hemoglobin A1C  %    Neuron Specific Enolase      EXT NSE      ext epinephrine      ext 24 hr ur normetanephrine      ext metanephrine free plasma      ext 24 hr ur metanephrine      ext 24 hr ur normetanephrine      ext normetanephrine      ext dopamine      EXT NOREPINEPHRINE      ext hcv rna quant pcr      PET Ga68 Dotatate   Order: [id number]   Status:  Final result   Visible to patient:  Yes (Patient Portal) Next appt:  None Dx:  Malignant carcinoid tumor of ileum   Details     Reading Physician Reading Date Result Priority   Justin Goode MD 9/6/2019 Routine      Narrative     EXAMINATION:  NM PET 68GA DOTATATE WHOLE BODY    CLINICAL HISTORY:  Malignant carcinoid tumor of the ileum    TECHNIQUE:  5.6 mCi of GA68 Dotatate was administered intravenously in the left hand. After an approximately 60 min distribution time, PET/CT images were acquired from the skull vertex to the mid thigh. Transmission images were acquired to correct for attenuation using a whole body low-dose CT scan without contrast with the arms positioned above the head.    COMPARISON:  Gallium 68 NETSpot PET-CT 03/29/2019    FINDINGS:  Quality of the study: Adequate.    No abnormal foci of increased tracer uptake are present.  Previous tracer avid foci in the small bowel are no longer appreciated status post interval resection.    Physiologic distribution of the tracer is seen within the pituitary, salivary, and thyroid glands, stomach, liver, pancreas uncinate process, spleen, adrenal glands, kidneys and  urinary bladder, and bowel.    Incidental CT findings: Status post right shoulder arthroplasty.      Impression       No PET/CT findings to suggest somatostatin receptor avid disease status post interval resection.      Electronically signed by: Justin Goode  Date: 09/06/2019  Time: 16:26             Last Resulted: 09/06/19 16:26 Order Details View Encounter Lab and Collection Details Routing Result History              Patient Portal) Next appt:  None Dx:  Malignant carcinoid tumor of ileum   Details     Reading Physician Reading Date Result Priority   Francis Lugo MD 9/6/2019 Routine      Narrative     EXAMINATION:  MRI ABDOMEN-PELVIS W W/O CONTRAST (XPD)    CLINICAL HISTORY:  neoplasm;  Malignant carcinoid tumor of the ileum    TECHNIQUE:  Multiplanar, multi-sequence MR imaging of the abdomen and pelvis performed before and after administration of 10 cc of Gadavist gadolinium-based intravenous contrast per the liver protocol.    COMPARISON:  MRI abdomen with and without contrast dated 03/29/2019 and Dotatate PET CT dated 03/29/2019; MRI thoracic spine dated 04/05/2019    FINDINGS:  Pulmonary Bases: No large pleural effusion.    Liver: No abnormal diffusion restriction or solid enhancing lesion.  Few scattered T2 hyperintense, hypoenhancing foci, probable hepatic cysts.  Largest measuring 6 mm, similar (series 14, image 9).  Hepatic vasculature is patent.    Bile Ducts: Similar intra and extrahepatic ductal prominence in this patient status post cholecystectomy.    Gallbladder: Surgically absent.    Pancreas: Normal    Spleen: Normal    Gastrointestinal tract: Normal caliber.  Short segment suspected mild diffusion restriction at the level of the terminal ileum (series 8, image 10)    Mesentery: No discrete mesenteric mass.    Adrenal Glands: normal.    Kidneys and bladder: No hydronephrosis.  Bilateral renal cysts.  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  Bladder is unremarkable.    Aorta and Abdominal Vasculature:  normal.    Lymph nodes: no pathologically enlarged lymph node    Body wall: Ventral abdominal hernia containing nonobstructed loop of bowel.    Other: No free fluid.  Stable T12 vertebral body lesion, previously demonstrated as possible atypical vertebral hemangioma.      Impression       No intrahepatic lesion.    Mild diffusion restriction at level of the terminal ileum, of uncertain etiology.  Otherwise, note that assessment of bowel is limited in this patient with known small bowel carcinoid.    Midline ventral hernia containing nonobstru           Results for EBONY ROBERTSON (MRN [id number]) as of 9/12/2019 11:40   Ref. Range 3/11/2019 00:00 8/15/2019 00:00   EXT 5 HIAA BLOOD Latest Ref Range: 0 - 22 ng/mL 9.2 11   EXT Albumin Latest Ref Range: 3.5 - 4.8 g/dl 4.3    EXT ALT Latest Ref Range: 0 - 32 iu/l 10    EXT AST Latest Ref Range: 0 - 40 iu/l 9    EXT Bilirubin Direct Latest Ref Range: 39 - 117 iu/l 61    EXT BilirubiN Total Latest Ref Range: 0.0 - 1.2 mg/dl 0.3    EXT BUN Latest Ref Range: 8 - 27 mg/dl 14    EXT Calcium Latest Ref Range: 8.7 - 10.3 mg/dl 9.7    EXT Chloride Latest Ref Range: 96 - 106 mmol/l 100 (A)    EXT CO2 Latest Ref Range: 20 - 29 mmol/l 27    EXT Creatinine Latest Ref Range: 0.57 - 1.00 mg/dl 0.85    EXT Glucose Latest Ref Range: 65 - 99 mg/dl 82    EXT Hematocrit Latest Ref Range: 34.0 - 46.6 % 41.2    EXT Hemoglobin Latest Ref Range: 11.1 - 15.9 g/dl 12.7    EXT NEUROKININ A DEEPTHI Latest Ref Range: 0 - 40 pg/mL <10.0 less than 10   EXT PANCREASTATIN DEEPTHI Latest Ref Range: 10 - 135 pg/mL 115 51   EXT Platelets Latest Ref Range: 150 - 379 x10e3/ul 434 (A)    EXT Potassium Latest Ref Range: 3.5 - 5.2 mmol/l 3.9    EXT Protein total Latest Ref Range: 6.0 - 8.5 g/dl 6.8    EXT SEROTONIN Latest Ref Range: 56 - 244 ng/mL 72 26 (A)   EXT Sodium Latest Ref Range: 134 - 144 mmol/l 143    EXT WBC Latest Ref Range: 3.4 - 10.8 x10e3/ul 14.7 (A)        Impression:  68-year-old female with  neuroendocrine tumor of small bowel now completely disease free as there is no evidence of disease by gallium scan or MRI and her markers are in the normal range.    She see she is eating better with may case however not able to gain weight.  She always feels fatigued.    She also has a incisional hernia which appears more prominent today than about 4 weeks back.    From the neuroendocrine point a few she needs a follow-up in about 6 months time with a gallium scan and markers in 6 months.  I talked to her about incisional hernia and sits prominent she may be better of getting the hernia fixed.  However other than the size she is not having symptoms.  I talked to her about the binder the advantages and the risk and there is no medical advantage in using the abdominal binder.    She had multiple vitamins checked by her PCP and she was prescribed to take chromium and vitamin D. I suggested that she stop the chromium supplementation and see how she feels as she was complaining of slurry stools after starting vitamin D and chromium.  If she continues to have the same problem I suggested that she stop the vitamin-D supplementation and informed the office so that she can have a different form of vitamin-D supplementation    I reviewed all her medications    Plan:       Follow-up in 6 months with gallium scan neuroendocrine markers             MICHELLE Fernandez MD, FACS   Associate Professor of Surgery, [facility]   Neuroendocrine Surgery, Hepatic/Pancreatic & General Surgery   [address]   [place] [address]   ph. [phone]; [phone]   fax. [phone]

## 2019-09-12 NOTE — PATIENT INSTRUCTIONS
Follow up with Dr. Laguerre on Thursday, March 12, 2020 at 11am,    Gallium on Thursday, March 5, 2020 at 11am.

## 2019-09-26 ENCOUNTER — HISTORICAL (OUTPATIENT)
Dept: LAB | Facility: HOSPITAL | Age: 69
End: 2019-09-26

## 2019-09-26 LAB
AMYLASE SERPL-CCNC: 13 UNIT/L (ref 25–115)
LIPASE SERPL-CCNC: 10 UNIT/L (ref 73–393)

## 2019-10-01 ENCOUNTER — PATIENT MESSAGE (OUTPATIENT)
Dept: NEUROLOGY | Facility: HOSPITAL | Age: 69
End: 2019-10-01

## 2019-10-01 NOTE — TELEPHONE ENCOUNTER
Ms Chan this is Milena Laguerre's nurse. I faxed the notes from Dr. Laguerre's  9/12/19 visit to Dr. Todd. If for some reason he does not get the notes let us know and we will resend them

## 2019-10-03 ENCOUNTER — HISTORICAL (OUTPATIENT)
Dept: LAB | Facility: HOSPITAL | Age: 69
End: 2019-10-03

## 2019-10-03 LAB
BILIRUB SERPL-MCNC: NEGATIVE MG/DL
BLOOD URINE, POC: NEGATIVE
CLARITY, POC UA: NORMAL
COLOR, POC UA: YELLOW
GLUCOSE UR QL STRIP: NEGATIVE
KETONES UR QL STRIP: NEGATIVE
LEUKOCYTE EST, POC UA: NEGATIVE
NITRITE, POC UA: NEGATIVE
PH, POC UA: 6.5
PROTEIN, POC: NEGATIVE
SPECIFIC GRAVITY, POC UA: 1
UROBILINOGEN, POC UA: NORMAL

## 2019-10-04 ENCOUNTER — HISTORICAL (OUTPATIENT)
Dept: ADMINISTRATIVE | Facility: HOSPITAL | Age: 69
End: 2019-10-04

## 2019-10-05 LAB — FINAL CULTURE: NO GROWTH

## 2019-10-07 ENCOUNTER — HISTORICAL (OUTPATIENT)
Dept: LAB | Facility: HOSPITAL | Age: 69
End: 2019-10-07

## 2019-10-15 ENCOUNTER — HISTORICAL (OUTPATIENT)
Dept: ADMINISTRATIVE | Facility: HOSPITAL | Age: 69
End: 2019-10-15

## 2019-10-16 ENCOUNTER — HISTORICAL (OUTPATIENT)
Dept: ADMINISTRATIVE | Facility: HOSPITAL | Age: 69
End: 2019-10-16

## 2019-11-20 ENCOUNTER — PATIENT MESSAGE (OUTPATIENT)
Dept: NEUROLOGY | Facility: HOSPITAL | Age: 69
End: 2019-11-20

## 2019-11-22 ENCOUNTER — TELEPHONE (OUTPATIENT)
Dept: NEUROLOGY | Facility: HOSPITAL | Age: 69
End: 2019-11-22

## 2019-11-22 NOTE — TELEPHONE ENCOUNTER
Essence with Dr. Ramirez will refax referral documents to clinic at [phone].  Essence states she sent cd of video capsule study to Dr. Vazquez' residence.

## 2019-11-22 NOTE — TELEPHONE ENCOUNTER
----- Message from Hannah Pop sent at 11/22/2019 12:20 PM CST -----  Contact: Pt/ [phone]   ---Pt is returning your call.    Please call and advise.

## 2019-11-22 NOTE — TELEPHONE ENCOUNTER
Pt's referral sent to clinic from Dr. Ramirez, attn Dr. Laguerre.  Pt notified cd of video capsule study required.  Pt to be notified with referral status.

## 2019-11-22 NOTE — TELEPHONE ENCOUNTER
----- Message from Ashlyn Dwyer sent at 11/22/2019  8:51 AM CST -----  Contact: self [phone]  GI - Patient is calling to inquire if you received referral from Dr. Ramirez. Please call

## 2019-11-25 ENCOUNTER — DOCUMENTATION ONLY (OUTPATIENT)
Dept: NEUROLOGY | Facility: HOSPITAL | Age: 69
End: 2019-11-25

## 2019-11-25 NOTE — PROGRESS NOTES
Video capsule study from October 2019 reviewed independently. There a several small benign lesions in the small bowel including benign folds and lymphangiectasias. The small bowel anastomosis does not look abnormal to me.     Impression:   68yrF with a history of small bowel carcinoid s/p previous surgery. There is no evidence of current/recurrent disease activity by recent imaging. Video capsule study reviewed and is negative for evidence of carcinoid on my evaluation

## 2019-11-26 NOTE — TELEPHONE ENCOUNTER
Pt would like Dr. Laguerre to know she is not gaining weight.  Her wt is 122 pounds, wants to know if she should be concerned.  Pt instructed to continue megace as ordered.  Dr. Laguerre to be notified.

## 2019-12-18 ENCOUNTER — TELEPHONE (OUTPATIENT)
Dept: NEUROLOGY | Facility: HOSPITAL | Age: 69
End: 2019-12-18

## 2019-12-18 NOTE — TELEPHONE ENCOUNTER
Spoke with pt and told her that Dr. Laguerre said for her to take Bentyl for these episode and to make appt in January. appt is 1/20/20 at 1020 am. Pt verbalized understanding

## 2019-12-18 NOTE — TELEPHONE ENCOUNTER
----- Message from Dimas Tapia sent at 12/18/2019 10:12 AM CST -----  Contact: patient  Patient called to speak with your office about her current weight issues and her change in appetite.    She does not want to be seen, just wants her questions answered.    Please call [phone] to discuss today.

## 2019-12-31 ENCOUNTER — TELEPHONE (OUTPATIENT)
Dept: NEUROLOGY | Facility: HOSPITAL | Age: 69
End: 2019-12-31

## 2020-01-08 ENCOUNTER — TELEPHONE (OUTPATIENT)
Dept: NEUROLOGY | Facility: HOSPITAL | Age: 70
End: 2020-01-08

## 2020-01-08 NOTE — TELEPHONE ENCOUNTER
----- Message from Zoraida Morrison sent at 1/8/2020  9:59 AM CST -----  Contact: Essence russell/ Dr. Ramirez [phone] ext. [phone]  JENIFER Lowry is calling to ask when is pt being scheduled with Dr. Vazquez. Please advise.

## 2020-01-08 NOTE — TELEPHONE ENCOUNTER
Essence with Dr. Ramirez requesting when pt scheduled to see Dr. Vazquez.  Essence notified pt has appointment scheduled with Dr. Laguerre on 1/20/20.  Essence notified Dr. Vazquez'  Documentation note dated 11/25/19.

## 2020-01-15 NOTE — PROGRESS NOTES
NOLANETS:  [facility]  A collaboration between [facility] and [facility]      PATIENT: Christiana Chan  MRN: [id number]  DATE: 1/15/2020    Subjective:      Chief Complaint: No chief complaint on file.   She has intermittent abdominal pain, tiredness, alternating diarrhea and constipation, no taste, abdominal pain and swelling and cramping pain.    She sees disease she has a cramping pain exactly like what she had before the surgery. Certain type of food worsens the abdominal pain. She states that she scattered take sugar however can take only mashed potatoes of French fries.    She has been having this abdominal pain for a very long time.  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 She was taken to surgery April of last year and underwent a limited small-bowel resection..  She had multifocal tumors however did not have any lymphatic extension are a disease elsewhere in the body    Vitals: There were no vitals taken for this visit. --    ECOG Score: 1 - Symptomatic but completely ambulatory    Diagnosis: No diagnosis found.     Interval History:     Status post small-bowel resection for multicentric small bowel carcinoid.  She did not have any lymph darryn disease or any metastatic disease elsewhere. There is a past history of obesity in the family and she had lost significant weight and now she is scared to gain any weight. Will assist nutrition when she comes back.  She also has a incisional hernia which is not symptomatic will continue to observe    Oncologic History:   Oncologic History 2/2019 small bowell well diff, G1, Ki67 0%   Oncologic Treatment 2/2019 surgery   Pathology 4/2019 1. APPENDIX WITH NO EVIDENCE OF NEOPLASIA IDENTIFIED.  2. RESECTION OF SMALL INTESTINE (ILEUM) SHOWING MULTIPLE FOCI OF WELL-DIFFERENTIATED  NEUROENDOCRINE TUMOR (CARCINOID TUMOR) WITH NEGATIVE  MARGINS. SEE SYNOPTIC REPORT:  PROCEDURE: SEGMENTAL RESECTION, SMALL INTESTINE  TUMOR SITE: ILEUM     Past Medical History:  Past Medical History:   Diagnosis Date    Abdominal bloating     Abdominal pain     Asthma     Atrial fibrillation     Colon polyps     Constipation     Diverticulosis of colon     Esophageal ulcer     Gastritis     GERD (gastroesophageal reflux disease)     Heartburn     Hiatal hernia     HTN (hypertension)     Intermittent diarrhea     Iron deficiency anemia     Osteoarthritis     Osteoporosis     Rectal bleeding     Ruptured lumbar disc     Small bowel lesion     Vitamin B12 deficiency anemia     Vitamin D deficiency        Past Surgical History:  Past Surgical History:   Procedure Laterality Date    ANTEGRADE SINGLE BALLOON ENTEROSCOPY N/A 2019    Procedure: ENTEROSCOPY, DOUBLE BALLOON, ANTEGRADE;  Surgeon: Sivakumar Dorsey MD;  Location: [address]);  Service: Endoscopy;  Laterality: N/A;    BREAST BIOPSY Bilateral      SECTION   &     CHOLECYSTECTOMY      COLONOSCOPY      outside facility    ENDOSCOPIC ULTRASOUND OF UPPER GASTROINTESTINAL TRACT N/A 2019    Procedure: ULTRASOUND-ENDOSCOPIC-UPPER;  Surgeon: Armando Bills MD;  Location: [facility];  Service: Endoscopy;  Laterality: N/A;  Carcinoid diagnosis    ESOPHAGOGASTRODUODENOSCOPY      outside facility    HYSTERECTOMY      LAPAROSCOPIC APPENDECTOMY N/A 2019    Procedure: APPENDECTOMY, LAPAROSCOPIC;  Surgeon: Rebecca Valdez MD;  Location: [facility];  Service: General;  Laterality: N/A;    LAPAROSCOPIC RESECTION OF SMALL INTESTINE N/A 2019    Procedure: EXCISION, SMALL INTESTINE, LAPAROSCOPIC;  Surgeon: Rebecca Valdez MD;  Location: [facility];  Service: General;  Laterality: N/A;    SHOULDER SURGERY Right 2013    TOTAL KNEE ARTHROPLASTY Right 2013       Family History:  Family History   Problem Relation Age of Onset    Diabetes  Mother     Multiple myeloma Father        Allergies:  Amoxicillin; Sulfa (sulfonamide antibiotics); Codeine; Demerol [meperidine]; Hydrocodone; Epinephrine; and Nitrofurantoin monohyd/m-cryst    Medications:   Current Outpatient Medications   Medication Sig    acetaminophen (TYLENOL) 500 MG tablet Take 500 mg by mouth every 6 (six) hours as needed for Pain.    albuterol (PROAIR HFA) 90 mcg/actuation inhaler Inhale 1 puff into the lungs every 6 (six) hours as needed for Wheezing. Rescue    ALPRAZolam (XANAX) 2 MG Tab Take 1 mg by mouth nightly.     azithromycin (Z-AMBREEN) 250 MG tablet TK UTD    carvedilol (COREG) 25 MG tablet Take 25 mg by mouth 2 (two) times daily.    cholecalciferol, vitamin D3, (VITAMIN D3) 2,000 unit Tab Take 1 tablet by mouth once daily.    cholestyramine (QUESTRAN) 4 gram packet Take 1 packet (4 g total) by mouth 3 (three) times daily with meals.    citalopram (CELEXA) 10 MG tablet Take 10 mg by mouth.    citalopram (CELEXA) 10 MG tablet TK 1 T PO HS    COMBIVENT RESPIMAT  mcg/actuation inhaler INL 2 PFS PO BID    cyanocobalamin (VITAMIN B-12) 1,000 mcg/mL injection Inject 1,000 mcg into the muscle every 14 (fourteen) days.    cyanocobalamin, vitamin B-12, 1,000 mcg/mL Kit   q2 weeks. due 3/7/19    cyclobenzaprine (FLEXERIL) 5 MG tablet TK 1 T PO TID FOR 10 DAYS PRF MUSCLE SPASM    denosumab (PROLIA) 60 mg/mL Syrg Inject 60 mg into the skin every 6 (six) months.    dicyclomine (BENTYL) 20 mg tablet Take 20 mg by mouth every 6 (six) hours as needed (abdomenal pain and cramping.).    digoxin (LANOXIN) 125 mcg tablet Take 125 mcg by mouth once daily.    docusate sodium (COLACE) 100 MG capsule Take 100 mg by mouth daily as needed for Constipation.    doxycycline (MONODOX) 100 MG capsule TK 1 C PO BID FOR 10 DAYS    estradiol (ESTRACE) 0.01 % (0.1 mg/gram) vaginal cream Place 1 g vaginally as needed.     estradiol (VAGIFEM) 10 mcg Tab Place 1 tablet vaginally twice a week.      FLUoxetine 10 MG capsule Take 10 mg by mouth once daily.    gabapentin (NEURONTIN) 100 MG capsule Take 1 capsule (100 mg total) by mouth 2 (two) times daily.    ipratropium-albuterol (COMBIVENT RESPIMAT)  mcg/actuation inhaler Inhale into the lungs.    ketorolac (TORADOL) 10 mg tablet Take by mouth.    lamoTRIgine (LAMICTAL) 100 MG tablet Take 100 mg by mouth 2 (two) times daily.    lisinopril (PRINIVIL,ZESTRIL) 20 MG tablet Take 20 mg by mouth Daily.     lubiprostone (AMITIZA) 24 MCG Cap Take 24 mcg by mouth as needed.     megestrol (MEGACE) 400 mg/10 mL (40 mg/mL) Susp Take 10 mLs (400 mg total) by mouth 2 (two) times daily.    metoclopramide HCl (REGLAN) 10 MG tablet Take 1 tablet (10 mg total) by mouth 4 (four) times daily.    ondansetron (ZOFRAN) 8 MG tablet Take 4 mg by mouth every 4 (four) hours as needed for Nausea.     ondansetron (ZOFRAN-ODT) 8 MG TbDL Place under the tongue.    ondansetron (ZOFRAN-ODT) 8 MG TbDL DIS 1/2 TO 1 T UNT Q 4 H PRF NAUSEA    pantoprazole (PROTONIX) 20 MG tablet Take 20 mg by mouth once daily.    polyethylene glycol (GLYCOLAX) 17 gram PwPk Take 17 g by mouth 2 (two) times daily as needed (for constipation).     pregabalin (LYRICA) 75 MG capsule Take 1 capsule (75 mg total) by mouth 2 (two) times daily.    propranolol (INDERAL) 10 MG tablet Take 10 mg by mouth daily as needed.    ranitidine (ZANTAC) 300 MG tablet TAKE 1 TABLET BY MOUTH IN THE EVENING AS NEEDED FOR REFLUX    spironolactone (ALDACTONE) 25 MG tablet Take 12.5 mg by mouth once daily.    terconazole (TERAZOL 7) 0.4 % Crea U 1 APPLICATION VAGINALLY QD HS    traMADol (ULTRAM) 50 mg tablet Take 1 tablet (50 mg total) by mouth every 6 (six) hours as needed for Pain.    traMADol (ULTRAM-ER) 100 MG Tb24 Take 50 mg by mouth daily as needed.    valACYclovir (VALTREX) 500 MG tablet TK 1 T PO D     No current facility-administered medications for this visit.         Review of Systems    Constitutional: Positive for activity change, appetite change and fatigue. Negative for unexpected weight change.   HENT: Negative for congestion, dental problem, drooling, ear discharge, facial swelling, hearing loss, nosebleeds, postnasal drip, rhinorrhea, sinus pressure, sinus pain, sore throat, tinnitus and trouble swallowing.    Eyes: Negative for photophobia, discharge, itching and visual disturbance.   Respiratory: Negative for cough, wheezing and stridor.    Cardiovascular: Negative for chest pain and leg swelling.   Gastrointestinal: Positive for abdominal distention, abdominal pain, constipation, diarrhea and nausea. Negative for anal bleeding, blood in stool, rectal pain and vomiting.   Endocrine: Negative.    Genitourinary: Negative.    Musculoskeletal: Negative for arthralgias, back pain, gait problem and joint swelling.   Skin: Negative for pallor.   Allergic/Immunologic: Negative.    Neurological: Negative for dizziness, tremors, seizures, syncope, facial asymmetry, speech difficulty, weakness, light-headedness, numbness and headaches.   Hematological: Negative for adenopathy. Does not bruise/bleed easily.   Psychiatric/Behavioral: Positive for dysphoric mood. The patient is nervous/anxious.       Objective:      Physical Exam   Constitutional: She is oriented to person, place, and time. No distress.   HENT:   Head: Normocephalic and atraumatic.   Right Ear: External ear normal.   Left Ear: External ear normal.   Eyes: Pupils are equal, round, and reactive to light. Conjunctivae are normal.   Neck: Normal range of motion. Neck supple.   Cardiovascular: Normal rate and regular rhythm.   Pulmonary/Chest: Effort normal and breath sounds normal.   Abdominal: Soft. Bowel sounds are normal. She exhibits no distension and no mass. There is no tenderness. There is no guarding. A hernia is present.   Musculoskeletal: Normal range of motion.   Neurological: She is alert and oriented to person, place, and  time.   Skin: Skin is warm and dry. She is not diaphoretic.   Psychiatric: She has a normal mood and affect. Her behavior is normal.      Assessment:       No diagnosis found.    Laboratory Data:   9/9/19       EXT 5 HIAA BLOOD 0 - 22 ng/mL 11    Comment: DEEPTHI   EXT GASTRIN     EXT SEROTONIN 56 - 244 ng/mL 26Abnormal     Comment: [facility]   EXT CHROMOGRANIN A     EXT PANCREASTATIN DEEPTHI 10 - 135 pg/mL 51    Comment: DEEPTHI   EXT PANCREASTATIN CAMB     EXT NEUROKININ A DEEPTHI 0 - 40 pg/mL less than 10    Comment: DEEPTHI   EXT NEUROKININ A CAMB     EXT OCTREOTIDE LEVEL     EXT LANREOTIDE LEVEL     EXT ANTI PARIETAL CELL AB     EXT ANTI THYROID AB     EXT ANTI ISLET CELL AB     EXT Folate     EXT VITAMIN B12           Scans:   NM PET Ga68 Dotatate  Narrative: EXAMINATION:  NM PET 68GA DOTATATE WHOLE BODY    CLINICAL HISTORY:  Malignant carcinoid tumor of the ileum    TECHNIQUE:  5.6 mCi of GA68 Dotatate was administered intravenously in the left hand. After an approximately 60 min distribution time, PET/CT images were acquired from the skull vertex to the mid thigh. Transmission images were acquired to correct for attenuation using a whole body low-dose CT scan without contrast with the arms positioned above the head.    COMPARISON:  Gallium 68 NETSpot PET-CT 03/29/2019    FINDINGS:  Quality of the study: Adequate.    No abnormal foci of increased tracer uptake are present.  Previous tracer avid foci in the small bowel are no longer appreciated status post interval resection.    Physiologic distribution of the tracer is seen within the pituitary, salivary, and thyroid glands, stomach, liver, pancreas uncinate process, spleen, adrenal glands, kidneys and urinary bladder, and bowel.    Incidental CT findings: Status post right shoulder arthroplasty.  Impression: No PET/CT findings to suggest somatostatin receptor avid disease status post interval resection.    Electronically signed by: Justin  Russel  Date:    09/06/2019  Time:    16:26  MRI Abdomen-Pelvis w w/o Contrast (XPD)  Narrative: EXAMINATION:  MRI ABDOMEN-PELVIS W W/O CONTRAST (XPD)    CLINICAL HISTORY:  neoplasm;  Malignant carcinoid tumor of the ileum    TECHNIQUE:  Multiplanar, multi-sequence MR imaging of the abdomen and pelvis performed before and after administration of 10 cc of Gadavist gadolinium-based intravenous contrast per the liver protocol.    COMPARISON:  MRI abdomen with and without contrast dated 03/29/2019 and Dotatate PET CT dated 03/29/2019; MRI thoracic spine dated 04/05/2019    FINDINGS:  Pulmonary Bases: No large pleural effusion.    Liver: No abnormal diffusion restriction or solid enhancing lesion.  Few scattered T2 hyperintense, hypoenhancing foci, probable hepatic cysts.  Largest measuring 6 mm, similar (series 14, image 9).  Hepatic vasculature is patent.    Bile Ducts: Similar intra and extrahepatic ductal prominence in this patient status post cholecystectomy.    Gallbladder: Surgically absent.    Pancreas: Normal    Spleen: Normal    Gastrointestinal tract: Normal caliber.  Short segment suspected mild diffusion restriction at the level of the terminal ileum (series 8, image 10)    Mesentery: No discrete mesenteric mass.    Adrenal Glands: normal.    Kidneys and bladder: No hydronephrosis.  Bilateral renal cysts.  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  Bladder is unremarkable.    Aorta and Abdominal Vasculature: normal.    Lymph nodes: no pathologically enlarged lymph node    Body wall: Ventral abdominal hernia containing nonobstructed loop of bowel.    Other: No free fluid.  Stable T12 vertebral body lesion, previously demonstrated as possible atypical vertebral hemangioma.  Impression: No intrahepatic lesion.    Mild diffusion restriction at level of the terminal ileum, of uncertain etiology.  Otherwise, note that assessment of bowel is limited in this patient with known small bowel carcinoid.    Midline ventral hernia containing  nonobstructed loop of bowel, left renal AML, and additional findings as above    Electronically signed by: Francis Lugo  Date:    09/06/2019  Time:    14:32       Impression:   status post small-bowel resection with issues of cramping abdominal pain nausea not able to gain weight loss of taste and with incisional hernia. I had a long discussion with her trying to elicit her complains a symptomatology.  It is very difficult to keep track as she has multitude of issues.    She has a incisional hernia which is bigger than last time it is reducible.    She had a upper endoscopy and lower endoscopy in her hometown the upper endoscopy shows some irritation of the duodenum., she had capsule endoscopy done there which showed a intussusception of the anastomotic area with a nodule in the jejunum.  This was reviewed by Dr. Vazquez here who informed that the capsule endoscopy is completely normal. Also during the endoscopy a biopsy was done which shows a normal mucosa with no loss of the villous architecture are no evidence of the pills disease.  Next for I reviewed all the medications she is on multiple antidepressant medications.  She is also on May case and in spite of that not able to gain weight.  I so I asked her to stop taking the may case. She is taking Protonix for her duodenitis and gastritis.    Will order for the neuroendocrine Labs and  vitamin levels    I have requested the dietitian to see her talked to her and make some a recommendations regarding her dietary intake.  She also needs to be seen by Dr. Vazquez and I have and I think she may benefit from a repeat enteroscopy overall.    Plan:       Status post small-bowel resection with no residual disease as per the gallium scan done in December.  Multitude of GI issues which need to be evaluated and addressed properly    To see Dr. Vazquez for possible enteroscopy and evaluation of the chronic abdominal pain    Since incisional hernias bigger I strongly  recommended that she undergo robotic hernia repair.        MICHELLE Fernandez MD, FACS   Associate Professor of Surgery, [facility]   Neuroendocrine Surgery, Hepatic/Pancreatic & General Surgery   [address], [address]   [place] [address]   ph. [phone]; [phone]   fax. [phone]

## 2020-01-20 ENCOUNTER — CLINICAL SUPPORT (OUTPATIENT)
Dept: NEUROLOGY | Facility: HOSPITAL | Age: 70
End: 2020-01-20
Payer: MEDICARE

## 2020-01-20 ENCOUNTER — OFFICE VISIT (OUTPATIENT)
Dept: NEUROLOGY | Facility: HOSPITAL | Age: 70
End: 2020-01-20
Attending: SURGERY
Payer: MEDICARE

## 2020-01-20 VITALS
DIASTOLIC BLOOD PRESSURE: 64 MMHG | WEIGHT: 122.56 LBS | BODY MASS INDEX: 21.71 KG/M2 | HEART RATE: 82 BPM | HEIGHT: 63 IN | TEMPERATURE: 98 F | SYSTOLIC BLOOD PRESSURE: 117 MMHG

## 2020-01-20 DIAGNOSIS — C7A.012 MALIGNANT CARCINOID TUMOR OF ILEUM: Primary | ICD-10-CM

## 2020-01-20 DIAGNOSIS — Z71.3 NUTRITIONAL COUNSELING: ICD-10-CM

## 2020-01-20 DIAGNOSIS — K43.2 INCISIONAL HERNIA, WITHOUT OBSTRUCTION OR GANGRENE: ICD-10-CM

## 2020-01-20 DIAGNOSIS — R62.7 ADULT FAILURE TO THRIVE: ICD-10-CM

## 2020-01-20 DIAGNOSIS — R10.9 CHRONIC ABDOMINAL PAIN: ICD-10-CM

## 2020-01-20 DIAGNOSIS — G89.29 CHRONIC ABDOMINAL PAIN: ICD-10-CM

## 2020-01-20 PROCEDURE — 99213 OFFICE O/P EST LOW 20 MIN: CPT | Performed by: SURGERY

## 2020-01-20 PROCEDURE — 99211 OFF/OP EST MAY X REQ PHY/QHP: CPT | Mod: 27

## 2020-01-20 RX ORDER — TRAZODONE HYDROCHLORIDE 50 MG/1
100 TABLET ORAL
COMMUNITY
Start: 2020-01-01 | End: 2022-07-12

## 2020-01-20 NOTE — PROGRESS NOTES
MRN: [id number]    Referring Physician:  Rebecca Valdez MD    Reason for Visit: Nutrition Consult for Abdominal Pain; Self Restrictive Diet    Pertinent Social History: Patient is independent with Activities of Daily Living. Patient reports Normal Appetite at this time.     Previous Medical History:  Past Medical History:   Diagnosis Date    Abdominal bloating     Abdominal pain     Asthma     Atrial fibrillation     Colon polyps     Constipation     Diverticulosis of colon     Esophageal ulcer     Gastritis     GERD (gastroesophageal reflux disease)     Heartburn     Hiatal hernia     HTN (hypertension)     Intermittent diarrhea     Iron deficiency anemia     Osteoarthritis     Osteoporosis     Rectal bleeding     Ruptured lumbar disc     Small bowel lesion     Vitamin B12 deficiency anemia     Vitamin D deficiency        Previous Surgical History:  Past Surgical History:   Procedure Laterality Date    ANTEGRADE SINGLE BALLOON ENTEROSCOPY N/A 2019    Procedure: ENTEROSCOPY, DOUBLE BALLOON, ANTEGRADE;  Surgeon: Sivakumar Dorsey MD;  Location: [address];  Service: Endoscopy;  Laterality: N/A;    BREAST BIOPSY Bilateral      SECTION   &     CHOLECYSTECTOMY      COLONOSCOPY      outside facility    ENDOSCOPIC ULTRASOUND OF UPPER GASTROINTESTINAL TRACT N/A 2019    Procedure: ULTRASOUND-ENDOSCOPIC-UPPER;  Surgeon: Armando Bills MD;  Location: [facility];  Service: Endoscopy;  Laterality: N/A;  Carcinoid diagnosis    ESOPHAGOGASTRODUODENOSCOPY      outside facility    HYSTERECTOMY      LAPAROSCOPIC APPENDECTOMY N/A 2019    Procedure: APPENDECTOMY, LAPAROSCOPIC;  Surgeon: Rebecca Valdez MD;  Location: [facility];  Service: General;  Laterality: N/A;    LAPAROSCOPIC RESECTION OF SMALL INTESTINE N/A 2019    Procedure: EXCISION, SMALL INTESTINE, LAPAROSCOPIC;  Surgeon: Rebecca Valdez MD;  Location: [facility];  Service:  General;  Laterality: N/A;    SHOULDER SURGERY Right 03/13/2013    TOTAL KNEE ARTHROPLASTY Right 09/16/2013       Medication:    acetaminophen (TYLENOL) 500 MG tablet, Take 500 mg by mouth every 6 (six) hours as needed for Pain., Disp: , Rfl:     albuterol (PROAIR HFA) 90 mcg/actuation inhaler, Inhale 1 puff into the lungs every 6 (six) hours as needed for Wheezing. Rescue, Disp: , Rfl:     ALPRAZolam (XANAX) 2 MG Tab, Take 1 mg by mouth nightly. , Disp: , Rfl:     carvedilol (COREG) 25 MG tablet, Take 25 mg by mouth 2 (two) times daily., Disp: , Rfl:     citalopram (CELEXA) 10 MG tablet, Take 10 mg by mouth., Disp: , Rfl:     COMBIVENT RESPIMAT  mcg/actuation inhaler, INL 2 PFS PO BID, Disp: , Rfl: 0    denosumab (PROLIA) 60 mg/mL Syrg, Inject 60 mg into the skin every 6 (six) months., Disp: , Rfl:     dicyclomine (BENTYL) 20 mg tablet, Take 20 mg by mouth every 6 (six) hours as needed (abdomenal pain and cramping.)., Disp: , Rfl:     gabapentin (NEURONTIN) 100 MG capsule, Take 1 capsule (100 mg total) by mouth 2 (two) times daily., Disp: 60 capsule, Rfl: 4    lamoTRIgine (LAMICTAL) 100 MG tablet, Take 100 mg by mouth 2 (two) times daily., Disp: , Rfl:     lisinopril (PRINIVIL,ZESTRIL) 20 MG tablet, Take 20 mg by mouth Daily. , Disp: , Rfl:     lubiprostone (AMITIZA) 24 MCG Cap, Take 24 mcg by mouth as needed. , Disp: , Rfl:     megestrol (MEGACE) 400 mg/10 mL (40 mg/mL) Susp, Take 10 mLs (400 mg total) by mouth 2 (two) times daily., Disp: 600 mL, Rfl: 11-NOT TAKING    ondansetron (ZOFRAN) 8 MG tablet, Take 4 mg by mouth every 4 (four) hours as needed for Nausea. , Disp: , Rfl:     pantoprazole (PROTONIX) 20 MG tablet, Take 20 mg by mouth once daily., Disp: , Rfl:     polyethylene glycol (GLYCOLAX) 17 gram PwPk, Take 17 g by mouth 2 (two) times daily as needed (for constipation). , Disp: , Rfl:     propranolol (INDERAL) 10 MG tablet, Take 10 mg by mouth daily as needed., Disp: , Rfl:      "spironolactone (ALDACTONE) 25 MG tablet, Take 12.5 mg by mouth once daily., Disp: , Rfl:     terconazole (TERAZOL 7) 0.4 % Crea, U 1 APPLICATION VAGINALLY QD HS, Disp: , Rfl: 1    traZODone (DESYREL) 50 MG tablet, , Disp: , Rfl:     valACYclovir (VALTREX) 500 MG tablet, TK 1 T PO D, Disp: , Rfl: 1    Vitamin/Supplements/Herbs:     cholestyramine (QUESTRAN) 4 gram packet, Take 1 packet (4 g total) by mouth 3 (three) times daily with meals., Disp: 270 packet, Rfl: 3-NOT TAKING    cyanocobalamin (VITAMIN B-12) 1,000 mcg/mL injection, Inject 1,000 mcg into the muscle every 14 (fourteen) days., Disp: , Rfl:        Allergies:  Review of patient's allergies indicates:   Allergen Reactions    Amoxicillin Other (See Comments)     Taking digoxin.    Sulfa (sulfonamide antibiotics) Itching and Swelling    Codeine Nausea Only and Other (See Comments)     Nausea and constipation.    Demerol [meperidine] Nausea Only and Other (See Comments)     Nausea and constipation.    Hydrocodone Nausea Only and Other (See Comments)     Nausea and constipation.    Epinephrine      Neuroendocrine Tumor patient      Nitrofurantoin monohyd/m-cryst          Labs: pending      : 1950  Age: 69 y.o.    Anthropometrics  Weight: 55.6 kg (122 lb 9.2 oz)  Height:  5' 3" (1.6 m)    Estimated body mass index is 21.71 kg/m²     BMI Weight Status   <16 Severe Thinness   16-16.9 Moderate Thinness   17.0-18.4 Mild Thinness   Below 18.5 Underweight   18.6-24.9 Normal/Healthy   25.0-29.9 Overweight   30.0-34.9 Obesity Class I   35.0-39.9 Obesity Class II   >40 Extreme Obesity   Class III     Ideal Weight Range for Your Height: 5'3" = 107 - 140 lbs    Weight History:  Wt Readings from Last 12 Encounters:   20 55.6 kg (122 lb 9.2 oz)   19 55 kg (121 lb 4.1 oz)   08/15/19 54.2 kg (119 lb 6.1 oz)   19 54.7 kg (120 lb 9.5 oz)   04/15/19 65.9 kg (145 lb 4.5 oz)   19 59 kg (130 lb)   19 59.1 kg (130 lb 2.9 oz) "   02/14/19 64.9 kg (143 lb)       Percentage of Weight Change when compared to Current Weight:  Current Weight:   Wt Readings from Last 1 Encounters:   01/20/20 55.6 kg (122 lb 9.2 oz)        Weight Pounds %Change Significant Severe   1 Week - - - 1-2% >2%   1 Month - - - 5% >5%   3 Months - - - 7.5% >7.5%   6 Months 54.2kg +3lbs - 10% >10%   1 Year 64.9kg 20lbs 14% 20% >20%         Estimated Nutrition Needs:   Energy Needs: 1800 (1130 MSJ x 1.6 PAL)  Protein Needs: 55 (1.0 gm Protein/kg)  Fluid Needs: 1800 (1 mL/calorie)      Malnutrition Assessment:  Malnutrition in the context of Chronic Illness/Injury  Energy Intake: <50% of estimated energy requirement for 1 year  Body Fat Depletion: moderate depletion of orbitals, triceps and thoracic and lumbar region   Muscle Mass Depletion: mild depletion of temples, clavicle region and interosseous muscle   Weight Loss: 14% x 1 year which is not significant   Fluid Accumulation: none    Gastrointestinal Habits:  3-5 times per day, Large, Intermittent Diarrhea happens 2 hr after eating and last 45 minutes. Other times,  Formed, hard,  abdominal pain, constipation, indigestion/heartburn and nausea. Patient reports will avoid constipation. She takes Amitiza and at times Glycolax, if she has not had a BM in 2 days. Described as Type 4 to 6 on Rains Stool Form Scale.     Nutrition Symptoms: anorexia, diarrhea, constipation, taste changes, weight loss, pain, nausea, fatigue and feel full quickly  Nutrition Status: moderate weight loss 10% to 15%    Nutrition History    Meal Patterns: 2 meals daily and 1-2 snacks daily;   Breakfast: boiled Egg, turkey simon, peanut butter on sliced bread; x2 cups coffee; ROBBIE Grits right now  Lunch: skips  Dinner: Leftovers such as Baked chicken, mashed potato made with milk and butter; green beans; ROBBIE White Bean Soup but not Red Beans; Using Olive Oil   Snacks: Cooked fruits, Gummy Bears, Cheetos  Beverage Intake: mainly drinks water, drinks  carbonated drinks, drinks caffeine    Food Intolerance: Avoids Milk due to causes nausea.     Meal preparation/shopping: spouse   Dining out: Infrequent, 1-2 times per week      Dentition: normal dentition for age                  Difficulty chewing or swallowing? No    Exercise: Physical ability to complete tasks for meal preparation, Physical ability to self-feed, Cognitive ability to complete tasks for meal preparation, Remembers to eat, recalls eating and Type of physical activity-not active    ECOG Performance Status: (1) Restricted in physically strenuous activity, ambulatory and able to do work of light nature    Comprehension:  Patient with good  comprehension as evidenced by appropriate questions and concern expressed.    Motivation to change: low  Behavior goals: Promote weight gain;       Nutrition Diagnosis  Nutrition Problem  Limited food acceptance       Related to (etiology):   Chronic ABD pain   Intermittent Diarrhea and Constipation      Signs and Symptoms (as evidenced by):   Patient reports very restrictive diet due to fear of abdominal pain with diarrhea.       Interventions:  General/healthful diet- Eat a variety of fruits and veggies up to 6 servings per day. Cook for improved digestability   Energy-modified diet- consume 9791-3306 calories per day to promote weight gain   Protein-modified diet-55 to 60 gm  Medical food supplement: Commercial beverage- Trial Boost Breeze Oral Nutrition Supplement in place of Boost due to Lactose intolerance.    Vitamin and Mineral Supplements:  Multivitamin/mineral and Multi-trace elements-Discuss starting an MVI with PCP on next visit  Purpose of the nutrition education-Improve knowledge of GI symptoms      Nutrition Diagnosis Status:   New        Recommendations:  1. Discussed daily calorie goal to promote weight gain. Patient is restrictive with her food intake due to fear of abdominal pain.   2. Medication reviewed for interactions and contributors of GI  complaints.   3. Patient to discuss with PCP on next visit, 1. Rule out thrush. Patient complaining of dec taste sensation  2. Starting perscription MVI with Minerals    4. RD contact information provided for questions. RD added to Care Team.         Routed to Rebecca Valdez MD      Consultation Time: 45 minutes.      Follow Up: 6 months.

## 2020-01-21 ENCOUNTER — TELEPHONE (OUTPATIENT)
Dept: NEUROLOGY | Facility: HOSPITAL | Age: 70
End: 2020-01-21

## 2020-01-21 NOTE — TELEPHONE ENCOUNTER
Called pt and she wanted to know if Dr. Laguerre sent any medication for appetite to her pharmacy. I told her that he did not put it in his notes and I did not see anything on her medication list. She states that is ok she I eating.

## 2020-01-21 NOTE — TELEPHONE ENCOUNTER
GI clinic appt rescheduled with pt on Wednesday, January 29, 2020 at 915am.  Pt repeated date and time correctly.

## 2020-01-29 ENCOUNTER — OFFICE VISIT (OUTPATIENT)
Dept: NEUROLOGY | Facility: HOSPITAL | Age: 70
End: 2020-01-29
Attending: INTERNAL MEDICINE
Payer: MEDICARE

## 2020-01-29 VITALS
WEIGHT: 125.69 LBS | BODY MASS INDEX: 22.27 KG/M2 | HEIGHT: 63 IN | DIASTOLIC BLOOD PRESSURE: 69 MMHG | TEMPERATURE: 98 F | HEART RATE: 98 BPM | SYSTOLIC BLOOD PRESSURE: 121 MMHG

## 2020-01-29 DIAGNOSIS — R10.9 ABDOMINAL PAIN, UNSPECIFIED ABDOMINAL LOCATION: Primary | ICD-10-CM

## 2020-01-29 PROCEDURE — 99215 OFFICE O/P EST HI 40 MIN: CPT | Performed by: INTERNAL MEDICINE

## 2020-01-29 RX ORDER — HYOSCYAMINE SULFATE 0.12 MG/1
0.12 TABLET SUBLINGUAL
Qty: 45 TABLET | Refills: 0 | Status: ON HOLD | OUTPATIENT
Start: 2020-01-29 | End: 2020-03-13 | Stop reason: HOSPADM

## 2020-01-29 NOTE — PROGRESS NOTES
[facility]    CC: abdominal pain    HPI 69 y.o. female h/o small bowel carcinoid s/p resection 19 p/w recurrent, chronic 2 months of lower cramping abdominal pain occurring sporadically that is associated with nausea, gagging, and diaphoresis and usually has a large liquid bowel movement. After taking Bentyl 10 mg and Zofran and the pain subsides 45 minutes ago. When she drinks cold liquids her pain is worse as well. She also has constipation that is treated with Amitiza.      Chart reviewed and summarized here.    Past Medical History  Past Medical History:   Diagnosis Date    Abdominal bloating     Abdominal pain     Asthma     Atrial fibrillation     Colon polyps     Constipation     Diverticulosis of colon     Esophageal ulcer     Gastritis     GERD (gastroesophageal reflux disease)     Heartburn     Hiatal hernia     HTN (hypertension)     Intermittent diarrhea     Iron deficiency anemia     Osteoarthritis     Osteoporosis     Rectal bleeding     Ruptured lumbar disc     Small bowel lesion     Vitamin B12 deficiency anemia     Vitamin D deficiency        Past Surgical History  Past Surgical History:   Procedure Laterality Date    ANTEGRADE SINGLE BALLOON ENTEROSCOPY N/A 2019    Procedure: ENTEROSCOPY, DOUBLE BALLOON, ANTEGRADE;  Surgeon: Sivakumar Dorsey MD;  Location: [address];  Service: Endoscopy;  Laterality: N/A;    BREAST BIOPSY Bilateral      SECTION   &     CHOLECYSTECTOMY      COLONOSCOPY      outside facility    ENDOSCOPIC ULTRASOUND OF UPPER GASTROINTESTINAL TRACT N/A 2019    Procedure: ULTRASOUND-ENDOSCOPIC-UPPER;  Surgeon: Armando Bills MD;  Location: [facility];  Service: Endoscopy;  Laterality: N/A;  Carcinoid diagnosis    ESOPHAGOGASTRODUODENOSCOPY      outside facility    HYSTERECTOMY      LAPAROSCOPIC APPENDECTOMY N/A 2019    Procedure: APPENDECTOMY, LAPAROSCOPIC;  Surgeon: Rebecca Valdez MD;   "Location: [facility];  Service: General;  Laterality: N/A;    LAPAROSCOPIC RESECTION OF SMALL INTESTINE N/A 4/11/2019    Procedure: EXCISION, SMALL INTESTINE, LAPAROSCOPIC;  Surgeon: Rebecca Valdez MD;  Location: [facility] OR;  Service: General;  Laterality: N/A;    SHOULDER SURGERY Right 03/13/2013    TOTAL KNEE ARTHROPLASTY Right 09/16/2013       Social History  Social History     Tobacco Use    Smoking status: Former Smoker     Packs/day: 0.90     Years: 5.00     Pack years: 4.50    Smokeless tobacco: Never Used   Substance Use Topics    Alcohol use: Yes    Drug use: No       Family History  Family History   Problem Relation Age of Onset    Diabetes Mother     Multiple myeloma Father        Review of Systems  General ROS: negative for chills, fever; positive fatigue and weight loss   Ophthalmic ROS: negative for blurry vision, photophobia or eye pain  ENT ROS: negative for epistaxis, sore throat or rhinorrhea  Respiratory ROS: no cough, shortness of breath, or wheezing  Cardiovascular ROS: no chest pain or dyspnea on exertion  Gastrointestinal ROS: positive abdominal pain and irregular bowel habits   Genito-Urinary ROS: no dysuria, trouble voiding, or hematuria  Musculoskeletal ROS: negative for gait disturbance or muscular weakness  Neurological ROS: no syncope or seizures; no ataxia  Dermatological ROS: negative for pruritis, rash and jaundice    Physical Examination  /69 (BP Location: Right arm, Patient Position: Sitting, BP Method: Medium (Automatic))   Pulse 98   Temp 97.6 °F (36.4 °C) (Oral)   Ht 5' 3" (1.6 m)   Wt 57 kg (125 lb 10.6 oz)   BMI 22.26 kg/m²   General appearance: alert, cooperative, no distress  HENT: Normocephalic, atraumatic, neck symmetrical, no nasal discharge   Eyes: conjunctivae/corneas clear, PERRL, EOM's intact  Lungs: clear to auscultation bilaterally, no dullness to percussion bilaterally  Heart: regular rate and rhythm without rub; no displacement of the PMI "   Abdomen: soft, bowel sounds normoactive; no organomegaly + Scar, mild ttp in mid abdomen with guarding  Extremities: extremities symmetric; no clubbing, cyanosis, or edema  Integument: Skin color, texture, turgor normal; no rashes; hair distrubution normal  Neurologic: Alert and oriented X 3, normal strength, normal coordination and gait  Psychiatric: no pressured speech; normal affect; no evidence of impaired cognition     Labs:  Chromogranin A 358    Imaging:  MRI report reviewed with mild distal ileum thickening    Additional history obtained from    Assessment: 69 y.o. female h/o small bowel carcinoid s/p resection 4/17/19 p/w GI distress after eating including nausea and pain suspicious for dumping syndrome but could represent SBBO or partial obstruction due to adhesions. MRI with nonspecific ileum thickening but VCE unrevealing. Surveillance PET w/o recurrence of NET but chromogranin A is slightly elevated. Symptoms are mostly functional due to recent intestinal surgery which resection which will hopefully improve over time.     Plan:   Trial of Levsin 0.125 mg SL prior to meals x 14 days   If no relief then Bentyl 10 mg prior to meals x 14 days   Will discuss with Dr. Fernandez the possibility of recurrence of carcinoid syndrome and need for sandostatin  After that the trial of Xifaxin or Creon  Stopping Miralax since it is not effective  Continue Amitiza qod prn constipation since she has had good response in the past.        Justin Vazquez MD   [address] [place] [address], [address]   [place] [address] [phone]

## 2020-01-30 RX ORDER — LUBIPROSTONE 24 UG/1
24 CAPSULE ORAL
Qty: 30 CAPSULE | Refills: 2 | Status: ON HOLD | OUTPATIENT
Start: 2020-01-30 | End: 2020-03-13 | Stop reason: HOSPADM

## 2020-01-30 NOTE — TELEPHONE ENCOUNTER
Pt continues with constipation and night sweats.  Has not started Levsin.  Pt took miralax and amitiza. Pt notified per Dr. Vazquez, recommendations to discontinue Miralax since it is not effective and continue Amitiza every other day for constipation.  Pt instructed to take 1 Levsin tablet under the tongue 3 times a day with meals.  Pt advised to call clinic in 14 days with results.  Pt notified Dr. Vazquez to consult with Dr. Laguerre for possible carcinoid syndrome treatment of Sandostatin.  Pt request Amitiza order due to limited supply remaining.  Pt repeated all instructions given correctly..

## 2020-01-30 NOTE — TELEPHONE ENCOUNTER
----- Message from Chela Rodas sent at 1/30/2020  8:08 AM CST -----  Contact: self / [phone]  GI: Needs to speak with you on her symptoms she is still having bowel movement. Please advise

## 2020-02-04 ENCOUNTER — TELEPHONE (OUTPATIENT)
Dept: NEUROLOGY | Facility: HOSPITAL | Age: 70
End: 2020-02-04

## 2020-02-04 NOTE — TELEPHONE ENCOUNTER
Called pt and Lvm to let her know that the Boost product that she could but it on [employer] or on [employer] on line web site. There could be DME companies in her area that carry the Boost Smooth. I let her know that insurance would not pay for this product. She could return this call [phone]

## 2020-02-04 NOTE — TELEPHONE ENCOUNTER
----- Message from Ashlyn Dwyer sent at 2/4/2020  8:31 AM CST -----  Contact: self [phone]  BRENT - Patient is calling to find out where she can purchase the drink Judi recommended . Please call

## 2020-02-06 LAB
5OH-INDOLEACETATE 24H UR-MRATE: 1.7 MG/24 H
SPECIMEN VOL 24H UR: 1000 ML

## 2020-02-10 ENCOUNTER — PATIENT MESSAGE (OUTPATIENT)
Dept: NEUROLOGY | Facility: HOSPITAL | Age: 70
End: 2020-02-10

## 2020-02-10 NOTE — PROGRESS NOTES
"NOLANETS:  [facility]  A collaboration between [facility] and [facility]      PATIENT: Christiana Chan  MRN: [id number]  DATE: 2/17/2020    Subjective:      Chief Complaint:   F/u after seeing Dr. Vazquez    Passed out saturday night . Had abdominal pain, flushing cramping passed out on floor, incoherent for some time. Had similar episode before surgery,. At that time she used to have diarrhea and vomiting. Did not have this[robelm since surgery until now  Has issues of constipation, BM almost there not able to empty    Able to eat better, has physical activities more now, feeling hungry  Good breakfast, medium lunch and ok dinner    Feeling not really wel with flushing sweating  But no diarrhea    All w/u negative except for mild increase in PTH  levasin and amartiza not helping at all  Has not tried bentyl    Vitals: Blood pressure 123/75, pulse 74, temperature 98.1 °F (36.7 °C), temperature source Oral, height 5' 3" (1.6 m), weight 57.2 kg (126 lb 3.4 oz). -    ECOG Score: 1 - Symptomatic but completely ambulatory    Diagnosis: No diagnosis found.     Interval History:   1/29/20 Dr. 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 MRI with nonspecific ileum thickening but VCE unrevealing. Surveillance PET w/o recurrence of NET but chromogranin A is slightly elevated. Symptoms are mostly functional due to recent intestinal surgery which resection which will hopefully improve over time.      Trial of Levsin 0.125 mg SL prior to meals x 14 days   If no relief then Bentyl 10 mg prior to meals x 14 days   Will discuss with Dr. Fernandez the possibility of recurrence of carcinoid syndrome and need for sandostatin  After that the trial of Xifaxin or " Creon  Stopping Miralax since it is not effective  Continue Amitiza qod prn constipation since she has had good response in the past.                                                                                  She has been having this abdominal pain for a very long time.  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 She was taken to surgery April of last year and underwent a limited small-bowel resection.. She has intermittent abdominal pain, tiredness, alternating diarrhea and constipation, no taste, abdominal pain and swelling and cramping pain.   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  She did not have any lymph darryn disease or any metastatic disease elsewhere. There is a past history of obesity in the family and she had lost significant weight and now she is scared to gain any weight. Will assist nutrition when she comes back.  She also has a incisional hernia which is not symptomatic will continue to observe    Oncologic History:   Oncologic History 2/2019 small bowell well diff, G1, Ki67 0%   Oncologic Treatment 2/2019 surgery   Pathology 4/2019 1. APPENDIX WITH NO EVIDENCE OF NEOPLASIA IDENTIFIED.  2. RESECTION OF SMALL INTESTINE (ILEUM) SHOWING MULTIPLE FOCI OF WELL-DIFFERENTIATED  NEUROENDOCRINE TUMOR (CARCINOID TUMOR) WITH NEGATIVE MARGINS. SEE SYNOPTIC REPORT:  PROCEDURE: SEGMENTAL RESECTION, SMALL INTESTINE  TUMOR SITE: ILEUM     Past Medical History:  Past Medical History:   Diagnosis Date    Abdominal bloating     Abdominal pain     Asthma     Atrial fibrillation     Colon polyps     Constipation     Diverticulosis of colon     Esophageal ulcer     Gastritis     GERD (gastroesophageal reflux disease)     Heartburn     Hiatal hernia     HTN (hypertension)     Intermittent diarrhea     Iron deficiency  anemia     Osteoarthritis     Osteoporosis     Rectal bleeding     Ruptured lumbar disc     Small bowel lesion     Vitamin B12 deficiency anemia     Vitamin D deficiency        Past Surgical History:  Past Surgical History:   Procedure Laterality Date    ANTEGRADE SINGLE BALLOON ENTEROSCOPY N/A 2019    Procedure: ENTEROSCOPY, DOUBLE BALLOON, ANTEGRADE;  Surgeon: Sivakumar Dorsey MD;  Location: [address];  Service: Endoscopy;  Laterality: N/A;    BREAST BIOPSY Bilateral      SECTION   &     CHOLECYSTECTOMY      COLONOSCOPY      outside facility    ENDOSCOPIC ULTRASOUND OF UPPER GASTROINTESTINAL TRACT N/A 2019    Procedure: ULTRASOUND-ENDOSCOPIC-UPPER;  Surgeon: Armando Bills MD;  Location: [facility];  Service: Endoscopy;  Laterality: N/A;  Carcinoid diagnosis    ESOPHAGOGASTRODUODENOSCOPY      outside facility    HYSTERECTOMY      LAPAROSCOPIC APPENDECTOMY N/A 2019    Procedure: APPENDECTOMY, LAPAROSCOPIC;  Surgeon: Rebecca Valdez MD;  Location: [facility];  Service: General;  Laterality: N/A;    LAPAROSCOPIC RESECTION OF SMALL INTESTINE N/A 2019    Procedure: EXCISION, SMALL INTESTINE, LAPAROSCOPIC;  Surgeon: Rebecca Valdez MD;  Location: [facility];  Service: General;  Laterality: N/A;    SHOULDER SURGERY Right 2013    TOTAL KNEE ARTHROPLASTY Right 2013       Family History:  Family History   Problem Relation Age of Onset    Diabetes Mother     Multiple myeloma Father        Allergies:  Amoxicillin; Sulfa (sulfonamide antibiotics); Codeine; Demerol [meperidine]; Hydrocodone; Epinephrine; and Nitrofurantoin monohyd/m-cryst    Medications:   Current Outpatient Medications   Medication Sig    albuterol (PROAIR HFA) 90 mcg/actuation inhaler Inhale 1 puff into the lungs every 6 (six) hours as needed for Wheezing. Rescue    ALPRAZolam (XANAX) 2 MG Tab Take 1 mg by mouth nightly.     carvedilol (COREG) 25 MG tablet Take 25  mg by mouth 2 (two) times daily.    citalopram (CELEXA) 10 MG tablet Take 10 mg by mouth.    COMBIVENT RESPIMAT  mcg/actuation inhaler INL 2 PFS PO BID    cyanocobalamin (VITAMIN B-12) 1,000 mcg/mL injection Inject 1,000 mcg into the muscle every 14 (fourteen) days.    denosumab (PROLIA) 60 mg/mL Syrg Inject 60 mg into the skin every 6 (six) months.    dicyclomine (BENTYL) 20 mg tablet Take 20 mg by mouth every 6 (six) hours as needed (abdomenal pain and cramping.).    gabapentin (NEURONTIN) 100 MG capsule Take 1 capsule (100 mg total) by mouth 2 (two) times daily.    hyoscyamine (LEVSIN/SL) 0.125 mg Subl Place 1 tablet (0.125 mg total) under the tongue 3 (three) times daily with meals.    lamoTRIgine (LAMICTAL) 100 MG tablet Take 100 mg by mouth 2 (two) times daily.    lisinopril (PRINIVIL,ZESTRIL) 20 MG tablet Take 20 mg by mouth Daily.     lubiprostone (AMITIZA) 24 MCG Cap Take 1 capsule (24 mcg total) by mouth as needed.    megestrol (MEGACE) 400 mg/10 mL (40 mg/mL) Susp Take 10 mLs (400 mg total) by mouth 2 (two) times daily.    ondansetron (ZOFRAN) 8 MG tablet Take 4 mg by mouth every 4 (four) hours as needed for Nausea.     pantoprazole (PROTONIX) 20 MG tablet Take 20 mg by mouth once daily.    propranolol (INDERAL) 10 MG tablet Take 10 mg by mouth daily as needed.    spironolactone (ALDACTONE) 25 MG tablet Take 12.5 mg by mouth once daily.    terconazole (TERAZOL 7) 0.4 % Crea U 1 APPLICATION VAGINALLY QD HS    traZODone (DESYREL) 50 MG tablet 100 mg.     valACYclovir (VALTREX) 500 MG tablet TK 1 T PO D    acetaminophen (TYLENOL) 500 MG tablet Take 500 mg by mouth every 6 (six) hours as needed for Pain.    cholestyramine (QUESTRAN) 4 gram packet Take 1 packet (4 g total) by mouth 3 (three) times daily with meals.     No current facility-administered medications for this visit.         Review of Systems   Constitutional: Positive for activity change, appetite change and fatigue.  Negative for chills, diaphoresis, fever and unexpected weight change.   HENT: Negative for congestion, drooling, ear discharge, ear pain, facial swelling, hearing loss, mouth sores, rhinorrhea, sinus pressure, sneezing, sore throat, tinnitus and trouble swallowing.    Eyes: Negative for photophobia, pain, discharge, redness, itching and visual disturbance.   Respiratory: Negative for apnea, cough, choking, shortness of breath, wheezing and stridor.    Cardiovascular: Negative for chest pain, palpitations and leg swelling.   Gastrointestinal: Positive for abdominal distention, abdominal pain, constipation and diarrhea. Negative for anal bleeding, blood in stool, rectal pain and vomiting.   Endocrine: Negative.    Genitourinary: Negative.    Musculoskeletal: Negative for arthralgias, gait problem, joint swelling and neck stiffness.   Skin: Negative for pallor, rash and wound.   Neurological: Positive for light-headedness. Negative for tremors, syncope, facial asymmetry, speech difficulty and headaches.   Hematological: Does not bruise/bleed easily.   Psychiatric/Behavioral: Negative for confusion, decreased concentration and dysphoric mood.      Objective:      Physical Exam   Constitutional: She is oriented to person, place, and time. No distress.   HENT:   Head: Normocephalic and atraumatic.   Right Ear: External ear normal.   Left Ear: External ear normal.   Eyes: Pupils are equal, round, and reactive to light. Conjunctivae and EOM are normal.   Neck: Normal range of motion.   Cardiovascular: Normal rate and regular rhythm.   Pulmonary/Chest: Effort normal and breath sounds normal.   Abdominal: Soft. Bowel sounds are normal. She exhibits no mass. There is no tenderness. There is no rebound and no guarding. A hernia is present.   Mid part of incision has incisional hernia reducible   Musculoskeletal: Normal range of motion.   Neurological: She is alert and oriented to person, place, and time.   Skin: Skin is warm  and dry. She is not diaphoretic.   Psychiatric: She has a normal mood and affect. Her behavior is normal.      Assessment:       No diagnosis found.    Laboratory Data: 1/29/20  5-HIAA, 24H Ur 6.0 OR LESS mg/24 h 1.7          Scans:   NM PET Ga68 Dotatate  Narrative: EXAMINATION:  NM PET 68GA DOTATATE WHOLE BODY    CLINICAL HISTORY:  Malignant carcinoid tumor of the ileum    TECHNIQUE:  5.6 mCi of GA68 Dotatate was administered intravenously in the left hand. After an approximately 60 min distribution time, PET/CT images were acquired from the skull vertex to the mid thigh. Transmission images were acquired to correct for attenuation using a whole body low-dose CT scan without contrast with the arms positioned above the head.    COMPARISON:  Gallium 68 NETSpot PET-CT 03/29/2019    FINDINGS:  Quality of the study: Adequate.    No abnormal foci of increased tracer uptake are present.  Previous tracer avid foci in the small bowel are no longer appreciated status post interval resection.    Physiologic distribution of the tracer is seen within the pituitary, salivary, and thyroid glands, stomach, liver, pancreas uncinate process, spleen, adrenal glands, kidneys and urinary bladder, and bowel.    Incidental CT findings: Status post right shoulder arthroplasty.  Impression: No PET/CT findings to suggest somatostatin receptor avid disease status post interval resection.    Electronically signed by: Justin Goode  Date:    09/06/2019  Time:    16:26  MRI Abdomen-Pelvis w w/o Contrast (XPD)  Narrative: EXAMINATION:  MRI ABDOMEN-PELVIS W W/O CONTRAST (XPD)    CLINICAL HISTORY:  neoplasm;  Malignant carcinoid tumor of the ileum    TECHNIQUE:  Multiplanar, multi-sequence MR imaging of the abdomen and pelvis performed before and after administration of 10 cc of Gadavist gadolinium-based intravenous contrast per the liver protocol.    COMPARISON:  MRI abdomen with and without contrast dated 03/29/2019 and Dotatate PET CT dated  03/29/2019; MRI thoracic spine dated 04/05/2019    FINDINGS:  Pulmonary Bases: No large pleural effusion.    Liver: No abnormal diffusion restriction or solid enhancing lesion.  Few scattered T2 hyperintense, hypoenhancing foci, probable hepatic cysts.  Largest measuring 6 mm, similar (series 14, image 9).  Hepatic vasculature is patent.    Bile Ducts: Similar intra and extrahepatic ductal prominence in this patient status post cholecystectomy.    Gallbladder: Surgically absent.    Pancreas: Normal    Spleen: Normal    Gastrointestinal tract: Normal caliber.  Short segment suspected mild diffusion restriction at the level of the terminal ileum (series 8, image 10)    Mesentery: No discrete mesenteric mass.    Adrenal Glands: normal.    Kidneys and bladder: No hydronephrosis.  Bilateral renal cysts.  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  Bladder is unremarkable.    Aorta and Abdominal Vasculature: normal.    Lymph nodes: no pathologically enlarged lymph node    Body wall: Ventral abdominal hernia containing nonobstructed loop of bowel.    Other: No free fluid.  Stable T12 vertebral body lesion, previously demonstrated as possible atypical vertebral hemangioma.  Impression: No intrahepatic lesion.    Mild diffusion restriction at level of the terminal ileum, of uncertain etiology.  Otherwise, note that assessment of bowel is limited in this patient with known small bowel carcinoid.    Midline ventral hernia containing nonobstructed loop of bowel, left renal AML, and additional findings as above    Electronically signed by: Francis Lugo  Date:    09/06/2019  Time:    14:32     Gallium scan does not show any uptake at all.      Impression:   Ms. Chan had significant GI issues with abdominal pain diarrhea intermittently.  She was finally diagnosed with carcinoid tumor when she underwent enteroscopy last year.  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  Interestingly all the lymph nodes were  negative and she does not have any other lesion.  She had a gallium scan done in September which does not show any lesion.  She also had a MRI of the time which does not show any lesions anywhere else.    She developed incisional hernia she has been having some pain at the side. However she has been having intermittent episodes of crampy abdominal pain.  She was seen by Gastroenterology and she was tried on medications which has not worked.    Since the last visit in January 20, 2020 she has gained about 6 lb and feels that she is eating better but on more questions she has a she just feels the same the symptoms that she used to have before surgery have return back.    She complaints of incomplete emptying of stool consistent the pelvic floor dysfunction.  Will obtain difficult g.  A she has lot of pain around the incision area with symptoms of bowel obstruction. She was told by a physician that she may have carcinoid syndrome however heard neuroendocrine tumor markers are complete in the normal range and her.  During the last visit I obtained a lot of labs which showed increasing vitamin B12 and folate, increasing PTH level and normal neuroendocrine tumor markers level.    Plan:       She is scheduled for gallium scan on March 1st I will see her after the scan meanwhile am going to try her the medication with Bentyl.    She also Sounds some with exocrine insufficiency.  I will prescribe her Creon.  Since she has pain at the incisional hernia with symptoms of bowel obstruction I then talked to the family that the next step port would be to proceed with repair of the incisional hernia and then see how her GI symptoms are.  They are agreeable and the plan is to proceed with incisional hernia repair    Creon and Bentyl prescribed.  Gallium scan dated confirmed    Minimal I tried her to focus on nutrition and physical activities          MICHELLE Fernandez MD, FACS   Associate Professor of Surgery, [facility]   Neuroendocrine  Surgery, Hepatic/Pancreatic & General Surgery   [address]   [place] [address]   ph. [phone]; [phone]   fax. [phone]

## 2020-02-13 LAB
25(OH)D3+25(OH)D2 SERPL-MCNC: 39 NG/ML (ref 30–100)
5-HIAA, PLASMA (NEUROEND): 5 NG/ML
AMYLASE SERPL-CCNC: 55 U/L (ref 21–101)
CALCIUM SERPL-MCNC: 9.4 MG/DL (ref 8.6–10.4)
CGA SERPL-MCNC: 358 NG/ML (ref 25–140)
FOLATE SERPL-MCNC: 6.4 NG/ML
IRON SATN MFR SERPL: 33 % (CALC) (ref 16–45)
IRON SERPL-MCNC: 88 MCG/DL (ref 45–160)
LIPASE SERPL-CCNC: 10 U/L (ref 7–60)
NEUROKININ A: NORMAL PG/ML
PANCREASTATIN: 67 PG/ML (ref 10–135)
PTH-INTACT SERPL-MCNC: 90 PG/ML (ref 14–64)
SEROTONIN SER-MCNC: 30 NG/ML (ref 56–244)
T4 SERPL-MCNC: 10.9 MCG/DL (ref 5.1–11.9)
TIBC SERPL-MCNC: 270 MCG/DL (CALC) (ref 250–450)
TSH SERPL-ACNC: 0.55 MIU/L (ref 0.4–4.5)
VIT B12 SERPL-MCNC: 1475 PG/ML (ref 200–1100)
VITAMIN D2 SERPL-MCNC: 5 NG/ML
VITAMIN D3 SERPL-MCNC: 34 NG/ML

## 2020-02-17 ENCOUNTER — OFFICE VISIT (OUTPATIENT)
Dept: NEUROLOGY | Facility: HOSPITAL | Age: 70
End: 2020-02-17
Attending: SURGERY
Payer: MEDICARE

## 2020-02-17 VITALS
WEIGHT: 126.19 LBS | BODY MASS INDEX: 22.36 KG/M2 | TEMPERATURE: 98 F | HEIGHT: 63 IN | HEART RATE: 74 BPM | DIASTOLIC BLOOD PRESSURE: 75 MMHG | SYSTOLIC BLOOD PRESSURE: 123 MMHG

## 2020-02-17 DIAGNOSIS — R10.10 PAIN OF UPPER ABDOMEN: ICD-10-CM

## 2020-02-17 DIAGNOSIS — K43.2 INCISIONAL HERNIA, WITHOUT OBSTRUCTION OR GANGRENE: ICD-10-CM

## 2020-02-17 DIAGNOSIS — M62.89 PFD (PELVIC FLOOR DYSFUNCTION): ICD-10-CM

## 2020-02-17 DIAGNOSIS — C7A.012 MALIGNANT CARCINOID TUMOR OF ILEUM: Primary | ICD-10-CM

## 2020-02-17 PROCEDURE — 99213 OFFICE O/P EST LOW 20 MIN: CPT | Performed by: SURGERY

## 2020-02-17 RX ORDER — DICYCLOMINE HYDROCHLORIDE 20 MG/1
20 TABLET ORAL EVERY 6 HOURS PRN
Qty: 45 TABLET | Refills: 1 | Status: ON HOLD | OUTPATIENT
Start: 2020-02-17 | End: 2020-03-13 | Stop reason: HOSPADM

## 2020-02-17 NOTE — PATIENT INSTRUCTIONS
Your next appointment with Dr. Laguerre in 3/9/20 at 1020 am    Your Gallium scan scheduled for 3/5/20 at 1100 am    You have orders for the Defecogram to be done at home hospital    Called at a later date to discuss Hernia surgery  [phone]

## 2020-02-18 ENCOUNTER — TELEPHONE (OUTPATIENT)
Dept: NEUROLOGY | Facility: HOSPITAL | Age: 70
End: 2020-02-18

## 2020-02-18 NOTE — TELEPHONE ENCOUNTER
Spoke to pt. She said that she was told that the procedure that Dr. Laguerre sent home with her. She called and they told her that a GI doctor does this procedure. I talked with Shabnam and she says that Dr. Vazquez does not do this procedure that it is done in xray. I called radiology and they transferred me to xray and the xray tech that I should talk to has gone for the day. Will call in am. Called pt back and explained to her that it is done is xray and when I find out where it is done I will called her tomorrow to  Let her know. Pt verbalized understanding

## 2020-02-21 ENCOUNTER — TELEPHONE (OUTPATIENT)
Dept: NEUROLOGY | Facility: HOSPITAL | Age: 70
End: 2020-02-21

## 2020-02-21 DIAGNOSIS — M62.89 PELVIC FLOOR DYSFUNCTION: Primary | ICD-10-CM

## 2020-02-21 NOTE — TELEPHONE ENCOUNTER
Spoke to pt about her test that and gave her instruction for the test. She states that she will call the department her self.

## 2020-02-24 ENCOUNTER — TELEPHONE (OUTPATIENT)
Dept: NEUROLOGY | Facility: HOSPITAL | Age: 70
End: 2020-02-24

## 2020-02-24 NOTE — TELEPHONE ENCOUNTER
Spoke with pt and rescheduled gallium for 3/9/20 at 200 pm and Dr. Laguerre appt for 3/12/20 1120 am

## 2020-02-25 DIAGNOSIS — K43.0 INCISIONAL HERNIA WITH OBSTRUCTION BUT NO GANGRENE: Primary | ICD-10-CM

## 2020-02-25 DIAGNOSIS — K43.0 INCARCERATED INCISIONAL HERNIA: ICD-10-CM

## 2020-02-25 RX ORDER — ENOXAPARIN SODIUM 100 MG/ML
30 INJECTION SUBCUTANEOUS EVERY 24 HOURS
Status: DISCONTINUED | OUTPATIENT
Start: 2020-02-25 | End: 2020-02-25 | Stop reason: HOSPADM

## 2020-02-26 ENCOUNTER — TELEPHONE (OUTPATIENT)
Dept: NEUROLOGY | Facility: HOSPITAL | Age: 70
End: 2020-02-26

## 2020-02-26 NOTE — TELEPHONE ENCOUNTER
Spoke with pt  about her test for 3/4/20. Explained that she is to take a fleets enema the morning of the test.  verbalized understanding.

## 2020-02-27 ENCOUNTER — TELEPHONE (OUTPATIENT)
Dept: NEUROLOGY | Facility: HOSPITAL | Age: 70
End: 2020-02-27

## 2020-02-27 NOTE — TELEPHONE ENCOUNTER
Spoke with pt and let her know that 3/13/20 is her surgery date and explained that she has a pre-op services appt that same day. Pt verbalized understanding

## 2020-03-04 ENCOUNTER — HOSPITAL ENCOUNTER (OUTPATIENT)
Dept: RADIOLOGY | Facility: HOSPITAL | Age: 70
Discharge: HOME | End: 2020-03-04
Attending: SURGERY
Payer: MEDICARE

## 2020-03-04 DIAGNOSIS — M62.89 PELVIC FLOOR DYSFUNCTION: ICD-10-CM

## 2020-03-04 PROCEDURE — 74270 X-RAY XM COLON 1CNTRST STD: CPT | Mod: 26,,, | Performed by: RADIOLOGY

## 2020-03-04 PROCEDURE — A9698 NON-RAD CONTRAST MATERIALNOC: HCPCS | Performed by: SURGERY

## 2020-03-04 PROCEDURE — 74270 X-RAY XM COLON 1CNTRST STD: CPT | Mod: TC

## 2020-03-04 PROCEDURE — 74270 FL DEFECOGRAM: ICD-10-PCS | Mod: 26,,, | Performed by: RADIOLOGY

## 2020-03-04 PROCEDURE — 25500020 PHARM REV CODE 255: Performed by: SURGERY

## 2020-03-04 RX ADMIN — BARIUM SULFATE 60 ML: 0.6 SUSPENSION ORAL at 09:03

## 2020-03-04 RX ADMIN — BARIUM SULFATE 454 G: 0.6 CREAM ORAL at 09:03

## 2020-03-05 ENCOUNTER — PATIENT MESSAGE (OUTPATIENT)
Dept: NEUROLOGY | Facility: HOSPITAL | Age: 70
End: 2020-03-05

## 2020-03-05 NOTE — TELEPHONE ENCOUNTER
You will be admitted on the morning of 3/13/20. The appointment for the 3/12 is to sign consents and meet with pre op services. You will most likely do blood work, EKG and all of the pre operative test that day

## 2020-03-09 ENCOUNTER — HOSPITAL ENCOUNTER (OUTPATIENT)
Dept: RADIOLOGY | Facility: HOSPITAL | Age: 70
Discharge: HOME | End: 2020-03-09
Attending: SURGERY
Payer: MEDICARE

## 2020-03-09 DIAGNOSIS — C7A.012 MALIGNANT CARCINOID TUMOR OF THE ILEUM: ICD-10-CM

## 2020-03-09 PROCEDURE — 78815 PET IMAGE W/CT SKULL-THIGH: CPT | Mod: 26,PS,, | Performed by: RADIOLOGY

## 2020-03-09 PROCEDURE — A9587 GALLIUM GA-68: HCPCS

## 2020-03-09 PROCEDURE — 78815 NM PET 68GA DOTATATE WHOLE BODY: ICD-10-PCS | Mod: 26,PS,, | Performed by: RADIOLOGY

## 2020-03-09 PROCEDURE — 78815 PET IMAGE W/CT SKULL-THIGH: CPT | Mod: TC

## 2020-03-09 NOTE — PROGRESS NOTES
"NOLANETS:  [facility]  A collaboration between [facility] and [facility]      PATIENT: Christiana Chan  MRN: [id number]  DATE: 3/12/2020    Subjective:      Chief Complaint:   Pre op visit    She continues to have pain at the upper abdomen for had a etiology of pain is not known.  She has more of constipation like issues now.  During my initial evaluation before the surgery she was having issues with diarrhea.  She has to take laxatives almost daily.  Most of laxatives are not helping her.    Vitals: Blood pressure 117/71, pulse 77, temperature 97.7 °F (36.5 °C), temperature source Oral, height 5' 3" (1.6 m), weight 56.8 kg (125 lb 5.3 oz).     ECOG Score: 1 - Symptomatic but completely ambulatory    Diagnosis: No diagnosis found.     Interval History:   2/17/20   Ms. Chan had significant GI issues with abdominal pain diarrhea intermittently.  She was finally diagnosed with carcinoid tumor when she underwent enteroscopy last year.  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  Interestingly all the lymph nodes were negative and she does not have any other lesion.  She had a gallium scan done in September which does not show any lesion.  She also had a MRI of the time which does not show any lesions anywhere else.     She developed incisional hernia she has been having some pain at the side. However she has been having intermittent episodes of crampy abdominal pain.  She was seen by Gastroenterology and she was tried on medications which has not worked.     Since the last visit in January 20, 2020 she has gained about 6 lb and feels that she is eating better but on more questions she has a she just feels the same the symptoms that she used to have before surgery have return back.  She developed incisional hernia she has been having some pain at the side. However she has been having " intermittent episodes of crampy abdominal pain.  She was seen by Gastroenterology and she was tried on medications which has not worked.     Since the last visit in January 20, 2020 she has gained about 6 lb and feels that she is eating better but on more questions she has a she just feels the same the symptoms that she used to have before surgery have return back.     She complaints of incomplete emptying of stool consistent the pelvic floor dysfunction.  Will obtain difficult g.  A she has lot of pain around the incision area with symptoms of bowel obstruction. She was told by a physician that she may have carcinoid syndrome however heard neuroendocrine tumor markers are complete in the normal range and her.  During the last visit I obtained a lot of labs which showed increasing vitamin B12 and folate, increasing PTH level and normal neuroendocrine tumor markers level.    Oncologic History:   Oncologic History 2/2019 small bowell well diff, G1, Ki67 0%   Oncologic Treatment 2/2019 surgery   Pathology 4/2019 1. APPENDIX WITH NO EVIDENCE OF NEOPLASIA IDENTIFIED.  2. RESECTION OF SMALL INTESTINE (ILEUM) SHOWING MULTIPLE FOCI OF WELL-DIFFERENTIATED  NEUROENDOCRINE TUMOR (CARCINOID TUMOR) WITH NEGATIVE MARGINS. SEE SYNOPTIC REPORT:  PROCEDURE: SEGMENTAL RESECTION, SMALL INTESTINE  TUMOR SITE: ILEUM     Past Medical History:  Past Medical History:   Diagnosis Date    Abdominal bloating     Abdominal pain     Asthma     Atrial fibrillation     Colon polyps     Constipation     Diverticulosis of colon     Esophageal ulcer     Gastritis     GERD (gastroesophageal reflux disease)     Heartburn     Hiatal hernia     HTN (hypertension)     Intermittent diarrhea     Iron deficiency anemia     Osteoarthritis     Osteoporosis     Rectal bleeding     Ruptured lumbar disc     Small bowel lesion     Vitamin B12 deficiency anemia     Vitamin D deficiency        Past Surgical History:  Past Surgical History:    Procedure Laterality Date    ANTEGRADE SINGLE BALLOON ENTEROSCOPY N/A 2019    Procedure: ENTEROSCOPY, DOUBLE BALLOON, ANTEGRADE;  Surgeon: Sivakumar Dorsey MD;  Location: [address]);  Service: Endoscopy;  Laterality: N/A;    BREAST BIOPSY Bilateral      SECTION   &     CHOLECYSTECTOMY      COLONOSCOPY      outside facility    ENDOSCOPIC ULTRASOUND OF UPPER GASTROINTESTINAL TRACT N/A 2019    Procedure: ULTRASOUND-ENDOSCOPIC-UPPER;  Surgeon: Armando Bills MD;  Location: [facility];  Service: Endoscopy;  Laterality: N/A;  Carcinoid diagnosis    ESOPHAGOGASTRODUODENOSCOPY      outside facility    HYSTERECTOMY      LAPAROSCOPIC APPENDECTOMY N/A 2019    Procedure: APPENDECTOMY, LAPAROSCOPIC;  Surgeon: Rebecca Valdez MD;  Location: [facility];  Service: General;  Laterality: N/A;    LAPAROSCOPIC RESECTION OF SMALL INTESTINE N/A 2019    Procedure: EXCISION, SMALL INTESTINE, LAPAROSCOPIC;  Surgeon: Rebecca Valdez MD;  Location: [facility] OR;  Service: General;  Laterality: N/A;    SHOULDER SURGERY Right 2013    TOTAL KNEE ARTHROPLASTY Right 2013       Family History:  Family History   Problem Relation Age of Onset    Diabetes Mother     Multiple myeloma Father        Allergies:  Amoxicillin; Sulfa (sulfonamide antibiotics); Codeine; Demerol [meperidine]; Hydrocodone; Epinephrine; and Nitrofurantoin monohyd/m-cryst    Medications:   Current Outpatient Medications   Medication Sig    acetaminophen (TYLENOL) 500 MG tablet Take 500 mg by mouth every 6 (six) hours as needed for Pain.    albuterol (PROAIR HFA) 90 mcg/actuation inhaler Inhale 1 puff into the lungs every 6 (six) hours as needed for Wheezing. Rescue    ALPRAZolam (XANAX) 2 MG Tab Take 1 mg by mouth nightly.     carvedilol (COREG) 25 MG tablet Take 25 mg by mouth 2 (two) times daily.    cholestyramine (QUESTRAN) 4 gram packet Take 1 packet (4 g total) by mouth 3 (three) times  daily with meals.    citalopram (CELEXA) 10 MG tablet Take 10 mg by mouth.    COMBIVENT RESPIMAT  mcg/actuation inhaler INL 2 PFS PO BID    cyanocobalamin (VITAMIN B-12) 1,000 mcg/mL injection Inject 1,000 mcg into the muscle every 14 (fourteen) days.    denosumab (PROLIA) 60 mg/mL Syrg Inject 60 mg into the skin every 6 (six) months.    dicyclomine (BENTYL) 20 mg tablet Take 1 tablet (20 mg total) by mouth every 6 (six) hours as needed (abdomenal pain and cramping.).    gabapentin (NEURONTIN) 100 MG capsule Take 1 capsule (100 mg total) by mouth 2 (two) times daily.    hyoscyamine (LEVSIN/SL) 0.125 mg Subl Place 1 tablet (0.125 mg total) under the tongue 3 (three) times daily with meals.    lamoTRIgine (LAMICTAL) 100 MG tablet Take 100 mg by mouth 2 (two) times daily.    lipase-protease-amylase 24,000-76,000-120,000 units (CREON) 24,000-76,000 -120,000 unit capsule Take 2 capsules by mouth 3 (three) times daily with meals.    lisinopril (PRINIVIL,ZESTRIL) 20 MG tablet Take 20 mg by mouth Daily.     lubiprostone (AMITIZA) 24 MCG Cap Take 1 capsule (24 mcg total) by mouth as needed.    megestrol (MEGACE) 400 mg/10 mL (40 mg/mL) Susp Take 10 mLs (400 mg total) by mouth 2 (two) times daily.    ondansetron (ZOFRAN) 8 MG tablet Take 4 mg by mouth every 4 (four) hours as needed for Nausea.     pantoprazole (PROTONIX) 20 MG tablet Take 20 mg by mouth once daily.    propranolol (INDERAL) 10 MG tablet Take 10 mg by mouth daily as needed.    spironolactone (ALDACTONE) 25 MG tablet Take 12.5 mg by mouth once daily.    terconazole (TERAZOL 7) 0.4 % Crea U 1 APPLICATION VAGINALLY QD HS    traZODone (DESYREL) 50 MG tablet 100 mg.     valACYclovir (VALTREX) 500 MG tablet TK 1 T PO D     No current facility-administered medications for this visit.         Review of Systems   Constitutional: Positive for activity change, appetite change and fatigue. Negative for chills, diaphoresis, fever and unexpected  weight change.   HENT: Negative for dental problem, drooling, ear discharge, ear pain, facial swelling, hearing loss, nosebleeds, rhinorrhea, sinus pressure, sinus pain, sneezing, sore throat, tinnitus and trouble swallowing.    Eyes: Negative for photophobia, pain, discharge, redness, itching and visual disturbance.   Respiratory: Negative for apnea, cough, chest tightness, shortness of breath, wheezing and stridor.    Cardiovascular: Negative for palpitations and leg swelling.   Gastrointestinal: Positive for abdominal distention, abdominal pain and constipation. Negative for diarrhea, nausea, rectal pain and vomiting.   Endocrine: Negative.    Genitourinary: Negative.    Musculoskeletal: Positive for back pain. Negative for joint swelling and neck stiffness.   Skin: Negative for pallor, rash and wound.   Allergic/Immunologic: Negative.    Neurological: Negative.  Negative for tremors, syncope, facial asymmetry, speech difficulty, weakness and light-headedness.   Hematological: Negative for adenopathy. Does not bruise/bleed easily.   Psychiatric/Behavioral: Negative for behavioral problems, confusion, decreased concentration and dysphoric mood.      Objective:      Physical Exam   Constitutional: She is oriented to person, place, and time. She appears well-developed and well-nourished.   HENT:   Head: Normocephalic and atraumatic.   Right Ear: External ear normal.   Left Ear: External ear normal.   Eyes: Pupils are equal, round, and reactive to light. Conjunctivae and EOM are normal.   Neck: Normal range of motion. Neck supple.   Cardiovascular: Normal rate and regular rhythm.   Pulmonary/Chest: Effort normal and breath sounds normal.   Abdominal: Soft. Bowel sounds are normal. She exhibits no distension. There is no tenderness. There is no rebound and no guarding. A hernia is present.   Reducible hernia around the umbilicus   Musculoskeletal: Normal range of motion.   Neurological: She is alert and oriented to  person, place, and time.   Skin: Skin is warm and dry.   Psychiatric: She has a normal mood and affect. Her behavior is normal.      Assessment:       No diagnosis found.    Laboratory Data:        scans:   NM PET Ga68 Dotatate  Narrative: EXAMINATION:  NM PET 68GA DOTATATE WHOLE BODY    CLINICAL HISTORY:  Malignant carcinoid tumor of the ileum    TECHNIQUE:  6.1 mCi of Ga-68 DOTA-ESPINOSA was administered intravenously in the right hand. After an approximately 60 min distribution time, PET/CT images were acquired from the skull vertex to the mid thigh. Transmission images were acquired to correct for attenuation using a whole body low-dose CT scan without contrast with the arms positioned above the head.    COMPARISON:  Dotatate 09/06/2019, 03/29/2019    FINDINGS:  Quality of the study: Adequate.    No abnormal foci of increased tracer uptake are present.    Physiologic distribution of the tracer is seen within the pituitary, salivary, and thyroid glands, stomach, liver, pancreas uncinate process, spleen, adrenal glands,  tract, and bowel.    Incidental CT findings: Postoperative changes of cholecystectomy and right shoulder arthroplasty.  Diastasis of the ventral abdominal wall.  Impression: No PET/CT findings to suggest somatostatin receptor avid disease status post resection.    Electronically signed by resident: Chance Lomeli  Date:    03/09/2020  Time:    16:17    Electronically signed by: Pete Hernandez MD  Date:    03/09/2020  Time:    16:40       Impression:   69-year-old female with chronic abdominal pain issue was found to have a carcinoid tumor on enteroscopy.  She underwent small bowel resection and she was found to have a multicentric neuroendocrine tumors.  She did not have any evidence of any lymph node involvement or metastatic disease.  She continues to have chronic abdominal pain.  She was postoperative she had lot of issues with nausea.  She had developed incisional hernia.    She continues to  have pain in the upper abdomen and have constant chronic abdominal pain. She initially had diarrhea before surgery and now she has trouble having bowel movement.  During the my last evaluation I ordered for difficult g which has been found to be in the normal range.  One of her physicians in [place] told her that her symptoms may be from a carcinoid tumor therefore I obtain the gallium scan and the gallium scan does not show any uptake which completely rules out any carcinoid tumor causing this problem.    She is tender at the edges of the fascial defect therefore my recommendation would be to proceed with the incisional hernia repair and if she continues to have pain then she needs a workup for chronic abdominal pain and.    With the current crisis going on with COVID -19 I recommended that she can postpone the surgery. I talked about the hospital stay for a day or less than a day after surgery and then I talked about the benefits and risks of the surgery I emphasized the risk involved involved with surgery such as bleeding infection DVT PE MI recurrence chronic pain requiring removal of the mesh seroma measures treated complications. Next for the plan would be to evaluate her after the mesh placement and if she continues to have a chronic pain then she needs a repeat enteroscopy here.    Also during her hospitalization I may have to find the correct medication for constipation.  She has tried Levsin, MiraLax and Colace and none of them work.  Currently she takes Dulcolax 2 tablets a day    Plan:       NPO after midnight  For robotic laparoscopic incisional hernia repair  Risks and benefits clearly explained and consent obtained  Options for postponing to a different date in the future given.          MICHELLE Fernandez MD, FACS   Associate Professor of Surgery, [facility]   Neuroendocrine Surgery, Hepatic/Pancreatic & General Surgery   [address]   [place] [address]   ph. [phone];  [phone]   fax. [phone]

## 2020-03-09 NOTE — H&P (VIEW-ONLY)
"NOLANETS:  [facility]  A collaboration between [facility] and [facility]      PATIENT: Christiana Chan  MRN: [id number]  DATE: 3/12/2020    Subjective:      Chief Complaint:   Pre op visit    She continues to have pain at the upper abdomen for had a etiology of pain is not known.  She has more of constipation like issues now.  During my initial evaluation before the surgery she was having issues with diarrhea.  She has to take laxatives almost daily.  Most of laxatives are not helping her.    Vitals: Blood pressure 117/71, pulse 77, temperature 97.7 °F (36.5 °C), temperature source Oral, height 5' 3" (1.6 m), weight 56.8 kg (125 lb 5.3 oz).     ECOG Score: 1 - Symptomatic but completely ambulatory    Diagnosis: No diagnosis found.     Interval History:   2/17/20   Ms. Chan had significant GI issues with abdominal pain diarrhea intermittently.  She was finally diagnosed with carcinoid tumor when she underwent enteroscopy last year.  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  Interestingly all the lymph nodes were negative and she does not have any other lesion.  She had a gallium scan done in September which does not show any lesion.  She also had a MRI of the time which does not show any lesions anywhere else.     She developed incisional hernia she has been having some pain at the side. However she has been having intermittent episodes of crampy abdominal pain.  She was seen by Gastroenterology and she was tried on medications which has not worked.     Since the last visit in January 20, 2020 she has gained about 6 lb and feels that she is eating better but on more questions she has a she just feels the same the symptoms that she used to have before surgery have return back.  She developed incisional hernia she has been having some pain at the side. However she has been having " intermittent episodes of crampy abdominal pain.  She was seen by Gastroenterology and she was tried on medications which has not worked.     Since the last visit in January 20, 2020 she has gained about 6 lb and feels that she is eating better but on more questions she has a she just feels the same the symptoms that she used to have before surgery have return back.     She complaints of incomplete emptying of stool consistent the pelvic floor dysfunction.  Will obtain difficult g.  A she has lot of pain around the incision area with symptoms of bowel obstruction. She was told by a physician that she may have carcinoid syndrome however heard neuroendocrine tumor markers are complete in the normal range and her.  During the last visit I obtained a lot of labs which showed increasing vitamin B12 and folate, increasing PTH level and normal neuroendocrine tumor markers level.    Oncologic History:   Oncologic History 2/2019 small bowell well diff, G1, Ki67 0%   Oncologic Treatment 2/2019 surgery   Pathology 4/2019 1. APPENDIX WITH NO EVIDENCE OF NEOPLASIA IDENTIFIED.  2. RESECTION OF SMALL INTESTINE (ILEUM) SHOWING MULTIPLE FOCI OF WELL-DIFFERENTIATED  NEUROENDOCRINE TUMOR (CARCINOID TUMOR) WITH NEGATIVE MARGINS. SEE SYNOPTIC REPORT:  PROCEDURE: SEGMENTAL RESECTION, SMALL INTESTINE  TUMOR SITE: ILEUM     Past Medical History:  Past Medical History:   Diagnosis Date    Abdominal bloating     Abdominal pain     Asthma     Atrial fibrillation     Colon polyps     Constipation     Diverticulosis of colon     Esophageal ulcer     Gastritis     GERD (gastroesophageal reflux disease)     Heartburn     Hiatal hernia     HTN (hypertension)     Intermittent diarrhea     Iron deficiency anemia     Osteoarthritis     Osteoporosis     Rectal bleeding     Ruptured lumbar disc     Small bowel lesion     Vitamin B12 deficiency anemia     Vitamin D deficiency        Past Surgical History:  Past Surgical History:    Procedure Laterality Date    ANTEGRADE SINGLE BALLOON ENTEROSCOPY N/A 2019    Procedure: ENTEROSCOPY, DOUBLE BALLOON, ANTEGRADE;  Surgeon: Sivakumar Dorsey MD;  Location: [address]);  Service: Endoscopy;  Laterality: N/A;    BREAST BIOPSY Bilateral      SECTION   &     CHOLECYSTECTOMY      COLONOSCOPY      outside facility    ENDOSCOPIC ULTRASOUND OF UPPER GASTROINTESTINAL TRACT N/A 2019    Procedure: ULTRASOUND-ENDOSCOPIC-UPPER;  Surgeon: Armando Bills MD;  Location[facility];  Service: Endoscopy;  Laterality: N/A;  Carcinoid diagnosis    ESOPHAGOGASTRODUODENOSCOPY      outside facility    HYSTERECTOMY      LAPAROSCOPIC APPENDECTOMY N/A 2019    Procedure: APPENDECTOMY, LAPAROSCOPIC;  Surgeon: Rebecca Valdez MD;  Location: [facility];  Service: General;  Laterality: N/A;    LAPAROSCOPIC RESECTION OF SMALL INTESTINE N/A 2019    Procedure: EXCISION, SMALL INTESTINE, LAPAROSCOPIC;  Surgeon: Rebecca Valdez MD;  [facility];  Service: General;  Laterality: N/A;    SHOULDER SURGERY Right 2013    TOTAL KNEE ARTHROPLASTY Right 2013       Family History:  Family History   Problem Relation Age of Onset    Diabetes Mother     Multiple myeloma Father        Allergies:  Amoxicillin; Sulfa (sulfonamide antibiotics); Codeine; Demerol [meperidine]; Hydrocodone; Epinephrine; and Nitrofurantoin monohyd/m-cryst    Medications:   Current Outpatient Medications   Medication Sig    acetaminophen (TYLENOL) 500 MG tablet Take 500 mg by mouth every 6 (six) hours as needed for Pain.    albuterol (PROAIR HFA) 90 mcg/actuation inhaler Inhale 1 puff into the lungs every 6 (six) hours as needed for Wheezing. Rescue    ALPRAZolam (XANAX) 2 MG Tab Take 1 mg by mouth nightly.     carvedilol (COREG) 25 MG tablet Take 25 mg by mouth 2 (two) times daily.    cholestyramine (QUESTRAN) 4 gram packet Take 1 packet (4 g total) by mouth 3 (three) times  daily with meals.    citalopram (CELEXA) 10 MG tablet Take 10 mg by mouth.    COMBIVENT RESPIMAT  mcg/actuation inhaler INL 2 PFS PO BID    cyanocobalamin (VITAMIN B-12) 1,000 mcg/mL injection Inject 1,000 mcg into the muscle every 14 (fourteen) days.    denosumab (PROLIA) 60 mg/mL Syrg Inject 60 mg into the skin every 6 (six) months.    dicyclomine (BENTYL) 20 mg tablet Take 1 tablet (20 mg total) by mouth every 6 (six) hours as needed (abdomenal pain and cramping.).    gabapentin (NEURONTIN) 100 MG capsule Take 1 capsule (100 mg total) by mouth 2 (two) times daily.    hyoscyamine (LEVSIN/SL) 0.125 mg Subl Place 1 tablet (0.125 mg total) under the tongue 3 (three) times daily with meals.    lamoTRIgine (LAMICTAL) 100 MG tablet Take 100 mg by mouth 2 (two) times daily.    lipase-protease-amylase 24,000-76,000-120,000 units (CREON) 24,000-76,000 -120,000 unit capsule Take 2 capsules by mouth 3 (three) times daily with meals.    lisinopril (PRINIVIL,ZESTRIL) 20 MG tablet Take 20 mg by mouth Daily.     lubiprostone (AMITIZA) 24 MCG Cap Take 1 capsule (24 mcg total) by mouth as needed.    megestrol (MEGACE) 400 mg/10 mL (40 mg/mL) Susp Take 10 mLs (400 mg total) by mouth 2 (two) times daily.    ondansetron (ZOFRAN) 8 MG tablet Take 4 mg by mouth every 4 (four) hours as needed for Nausea.     pantoprazole (PROTONIX) 20 MG tablet Take 20 mg by mouth once daily.    propranolol (INDERAL) 10 MG tablet Take 10 mg by mouth daily as needed.    spironolactone (ALDACTONE) 25 MG tablet Take 12.5 mg by mouth once daily.    terconazole (TERAZOL 7) 0.4 % Crea U 1 APPLICATION VAGINALLY QD HS    traZODone (DESYREL) 50 MG tablet 100 mg.     valACYclovir (VALTREX) 500 MG tablet TK 1 T PO D     No current facility-administered medications for this visit.         Review of Systems   Constitutional: Positive for activity change, appetite change and fatigue. Negative for chills, diaphoresis, fever and unexpected  weight change.   HENT: Negative for dental problem, drooling, ear discharge, ear pain, facial swelling, hearing loss, nosebleeds, rhinorrhea, sinus pressure, sinus pain, sneezing, sore throat, tinnitus and trouble swallowing.    Eyes: Negative for photophobia, pain, discharge, redness, itching and visual disturbance.   Respiratory: Negative for apnea, cough, chest tightness, shortness of breath, wheezing and stridor.    Cardiovascular: Negative for palpitations and leg swelling.   Gastrointestinal: Positive for abdominal distention, abdominal pain and constipation. Negative for diarrhea, nausea, rectal pain and vomiting.   Endocrine: Negative.    Genitourinary: Negative.    Musculoskeletal: Positive for back pain. Negative for joint swelling and neck stiffness.   Skin: Negative for pallor, rash and wound.   Allergic/Immunologic: Negative.    Neurological: Negative.  Negative for tremors, syncope, facial asymmetry, speech difficulty, weakness and light-headedness.   Hematological: Negative for adenopathy. Does not bruise/bleed easily.   Psychiatric/Behavioral: Negative for behavioral problems, confusion, decreased concentration and dysphoric mood.      Objective:      Physical Exam   Constitutional: She is oriented to person, place, and time. She appears well-developed and well-nourished.   HENT:   Head: Normocephalic and atraumatic.   Right Ear: External ear normal.   Left Ear: External ear normal.   Eyes: Pupils are equal, round, and reactive to light. Conjunctivae and EOM are normal.   Neck: Normal range of motion. Neck supple.   Cardiovascular: Normal rate and regular rhythm.   Pulmonary/Chest: Effort normal and breath sounds normal.   Abdominal: Soft. Bowel sounds are normal. She exhibits no distension. There is no tenderness. There is no rebound and no guarding. A hernia is present.   Reducible hernia around the umbilicus   Musculoskeletal: Normal range of motion.   Neurological: She is alert and oriented to  person, place, and time.   Skin: Skin is warm and dry.   Psychiatric: She has a normal mood and affect. Her behavior is normal.      Assessment:       No diagnosis found.    Laboratory Data:        scans:   NM PET Ga68 Dotatate  Narrative: EXAMINATION:  NM PET 68GA DOTATATE WHOLE BODY    CLINICAL HISTORY:  Malignant carcinoid tumor of the ileum    TECHNIQUE:  6.1 mCi of Ga-68 DOTA-ESPINOSA was administered intravenously in the right hand. After an approximately 60 min distribution time, PET/CT images were acquired from the skull vertex to the mid thigh. Transmission images were acquired to correct for attenuation using a whole body low-dose CT scan without contrast with the arms positioned above the head.    COMPARISON:  Dotatate 09/06/2019, 03/29/2019    FINDINGS:  Quality of the study: Adequate.    No abnormal foci of increased tracer uptake are present.    Physiologic distribution of the tracer is seen within the pituitary, salivary, and thyroid glands, stomach, liver, pancreas uncinate process, spleen, adrenal glands,  tract, and bowel.    Incidental CT findings: Postoperative changes of cholecystectomy and right shoulder arthroplasty.  Diastasis of the ventral abdominal wall.  Impression: No PET/CT findings to suggest somatostatin receptor avid disease status post resection.    Electronically signed by resident: Chance Lomeli  Date:    03/09/2020  Time:    16:17    Electronically signed by: Pete Hernandez MD  Date:    03/09/2020  Time:    16:40       Impression:   69-year-old female with chronic abdominal pain issue was found to have a carcinoid tumor on enteroscopy.  She underwent small bowel resection and she was found to have a multicentric neuroendocrine tumors.  She did not have any evidence of any lymph node involvement or metastatic disease.  She continues to have chronic abdominal pain.  She was postoperative she had lot of issues with nausea.  She had developed incisional hernia.    She continues to  have pain in the upper abdomen and have constant chronic abdominal pain. She initially had diarrhea before surgery and now she has trouble having bowel movement.  During the my last evaluation I ordered for difficult g which has been found to be in the normal range.  One of her physicians in [place] told her that her symptoms may be from a carcinoid tumor therefore I obtain the gallium scan and the gallium scan does not show any uptake which completely rules out any carcinoid tumor causing this problem.    She is tender at the edges of the fascial defect therefore my recommendation would be to proceed with the incisional hernia repair and if she continues to have pain then she needs a workup for chronic abdominal pain and.    With the current crisis going on with COVID -19 I recommended that she can postpone the surgery. I talked about the hospital stay for a day or less than a day after surgery and then I talked about the benefits and risks of the surgery I emphasized the risk involved involved with surgery such as bleeding infection DVT PE MI recurrence chronic pain requiring removal of the mesh seroma measures treated complications. Next for the plan would be to evaluate her after the mesh placement and if she continues to have a chronic pain then she needs a repeat enteroscopy here.    Also during her hospitalization I may have to find the correct medication for constipation.  She has tried Levsin, MiraLax and Colace and none of them work.  Currently she takes Dulcolax 2 tablets a day    Plan:       NPO after midnight  For robotic laparoscopic incisional hernia repair  Risks and benefits clearly explained and consent obtained  Options for postponing to a different date in the future given.          MICHELLE Fernandez MD, FACS   Associate Professor of Surgery, [facility]   Neuroendocrine Surgery, Hepatic/Pancreatic & General Surgery   [address]   [place] [address]   ph. [phone];  [phone]   fax. [phone]

## 2020-03-12 ENCOUNTER — ANESTHESIA EVENT (OUTPATIENT)
Dept: SURGERY | Facility: HOSPITAL | Age: 70
End: 2020-03-12
Payer: MEDICARE

## 2020-03-12 ENCOUNTER — HOSPITAL ENCOUNTER (OUTPATIENT)
Dept: PREADMISSION TESTING | Facility: HOSPITAL | Age: 70
Discharge: HOME | End: 2020-03-12
Attending: SURGERY
Payer: MEDICARE

## 2020-03-12 ENCOUNTER — OFFICE VISIT (OUTPATIENT)
Dept: NEUROLOGY | Facility: HOSPITAL | Age: 70
End: 2020-03-12
Attending: SURGERY
Payer: MEDICARE

## 2020-03-12 VITALS
BODY MASS INDEX: 22.2 KG/M2 | DIASTOLIC BLOOD PRESSURE: 71 MMHG | HEART RATE: 77 BPM | HEIGHT: 63 IN | TEMPERATURE: 98 F | WEIGHT: 125.31 LBS | SYSTOLIC BLOOD PRESSURE: 117 MMHG

## 2020-03-12 DIAGNOSIS — C7A.012 MALIGNANT CARCINOID TUMOR OF ILEUM: ICD-10-CM

## 2020-03-12 DIAGNOSIS — K43.0 INCISIONAL HERNIA WITH OBSTRUCTION BUT NO GANGRENE: Primary | ICD-10-CM

## 2020-03-12 DIAGNOSIS — R10.10 PAIN OF UPPER ABDOMEN: ICD-10-CM

## 2020-03-12 PROCEDURE — 99213 OFFICE O/P EST LOW 20 MIN: CPT | Mod: 25 | Performed by: SURGERY

## 2020-03-12 RX ORDER — LIDOCAINE HYDROCHLORIDE 10 MG/ML
1 INJECTION, SOLUTION EPIDURAL; INFILTRATION; INTRACAUDAL; PERINEURAL ONCE
Status: CANCELLED | OUTPATIENT
Start: 2020-03-12 | End: 2020-03-12

## 2020-03-12 RX ORDER — SODIUM CHLORIDE, SODIUM LACTATE, POTASSIUM CHLORIDE, CALCIUM CHLORIDE 600; 310; 30; 20 MG/100ML; MG/100ML; MG/100ML; MG/100ML
INJECTION, SOLUTION INTRAVENOUS CONTINUOUS
Status: CANCELLED | OUTPATIENT
Start: 2020-03-12

## 2020-03-12 NOTE — DISCHARGE INSTRUCTIONS
Your surgery is scheduled for 3/13/20.    Please report to Procedure Check In Room on the 2nd FLOOR at 5:30 a.m.       INSTRUCTIONS IMPORTANT!!!  ¨ Do not eat or drink after 12 midnight-including water. OK to brush teeth, no   gum, candy or mints!    ¨ Take only these medicines with a small swallow of water-morning of surgery: propranolol, carvedilol and protonix        ____  Proceed to [facility] on 3/12/20 for additional testing.        ____  Do not wear makeup, including mascara.  ____  No powder, lotions or creams to surgical area.  ____  Please remove all jewelry, including piercings and leave at home.  ____  No money or valuables needed. Please leave at home.  ____  Please bring any documents given by your doctor.  ____  If going home the same day, arrange for a ride home. You will not be able to             drive if Anesthesia was used.  ____  Wear loose fitting clothing. Allow for dressings, bandages.  ____  Stop Aspirin, Ibuprofen, Motrin and Aleve at least 3-5 days before surgery, unless otherwise instructed by your doctor, or the nurse.   You MAY use Tylenol/acetaminophen until day of surgery.  ____  Wash the surgical area with Hibiclens the night before surgery, and again the             morning of surgery.  Be sure to rinse hibiclens off completely (if instructed by   nurse).  ____  If you take diabetic medication, do not take am of surgery unless instructed by Doctor.  ____  Call MD for temperature above 101 degrees or any other signs of infection such as Urinary (bladder) infection, Upper respiratory infection, skin boils, etc.  ____ Stop taking any Fish Oil supplement or any Vitamins that contain Vitamin E at least 5 days prior to surgery.  ____ Do Not wear your contact lenses the day of your procedure.  You may wear your glasses.      ____Do not shave surgical site for 3 days prior to surgery.  ____ Practice Good hand washing before, during, and after procedure.      I have read or  had read and explained to me, and understand the above information.  Additional comments or instructions:  For additional questions call [phone]      ANESTHESIA SIDE EFFECTS  -For the first 24 hours after surgery:  Do not drive, use heavy equipment, make important decisions, or drink alcohol  -It is normal to feel sleepy for several hours.  Rest until you are more awake.  -Have someone stay with you, if needed.  They can watch for problems and help keep you safe.  -Some possible post anesthesia side effects include: nausea and vomiting, sore throat and hoarseness, sleepiness, and dizziness.        Pre-Op Bathing Instructions    Before surgery, you can play an important role in your own health.    Because skin is not sterile, we need to be sure that your skin is as free of germs as possible. By following the instructions below, you can reduce the number of germs on your skin before surgery.    IMPORTANT: You will need to shower with a special soap called Hibiclens*, available at any pharmacy.  If you are allergic to Chlorhexidine (the antiseptic in Hibiclens), use an antibacterial soap such as Dial Soap for your preoperative shower.  You will shower with Hibiclens both the night before your surgery and the morning of your surgery.  Do not use Hibiclens on the head, face or genitals to avoid injury to those areas.    STEP #1: THE NIGHT BEFORE YOUR SURGERY     1. Do not shave the area of your body where your surgery will be performed.  2. Shower and wash your hair and body as usual with your normal soap and shampoo.  3. Rinse your hair and body thoroughly after you shower to remove all soap residue.  4. With your hand, apply one packet of Hibiclens soap to the surgical site.   5. Wash the site gently for five (5) minutes. Do not scrub your skin too hard.   6. Do not wash with your regular soap after Hibiclens is used.  7. Rinse your body thoroughly.  8. Pat yourself dry with a clean, soft towel.  9. Do not use lotion,  cream, or powder.  10. Wear clean clothes.    STEP #2: THE MORNING OF YOUR SURGERY     1. Repeat Step #1.    * Not to be used by people allergic to Chlorhexidine.

## 2020-03-12 NOTE — ANESTHESIA PREPROCEDURE EVALUATION
2020  Christiana Chan is a 69 y.o., female with carcinoid scheduled for incisional hernia repair on 3/13/2020.    Past Medical History:   Diagnosis Date    Abdominal bloating     Abdominal pain     Asthma     Atrial fibrillation     Colon polyps     Constipation     Diverticulosis of colon     Esophageal ulcer     Gastritis     GERD (gastroesophageal reflux disease)     Heartburn     Hiatal hernia     HTN (hypertension)     Intermittent diarrhea     Iron deficiency anemia     Osteoarthritis     Osteoporosis     Rectal bleeding     Ruptured lumbar disc     Small bowel lesion     Vitamin B12 deficiency anemia     Vitamin D deficiency      Past Surgical History:   Procedure Laterality Date    ANTEGRADE SINGLE BALLOON ENTEROSCOPY N/A 2019    Procedure: ENTEROSCOPY, DOUBLE BALLOON, ANTEGRADE;  Surgeon: Sivakumar Dorsey MD;  Location: [address]);  Service: Endoscopy;  Laterality: N/A;    BREAST BIOPSY Bilateral      SECTION   &     CHOLECYSTECTOMY      COLONOSCOPY      outside facility    ENDOSCOPIC ULTRASOUND OF UPPER GASTROINTESTINAL TRACT N/A 2019    Procedure: ULTRASOUND-ENDOSCOPIC-UPPER;  Surgeon: Armando Bills MD;  Location[facility];  Service: Endoscopy;  Laterality: N/A;  Carcinoid diagnosis    ESOPHAGOGASTRODUODENOSCOPY      outside facility    HYSTERECTOMY      LAPAROSCOPIC APPENDECTOMY N/A 2019    Procedure: APPENDECTOMY, LAPAROSCOPIC;  Surgeon: Rebecca Valdez MD;  Location: [facility];  Service: General;  Laterality: N/A;    LAPAROSCOPIC RESECTION OF SMALL INTESTINE N/A 2019    Procedure: EXCISION, SMALL INTESTINE, LAPAROSCOPIC;  Surgeon: Rebecca Valdez MD;  Location: [facility];  Service: General;  Laterality: N/A;    SHOULDER SURGERY Right 2013    TOTAL KNEE ARTHROPLASTY Right 2013        Anesthesia Evaluation    I have reviewed the Patient Summary Reports.    I have reviewed the Nursing Notes.   I have reviewed the Medications.     Review of Systems  Anesthesia Hx:  No problems with previous Anesthesia  Denies Family Hx of Anesthesia complications.    Social:  Social Alcohol Use, Non-Smoker    Hematology/Oncology:  Hematology Normal      Current/Recent Cancer. (Small bowel NET)   Cardiovascular:   Hypertension Dysrhythmias (pAF)  Denies Angina.        Pulmonary:   Asthma    Renal/:  Renal/ Normal     Hepatic/GI:   GERD Small bowel NET s/p resection   Neurological:  Neurology Normal    Endocrine:  Endocrine Normal        Physical Exam  General:  Well nourished, Obesity    Airway/Jaw/Neck:  Airway Findings: Mouth Opening: Normal Tongue: Normal  General Airway Assessment: Adult  Mallampati: II  Improves to II with phonation.  TM Distance: Normal, at least 6 cm      Dental:  Dental Findings: Upper Dentures   Chest/Lungs:  Chest/Lungs Findings: Clear to auscultation, Normal Respiratory Rate     Heart/Vascular:  Heart Findings: Rate: Normal  Rhythm: Regular Rhythm  Sounds: Normal        Mental Status:  Mental Status Findings:  Cooperative, Alert and Oriented       EKG 4/11/19  Sinus tachycardia  Nonspecific intraventricular block  Abnormal ECG  No previous ECGs available    TTE 4/11/19  · Mildly decreased left ventricular systolic function. The estimated ejection fraction is 45%  · Concentric left ventricular hypertrophy.  · Grade I (mild) left ventricular diastolic dysfunction consistent with impaired relaxation.  · Septal wall has abnormal motion consistent with left bundle branch block.  · Mild mitral regurgitation.  · Mild left atrial enlargement.  · The estimated PA systolic pressure is 22 mm Hg  · Normal right ventricular systolic function.  · Mild tricuspid regurgitation.  · Normal central venous pressure (3 mm Hg).        Anesthesia Plan  Type of Anesthesia, risks & benefits  discussed:  Anesthesia Type:  general  Patient's Preference:   Intra-op Monitoring Plan: standard ASA monitors  Intra-op Monitoring Plan Comments:   Post Op Pain Control Plan: multimodal analgesia and per primary service following discharge from PACU  Post Op Pain Control Plan Comments:   Induction:   IV  Beta Blocker:         Informed Consent: Patient understands risks and agrees with Anesthesia plan.  Questions answered. Anesthesia consent signed with patient.  ASA Score: 3     Day of Surgery Review of History & Physical:  There are no significant changes.      Anesthesia Plan Notes: Anesthesia consent to be signed prior to procedure on 3/13/2020          Ready For Surgery From Anesthesia Perspective.

## 2020-03-12 NOTE — PRE-PROCEDURE INSTRUCTIONS
John  [phone] -     Allergies, medical, surgical, family and psychosocial histories reviewed with patient. Periop plan of care reviewed. Preop instructions given, including medications to take and to hold. Hibiclens soap and instructions on use given. Time allotted for questions to be addressed.  Patient verbalized understanding.

## 2020-03-13 ENCOUNTER — ANESTHESIA (OUTPATIENT)
Dept: SURGERY | Facility: HOSPITAL | Age: 70
End: 2020-03-13
Payer: MEDICARE

## 2020-03-13 ENCOUNTER — HOSPITAL ENCOUNTER (OUTPATIENT)
Facility: HOSPITAL | Age: 70
Discharge: HOME | End: 2020-03-15
Attending: SURGERY | Admitting: SURGERY
Payer: MEDICARE

## 2020-03-13 DIAGNOSIS — C7A.012 MALIGNANT CARCINOID TUMOR OF ILEUM: ICD-10-CM

## 2020-03-13 DIAGNOSIS — K43.0 INCARCERATED INCISIONAL HERNIA: Primary | ICD-10-CM

## 2020-03-13 DIAGNOSIS — K36 CHRONIC APPENDICITIS: ICD-10-CM

## 2020-03-13 DIAGNOSIS — R33.8 ACUTE RETENTION OF URINE: ICD-10-CM

## 2020-03-13 PROCEDURE — 25000003 PHARM REV CODE 250: Performed by: SURGERY

## 2020-03-13 PROCEDURE — 25000003 PHARM REV CODE 250: Performed by: NURSE ANESTHETIST, CERTIFIED REGISTERED

## 2020-03-13 PROCEDURE — 63600175 PHARM REV CODE 636 W HCPCS: Performed by: SURGERY

## 2020-03-13 PROCEDURE — C1781 MESH (IMPLANTABLE): HCPCS | Performed by: SURGERY

## 2020-03-13 PROCEDURE — 63600175 PHARM REV CODE 636 W HCPCS: Performed by: NURSE ANESTHETIST, CERTIFIED REGISTERED

## 2020-03-13 PROCEDURE — 36000711: Performed by: SURGERY

## 2020-03-13 PROCEDURE — G0378 HOSPITAL OBSERVATION PER HR: HCPCS

## 2020-03-13 PROCEDURE — 27201423 OPTIME MED/SURG SUP & DEVICES STERILE SUPPLY: Performed by: SURGERY

## 2020-03-13 PROCEDURE — C9290 INJ, BUPIVACAINE LIPOSOME: HCPCS | Performed by: SURGERY

## 2020-03-13 PROCEDURE — 94761 N-INVAS EAR/PLS OXIMETRY MLT: CPT

## 2020-03-13 PROCEDURE — 63600175 PHARM REV CODE 636 W HCPCS: Performed by: NURSE PRACTITIONER

## 2020-03-13 PROCEDURE — 25000003 PHARM REV CODE 250: Performed by: STUDENT IN AN ORGANIZED HEALTH CARE EDUCATION/TRAINING PROGRAM

## 2020-03-13 PROCEDURE — 71000039 HC RECOVERY, EACH ADD'L HOUR: Performed by: SURGERY

## 2020-03-13 PROCEDURE — 37000008 HC ANESTHESIA 1ST 15 MINUTES: Performed by: SURGERY

## 2020-03-13 PROCEDURE — 96372 THER/PROPH/DIAG INJ SC/IM: CPT | Mod: 59

## 2020-03-13 PROCEDURE — 71000016 HC POSTOP RECOV ADDL HR: Performed by: SURGERY

## 2020-03-13 PROCEDURE — 71000033 HC RECOVERY, INTIAL HOUR: Performed by: SURGERY

## 2020-03-13 PROCEDURE — 63600175 PHARM REV CODE 636 W HCPCS: Performed by: ANESTHESIOLOGY

## 2020-03-13 PROCEDURE — 37000009 HC ANESTHESIA EA ADD 15 MINS: Performed by: SURGERY

## 2020-03-13 PROCEDURE — 71000015 HC POSTOP RECOV 1ST HR: Performed by: SURGERY

## 2020-03-13 PROCEDURE — 63600175 PHARM REV CODE 636 W HCPCS: Performed by: STUDENT IN AN ORGANIZED HEALTH CARE EDUCATION/TRAINING PROGRAM

## 2020-03-13 PROCEDURE — 36000710: Performed by: SURGERY

## 2020-03-13 DEVICE — IMPLANTABLE DEVICE: Type: IMPLANTABLE DEVICE | Site: ABDOMEN | Status: FUNCTIONAL

## 2020-03-13 RX ORDER — METHOCARBAMOL 750 MG/1
750 TABLET, FILM COATED ORAL 3 TIMES DAILY
Qty: 90 TABLET | Refills: 0 | Status: SHIPPED | OUTPATIENT
Start: 2020-03-13 | End: 2020-03-15 | Stop reason: SDUPTHER

## 2020-03-13 RX ORDER — FENTANYL CITRATE 50 UG/ML
INJECTION, SOLUTION INTRAMUSCULAR; INTRAVENOUS
Status: DISCONTINUED | OUTPATIENT
Start: 2020-03-13 | End: 2020-03-13

## 2020-03-13 RX ORDER — HYDROMORPHONE HYDROCHLORIDE 1 MG/ML
1 INJECTION, SOLUTION INTRAMUSCULAR; INTRAVENOUS; SUBCUTANEOUS EVERY 6 HOURS PRN
Status: DISCONTINUED | OUTPATIENT
Start: 2020-03-13 | End: 2020-03-14

## 2020-03-13 RX ORDER — KETOROLAC TROMETHAMINE 30 MG/ML
15 INJECTION, SOLUTION INTRAMUSCULAR; INTRAVENOUS EVERY 6 HOURS
Status: DISCONTINUED | OUTPATIENT
Start: 2020-03-13 | End: 2020-03-13 | Stop reason: SDUPTHER

## 2020-03-13 RX ORDER — LABETALOL HYDROCHLORIDE 5 MG/ML
10 INJECTION, SOLUTION INTRAVENOUS EVERY 6 HOURS PRN
Status: DISCONTINUED | OUTPATIENT
Start: 2020-03-13 | End: 2020-03-15 | Stop reason: HOSPADM

## 2020-03-13 RX ORDER — VANCOMYCIN HCL IN 5 % DEXTROSE 1G/250ML
1000 PLASTIC BAG, INJECTION (ML) INTRAVENOUS
Status: COMPLETED | OUTPATIENT
Start: 2020-03-13 | End: 2020-03-13

## 2020-03-13 RX ORDER — ALBUMIN HUMAN 250 G/1000ML
SOLUTION INTRAVENOUS CONTINUOUS PRN
Status: DISCONTINUED | OUTPATIENT
Start: 2020-03-13 | End: 2020-03-13

## 2020-03-13 RX ORDER — ENOXAPARIN SODIUM 100 MG/ML
40 INJECTION SUBCUTANEOUS EVERY 24 HOURS
Status: DISCONTINUED | OUTPATIENT
Start: 2020-03-13 | End: 2020-03-15 | Stop reason: HOSPADM

## 2020-03-13 RX ORDER — VASOPRESSIN 20 [USP'U]/ML
INJECTION, SOLUTION INTRAMUSCULAR; SUBCUTANEOUS
Status: DISCONTINUED | OUTPATIENT
Start: 2020-03-13 | End: 2020-03-13

## 2020-03-13 RX ORDER — ONDANSETRON 4 MG/1
4 TABLET, ORALLY DISINTEGRATING ORAL ONCE
Qty: 15 TABLET | Refills: 0 | Status: SHIPPED | OUTPATIENT
Start: 2020-03-13 | End: 2020-03-15 | Stop reason: SDUPTHER

## 2020-03-13 RX ORDER — METHOCARBAMOL 750 MG/1
750 TABLET, FILM COATED ORAL 3 TIMES DAILY
Qty: 30 TABLET | Refills: 0 | Status: SHIPPED | OUTPATIENT
Start: 2020-03-13 | End: 2020-03-15 | Stop reason: HOSPADM

## 2020-03-13 RX ORDER — HYDRALAZINE HYDROCHLORIDE 20 MG/ML
10 INJECTION INTRAMUSCULAR; INTRAVENOUS EVERY 6 HOURS PRN
Status: DISCONTINUED | OUTPATIENT
Start: 2020-03-13 | End: 2020-03-15 | Stop reason: HOSPADM

## 2020-03-13 RX ORDER — ACETAMINOPHEN 650 MG/1
650 SUPPOSITORY RECTAL EVERY 4 HOURS PRN
Status: DISCONTINUED | OUTPATIENT
Start: 2020-03-13 | End: 2020-03-15 | Stop reason: HOSPADM

## 2020-03-13 RX ORDER — TRAMADOL HYDROCHLORIDE 50 MG/1
50 TABLET ORAL EVERY 6 HOURS PRN
Qty: 25 TABLET | Refills: 0 | Status: SHIPPED | OUTPATIENT
Start: 2020-03-13 | End: 2020-03-23

## 2020-03-13 RX ORDER — ONDANSETRON 8 MG/1
8 TABLET, ORALLY DISINTEGRATING ORAL EVERY 8 HOURS PRN
Status: DISCONTINUED | OUTPATIENT
Start: 2020-03-13 | End: 2020-03-15 | Stop reason: HOSPADM

## 2020-03-13 RX ORDER — BUPIVACAINE HYDROCHLORIDE 5 MG/ML
INJECTION, SOLUTION PERINEURAL
Status: DISCONTINUED | OUTPATIENT
Start: 2020-03-13 | End: 2020-03-13 | Stop reason: HOSPADM

## 2020-03-13 RX ORDER — DIPHENHYDRAMINE HYDROCHLORIDE 50 MG/ML
12.5 INJECTION INTRAMUSCULAR; INTRAVENOUS EVERY 6 HOURS PRN
Status: DISCONTINUED | OUTPATIENT
Start: 2020-03-13 | End: 2020-03-13 | Stop reason: HOSPADM

## 2020-03-13 RX ORDER — SODIUM CHLORIDE, SODIUM LACTATE, POTASSIUM CHLORIDE, CALCIUM CHLORIDE 600; 310; 30; 20 MG/100ML; MG/100ML; MG/100ML; MG/100ML
INJECTION, SOLUTION INTRAVENOUS CONTINUOUS
Status: DISCONTINUED | OUTPATIENT
Start: 2020-03-13 | End: 2020-03-13

## 2020-03-13 RX ORDER — VECURONIUM BROMIDE FOR INJECTION 1 MG/ML
INJECTION, POWDER, LYOPHILIZED, FOR SOLUTION INTRAVENOUS
Status: DISCONTINUED | OUTPATIENT
Start: 2020-03-13 | End: 2020-03-13

## 2020-03-13 RX ORDER — PANTOPRAZOLE SODIUM 20 MG/1
20 TABLET, DELAYED RELEASE ORAL DAILY
Status: DISCONTINUED | OUTPATIENT
Start: 2020-03-14 | End: 2020-03-13

## 2020-03-13 RX ORDER — ETOMIDATE 2 MG/ML
INJECTION INTRAVENOUS
Status: DISCONTINUED | OUTPATIENT
Start: 2020-03-13 | End: 2020-03-13

## 2020-03-13 RX ORDER — LIDOCAINE HYDROCHLORIDE 20 MG/ML
INJECTION INTRAVENOUS
Status: DISCONTINUED | OUTPATIENT
Start: 2020-03-13 | End: 2020-03-13

## 2020-03-13 RX ORDER — HYDROMORPHONE HYDROCHLORIDE 2 MG/ML
INJECTION, SOLUTION INTRAMUSCULAR; INTRAVENOUS; SUBCUTANEOUS
Status: DISCONTINUED | OUTPATIENT
Start: 2020-03-13 | End: 2020-03-13

## 2020-03-13 RX ORDER — TRAZODONE HYDROCHLORIDE 100 MG/1
100 TABLET ORAL NIGHTLY PRN
Status: DISCONTINUED | OUTPATIENT
Start: 2020-03-13 | End: 2020-03-15 | Stop reason: HOSPADM

## 2020-03-13 RX ORDER — DEXAMETHASONE SODIUM PHOSPHATE 4 MG/ML
INJECTION, SOLUTION INTRA-ARTICULAR; INTRALESIONAL; INTRAMUSCULAR; INTRAVENOUS; SOFT TISSUE
Status: DISCONTINUED | OUTPATIENT
Start: 2020-03-13 | End: 2020-03-13

## 2020-03-13 RX ORDER — CARVEDILOL 25 MG/1
25 TABLET ORAL 2 TIMES DAILY
Status: DISCONTINUED | OUTPATIENT
Start: 2020-03-13 | End: 2020-03-15 | Stop reason: HOSPADM

## 2020-03-13 RX ORDER — PANTOPRAZOLE SODIUM 40 MG/1
40 TABLET, DELAYED RELEASE ORAL DAILY
Status: DISCONTINUED | OUTPATIENT
Start: 2020-03-14 | End: 2020-03-15 | Stop reason: HOSPADM

## 2020-03-13 RX ORDER — ALPRAZOLAM 1 MG/1
1 TABLET ORAL NIGHTLY
Status: DISCONTINUED | OUTPATIENT
Start: 2020-03-13 | End: 2020-03-15 | Stop reason: HOSPADM

## 2020-03-13 RX ORDER — NEOSTIGMINE METHYLSULFATE 1 MG/ML
INJECTION, SOLUTION INTRAVENOUS
Status: DISCONTINUED | OUTPATIENT
Start: 2020-03-13 | End: 2020-03-13

## 2020-03-13 RX ORDER — ALBUTEROL SULFATE 90 UG/1
1 AEROSOL, METERED RESPIRATORY (INHALATION) EVERY 6 HOURS PRN
Status: DISCONTINUED | OUTPATIENT
Start: 2020-03-13 | End: 2020-03-15 | Stop reason: HOSPADM

## 2020-03-13 RX ORDER — ONDANSETRON 2 MG/ML
INJECTION INTRAMUSCULAR; INTRAVENOUS
Status: DISCONTINUED | OUTPATIENT
Start: 2020-03-13 | End: 2020-03-13

## 2020-03-13 RX ORDER — SUCCINYLCHOLINE CHLORIDE 20 MG/ML
INJECTION INTRAMUSCULAR; INTRAVENOUS
Status: DISCONTINUED | OUTPATIENT
Start: 2020-03-13 | End: 2020-03-13

## 2020-03-13 RX ORDER — GLYCOPYRROLATE 0.2 MG/ML
INJECTION INTRAMUSCULAR; INTRAVENOUS
Status: DISCONTINUED | OUTPATIENT
Start: 2020-03-13 | End: 2020-03-13

## 2020-03-13 RX ORDER — TALC
6 POWDER (GRAM) TOPICAL NIGHTLY PRN
Status: DISCONTINUED | OUTPATIENT
Start: 2020-03-13 | End: 2020-03-15 | Stop reason: HOSPADM

## 2020-03-13 RX ORDER — LIDOCAINE HYDROCHLORIDE 10 MG/ML
1 INJECTION, SOLUTION EPIDURAL; INFILTRATION; INTRACAUDAL; PERINEURAL ONCE
Status: DISCONTINUED | OUTPATIENT
Start: 2020-03-13 | End: 2020-03-13 | Stop reason: HOSPADM

## 2020-03-13 RX ORDER — KETOROLAC TROMETHAMINE 30 MG/ML
15 INJECTION, SOLUTION INTRAMUSCULAR; INTRAVENOUS EVERY 6 HOURS
Status: DISPENSED | OUTPATIENT
Start: 2020-03-14 | End: 2020-03-15

## 2020-03-13 RX ORDER — ACETAMINOPHEN 10 MG/ML
INJECTION, SOLUTION INTRAVENOUS
Status: DISCONTINUED | OUTPATIENT
Start: 2020-03-13 | End: 2020-03-13

## 2020-03-13 RX ORDER — PHENYLEPHRINE HYDROCHLORIDE 10 MG/ML
INJECTION INTRAVENOUS
Status: DISCONTINUED | OUTPATIENT
Start: 2020-03-13 | End: 2020-03-13

## 2020-03-13 RX ORDER — ONDANSETRON 2 MG/ML
4 INJECTION INTRAMUSCULAR; INTRAVENOUS DAILY PRN
Status: DISCONTINUED | OUTPATIENT
Start: 2020-03-13 | End: 2020-03-13 | Stop reason: HOSPADM

## 2020-03-13 RX ORDER — SODIUM CHLORIDE 0.9 % (FLUSH) 0.9 %
3 SYRINGE (ML) INJECTION
Status: DISCONTINUED | OUTPATIENT
Start: 2020-03-13 | End: 2020-03-13 | Stop reason: HOSPADM

## 2020-03-13 RX ORDER — SODIUM CHLORIDE 0.9 % (FLUSH) 0.9 %
10 SYRINGE (ML) INJECTION
Status: DISCONTINUED | OUTPATIENT
Start: 2020-03-13 | End: 2020-03-15 | Stop reason: HOSPADM

## 2020-03-13 RX ORDER — TRAMADOL HYDROCHLORIDE 50 MG/1
50 TABLET ORAL EVERY 6 HOURS PRN
Status: DISCONTINUED | OUTPATIENT
Start: 2020-03-13 | End: 2020-03-14

## 2020-03-13 RX ORDER — SODIUM CHLORIDE 9 MG/ML
INJECTION, SOLUTION INTRAVENOUS CONTINUOUS PRN
Status: DISCONTINUED | OUTPATIENT
Start: 2020-03-13 | End: 2020-03-13

## 2020-03-13 RX ORDER — HYDROMORPHONE HYDROCHLORIDE 2 MG/ML
0.5 INJECTION, SOLUTION INTRAMUSCULAR; INTRAVENOUS; SUBCUTANEOUS EVERY 5 MIN PRN
Status: DISCONTINUED | OUTPATIENT
Start: 2020-03-13 | End: 2020-03-13 | Stop reason: HOSPADM

## 2020-03-13 RX ORDER — ROCURONIUM BROMIDE 10 MG/ML
INJECTION, SOLUTION INTRAVENOUS
Status: DISCONTINUED | OUTPATIENT
Start: 2020-03-13 | End: 2020-03-13

## 2020-03-13 RX ORDER — ACETAMINOPHEN 325 MG/1
650 TABLET ORAL EVERY 8 HOURS PRN
Status: DISCONTINUED | OUTPATIENT
Start: 2020-03-13 | End: 2020-03-15 | Stop reason: HOSPADM

## 2020-03-13 RX ORDER — KETOROLAC TROMETHAMINE 30 MG/ML
INJECTION, SOLUTION INTRAMUSCULAR; INTRAVENOUS
Status: DISCONTINUED | OUTPATIENT
Start: 2020-03-13 | End: 2020-03-13

## 2020-03-13 RX ADMIN — PHENYLEPHRINE HYDROCHLORIDE 100 MCG: 10 INJECTION INTRAVENOUS at 08:03

## 2020-03-13 RX ADMIN — VASOPRESSIN 2 UNITS: 20 INJECTION, SOLUTION INTRAMUSCULAR; SUBCUTANEOUS at 10:03

## 2020-03-13 RX ADMIN — HYDROMORPHONE HYDROCHLORIDE 0.4 MG: 2 INJECTION INTRAMUSCULAR; INTRAVENOUS; SUBCUTANEOUS at 12:03

## 2020-03-13 RX ADMIN — PHENYLEPHRINE HYDROCHLORIDE 100 MCG: 10 INJECTION INTRAVENOUS at 07:03

## 2020-03-13 RX ADMIN — SODIUM CHLORIDE, SODIUM LACTATE, POTASSIUM CHLORIDE, AND CALCIUM CHLORIDE: .6; .31; .03; .02 INJECTION, SOLUTION INTRAVENOUS at 06:03

## 2020-03-13 RX ADMIN — SODIUM CHLORIDE: 0.9 INJECTION, SOLUTION INTRAVENOUS at 07:03

## 2020-03-13 RX ADMIN — PHENYLEPHRINE HYDROCHLORIDE 200 MCG: 10 INJECTION INTRAVENOUS at 08:03

## 2020-03-13 RX ADMIN — ROCURONIUM BROMIDE 5 MG: 10 INJECTION, SOLUTION INTRAVENOUS at 07:03

## 2020-03-13 RX ADMIN — PHENYLEPHRINE HYDROCHLORIDE 200 MCG: 10 INJECTION INTRAVENOUS at 10:03

## 2020-03-13 RX ADMIN — NEOSTIGMINE METHYLSULFATE 4 MG: 1 INJECTION INTRAVENOUS at 12:03

## 2020-03-13 RX ADMIN — VECURONIUM BROMIDE 1 MG: 10 INJECTION, POWDER, LYOPHILIZED, FOR SOLUTION INTRAVENOUS at 09:03

## 2020-03-13 RX ADMIN — ONDANSETRON 8 MG: 8 TABLET, ORALLY DISINTEGRATING ORAL at 05:03

## 2020-03-13 RX ADMIN — ETOMIDATE 12 MG: 2 INJECTION INTRAVENOUS at 07:03

## 2020-03-13 RX ADMIN — VASOPRESSIN 2 UNITS: 20 INJECTION, SOLUTION INTRAMUSCULAR; SUBCUTANEOUS at 11:03

## 2020-03-13 RX ADMIN — VASOPRESSIN 2 UNITS: 20 INJECTION, SOLUTION INTRAMUSCULAR; SUBCUTANEOUS at 08:03

## 2020-03-13 RX ADMIN — ONDANSETRON 4 MG: 2 INJECTION INTRAMUSCULAR; INTRAVENOUS at 02:03

## 2020-03-13 RX ADMIN — VECURONIUM BROMIDE 2 MG: 10 INJECTION, POWDER, LYOPHILIZED, FOR SOLUTION INTRAVENOUS at 10:03

## 2020-03-13 RX ADMIN — TRAMADOL HYDROCHLORIDE 50 MG: 50 TABLET, FILM COATED ORAL at 04:03

## 2020-03-13 RX ADMIN — FENTANYL CITRATE 50 MCG: 50 INJECTION, SOLUTION INTRAMUSCULAR; INTRAVENOUS at 07:03

## 2020-03-13 RX ADMIN — GLYCOPYRROLATE 0.6 MG: 0.2 INJECTION, SOLUTION INTRAMUSCULAR; INTRAVENOUS at 12:03

## 2020-03-13 RX ADMIN — DEXAMETHASONE SODIUM PHOSPHATE 8 MG: 4 INJECTION, SOLUTION INTRA-ARTICULAR; INTRALESIONAL; INTRAMUSCULAR; INTRAVENOUS; SOFT TISSUE at 08:03

## 2020-03-13 RX ADMIN — ALBUMIN HUMAN: 250 SOLUTION INTRAVENOUS at 10:03

## 2020-03-13 RX ADMIN — FENTANYL CITRATE 50 MCG: 50 INJECTION, SOLUTION INTRAMUSCULAR; INTRAVENOUS at 12:03

## 2020-03-13 RX ADMIN — PHENYLEPHRINE HYDROCHLORIDE 200 MCG: 10 INJECTION INTRAVENOUS at 07:03

## 2020-03-13 RX ADMIN — ALPRAZOLAM 1 MG: 1 TABLET ORAL at 08:03

## 2020-03-13 RX ADMIN — SUCCINYLCHOLINE CHLORIDE 120 MG: 20 INJECTION, SOLUTION INTRAMUSCULAR; INTRAVENOUS at 07:03

## 2020-03-13 RX ADMIN — FENTANYL CITRATE 25 MCG: 50 INJECTION, SOLUTION INTRAMUSCULAR; INTRAVENOUS at 07:03

## 2020-03-13 RX ADMIN — ROCURONIUM BROMIDE 45 MG: 10 INJECTION, SOLUTION INTRAVENOUS at 07:03

## 2020-03-13 RX ADMIN — GLYCOPYRROLATE 0.2 MG: 0.2 INJECTION, SOLUTION INTRAMUSCULAR; INTRAVENOUS at 07:03

## 2020-03-13 RX ADMIN — VANCOMYCIN HYDROCHLORIDE 1000 MG: 1 INJECTION, POWDER, LYOPHILIZED, FOR SOLUTION INTRAVENOUS at 07:03

## 2020-03-13 RX ADMIN — HYDROMORPHONE HYDROCHLORIDE 1 MG: 1 INJECTION, SOLUTION INTRAMUSCULAR; INTRAVENOUS; SUBCUTANEOUS at 11:03

## 2020-03-13 RX ADMIN — ONDANSETRON 8 MG: 2 INJECTION, SOLUTION INTRAMUSCULAR; INTRAVENOUS at 08:03

## 2020-03-13 RX ADMIN — ACETAMINOPHEN 1000 MG: 10 INJECTION, SOLUTION INTRAVENOUS at 07:03

## 2020-03-13 RX ADMIN — VECURONIUM BROMIDE 3 MG: 10 INJECTION, POWDER, LYOPHILIZED, FOR SOLUTION INTRAVENOUS at 08:03

## 2020-03-13 RX ADMIN — HYDROMORPHONE HYDROCHLORIDE 0.5 MG: 2 INJECTION, SOLUTION INTRAMUSCULAR; INTRAVENOUS; SUBCUTANEOUS at 01:03

## 2020-03-13 RX ADMIN — KETOROLAC TROMETHAMINE 15 MG: 30 INJECTION, SOLUTION INTRAMUSCULAR at 05:03

## 2020-03-13 RX ADMIN — FENTANYL CITRATE 50 MCG: 50 INJECTION, SOLUTION INTRAMUSCULAR; INTRAVENOUS at 06:03

## 2020-03-13 RX ADMIN — FENTANYL CITRATE 50 MCG: 50 INJECTION, SOLUTION INTRAMUSCULAR; INTRAVENOUS at 09:03

## 2020-03-13 RX ADMIN — VASOPRESSIN 2 UNITS: 20 INJECTION, SOLUTION INTRAMUSCULAR; SUBCUTANEOUS at 09:03

## 2020-03-13 RX ADMIN — VASOPRESSIN 1 UNITS: 20 INJECTION, SOLUTION INTRAMUSCULAR; SUBCUTANEOUS at 08:03

## 2020-03-13 RX ADMIN — TRAMADOL HYDROCHLORIDE 50 MG: 50 TABLET, FILM COATED ORAL at 10:03

## 2020-03-13 RX ADMIN — LIDOCAINE HYDROCHLORIDE 100 MG: 20 INJECTION, SOLUTION INTRAVENOUS at 07:03

## 2020-03-13 RX ADMIN — SODIUM CHLORIDE, SODIUM LACTATE, POTASSIUM CHLORIDE, AND CALCIUM CHLORIDE: .6; .31; .03; .02 INJECTION, SOLUTION INTRAVENOUS at 09:03

## 2020-03-13 RX ADMIN — ENOXAPARIN SODIUM 40 MG: 100 INJECTION SUBCUTANEOUS at 05:03

## 2020-03-13 RX ADMIN — KETOROLAC TROMETHAMINE 30 MG: 30 INJECTION, SOLUTION INTRAMUSCULAR; INTRAVENOUS at 10:03

## 2020-03-13 RX ADMIN — VASOPRESSIN 1 UNITS: 20 INJECTION, SOLUTION INTRAMUSCULAR; SUBCUTANEOUS at 09:03

## 2020-03-13 RX ADMIN — FENTANYL CITRATE 25 MCG: 50 INJECTION, SOLUTION INTRAMUSCULAR; INTRAVENOUS at 10:03

## 2020-03-13 RX ADMIN — CARVEDILOL 25 MG: 25 TABLET, FILM COATED ORAL at 08:03

## 2020-03-13 NOTE — PLAN OF CARE
Assisted to bathroom,unable to void at present. Voices feeling of pressure when attempted to void. Bladder scanned showing 115 ml. Dr Fernandez to keep patient overnight.

## 2020-03-13 NOTE — DISCHARGE SUMMARY
[facility] General Surgery  Discharge Summary      Patient Name: Christiana Chan  MRN: [id number]  Admission Date: 3/13/2020  Hospital Length of Stay: 0 days  Discharge Date and Time: 3/13/2020  Attending Physician: Rebecca Valdez MD   Discharging Provider: Rebecca Valdez MD  Primary Care Provider: Whitley Orozco MD     HPI: admitted with surgery for incarcerated incisional hernia    Procedure(s) (LRB):  ROBOTIC REPAIR, HERNIA, INCISIONAL (N/A)     Hospital Course: uneventful surgery and stable postop course. tiolerated diet, and stable after surgery    Consults:         Pending Diagnostic Studies:     None        Final Active Diagnoses:    Diagnosis Date Noted POA    PRINCIPAL PROBLEM:  Incarcerated incisional hernia [K43.0] 03/13/2020 Yes    Chronic appendicitis [K36]  Yes      Problems Resolved During this Admission:      Discharged Condition: stable    Disposition: Home or Self Care    Follow Up:    Patient Instructions:      Diet Adult Regular     Ice to affected area     Lifting restrictions   Order Comments: No lifting more than 10 lbs for 3 weeks  Can shower  Can climb stairs  Can travel  No driving for 10 days     Notify your health care provider if you experience any of the following:     Notify your health care provider if you experience any of the following:  increased confusion or weakness     Notify your health care provider if you experience any of the following:  persistent dizziness, light-headedness, or visual disturbances     Notify your health care provider if you experience any of the following:  worsening rash     Notify your health care provider if you experience any of the following:  severe persistent headache     Notify your health care provider if you experience any of the following:  difficulty breathing or increased cough     Notify your health care provider if you experience any of the following:  redness, tenderness, or signs of infection (pain,  swelling, redness, odor or green/yellow discharge around incision site)     Notify your health care provider if you experience any of the following:  severe uncontrolled pain     Notify your health care provider if you experience any of the following:  persistent nausea and vomiting or diarrhea     Notify your health care provider if you experience any of the following:  temperature >100.4     No dressing needed     Activity as tolerated     Medications:  Reconciled Home Medications:      Medication List      START taking these medications    methocarbamoL 750 MG Tab  Commonly known as:  ROBAXIN  Take 1 tablet (750 mg total) by mouth 3 (three) times daily. for 10 days     traMADoL 50 mg tablet  Commonly known as:  ULTRAM  Take 1 tablet (50 mg total) by mouth every 6 (six) hours as needed.        CHANGE how you take these medications    lipase-protease-amylase 24,000-76,000-120,000 units 24,000-76,000 -120,000 unit capsule  Commonly known as:  CREON  Take 1 capsule by mouth 3 (three) times daily with meals.  What changed:  how much to take        CONTINUE taking these medications    acetaminophen 500 MG tablet  Commonly known as:  TYLENOL  Take 500 mg by mouth every 6 (six) hours as needed for Pain.     ALPRAZolam 2 MG Tab  Commonly known as:  XANAX  Take 1 mg by mouth nightly.     carvediloL 25 MG tablet  Commonly known as:  COREG  Take 25 mg by mouth 2 (two) times daily.     citalopram 10 MG tablet  Commonly known as:  CELEXA  Take 10 mg by mouth.     COMBIVENT RESPIMAT  mcg/actuation inhaler  Generic drug:  ipratropium-albuteroL  INL 2 PFS PO BID     lisinopriL 20 MG tablet  Commonly known as:  PRINIVIL,ZESTRIL  Take 20 mg by mouth Daily.     ondansetron 8 MG tablet  Commonly known as:  ZOFRAN  Take 4 mg by mouth every 4 (four) hours as needed for Nausea.     pantoprazole 20 MG tablet  Commonly known as:  PROTONIX  Take 20 mg by mouth once daily.     PROAIR HFA 90 mcg/actuation inhaler  Generic drug:   albuterol  Inhale 1 puff into the lungs every 6 (six) hours as needed for Wheezing. Rescue     PROLIA 60 mg/mL Syrg  Generic drug:  denosumab  Inject 60 mg into the skin every 6 (six) months.     propranoloL 10 MG tablet  Commonly known as:  INDERAL  Take 10 mg by mouth daily as needed.     spironolactone 25 MG tablet  Commonly known as:  ALDACTONE  Take 12.5 mg by mouth once daily.     terconazole 0.4 % Crea  Commonly known as:  TERAZOL 7  U 1 APPLICATION VAGINALLY QD HS     traZODone 50 MG tablet  Commonly known as:  DESYREL  100 mg.     valACYclovir 500 MG tablet  Commonly known as:  VALTREX  TK 1 T PO D     VITAMIN B-12 1,000 mcg/mL injection  Generic drug:  cyanocobalamin  Inject 1,000 mcg into the muscle every 14 (fourteen) days.        STOP taking these medications    cholestyramine 4 gram packet  Commonly known as:  QUESTRAN     dicyclomine 20 mg tablet  Commonly known as:  BENTYL     gabapentin 100 MG capsule  Commonly known as:  NEURONTIN     hyoscyamine 0.125 mg Subl  Commonly known as:  LEVSIN/SL     lamoTRIgine 100 MG tablet  Commonly known as:  LAMICTAL     lubiprostone 24 MCG Cap  Commonly known as:  AMITIZA     megestroL 400 mg/10 mL (40 mg/mL) Susp  Commonly known as:  GERRI Valdez MD  General Surgery  [facility]

## 2020-03-13 NOTE — PLAN OF CARE
"VN note: VN cued into patient's room for introduction. Family at bedside. Patient informed VN she cannot answer questions right now since she is in "pain." VN will review chart to answer questions at this time. Bedside nurse Deandra notified. VN will continue to be available to patient and intervene prn.    "

## 2020-03-13 NOTE — TRANSFER OF CARE
"Anesthesia Transfer of Care Note    Patient: Christiana Chan    Procedure(s) Performed: Procedure(s) (LRB):  ROBOTIC REPAIR, HERNIA, INCISIONAL (N/A)    Patient location: PACU    Anesthesia Type: MAC    Transport from OR: Transported from OR on 6-10 L/min O2 by face mask with adequate spontaneous ventilation    Post pain: adequate analgesia    Post assessment: no apparent anesthetic complications    Post vital signs: stable    Level of consciousness: awake    Nausea/Vomiting: no nausea/vomiting    Complications: none    Transfer of care protocol was followed      Last vitals:   Visit Vitals  BP (!) 99/55   Pulse 72   Temp 37.1 °C (98.8 °F) (Skin)   Resp 12   Ht 5' 3" (1.6 m)   Wt 56.7 kg (125 lb)   SpO2 97%   Breastfeeding? No   BMI 22.14 kg/m²     "

## 2020-03-13 NOTE — OP NOTE
[facility]  Surgery Department  Operative Note    SUMMARY     Date of Procedure: 3/13/2020    I clearly mention to her there is a chance she will continue to have pain.  Achilles I can not rule out the etiology of her pain as a hernia and continue to  Procedure: Procedure(s) (LRB):  ROBOTIC REPAIR, HERNIA, INCISIONAL (N/A)   symbotex mesh 25 X20 cm trimmed  Lysis of adhesion    Surgeon(s) and Role:     * Rebecca Valdez MD - Primary    Assisting Surgeon: Marj Barrett    Pre-Operative Diagnosis: Incisional hernia with obstruction but no gangrene [K43.0]    Post-Operative Diagnosis: Post-Op Diagnosis Codes:     * Incisional hernia with obstruction but no gangrene [K43.0]  swiss cheese pattern defect 12 X  6 cms involving whole incision  Incarceration of omentum    Anesthesia: General    History and indication:  This 69-year-old female who underwent explored laparotomy and 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  She has had a workup done and etiology could not be ascertained.  She recently underwent gallium scan which shows no evidence of carcinoid disease.  She previously had diarrhea but now she has constipation.  She was also found have incisional hernia on examination he has pain around the edges of the hernia.  Since she was symptomatic I talked to her about having the hernia fix and if she continues to have pain then she may need a workup for chronic abdominal pain.  Her pain was getting worse she had initially had postponed concept of having surgery.  Since she continues to have surgery and all other workup is negative she finally agreed to proceed with surgery.  I discussed with her about the procedure the benefits and risks of the procedure such as bleeding infection DVT PE MI stroke recurrence of the hernia mesh related complications such as chronic pain requiring removal of mesh, hematoma.    Initially she hesitated to  have surgery.  However since she continues to have the pain and to rule out the source of pain as a hernia the plan was to proceed with hernia repair.  I clearly explained to her she may continue to have pain in that case she may proceed with further workup such as enteroscopy in the future.  After going all the risks and benefits consent was obtained      Finding:  Patient had a Bhutanese-cheese pattern of hernia defect all at that site of previous surgery the omentum was stuck.  Primary repair was accomplished using permanent stitch.  A similar attacks mesh of about 25 x 20 cm was used.  It had to be trimmed to accommodate the size and it was secured to the abdominal wall    Procedure:  Patient was brought to the operating room and was placed in supine position she was then sedated and intubated.  Time-out was done to verify the ID of the patient and procedure and everyone concurred  The abdomen was prepped and draped in usual sterile manner.    An incision was made in the left upper quadrant.  Veress needle was inserted into the abdominal cavity and was insufflated with CO2.  After adequate insufflation 8 mm robotic port was placed under direct vision.  After adequate insufflation a 8 mm robotic port was placed in the left lower quadrant and another port was placed in between.  The robotic arms were then docked.    The hernia was visualized patient had omentum stuck to the hernia carefully the omentum was taken down from the hernia sac.  Patient had a Bhutanese-cheese hernia defect.  The whole length of the hernia defect was closed with   12 x 6 cm.  Patient also had some adhesions in the upper abdomen this was carefully taken down.  Primary repair of the defect was accomplished meticulously and effectively using permanent v-loc stitch.  A 25 x 20 cm X mesh was taken it was carefully trimmed to fit in the size appropriately.  The sides were trimmed mainly.  It was inserted into the abdominal cavity.  Carefully the mesh  was from [facility] affix to the abdominal wall using absorbable v-loc stitch all around the circumference.  At the end of this 2 diagnose stitches were taken across the mesh so that it will be secured to the abdominal wall without any problem or without any sagging.  Following this meticulous hemostasis was accomplished.  The robotic arms were then undocked.  The left upper quadrant port site was a taken out under direct vision the defect was closed as the mesh was introduced through this area.  Following this the other ports were removed.  Marcaine and Exparel was injected all over the abdominal wall under direct vision and the camera was there.  After ensuring adequate hemostasis skin was closed using 4 O Monocryl and Dermabond was applied over the skin incision patient tolerated the procedure well at the end of the procedure a straight catheterization was done to empty the bladder.  Blood loss was minimal patient was stable was extubated taken to the recovery room in stable condition with no complication    Complications: no    Estimated Blood Loss (EBL): minimal         Implants:   Implant Name Type Inv. Item Serial No.  Lot No. LRB No. Used   SYMBOTEX    [employer] [id number] N/A 1       Specimens:   none           Condition: Stable    Disposition: PACU - hemodynamically stable.    Attestation: I was present and scrubbed for the entire procedure.

## 2020-03-13 NOTE — INTERVAL H&P NOTE
The patient has been examined and the H&P has been reviewed:    I concur with the findings and no changes have occurred since H&P was written.    Anesthesia/Surgery risks, benefits and alternative options discussed and understood by patient/family.          Active Hospital Problems    Diagnosis  POA    Incarcerated incisional hernia [K43.0]  Yes      Resolved Hospital Problems   No resolved problems to display.

## 2020-03-13 NOTE — PLAN OF CARE
Kassandra Rn/5A took report for Deandra Meyers/5A. Transported to room 532 via stretcher,sr upx2.  at bedside.

## 2020-03-13 NOTE — ANESTHESIA POSTPROCEDURE EVALUATION
Anesthesia Post Evaluation    Patient: Christiana Chan    Procedure(s) Performed: Procedure(s) (LRB):  ROBOTIC REPAIR, HERNIA, INCISIONAL (N/A)    Final Anesthesia Type: general    Patient location during evaluation: PACU  Patient participation: Yes- Able to Participate  Level of consciousness: awake and alert and oriented  Post-procedure vital signs: reviewed and stable  Pain management: adequate  Airway patency: patent    PONV status at discharge: No PONV  Anesthetic complications: no      Cardiovascular status: blood pressure returned to baseline and hemodynamically stable  Respiratory status: unassisted, spontaneous ventilation and room air  Hydration status: euvolemic  Follow-up not needed.          Vitals Value Taken Time   BP 99/55 3/13/2020  2:28 PM   Temp 37.1 °C (98.8 °F) 3/13/2020  2:28 PM   Pulse 76 3/13/2020  2:31 PM   Resp 12 3/13/2020  2:31 PM   SpO2 97 % 3/13/2020  2:31 PM   Vitals shown include unvalidated device data.      Event Time     Out of Recovery 14:29:44          Pain/Trey Score: Pain Rating Prior to Med Admin: 8 (3/13/2020  1:32 PM)  Pain Rating Post Med Admin: 6 (3/13/2020  2:24 PM)  Modified Trey Score: 18 (3/13/2020  2:24 PM)

## 2020-03-13 NOTE — PLAN OF CARE
Received from Or per stretcher/bed. Monitors and O2 applied. VSS See flow sheets. Family updated per text system.  No kwan during surgery. Straight cath for 100 ml clear yellow urine. With assist of Rosa M HERR OR

## 2020-03-14 PROCEDURE — 94799 UNLISTED PULMONARY SVC/PX: CPT

## 2020-03-14 PROCEDURE — 63600175 PHARM REV CODE 636 W HCPCS: Performed by: SURGERY

## 2020-03-14 PROCEDURE — 99900035 HC TECH TIME PER 15 MIN (STAT)

## 2020-03-14 PROCEDURE — 25000003 PHARM REV CODE 250: Performed by: STUDENT IN AN ORGANIZED HEALTH CARE EDUCATION/TRAINING PROGRAM

## 2020-03-14 PROCEDURE — 25000003 PHARM REV CODE 250: Performed by: SURGERY

## 2020-03-14 PROCEDURE — G0378 HOSPITAL OBSERVATION PER HR: HCPCS

## 2020-03-14 PROCEDURE — 97116 GAIT TRAINING THERAPY: CPT

## 2020-03-14 PROCEDURE — 96372 THER/PROPH/DIAG INJ SC/IM: CPT

## 2020-03-14 PROCEDURE — 97161 PT EVAL LOW COMPLEX 20 MIN: CPT

## 2020-03-14 PROCEDURE — 63600175 PHARM REV CODE 636 W HCPCS: Performed by: STUDENT IN AN ORGANIZED HEALTH CARE EDUCATION/TRAINING PROGRAM

## 2020-03-14 PROCEDURE — 94761 N-INVAS EAR/PLS OXIMETRY MLT: CPT

## 2020-03-14 RX ORDER — METHOCARBAMOL 500 MG/1
500 TABLET, FILM COATED ORAL 4 TIMES DAILY
Status: DISCONTINUED | OUTPATIENT
Start: 2020-03-14 | End: 2020-03-14

## 2020-03-14 RX ORDER — SIMETHICONE 80 MG
80 TABLET,CHEWABLE ORAL EVERY 8 HOURS PRN
Status: DISCONTINUED | OUTPATIENT
Start: 2020-03-14 | End: 2020-03-15 | Stop reason: HOSPADM

## 2020-03-14 RX ORDER — METHOCARBAMOL 500 MG/1
500 TABLET, FILM COATED ORAL 3 TIMES DAILY
Status: DISCONTINUED | OUTPATIENT
Start: 2020-03-14 | End: 2020-03-15 | Stop reason: HOSPADM

## 2020-03-14 RX ORDER — DIAZEPAM 2 MG/1
2 TABLET ORAL EVERY 8 HOURS PRN
Status: DISCONTINUED | OUTPATIENT
Start: 2020-03-14 | End: 2020-03-15 | Stop reason: HOSPADM

## 2020-03-14 RX ORDER — DIPHENHYDRAMINE HCL 25 MG
25 CAPSULE ORAL EVERY 6 HOURS PRN
Status: DISCONTINUED | OUTPATIENT
Start: 2020-03-14 | End: 2020-03-15 | Stop reason: HOSPADM

## 2020-03-14 RX ADMIN — DIPHENHYDRAMINE HYDROCHLORIDE 25 MG: 25 CAPSULE ORAL at 07:03

## 2020-03-14 RX ADMIN — PANTOPRAZOLE SODIUM 40 MG: 40 TABLET, DELAYED RELEASE ORAL at 09:03

## 2020-03-14 RX ADMIN — METHOCARBAMOL TABLETS 500 MG: 500 TABLET, COATED ORAL at 02:03

## 2020-03-14 RX ADMIN — KETOROLAC TROMETHAMINE 15 MG: 30 INJECTION, SOLUTION INTRAMUSCULAR at 06:03

## 2020-03-14 RX ADMIN — KETOROLAC TROMETHAMINE 15 MG: 30 INJECTION, SOLUTION INTRAMUSCULAR at 05:03

## 2020-03-14 RX ADMIN — TRAMADOL HYDROCHLORIDE 50 MG: 50 TABLET, FILM COATED ORAL at 01:03

## 2020-03-14 RX ADMIN — SIMETHICONE CHEW TAB 80 MG 80 MG: 80 TABLET ORAL at 08:03

## 2020-03-14 RX ADMIN — METHOCARBAMOL TABLETS 500 MG: 500 TABLET, COATED ORAL at 09:03

## 2020-03-14 RX ADMIN — DIPHENHYDRAMINE HYDROCHLORIDE 25 MG: 25 CAPSULE ORAL at 06:03

## 2020-03-14 RX ADMIN — METHOCARBAMOL TABLETS 500 MG: 500 TABLET, COATED ORAL at 08:03

## 2020-03-14 RX ADMIN — SPIRONOLACTONE 12.5 MG: 25 TABLET, FILM COATED ORAL at 11:03

## 2020-03-14 RX ADMIN — KETOROLAC TROMETHAMINE 15 MG: 30 INJECTION, SOLUTION INTRAMUSCULAR at 11:03

## 2020-03-14 RX ADMIN — CARVEDILOL 25 MG: 25 TABLET, FILM COATED ORAL at 08:03

## 2020-03-14 RX ADMIN — ENOXAPARIN SODIUM 40 MG: 100 INJECTION SUBCUTANEOUS at 06:03

## 2020-03-14 RX ADMIN — CARVEDILOL 25 MG: 25 TABLET, FILM COATED ORAL at 09:03

## 2020-03-14 RX ADMIN — ALPRAZOLAM 1 MG: 1 TABLET ORAL at 08:03

## 2020-03-14 NOTE — PLAN OF CARE
Patient A&Ox4. With in lap incision sites closed with dermabond, mild redness noted. With intact kwan draining to light yellowish urine. With c/o abdominal pain, PRN medicine given as requested. Bed alarm on.

## 2020-03-14 NOTE — PLAN OF CARE
Permission attained to enter room via virtual system.  Virtual rounds completed as documented.  Today's lab values, notes, and vital signs up to now have been reviewed.  Pt pain is not yet well controlled  she is disappointed to have ongoing pain since surgery not as yet relieved by the measures ordered   Currently an ice bag across the abd to help with pain   reports spasm like pain    she is on current scheduled muscle relaxers. Will discuss with bedside nurse

## 2020-03-14 NOTE — NURSING
Bladder scan performed. Bladder is not distended appears normal and patient does not have the urge to urinate. Bladder scan showed 276 ml of urine in the bladder.

## 2020-03-14 NOTE — NURSING
Dr. Middleton notified of patient unable to void since in and out cath in recovery and c/o right flank and mid upper abd pain; new order received to place kwan.

## 2020-03-14 NOTE — PROGRESS NOTES
"Surgery Progress Note  03/14/2020    Admission Summary  In brief, Christiana Chan is a 69 y.o. female admitted on 3/13/2020 following robotic ventral hernia repair w/ mesh.     HD#0  POD#1 Day Post-Op    Interval HPI  NAEON. Afebrile  C/o abdominal wall pain and anxiety  Urinary retention overnight after in/out w/ 600 cc on bladder scan, kwan inserted w/ 600 cc output     Objective  VITAL SIGNS: 24 HR MIN & MAX LAST    Temp  Min: 97.9 °F (36.6 °C)  Max: 99 °F (37.2 °C)  99 °F (37.2 °C)        BP  Min: 90/55  Max: 131/60  131/60     Pulse  Min: 64  Max: 84  71     Resp  Min: 10  Max: 20  16    SpO2  Min: 94 %  Max: 98 %  97 %      HT: 5' 3" (160 cm)  WT: 62.5 kg (137 lb 12.6 oz)  BMI: 24.4     Physical Exam:  Gen: No acute distress  Neuro: Alert and oriented to person and place  HEENT: NCAT, EOMI   CV: Regular Rate  Resp: Normal WOB, No SOB  Abd: Soft, Nondistended, Appropriately TTP  MSK: Moves all extremities    Results  Recent Labs   Lab 03/12/20  1425   WBC 8.61   HGB 12.3   HCT 38.0   *      CO2 25   BUN 10   CREATININE 0.8   CALCIUM 8.8   ALKPHOS 43*       Asessment/Plan  69 y.o. female s/p robotic ventral hernia repair w/ mesh     -reg diet  -pain control PRN, added robaxin  -nausea control PRN  -anti anxiety meds as needed   -kwan reinsterted 2/2 urinary retention   -OOB, ambulate  -ppx lovenox   Acute urine retention requiring reinsertion kwan.      Elsa Middleton MD  [employer] General Surgery PGY-2    "

## 2020-03-14 NOTE — PLAN OF CARE
Problem: Adult Inpatient Plan of Care  Goal: Plan of Care Review  Outcome: Ongoing, Progressing  Goal: Optimal Comfort and Wellbeing  Outcome: Ongoing, Progressing     Problem: Pain (Surgery Nonspecified)  Goal: Acceptable Pain Control  Outcome: Ongoing, Progressing     Problem: Fall Injury Risk  Goal: Absence of Fall and Fall-Related Injury  Outcome: Ongoing, Progressing

## 2020-03-14 NOTE — PLAN OF CARE
Cellulitis  Cellulitis is an infection of the deep layers of skin. A break in the skin, such as a cut or scratch, can let bacteria under the skin. If the bacteria get to deep layers of the skin, it can be serious. If not treated, cellulitis can get into the bloodstream and lymph nodes. The infection can then spread throughout the body. This causes serious illness.  Cellulitis causes the affected skin to become red, swollen, warm, and sore. The reddened areas have a visible border. An open sore may leak fluid (pus). You may have a fever, chills, and pain.  Cellulitis is treated with antibiotics taken for 7 to 10 days. An open sore may be cleaned and covered with cool wet gauze. Symptoms should get better 1 to 2 days after treatment is started. Make sure to take all the antibiotics for the full number of days until they are gone. Keep taking the medicine even if your symptoms go away.  Home care  Follow these tips:  · Limit the use of the part of your body with cellulitis.   · If the infection is on your leg, keep your leg raised while sitting. This will help to reduce swelling.  · Take all of the antibiotic medicine exactly as directed until it is gone. Do not miss any doses, especially during the first 7 days. Dont stop taking the medicine when your symptoms get better.  · Keep the affected area clean and dry.  · Wash your hands with soap and warm water before and after touching your skin. Anyone else who touches your skin should also wash his or her hands. Don't share towels.  Follow-up care  Follow up with your healthcare provider, or as advised. If your infection does not go away on the first antibiotic, your healthcare provider will prescribe a different one.  When to seek medical advice  Call your healthcare provider right away if any of these occur:  · Red areas that spread  · Swelling or pain that gets worse  · Fluid leaking from the skin (pus)  · Fever higher of 100.4º F (38.0º C) or higher after 2 days  "Virtual nurse cued into pt room with permission from patient. Pt was asked if IV Toradol has helped with pain. Pt states "somewhat, it' a 7 out of 10 now." Pt statement reported to Denis HERR.    " on antibiotics  Date Last Reviewed: 9/1/2016 © 2000-2017 [employer]. [address] [place] [address], [place] [address]. All rights reserved. This information is not intended as a substitute for professional medical care. Always follow your healthcare professional's instructions.        Paronychia of the Finger or Toe  Paronychia is an infection near a fingernail or toenail. It usually occurs when an opening in the cuticle or an ingrown toenail lets bacteria under the skin.  The infection will need to be drained if pus is present. If the infection has been caught early, you may need only antibiotic treatment. Healing will take about 1 to 2 weeks.  Home care  Follow these guidelines when caring for yourself at home:  · Clean and soak the toe or finger. Do this 2 times a day for the first 3 days. To do so:  ¨ Soak your foot or hand in a tub of warm water for 5 minutes. Or hold your toe or finger under a faucet of warm running water for 5 minutes.  ¨ Clean any crust away with soap and water using a cotton swab.  ¨ Put antibiotic ointment on the infected area.  · Change the dressing daily or any time it gets dirty.  · If you were given antibiotics, take them as directed until they are all gone.  · If your infection is on a toe, wear comfortable shoes with a lot of toe room. You can also wear open-toed sandals while your toe heals.  · You may use over-the-counter medicine (acetaminophen or ibuprofen to help with pain, unless another medicine was prescribed. If you have chronic liver or kidney disease, talk with your healthcare provider before using these medicines. Also talk with your provider if you've had a stomach ulcer or GI (gastrointestinal) bleeding.  Prevention  The following can prevent paronychia:  · Avoid cutting or playing with your cuticles at home.  · Don't bite your nails.  · Don't suck on your thumbs or fingers.  Follow-up care  Follow up with your healthcare provider, or as advised.  When to seek  medical advice  Call your healthcare provider right away if any of these occur:  · Redness, pain, or swelling of the finger or toe gets worse  · Red streaks in the skin leading away from the wound  · Pus or fluid draining from the nail area  · Fever of 100.4ºF (38ºC) or higher, or as directed by your provider  Date Last Reviewed: 8/1/2016  © 4375-9905 [employer]. [address] [place] [address], [place] [address]. All rights reserved. This information is not intended as a substitute for professional medical care. Always follow your healthcare professional's instructions.        Diphtheria/Tetanus Toxoids; Pertussis Vaccine, DTP injection  What is this medicine?  DIPHTHERIA and TETANUS TOXOIDS; PERTUSSIS VACCINE (dif THEER ee uh and TET n us TOK soids; per SHAWN MOHAN seen) is used to prevent diphtheria, tetanus, and pertussis infections.  How should I use this medicine?  This vaccine is for injection into a muscle. It is given by a health care professional.  A copy of Vaccine Information Statements will be given before each vaccination. Read this sheet carefully each time. The sheet may change frequently.  Talk to your pediatrician regarding the use of this vaccine in children. While the DTP vaccine may be given to children ages 6 weeks to 7 years and the Tdap vaccine may be given to children at least 10 years old, precautions do apply.  What side effects may I notice from receiving this medicine?  Side effects that you should report to your doctor or health care professional as soon as possible:  · allergic reactions like skin rash, itching or hives, swelling of the face, lips, or tongue  · breathing problems  · fever of 103 degrees F or more  · flu-like symptoms  · inconsolable crying  · infection  · pain, tingling, numbness in the hands or feet  · seizures  · swelling of arm or leg that was injected  · unusually weak or tired  Side effects that usually do not require immediate medical attention (report  these side effects to your doctor or health care professional if they continue or are bothersome):  · fussy, irritable  · loss of appetite  · fever of 102 degrees F or less  · pain, tenderness, redness, swelling, or a 'knot' at site where injected  · vomiting  What may interact with this medicine?  · immune globulin  · medicines that suppress your immune function like adalimumab, anakinra, infliximab  · medicines to treat cancer  · medicines that treat or prevent blood clots like warfarin, enoxaparin, and dalteparin  · steroid medicines like prednisone or cortisone  What if I miss a dose?  It is important not to miss your dose. Call your doctor or health care professional if you are unable to keep an appointment.  Where should I keep my medicine?  This drug is given in a hospital or clinic and will not be stored at home.  What should I tell my health care provider before I take this medicine?  They need to know if you have any of these conditions:  · blood disorders like hemophilia  · fever or infection  · immune system problems  · neurologic disease  · seizures  · an unusual or allergic reaction to vaccines, thimerosal, latex, other medicines, foods, dyes, or preservatives  · pregnant or trying to get pregnant  · breast-feeding  What should I watch for while using this medicine?  See your health care provider for all shots of this vaccine as directed. To have protection from infection, you must have 3 shots of this vaccine plus boosters as needed. Tell your doctor right away if you have any serious or unusual side effects after getting this vaccine.  NOTE:This sheet is a summary. It may not cover all possible information. If you have questions about this medicine, talk to your doctor, pharmacist, or health care provider. Copyright© 2017 [employer]      -As we discussed today you will be started on antibiotics for the next 10 days. Be sure to complete the antibiotics even if your symptoms improve.  -Keep the area  clean and covered.  -Apply the Bactroban to the affected area 3-5 days twice a day.  -As we discussed you should notice improvement in the affected area.  -If your notice worsening redness, swelling, worsening pain or purulent drainage with fevers go to the ER.    Please follow up with your Primary care provider within 2-5 days if your signs and symptoms have not resolved or worsen.     If your condition worsens or fails to improve we recommend that you receive another evaluation at the emergency room immediately or contact your primary medical clinic to discuss your concerns.   You must understand that you have received an Urgent Care treatment only and that you may be released before all of your medical problems are known or treated. You, the patient, will arrange for follow up care as instructed.

## 2020-03-15 VITALS
SYSTOLIC BLOOD PRESSURE: 127 MMHG | TEMPERATURE: 98 F | HEART RATE: 82 BPM | HEIGHT: 63 IN | WEIGHT: 137.81 LBS | OXYGEN SATURATION: 95 % | DIASTOLIC BLOOD PRESSURE: 78 MMHG | RESPIRATION RATE: 18 BRPM | BODY MASS INDEX: 24.42 KG/M2

## 2020-03-15 LAB
ANION GAP SERPL CALC-SCNC: 9 MMOL/L (ref 8–16)
BUN SERPL-MCNC: 7 MG/DL (ref 8–23)
CALCIUM SERPL-MCNC: 7.9 MG/DL (ref 8.7–10.5)
CHLORIDE SERPL-SCNC: 108 MMOL/L (ref 95–110)
CO2 SERPL-SCNC: 21 MMOL/L (ref 23–29)
CREAT SERPL-MCNC: 0.7 MG/DL (ref 0.5–1.4)
EST. GFR  (AFRICAN AMERICAN): >60 ML/MIN/1.73 M^2
EST. GFR  (NON AFRICAN AMERICAN): >60 ML/MIN/1.73 M^2
GLUCOSE SERPL-MCNC: 82 MG/DL (ref 70–110)
POTASSIUM SERPL-SCNC: 3.6 MMOL/L (ref 3.5–5.1)
SODIUM SERPL-SCNC: 138 MMOL/L (ref 136–145)

## 2020-03-15 PROCEDURE — 80048 BASIC METABOLIC PNL TOTAL CA: CPT

## 2020-03-15 PROCEDURE — G0378 HOSPITAL OBSERVATION PER HR: HCPCS

## 2020-03-15 PROCEDURE — 25000003 PHARM REV CODE 250: Performed by: STUDENT IN AN ORGANIZED HEALTH CARE EDUCATION/TRAINING PROGRAM

## 2020-03-15 PROCEDURE — 63600175 PHARM REV CODE 636 W HCPCS: Performed by: SURGERY

## 2020-03-15 PROCEDURE — 25000003 PHARM REV CODE 250: Performed by: SURGERY

## 2020-03-15 PROCEDURE — 36415 COLL VENOUS BLD VENIPUNCTURE: CPT

## 2020-03-15 RX ORDER — GABAPENTIN 300 MG/1
300 CAPSULE ORAL 3 TIMES DAILY
Status: DISCONTINUED | OUTPATIENT
Start: 2020-03-15 | End: 2020-03-15 | Stop reason: HOSPADM

## 2020-03-15 RX ORDER — ONDANSETRON 4 MG/1
4 TABLET, ORALLY DISINTEGRATING ORAL ONCE
Qty: 15 TABLET | Refills: 0 | Status: SHIPPED | OUTPATIENT
Start: 2020-03-15 | End: 2020-03-15

## 2020-03-15 RX ORDER — SIMETHICONE 80 MG
80 TABLET,CHEWABLE ORAL EVERY 8 HOURS PRN
Qty: 30 TABLET | Refills: 0 | Status: SHIPPED | OUTPATIENT
Start: 2020-03-15

## 2020-03-15 RX ORDER — TAMSULOSIN HYDROCHLORIDE 0.4 MG/1
0.4 CAPSULE ORAL DAILY
Status: DISCONTINUED | OUTPATIENT
Start: 2020-03-15 | End: 2020-03-15 | Stop reason: HOSPADM

## 2020-03-15 RX ORDER — DICYCLOMINE HYDROCHLORIDE 10 MG/1
10 CAPSULE ORAL 4 TIMES DAILY
Status: DISCONTINUED | OUTPATIENT
Start: 2020-03-15 | End: 2020-03-15

## 2020-03-15 RX ORDER — BETHANECHOL CHLORIDE 10 MG/1
10 TABLET ORAL 3 TIMES DAILY
Status: DISCONTINUED | OUTPATIENT
Start: 2020-03-15 | End: 2020-03-15 | Stop reason: HOSPADM

## 2020-03-15 RX ORDER — METHOCARBAMOL 750 MG/1
750 TABLET, FILM COATED ORAL 3 TIMES DAILY
Qty: 90 TABLET | Refills: 0 | Status: SHIPPED | OUTPATIENT
Start: 2020-03-15 | End: 2020-04-14

## 2020-03-15 RX ADMIN — KETOROLAC TROMETHAMINE 15 MG: 30 INJECTION, SOLUTION INTRAMUSCULAR at 12:03

## 2020-03-15 RX ADMIN — GABAPENTIN 300 MG: 300 CAPSULE ORAL at 08:03

## 2020-03-15 RX ADMIN — SPIRONOLACTONE 12.5 MG: 25 TABLET, FILM COATED ORAL at 08:03

## 2020-03-15 RX ADMIN — ACETAMINOPHEN 650 MG: 325 TABLET ORAL at 12:03

## 2020-03-15 RX ADMIN — PANTOPRAZOLE SODIUM 40 MG: 40 TABLET, DELAYED RELEASE ORAL at 08:03

## 2020-03-15 RX ADMIN — METHOCARBAMOL TABLETS 500 MG: 500 TABLET, COATED ORAL at 08:03

## 2020-03-15 RX ADMIN — KETOROLAC TROMETHAMINE 15 MG: 30 INJECTION, SOLUTION INTRAMUSCULAR at 05:03

## 2020-03-15 RX ADMIN — CARVEDILOL 25 MG: 25 TABLET, FILM COATED ORAL at 08:03

## 2020-03-15 RX ADMIN — SIMETHICONE CHEW TAB 80 MG 80 MG: 80 TABLET ORAL at 09:03

## 2020-03-15 RX ADMIN — TAMSULOSIN HYDROCHLORIDE 0.4 MG: 0.4 CAPSULE ORAL at 08:03

## 2020-03-15 NOTE — PLAN OF CARE
Pt vitals were maintained, pt had some moderate anxiety. Pt c/o gas and cramping pain but is scared to receive anything b/c she feels she might be allergic to them. Pt does have some mild lip swelling from previous administration of medicine. Pt bowel sounds are faint. Will continue to monitor

## 2020-03-15 NOTE — PLAN OF CARE
Patient has received discharge instructions from his bedside nurse . Prescriptions given to pt  her pharmacy.  Instructions reviewed by bedside nurse with with pt using teachback method. All questions were answered to pt's satisfaction. IV access  removed per floor nurse. Transport arranged for  discharge by bedside nurse. Pt no longer present in room.

## 2020-03-15 NOTE — PLAN OF CARE
Discharge orders noted, no HH or HME ordered.    Pt's nurse will go over medications/signs and symptoms prior to discharge       03/15/20 1225   Final Note   Assessment Type Final Discharge Note   Anticipated Discharge Disposition Home   What phone number can be called within the next 1-3 days to see how you are doing after discharge? [phone]   Hospital Follow Up  Appt(s) scheduled? No  (Offices closed for Weekend. Patient to schedule own follow up appointment.  )   Right Care Referral Info   Post Acute Recommendation No Care     Shabnam Pickett RN Transitional Navigator  [phone]

## 2020-03-15 NOTE — DISCHARGE SUMMARY
DISCHARGE SUMMARY  Neuroendocrine surgery    Patient Name: Christiana Chan  YOB: 1950    Admit Date: 3/13/2020    Discharge Date: 03/15/2020    Discharge Attending Physician: Rebecca Valdez MD     Diagnoses:  Active Hospital Problems    Diagnosis  POA    *Incarcerated incisional hernia [K43.0]  Yes    Acute retention of urine [R33.8]  No    Chronic appendicitis [K36]  Yes      Resolved Hospital Problems   No resolved problems to display.       Discharged Condition: stable     HOSPITAL COURSE:      69-year-old female presented on 3 13 for elective robotic assisted repair of incarcerated incisional hernia w/ mesh.  The operation was uncomplicated, however the patient exhibited signs of urinary retention and need for pain control so she was admitted to observation to the surgical service.  On postop day 1 she required Kumar placement.  On postop day 2 the Kumar was removed and she urinated spontaneously.  She was discharged home once tolerating p.o., pain controlled and urinating.  She was discharged with pain medication, antinausea medication.     Pertinent/Significant Diagnostic Studies:  None     Special Treatments/Procedures:     3/13: OR for robotic assisted incisional hernia repair w/ mesh     Disposition:  Home w/ family     Wound Care: none needed, incision w/ dissolvable sutures     Activity: as tolerated     Future Scheduled Appointments:  No future appointments.    Follow-up Plans from This Hospitalization:  -follow up with Dr. Herring in approx 4 weeks, office will call with appointment tomorrow     Discharge Medication List:   Christiana Chan   Home Medication Instructions MARCIA:[id number]    Printed on:03/15/20 1212   Medication Information                      acetaminophen (TYLENOL) 500 MG tablet  Take 500 mg by mouth every 6 (six) hours as needed for Pain.             albuterol (PROAIR HFA) 90 mcg/actuation inhaler  Inhale 1 puff into the lungs every 6 (six) hours as  needed for Wheezing. Rescue             ALPRAZolam (XANAX) 2 MG Tab  Take 1 mg by mouth nightly.              carvedilol (COREG) 25 MG tablet  Take 25 mg by mouth 2 (two) times daily.             citalopram (CELEXA) 10 MG tablet  Take 10 mg by mouth.             COMBIVENT RESPIMAT  mcg/actuation inhaler  INL 2 PFS PO BID             cyanocobalamin (VITAMIN B-12) 1,000 mcg/mL injection  Inject 1,000 mcg into the muscle every 14 (fourteen) days.             denosumab (PROLIA) 60 mg/mL Syrg  Inject 60 mg into the skin every 6 (six) months.             lipase-protease-amylase 24,000-76,000-120,000 units (CREON) 24,000-76,000 -120,000 unit capsule  Take 1 capsule by mouth 3 (three) times daily with meals.             lisinopril (PRINIVIL,ZESTRIL) 20 MG tablet  Take 20 mg by mouth Daily.              methocarbamoL (ROBAXIN) 750 MG Tab  Take 1 tablet (750 mg total) by mouth 3 (three) times daily.             ondansetron (ZOFRAN) 8 MG tablet  Take 4 mg by mouth every 4 (four) hours as needed for Nausea.              ondansetron (ZOFRAN-ODT) 4 MG TbDL  Take 1 tablet (4 mg total) by mouth once. for 1 dose             pantoprazole (PROTONIX) 20 MG tablet  Take 20 mg by mouth once daily.             propranolol (INDERAL) 10 MG tablet  Take 10 mg by mouth daily as needed.             simethicone (MYLICON) 80 MG chewable tablet  Take 1 tablet (80 mg total) by mouth every 8 (eight) hours as needed for Flatulence.             spironolactone (ALDACTONE) 25 MG tablet  Take 12.5 mg by mouth once daily.             terconazole (TERAZOL 7) 0.4 % Crea  U 1 APPLICATION VAGINALLY QD HS             traMADoL (ULTRAM) 50 mg tablet  Take 1 tablet (50 mg total) by mouth every 6 (six) hours as needed.             traMADoL (ULTRAM) 50 mg tablet  Take 1 tablet (50 mg total) by mouth every 6 (six) hours as needed.             traZODone (DESYREL) 50 MG tablet  100 mg.              valACYclovir (VALTREX) 500 MG tablet  TK 1 T PO D                  Patient Instructions:  Discharge Procedure Orders   Diet Adult Regular     Diet Adult Regular     Ice to affected area     Lifting restrictions   Order Comments: No lifting more than 10 lbs for 3 weeks  Can shower  Can climb stairs  Can travel  No driving for 10 days     Notify your health care provider if you experience any of the following:     Notify your health care provider if you experience any of the following:  increased confusion or weakness     Notify your health care provider if you experience any of the following:  persistent dizziness, light-headedness, or visual disturbances     Notify your health care provider if you experience any of the following:  worsening rash     Notify your health care provider if you experience any of the following:  severe persistent headache     Notify your health care provider if you experience any of the following:  difficulty breathing or increased cough     Notify your health care provider if you experience any of the following:  redness, tenderness, or signs of infection (pain, swelling, redness, odor or green/yellow discharge around incision site)     Notify your health care provider if you experience any of the following:  severe uncontrolled pain     Notify your health care provider if you experience any of the following:  persistent nausea and vomiting or diarrhea     Notify your health care provider if you experience any of the following:  temperature >100.4     No dressing needed     Notify your health care provider if you experience any of the following:  temperature >100.4     Notify your health care provider if you experience any of the following:  persistent nausea and vomiting or diarrhea     Notify your health care provider if you experience any of the following:  severe uncontrolled pain     Notify your health care provider if you experience any of the following:  redness, tenderness, or signs of infection (pain, swelling, redness, odor or  green/yellow discharge around incision site)     Notify your health care provider if you experience any of the following:  difficulty breathing or increased cough     Activity as tolerated     Activity as tolerated       Elsa Middleton, PGY 2   [facility]

## 2020-03-15 NOTE — PLAN OF CARE
VIRTUAL NURSE:  Cued into patient's room.  Permission received per patient to turn camera to view patient. Visitor at bedside.  Introduced as VN for night shift that will be working with floor nurse and nursing assistant. Educated patient on VN's role in patient care. C/o stomach pain. Medications reviewed, Requested simethicone as well. Bedside RN notified. Plan of care reviewed with patient. Education per flowsheet.  Opportunity given for questions and questions answered.  Instructed to call for assistance.  Will cont to monitor.    Labs, notes, and orders reviewed.

## 2020-03-15 NOTE — PROGRESS NOTES
"Surgery Progress Note  03/15/2020    Admission Summary  In brief, Christiana Chan is a 69 y.o. female admitted on 3/13/2020 following robotic ventral hernia repair w/ mesh.     HD#0  POD#1 Day Post-Op    Interval HPI  NAEON. Afebrile  Abd pain improved   Lip swelling/erythematous rash w/ opiates/tramadol yesterday discontinued   Good UOP w/ kwan inserted yesterday morning for urinary retention     Objective  VITAL SIGNS: 24 HR MIN & MAX LAST    Temp  Min: 98.1 °F (36.7 °C)  Max: 99 °F (37.2 °C)  98.2 °F (36.8 °C)        BP  Min: 117/56  Max: 159/75  138/69     Pulse  Min: 74  Max: 92  75     Resp  Min: 16  Max: 20  20    SpO2  Min: 95 %  Max: 97 %  95 %      HT: 5' 3" (160 cm)  WT: 62.5 kg (137 lb 12.6 oz)  BMI: 24.4     Physical Exam:  Gen: No acute distress  Neuro: Alert and oriented to person and place  HEENT: NCAT, EOMI   CV: Regular Rate  Resp: Normal WOB, No SOB  Abd: Soft, Nondistended, Appropriately TTP  MSK: Moves all extremities    Results  Recent Labs   Lab 03/12/20  1425 03/15/20  0648   WBC 8.61  --    HGB 12.3  --    HCT 38.0  --    *  --     138   CO2 25 21*   BUN 10 7*   CREATININE 0.8 0.7   CALCIUM 8.8 7.9*   ALKPHOS 43*  --        Asessment/Plan  69 y.o. female s/p robotic ventral hernia repair w/ mesh     -reg diet  -pain control PRN, added robaxin and valium for muscles spasms   -nausea control PRN  -anti anxiety meds as needed   -kwan reinsterted 2/2 urinary retention removed this morning, started flomax   -OOB, ambulate  -ppx lovenox         Elsa Middleton MD  [facility] PGY-2    "

## 2020-03-15 NOTE — NURSING
Patient still c/o redness on lips, PRN benadryl given. Dr. Middleton called, with order to d/c tramadol and put ice compress on lips.

## 2020-03-15 NOTE — PLAN OF CARE
avs prepared   Pt bedside nurse stated pt has voided.   When received cue to enter the room , this nurse will review avs with pt

## 2020-03-15 NOTE — PT/OT/SLP EVAL
Physical Therapy Evaluation and Treatment    Patient Name:  Christiana Chan   MRN:  [id number]    Recommendations:     Discharge Recommendations:  (TBD pending progress)   Discharge Equipment Recommendations: (TBD pending progress)   Barriers to discharge: None    Assessment:     Christiana Chan is a 69 y.o. female admitted with a medical diagnosis of Incarcerated incisional hernia.  She presents with the following impairments/functional limitations:  weakness, impaired endurance, impaired self care skills, impaired functional mobilty, gait instability, impaired balance, pain, impaired skin.  Pt performed supine<>sit with MOD/MINAx  1, sit<>stand transfer with CGAx 1 and gait 60 ft using a rw with CGAx  1. Pt c/o 8/10 abdominal pain despite  pre-medicating  prior to tx requiring a 2nd pain medication..    Rehab Prognosis: Good; patient would benefit from acute skilled PT services to address these deficits and reach maximum level of function.    Recent Surgery: Procedure(s) (LRB):  ROBOTIC REPAIR, HERNIA, INCISIONAL (N/A) 1 Day Post-Op    Plan:     During this hospitalization, patient to be seen 6 x/week to address the identified rehab impairments via gait training, therapeutic activities, therapeutic exercises and progress toward the following goals:    · Plan of Care Expires:  04/14/20    Subjective     Chief Complaint: abdominal pain  Patient/Family Comments/goals: to return to PLOF  Pain/Comfort:  · Pain Rating 1: 8/10  · Location - Side 1: Right  · Location - Orientation 1: generalized  · Location 1: abdomen  · Pain Addressed 1: Pre-medicate for activity, Reposition, Nurse notified, Cessation of Activity  · Pain Rating Post-Intervention 1: 8/10    Patients cultural, spiritual, Evangelical conflicts given the current situation: no    Living Environment:  Pt lives in [facility] with spouse and no steps to enter. Pt was independent with ADLS including driving and ambulation at community level without any DME. Pt may  still have a RW and BSC at home from previous Sx but needs to checkon this.  Prior to admission, patients level of function was independent.  Equipment used at home: (pt may have a RW and BSC from previous sx but not sure).  DME owned (not currently used): none.  Upon discharge, patient will have assistance from spouse.    Objective:     Communicated with RN prior to session.  Patient found HOB elevated with peripheral IV, kwan catheter, bed alarm  upon PT entry to room.    General Precautions: Standard, fall   Orthopedic Precautions:N/A   Braces: N/A     Exams:  · Gross Motor Coordination:  WFL B LE  · Postural Exam:  Patient presented with the following abnormalities:    · -       Rounded shoulders  · LUE ROM: WFL  · LUE Strength: 4/5  · RLE ROM: WFL  · RLE Strength: 4/5    Functional Mobility:  · Bed Mobility:     · Supine to Sit: minimum assistance and moderate assistance  · Sit to Supine: minimum assistance and moderate assistance  · Transfers:     · Sit to Stand:  contact guard assistance with rolling walker  · Gait: ambulated 60 ft using a RW with CGAx  1  · Balance: Static stand: F     Dynamic stand: F using a RW      Therapeutic Activities and Exercises:   Pt was educated in POC and role of PT  Pt was instructed in proper log rolling during supine<>sit to decrease abdominal pain and increase efficiency.  Pt transferred sit<>stand to a rw with CGAx  1 and stood with CGA/SBA.  Pt rested at EOB and then ambulated using a RW as above requiring VC for increasing HÉCTOR, reciprocal gait and continuous steps for safety.    AM-PAC 6 CLICK MOBILITY  Total Score:16     Patient left HOB elevated with call button in reach, bed alarm on and RN notified.    GOALS:   Multidisciplinary Problems     Physical Therapy Goals        Problem: Physical Therapy Goal    Goal Priority Disciplines Outcome Goal Variances Interventions   Physical Therapy Goal     PT, PT/OT Ongoing, Progressing     Description:  Goals to be met by:  20     Patient will increase functional independence with mobility by performin. Supine <> sit with Stand-by Assistance  2. Sit to stand transfer with Modified [place] and Supervision  3. Bed to chair transfer with Modified [place] and Supervision with or without AD  4. Gait  x 150 feet with Modified [place] and Supervision with/without AD.   5. Lower extremity exercise program 2 x 10  reps  with supervision                      History:     Past Medical History:   Diagnosis Date    Abdominal bloating     Abdominal pain     Asthma     Atrial fibrillation     Colon polyps     Constipation     Diverticulosis of colon     Esophageal ulcer     Gastritis     GERD (gastroesophageal reflux disease)     Heartburn     Hiatal hernia     HTN (hypertension)     Intermittent diarrhea     Iron deficiency anemia     Osteoarthritis     Osteoporosis     Rectal bleeding     Ruptured lumbar disc     Small bowel lesion     Vitamin B12 deficiency anemia     Vitamin D deficiency        Past Surgical History:   Procedure Laterality Date    ANTEGRADE SINGLE BALLOON ENTEROSCOPY N/A 2019    Procedure: ENTEROSCOPY, DOUBLE BALLOON, ANTEGRADE;  Surgeon: Sivakumar Dorsey MD;  Location: [address];  Service: Endoscopy;  Laterality: N/A;    BREAST BIOPSY Bilateral      SECTION   &     CHOLECYSTECTOMY      COLONOSCOPY      outside facility    ENDOSCOPIC ULTRASOUND OF UPPER GASTROINTESTINAL TRACT N/A 2019    Procedure: ULTRASOUND-ENDOSCOPIC-UPPER;  Surgeon: Armando Bills MD;  Location: [facility];  Service: Endoscopy;  Laterality: N/A;  Carcinoid diagnosis    ESOPHAGOGASTRODUODENOSCOPY      outside facility    HYSTERECTOMY      LAPAROSCOPIC APPENDECTOMY N/A 2019    Procedure: APPENDECTOMY, LAPAROSCOPIC;  Surgeon: Rebecca Valdez MD;  Location: [facility];  Service: General;  Laterality: N/A;    LAPAROSCOPIC RESECTION OF SMALL INTESTINE N/A  4/11/2019    Procedure: EXCISION, SMALL INTESTINE, LAPAROSCOPIC;  Surgeon: Rebecca Valdez MD;  Location: [facility];  Service: General;  Laterality: N/A;    SHOULDER SURGERY Right 03/13/2013    TOTAL KNEE ARTHROPLASTY Right 09/16/2013       Time Tracking:     PT Received On: 03/14/20  PT Start Time: 1250     PT Stop Time: 1314  PT Total Time (min): 24 min     Billable Minutes: Evaluation 15 and Gait Training 9      Essence Green, PT  03/14/2020

## 2020-03-15 NOTE — PLAN OF CARE
Problem: Physical Therapy Goal  Goal: Physical Therapy Goal  Description  Goals to be met by: 20     Patient will increase functional independence with mobility by performin. Supine <> sit with Stand-by Assistance  2. Sit to stand transfer with Modified [place] and Supervision  3. Bed to chair transfer with Modified [place] and Supervision with or without AD  4. Gait  x 150 feet with Modified [place] and Supervision with/without AD.   5. Lower extremity exercise program 2 x 10  reps  with supervision     Outcome: Ongoing, Progressing   PT evaluation was completed. Pt performed supine<>sit with MOD/MINAx  1, sit<>stand transfer with CGAx 1 and gait 60 ft using a rw with CGAx  1. Pt c/o 8/10 abdominal pain despite  pre-medicating  prior to tx requiring a 2nd pain medication.

## 2020-03-17 ENCOUNTER — TELEPHONE (OUTPATIENT)
Dept: NEUROLOGY | Facility: HOSPITAL | Age: 70
End: 2020-03-17

## 2020-03-17 NOTE — TELEPHONE ENCOUNTER
Spoke with pt and she has not had a BM she is passing gas. She has had the dulcolax pill and the mag citrate. She feel bloated. I told her to use dulcolax suppository and fleets enema. Explained how bowels are the last to wake up. Told pt to call me tomorrow and let me know how she is doing.

## 2020-03-23 ENCOUNTER — TELEPHONE (OUTPATIENT)
Dept: NEUROLOGY | Facility: HOSPITAL | Age: 70
End: 2020-03-23

## 2020-03-30 DIAGNOSIS — K43.0 INCARCERATED INCISIONAL HERNIA: Primary | ICD-10-CM

## 2020-03-30 RX ORDER — TRAMADOL HYDROCHLORIDE 50 MG/1
50 TABLET ORAL EVERY 6 HOURS PRN
Qty: 20 TABLET | Refills: 0 | Status: SHIPPED | OUTPATIENT
Start: 2020-03-30 | End: 2023-02-28 | Stop reason: ALTCHOICE

## 2020-03-30 NOTE — TELEPHONE ENCOUNTER
Spoke with pt and let her know that Tramadol has been pended for Dr. Laguerre to sign and send the medication. Pt verbalized understanding

## 2020-04-09 ENCOUNTER — TELEPHONE (OUTPATIENT)
Dept: NEUROLOGY | Facility: HOSPITAL | Age: 70
End: 2020-04-09

## 2020-04-09 NOTE — TELEPHONE ENCOUNTER
Spoke to pt and she states that on her left side under her breast on her rib there is a tender spot and she is applying ice periodically. She wants to make sure it was part of the surgery and nothing else. I told her that I would talk with Dr. Laguerre and call her this afternoon or in the morning. Pt verbalized understanding

## 2020-04-09 NOTE — TELEPHONE ENCOUNTER
----- Message from Hannah Pop sent at 4/9/2020 11:07 AM CDT -----  Contact: Pt/ [phone]  BRENT---Pt is requesting a callback.    Please call

## 2020-04-16 ENCOUNTER — TELEPHONE (OUTPATIENT)
Dept: NEUROLOGY | Facility: HOSPITAL | Age: 70
End: 2020-04-16

## 2020-04-16 NOTE — TELEPHONE ENCOUNTER
----- Message from Ashlyn Dwyer sent at 4/16/2020  1:11 PM CDT -----  Contact: self [phone]  BRENT - Patient is returning call. Please call

## 2020-04-16 NOTE — TELEPHONE ENCOUNTER
----- Message from Zoraida Morrison sent at 4/16/2020 12:51 PM CDT -----  Contact: [phone]/self   BRENT  Patient is requesting to speak with nurse regarding scheduling an appointment. Please advise.

## 2020-04-20 ENCOUNTER — OFFICE VISIT (OUTPATIENT)
Dept: NEUROLOGY | Facility: HOSPITAL | Age: 70
End: 2020-04-20
Attending: SURGERY
Payer: MEDICARE

## 2020-04-20 ENCOUNTER — TELEPHONE (OUTPATIENT)
Dept: NEUROLOGY | Facility: HOSPITAL | Age: 70
End: 2020-04-20

## 2020-04-20 DIAGNOSIS — Z09 POSTOP CHECK: Primary | ICD-10-CM

## 2020-04-20 NOTE — PATIENT INSTRUCTIONS
2 month follow up appt scheduled with pt via telephone on Monday, Violette 15, 2020 at 3pm.  Pt to be notified if need to change to virtual visit.  Pt acknowledged understanding.

## 2020-04-20 NOTE — PROGRESS NOTES
S/p incisional hernia repair on 3/13      Lost some more weight    Eating however not as expected, having appetite but not able to eat fully  BM still irregular still constipated taking dulcolax    Abdominal pain better mainly on the right side    Taking 1 boost a day  Sleep pattern disturbed    Ambulating    Overall better over 50%    abd looked ok    No evidence of disease  Will see her in about 2 months to 3 months time    Re scan will be decided at the time of next follow-up

## 2020-04-28 ENCOUNTER — TELEPHONE (OUTPATIENT)
Dept: NEUROLOGY | Facility: HOSPITAL | Age: 70
End: 2020-04-28

## 2020-04-28 NOTE — TELEPHONE ENCOUNTER
----- Message from Hannah Pop sent at 4/28/2020  8:43 AM CDT -----  Contact: Pt/ [phone]  BRENT----Pt is requesting a call back in regards to having a difficult time having a bowel movement.  Pt stated that she is taking medication for it.    Please call and advise.

## 2020-04-28 NOTE — TELEPHONE ENCOUNTER
Pt has not had bowel movemeant since last Friday, 4/24/20, taking dulcolax.  Pt states she was fine until Friday.  Pt advised to continue taking dulcolax and to increase filber intake. Pt to be notified with additional recommendations given by Dr. Laguerre.

## 2020-05-05 NOTE — TELEPHONE ENCOUNTER
Pt instructed per Dr. Laguerre, to take either half or a third of mag citrate to help when constipated.  Pt acknowledged understanding.

## 2020-05-08 DIAGNOSIS — K36 CHRONIC APPENDICITIS: Primary | ICD-10-CM

## 2020-05-08 DIAGNOSIS — C7A.012 MALIGNANT CARCINOID TUMOR OF ILEUM: ICD-10-CM

## 2020-05-08 DIAGNOSIS — R11.0 NAUSEA: ICD-10-CM

## 2020-05-08 RX ORDER — ONDANSETRON HYDROCHLORIDE 8 MG/1
4 TABLET, FILM COATED ORAL EVERY 8 HOURS PRN
Qty: 30 TABLET | Refills: 1 | Status: SHIPPED | OUTPATIENT
Start: 2020-05-08 | End: 2024-01-25

## 2020-05-28 ENCOUNTER — TELEPHONE (OUTPATIENT)
Dept: NEUROLOGY | Facility: HOSPITAL | Age: 70
End: 2020-05-28

## 2020-05-28 NOTE — TELEPHONE ENCOUNTER
Pt starting having bilateral lower leg pain without swelling.  Pt restarted gabapentin today.  Pt did not obtain refill of gabapentin since April 2019. Pt would like to know what Dr. Laguerre recommends.  Pt states she can also get recommendations sent via portal or phone.

## 2020-05-28 NOTE — TELEPHONE ENCOUNTER
----- Message from Ashlyn Dwyer sent at 5/28/2020  8:11 AM CDT -----  Contact: self [phone]  Shade - Patient is calling to discuss some symptoms she is having after surgery. Please call

## 2020-06-15 ENCOUNTER — OFFICE VISIT (OUTPATIENT)
Dept: NEUROLOGY | Facility: HOSPITAL | Age: 70
End: 2020-06-15
Attending: SURGERY
Payer: MEDICARE

## 2020-06-15 VITALS
WEIGHT: 123.88 LBS | DIASTOLIC BLOOD PRESSURE: 73 MMHG | HEART RATE: 71 BPM | TEMPERATURE: 98 F | BODY MASS INDEX: 21.95 KG/M2 | SYSTOLIC BLOOD PRESSURE: 128 MMHG

## 2020-06-15 DIAGNOSIS — Z87.19 H/O HERNIA REPAIR: Primary | ICD-10-CM

## 2020-06-15 DIAGNOSIS — C7A.012 MALIGNANT CARCINOID TUMOR OF ILEUM: ICD-10-CM

## 2020-06-15 DIAGNOSIS — Z98.890 H/O HERNIA REPAIR: Primary | ICD-10-CM

## 2020-06-15 PROCEDURE — 99214 OFFICE O/P EST MOD 30 MIN: CPT | Performed by: SURGERY

## 2020-06-15 RX ORDER — GABAPENTIN 300 MG/1
300 CAPSULE ORAL 3 TIMES DAILY
Qty: 90 CAPSULE | Refills: 11 | Status: SHIPPED | OUTPATIENT
Start: 2020-06-15 | End: 2021-06-15

## 2020-06-15 NOTE — PROGRESS NOTES
NOLANETS:  [facility]  A collaboration between [facility] and [facility]      PATIENT: Christiana Chan  MRN: [id number]  DATE: 6/15/2020    Subjective:      Chief Complaint: No chief complaint on file.   Continues to feel tired not able to tolerate certain kinds of food.  Has issues with constipation not getting better with a any medication also has some intermittent cramping abdominal pain  Eating is very irregular because of the pain    Vitals: Blood pressure 128/73, pulse 71, temperature 98.3 °F (36.8 °C), temperature source Oral, weight 56.2 kg (123 lb 14.4 oz).     ECOG Score: 1 - Symptomatic but completely ambulatory    Diagnosis: No diagnosis found.     Interval History:   Oncologic History:   Oncologic History    Oncologic Treatment    Pathology      Past Medical History:  Past Medical History:   Diagnosis Date    Abdominal bloating     Abdominal pain     Asthma     Atrial fibrillation     Colon polyps     Constipation     Diverticulosis of colon     Esophageal ulcer     Gastritis     GERD (gastroesophageal reflux disease)     Heartburn     Hiatal hernia     HTN (hypertension)     Intermittent diarrhea     Iron deficiency anemia     Osteoarthritis     Osteoporosis     Rectal bleeding     Ruptured lumbar disc     Small bowel lesion     Vitamin B12 deficiency anemia     Vitamin D deficiency        Past Surgical History:  Past Surgical History:   Procedure Laterality Date    ANTEGRADE SINGLE BALLOON ENTEROSCOPY N/A 2019    Procedure: ENTEROSCOPY, DOUBLE BALLOON, ANTEGRADE;  Surgeon: Sivakumar Dorsey MD;  Location: [facility] ([address] [place] [address]);  Service: Endoscopy;  Laterality: N/A;    BREAST BIOPSY Bilateral      SECTION   &     CHOLECYSTECTOMY      COLONOSCOPY      outside facility    ENDOSCOPIC ULTRASOUND OF UPPER GASTROINTESTINAL TRACT N/A 2019    Procedure:  ULTRASOUND-ENDOSCOPIC-UPPER;  Surgeon: Armando Bills MD;  Location: [facility];  Service: Endoscopy;  Laterality: N/A;  Carcinoid diagnosis    ESOPHAGOGASTRODUODENOSCOPY      outside facility    HYSTERECTOMY  1991    LAPAROSCOPIC APPENDECTOMY N/A 4/11/2019    Procedure: APPENDECTOMY, LAPAROSCOPIC;  Surgeon: Rebecca Valdez MD;  Location: [facility];  Service: General;  Laterality: N/A;    LAPAROSCOPIC RESECTION OF SMALL INTESTINE N/A 4/11/2019    Procedure: EXCISION, SMALL INTESTINE, LAPAROSCOPIC;  Surgeon: Rebecca Valdez MD;  Location: [facility];  Service: General;  Laterality: N/A;    ROBOT-ASSISTED LAPAROSCOPIC REPAIR OF INCISIONAL HERNIA N/A 3/13/2020    Procedure: ROBOTIC REPAIR, HERNIA, INCISIONAL;  Surgeon: Rebecca Valdez MD;  Location: [facility];  Service: General;  Laterality: N/A;    SHOULDER SURGERY Right 03/13/2013    TOTAL KNEE ARTHROPLASTY Right 09/16/2013       Family History:  Family History   Problem Relation Age of Onset    Diabetes Mother     Multiple myeloma Father        Allergies:  Amoxicillin, Sulfa (sulfonamide antibiotics), Codeine, Demerol [meperidine], Hydrocodone, Epinephrine, and Nitrofurantoin monohyd/m-cryst    Medications:   Current Outpatient Medications   Medication Sig    acetaminophen (TYLENOL) 500 MG tablet Take 500 mg by mouth every 6 (six) hours as needed for Pain.    albuterol (PROAIR HFA) 90 mcg/actuation inhaler Inhale 1 puff into the lungs every 6 (six) hours as needed for Wheezing. Rescue    ALPRAZolam (XANAX) 2 MG Tab Take 1 mg by mouth nightly.     carvedilol (COREG) 25 MG tablet Take 25 mg by mouth 2 (two) times daily.    citalopram (CELEXA) 10 MG tablet Take 10 mg by mouth.    COMBIVENT RESPIMAT  mcg/actuation inhaler INL 2 PFS PO BID    cyanocobalamin (VITAMIN B-12) 1,000 mcg/mL injection Inject 1,000 mcg into the muscle every 14 (fourteen) days.    denosumab (PROLIA) 60 mg/mL Syrg Inject 60 mg into the skin every 6 (six) months.     lipase-protease-amylase 24,000-76,000-120,000 units (CREON) 24,000-76,000 -120,000 unit capsule Take 1 capsule by mouth 3 (three) times daily with meals.    lisinopril (PRINIVIL,ZESTRIL) 20 MG tablet Take 20 mg by mouth Daily.     ondansetron (ZOFRAN) 8 MG tablet Take 0.5 tablets (4 mg total) by mouth every 8 (eight) hours as needed for Nausea.    pantoprazole (PROTONIX) 20 MG tablet Take 20 mg by mouth once daily.    propranolol (INDERAL) 10 MG tablet Take 10 mg by mouth daily as needed.    simethicone (MYLICON) 80 MG chewable tablet Take 1 tablet (80 mg total) by mouth every 8 (eight) hours as needed for Flatulence.    spironolactone (ALDACTONE) 25 MG tablet Take 12.5 mg by mouth once daily.    terconazole (TERAZOL 7) 0.4 % Crea U 1 APPLICATION VAGINALLY QD HS    traMADoL (ULTRAM) 50 mg tablet Take 1 tablet (50 mg total) by mouth every 6 (six) hours as needed for Pain.    traZODone (DESYREL) 50 MG tablet 100 mg.     valACYclovir (VALTREX) 500 MG tablet TK 1 T PO D     No current facility-administered medications for this visit.         Review of Systems   Constitutional: Positive for appetite change and fatigue. Negative for activity change and unexpected weight change.   HENT: Negative for dental problem, ear discharge, ear pain, facial swelling, mouth sores, nosebleeds, postnasal drip, rhinorrhea, sinus pressure, sinus pain, sneezing, sore throat, tinnitus, trouble swallowing and voice change.    Eyes: Negative for photophobia, pain and itching.   Respiratory: Negative for apnea, chest tightness, wheezing and stridor.    Cardiovascular: Negative for chest pain and palpitations.   Gastrointestinal: Negative for anal bleeding and diarrhea.   Endocrine: Negative.    Genitourinary: Negative.    Musculoskeletal: Negative for myalgias, neck pain and neck stiffness.   Skin: Negative for pallor, rash and wound.   Allergic/Immunologic: Negative.    Neurological: Negative for seizures, syncope, facial  asymmetry, speech difficulty, weakness and light-headedness.   Psychiatric/Behavioral: Negative for agitation, behavioral problems and confusion.      Objective:      Physical Exam  Constitutional:       General: She is not in acute distress.     Appearance: Normal appearance. She is not ill-appearing, toxic-appearing or diaphoretic.   HENT:      Head: Normocephalic.      Right Ear: External ear normal.      Left Ear: External ear normal.      Nose: Nose normal.   Eyes:      Pupils: Pupils are equal, round, and reactive to light.   Neck:      Musculoskeletal: Normal range of motion and neck supple.   Cardiovascular:      Rate and Rhythm: Normal rate and regular rhythm.      Pulses: Normal pulses.   Pulmonary:      Effort: Pulmonary effort is normal.   Abdominal:      General: Abdomen is flat. Bowel sounds are normal. There is no distension.      Tenderness: There is no abdominal tenderness. There is no right CVA tenderness, left CVA tenderness or rebound.      Comments: Healed wound looks good no evidence of any hernia etely Incision   Musculoskeletal: Normal range of motion.   Skin:     General: Skin is warm.   Neurological:      General: No focal deficit present.      Mental Status: She is alert and oriented to person, place, and time. Mental status is at baseline.   Psychiatric:         Mood and Affect: Mood normal.         Thought Content: Thought content normal.        Assessment:       No diagnosis found.    Laboratory Data:       Scans:   NM PET Ga68 Dotatate  Narrative: EXAMINATION:  NM PET 68GA DOTATATE WHOLE BODY    CLINICAL HISTORY:  Malignant carcinoid tumor of the ileum    TECHNIQUE:  6.1 mCi of Ga-68 DOTA-ESPINOSA was administered intravenously in the right hand. After an approximately 60 min distribution time, PET/CT images were acquired from the skull vertex to the mid thigh. Transmission images were acquired to correct for attenuation using a whole body low-dose CT scan without contrast with the arms  positioned above the head.    COMPARISON:  Dotatate 09/06/2019, 03/29/2019    FINDINGS:  Quality of the study: Adequate.    No abnormal foci of increased tracer uptake are present.    Physiologic distribution of the tracer is seen within the pituitary, salivary, and thyroid glands, stomach, liver, pancreas uncinate process, spleen, adrenal glands,  tract, and bowel.    Incidental CT findings: Postoperative changes of cholecystectomy and right shoulder arthroplasty.  Diastasis of the ventral abdominal wall.  Impression: No PET/CT findings to suggest somatostatin receptor avid disease status post resection.    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  She also developed incisional hernia which was repaired about 3 months back.  Her main complaints are with the fatigue eating abnormalities.  She previously has had a complete workup including a DC go graft the follow-through there is no anatomical issues at all.    Plan:         No need for any scans or any in workup for the neuroendocrine tumor point FU  I asked her to look at food intake the type of board which will help her  Follow-up in 3 months time if she is not any better will refer her back to GI        MICHELLE Fernandez MD, FACS   Associate Professor of Surgery, [facility]   Neuroendocrine Surgery, Hepatic/Pancreatic & General Surgery   [address], [address]   [place] [address]   ph. [phone]; [phone]   fax. [phone]

## 2020-07-02 RX ORDER — GABAPENTIN 100 MG/1
100 CAPSULE ORAL 3 TIMES DAILY
Qty: 90 CAPSULE | Refills: 11 | Status: SHIPPED | OUTPATIENT
Start: 2020-07-02 | End: 2023-02-28

## 2020-07-06 LAB
BUN SERPL-MCNC: 16 MG/DL (ref 7–18)
CALCIUM SERPL-MCNC: 8.8 MG/DL (ref 8.5–10.1)
CHLORIDE SERPL-SCNC: 107 MMOL/L (ref 98–107)
CO2 SERPL-SCNC: 31 MMOL/L (ref 21–32)
CREAT SERPL-MCNC: 0.9 MG/DL (ref 0.6–1.3)
CREAT/UREA NIT SERPL: 18
GLUCOSE SERPL-MCNC: 79 MG/DL (ref 74–106)
POTASSIUM SERPL-SCNC: 4.2 MMOL/L (ref 3.5–5.1)
SODIUM SERPL-SCNC: 142 MMOL/L (ref 136–145)

## 2020-07-14 ENCOUNTER — HISTORICAL (OUTPATIENT)
Dept: ADMINISTRATIVE | Facility: HOSPITAL | Age: 70
End: 2020-07-14

## 2020-07-20 ENCOUNTER — PATIENT MESSAGE (OUTPATIENT)
Dept: NEUROLOGY | Facility: HOSPITAL | Age: 70
End: 2020-07-20

## 2020-07-20 DIAGNOSIS — C7A.012 MALIGNANT CARCINOID TUMOR OF ILEUM: Primary | ICD-10-CM

## 2020-07-20 RX ORDER — PANCRELIPASE 24000; 76000; 120000 [USP'U]/1; [USP'U]/1; [USP'U]/1
1 CAPSULE, DELAYED RELEASE PELLETS ORAL
Qty: 90 CAPSULE | Refills: 11 | Status: SHIPPED | OUTPATIENT
Start: 2020-07-20 | End: 2021-11-09 | Stop reason: SDUPTHER

## 2020-08-03 ENCOUNTER — HISTORICAL (OUTPATIENT)
Dept: RADIOLOGY | Facility: HOSPITAL | Age: 70
End: 2020-08-03

## 2020-08-17 ENCOUNTER — HISTORICAL (OUTPATIENT)
Dept: ADMINISTRATIVE | Facility: HOSPITAL | Age: 70
End: 2020-08-17

## 2020-08-17 LAB
ABS NEUT (OLG): 6.65 X10(3)/MCL (ref 2.1–9.2)
ALBUMIN SERPL-MCNC: 3.7 GM/DL (ref 3.4–5)
ALBUMIN/GLOB SERPL: 1.4 RATIO (ref 1.1–2)
ALP SERPL-CCNC: 36 UNIT/L (ref 40–150)
ALT SERPL-CCNC: 8 UNIT/L (ref 0–55)
AST SERPL-CCNC: 9 UNIT/L (ref 5–34)
BASOPHILS # BLD AUTO: 0 X10(3)/MCL (ref 0–0.2)
BASOPHILS NFR BLD AUTO: 0 %
BILIRUB SERPL-MCNC: 0.5 MG/DL
BILIRUBIN DIRECT+TOT PNL SERPL-MCNC: 0.2 MG/DL (ref 0–0.5)
BILIRUBIN DIRECT+TOT PNL SERPL-MCNC: 0.3 MG/DL (ref 0–0.8)
BUN SERPL-MCNC: 9.5 MG/DL (ref 9.8–20.1)
CALCIUM SERPL-MCNC: 8.3 MG/DL (ref 8.4–10.2)
CHLORIDE SERPL-SCNC: 102 MMOL/L (ref 98–107)
CHOLEST SERPL-MCNC: 189 MG/DL
CHOLEST/HDLC SERPL: 3 {RATIO} (ref 0–5)
CO2 SERPL-SCNC: 29 MMOL/L (ref 23–31)
CREAT SERPL-MCNC: 0.82 MG/DL (ref 0.55–1.02)
DEPRECATED CALCIDIOL+CALCIFEROL SERPL-MC: 30.6 NG/ML (ref 6.6–49.9)
EOSINOPHIL # BLD AUTO: 0.1 X10(3)/MCL (ref 0–0.9)
EOSINOPHIL NFR BLD AUTO: 1 %
ERYTHROCYTE [DISTWIDTH] IN BLOOD BY AUTOMATED COUNT: 14.6 % (ref 11.5–17)
EST. AVERAGE GLUCOSE BLD GHB EST-MCNC: 105.4 MG/DL
EXT 24 HR UR METANEPHRINE: ABNORMAL
EXT 24 HR UR NORMETANEPHRINE: ABNORMAL
EXT 24 HR UR NORMETANEPHRINE: ABNORMAL
EXT 25 HYDROXY VIT D2: ABNORMAL
EXT 25 HYDROXY VIT D3: 30.6 NG/ML (ref 6.6–49.9)
EXT 5 HIAA 24 HR URINE: ABNORMAL
EXT 5 HIAA BLOOD: ABNORMAL
EXT ACTH: ABNORMAL
EXT AFP: ABNORMAL
EXT ALBUMIN: 3.7 GM/DL (ref 3.4–5)
EXT ALKALINE PHOSPHATASE: 36 U/L (ref 40–150)
EXT ALT: 8 U/L (ref 0–55)
EXT AMYLASE: ABNORMAL
EXT ANTI ISLET CELL AB: ABNORMAL
EXT ANTI PARIETAL CELL AB: ABNORMAL
EXT ANTI THYROID AB: ABNORMAL
EXT AST: 9 U/L (ref 5–34)
EXT BILIRUBIN DIRECT: 0.2 MG/DL (ref 0–0.5)
EXT BILIRUBIN TOTAL: 0.5 MG/DL (ref 0–1.5)
EXT BK VIRUS DNA QN PCR: ABNORMAL
EXT BUN: 9.5 MG/DL (ref 9.8–20.1)
EXT C PEPTIDE: ABNORMAL
EXT CA 125: ABNORMAL
EXT CA 19-9: ABNORMAL
EXT CA 27-29: ABNORMAL
EXT CALCITONIN: ABNORMAL
EXT CALCIUM: 8.3 MG/DL (ref 8.4–10.2)
EXT CEA: ABNORMAL
EXT CHLORIDE: 102 MMOL/L (ref 98–107)
EXT CHOLESTEROL: 189 MG/DL (ref 0–200)
EXT CHROMOGRANIN A: ABNORMAL
EXT CO2: 29 MMOL/L (ref 23–31)
EXT CREATININE UA: ABNORMAL
EXT CREATININE: 0.82 MG/DL (ref 0.55–1.02)
EXT CYCLOSPORONE LEVEL: ABNORMAL
EXT DOPAMINE: ABNORMAL
EXT EBV DNA BY PCR: ABNORMAL
EXT EPINEPHRINE: ABNORMAL
EXT FOLATE: ABNORMAL
EXT FREE T3: ABNORMAL
EXT FREE T4: ABNORMAL
EXT FSH: ABNORMAL
EXT GASTRIN RELEASING PEPTIDE: ABNORMAL
EXT GASTRIN RELEASING PEPTIDE: ABNORMAL
EXT GASTRIN: ABNORMAL
EXT GGT: ABNORMAL
EXT GHRELIN: ABNORMAL
EXT GLUCAGON: ABNORMAL
EXT GLUCOSE: 77 MG/DL (ref 82–115)
EXT GROWTH HORMONE: ABNORMAL
EXT HCV RNA QUANT PCR: ABNORMAL
EXT HDL: 58 MG/DL (ref 35–60)
EXT HEMATOCRIT: 39.2 % (ref 37–47)
EXT HEMOGLOBIN A1C: 5.3 % (ref 0–7)
EXT HEMOGLOBIN: 12.3 GM/DL (ref 12–16)
EXT HISTAMINE 24 HR URINE: ABNORMAL
EXT HISTAMINE: ABNORMAL
EXT IGF-1: ABNORMAL
EXT IMMUNKNOW (NON-STIMULATED): ABNORMAL
EXT IMMUNKNOW (STIMULATED): ABNORMAL
EXT INR: ABNORMAL
EXT INSULIN: ABNORMAL
EXT LANREOTIDE LEVEL: ABNORMAL
EXT LDH, TOTAL: ABNORMAL
EXT LDL CHOLESTEROL: 111 (ref 50–140)
EXT LIPASE: ABNORMAL
EXT MAGNESIUM: ABNORMAL
EXT METANEPHRINE FREE PLASMA: ABNORMAL
EXT MOTILIN: ABNORMAL
EXT NEUROKININ A CAMB: ABNORMAL
EXT NEUROKININ A ISI: ABNORMAL
EXT NEUROTENSIN: ABNORMAL
EXT NOREPINEPHRINE: ABNORMAL
EXT NORMETANEPHRINE: ABNORMAL
EXT NSE: ABNORMAL
EXT OCTREOTIDE LEVEL: ABNORMAL
EXT PANCREASTATIN CAMB: ABNORMAL
EXT PANCREASTATIN ISI: ABNORMAL
EXT PANCREATIC POLYPEPTIDE: ABNORMAL
EXT PHOSPHORUS: ABNORMAL
EXT PLATELETS: 315 (ref 130–400)
EXT POTASSIUM: 4.2 MMOL/L (ref 3.5–5.1)
EXT PROGRAF LEVEL: ABNORMAL
EXT PROLACTIN: ABNORMAL
EXT PROTEIN TOTAL: 6.4 GM/DL (ref 5.8–7.6)
EXT PROTEIN UA: ABNORMAL
EXT PT: ABNORMAL
EXT PTH, INTACT: ABNORMAL
EXT PTT: ABNORMAL
EXT RAPAMUNE LEVEL: ABNORMAL
EXT SEROTONIN: ABNORMAL
EXT SODIUM: 140 MMOL/L (ref 136–145)
EXT SOMATOSTATIN: ABNORMAL
EXT SUBSTANCE P: ABNORMAL
EXT TRIGLYCERIDES: 100 MG/DL (ref 37–140)
EXT TRYPTASE: ABNORMAL
EXT TSH: 0.36 UIU/ML (ref 0.35–4.94)
EXT URIC ACID: ABNORMAL
EXT URINE AMYLASE U/HR: ABNORMAL
EXT URINE AMYLASE U/L: ABNORMAL
EXT VASOACTIVE INTESTINAL POLYPEPTIDE: ABNORMAL
EXT VITAMIN B12: 812 PG/ML (ref 213–816)
EXT VMA 24 HR URINE: ABNORMAL
EXT WBC: 9.6 X10(3)/MCL (ref 4.5–11.5)
GLOBULIN SER-MCNC: 2.7 GM/DL (ref 2.4–3.5)
GLOBULIN: 2.7 GM/DL (ref 2.4–3.5)
GLUCOSE SERPL-MCNC: 77 MG/DL (ref 82–115)
HBA1C MFR BLD: 5.3 %
HCT VFR BLD AUTO: 39.2 % (ref 37–47)
HDLC SERPL-MCNC: 58 MG/DL (ref 35–60)
HGB BLD-MCNC: 12.3 GM/DL (ref 12–16)
LDLC SERPL CALC-MCNC: 111 MG/DL (ref 50–140)
LYMPHOCYTES # BLD AUTO: 2.1 X10(3)/MCL (ref 0.6–4.6)
LYMPHOCYTES NFR BLD AUTO: 22 %
MCH RBC QN AUTO: 30 PG (ref 27–31)
MCH RBC QN AUTO: 30 PG/ML (ref 27–31)
MCHC RBC AUTO-ENTMCNC: 31.4 GM/DL (ref 33–36)
MCHC RBC AUTO-ENTMCNC: 31.4 GM/DL (ref 33–36)
MCV RBC AUTO: 95.6 FL (ref 80–94)
MCV RBC AUTO: 95.6 FL (ref 80–94)
MONOCYTES # BLD AUTO: 0.7 X10(3)/MCL (ref 0.1–1.3)
MONOCYTES NFR BLD AUTO: 7 %
NEURON SPECIFIC ENOLASE: ABNORMAL
NEUTROPHILS # BLD AUTO: 6.65 X10(3)/MCL (ref 2.1–9.2)
NEUTROPHILS NFR BLD AUTO: 69 %
PLATELET # BLD AUTO: 315 X10(3)/MCL (ref 130–400)
PMV BLD AUTO: 9 FL (ref 9.4–12.4)
POTASSIUM SERPL-SCNC: 4.2 MMOL/L (ref 3.5–5.1)
PROT SERPL-MCNC: 6.4 GM/DL (ref 5.8–7.6)
RBC # BLD AUTO: 4.1 X10(3)/MCL (ref 4.2–5.4)
RBC # BLD AUTO: 4.1 X10(6)/MCL (ref 4.2–5.4)
SODIUM SERPL-SCNC: 140 MMOL/L (ref 136–145)
TRIGL SERPL-MCNC: 100 MG/DL (ref 37–140)
TSH SERPL-ACNC: 0.36 UIU/ML (ref 0.35–4.94)
VIT B12 SERPL-MCNC: 812 PG/ML (ref 213–816)
VLDLC SERPL CALC-MCNC: 20 MG/DL
WBC # SPEC AUTO: 9.6 X10(3)/MCL (ref 4.5–11.5)

## 2020-08-31 NOTE — PROGRESS NOTES
NOLANETS:  [facility]  A collaboration between [facility] and [facility]

## 2020-09-10 ENCOUNTER — OFFICE VISIT (OUTPATIENT)
Dept: NEUROLOGY | Facility: HOSPITAL | Age: 70
End: 2020-09-10
Attending: SURGERY
Payer: MEDICARE

## 2020-09-10 VITALS
TEMPERATURE: 99 F | DIASTOLIC BLOOD PRESSURE: 63 MMHG | HEART RATE: 73 BPM | WEIGHT: 124.25 LBS | BODY MASS INDEX: 22.02 KG/M2 | SYSTOLIC BLOOD PRESSURE: 104 MMHG | HEIGHT: 63 IN

## 2020-09-10 DIAGNOSIS — C7A.012 MALIGNANT CARCINOID TUMOR OF ILEUM: Primary | ICD-10-CM

## 2020-09-10 PROCEDURE — 99215 OFFICE O/P EST HI 40 MIN: CPT | Performed by: SURGERY

## 2020-09-10 RX ORDER — METHOCARBAMOL 750 MG/1
750 TABLET, FILM COATED ORAL 2 TIMES DAILY
COMMUNITY

## 2020-09-10 NOTE — PATIENT INSTRUCTIONS
Tumor Markers due in 6 months.    MRI due in 6 months, you can call [phone] to schedule.    6 month follow up scheduled with Dr. Laguerre on  Thursday, March 11, 2021 at 1040am

## 2020-09-10 NOTE — PROGRESS NOTES
"NOLANETS:  [facility]  A collaboration between [facility] and [facility]      PATIENT: Christiana Chan  MRN: [id number]  DATE: 9/10/2020    Subjective:      Chief Complaint: Follow-up (3 month f/u)   She feels a lot better able to eat better even though has several significant issues of constipation abdominal bloating.  She still has the problem of a feeling full after eating but he does improved significantly compared to the past    Vitals: Blood pressure 104/63, pulse 73, temperature 98.8 °F (37.1 °C), temperature source Oral, height 5' 3" (1.6 m), weight 56.4 kg (124 lb 3.7 oz).     ECOG Score: 1 - Symptomatic but completely ambulatory    Diagnosis: No diagnosis found.     Interval History:     Oncologic History:   Oncologic History    Oncologic Treatment    Pathology      Past Medical History:  Past Medical History:   Diagnosis Date    Abdominal bloating     Abdominal pain     Asthma     Atrial fibrillation     Colon polyps     Constipation     Diverticulosis of colon     Esophageal ulcer     Gastritis     GERD (gastroesophageal reflux disease)     Heartburn     Hiatal hernia     HTN (hypertension)     Intermittent diarrhea     Iron deficiency anemia     Osteoarthritis     Osteoporosis     Rectal bleeding     Ruptured lumbar disc     Small bowel lesion     Vitamin B12 deficiency anemia     Vitamin D deficiency        Past Surgical History:  Past Surgical History:   Procedure Laterality Date    ANTEGRADE SINGLE BALLOON ENTEROSCOPY N/A 2019    Procedure: ENTEROSCOPY, DOUBLE BALLOON, ANTEGRADE;  Surgeon: Sivakumar Dorsey MD;  Location: [facility] ([address] [place] [address]);  Service: Endoscopy;  Laterality: N/A;    BREAST BIOPSY Bilateral      SECTION   &     CHOLECYSTECTOMY      COLONOSCOPY      outside facility    ENDOSCOPIC ULTRASOUND OF UPPER GASTROINTESTINAL TRACT N/A 2019    " Procedure: ULTRASOUND-ENDOSCOPIC-UPPER;  Surgeon: Armando Bills MD;  Location: [facility] [place] [facility];  Service: Endoscopy;  Laterality: N/A;  Carcinoid diagnosis    ESOPHAGOGASTRODUODENOSCOPY      outside facility    HYSTERECTOMY  1991    LAPAROSCOPIC APPENDECTOMY N/A 4/11/2019    Procedure: APPENDECTOMY, LAPAROSCOPIC;  Surgeon: Rebecca Valdez MD;  Location: [facility] [place] OR;  Service: General;  Laterality: N/A;    LAPAROSCOPIC RESECTION OF SMALL INTESTINE N/A 4/11/2019    Procedure: EXCISION, SMALL INTESTINE, LAPAROSCOPIC;  Surgeon: Rebecca Valdez MD;  Location: [facility] [place] OR;  Service: General;  Laterality: N/A;    ROBOT-ASSISTED LAPAROSCOPIC REPAIR OF INCISIONAL HERNIA N/A 3/13/2020    Procedure: ROBOTIC REPAIR, HERNIA, INCISIONAL;  Surgeon: Rebecca Valdez MD;  Location: [facility] [place] OR;  Service: General;  Laterality: N/A;    SHOULDER SURGERY Right 03/13/2013    TOTAL KNEE ARTHROPLASTY Right 09/16/2013       Family History:  Family History   Problem Relation Age of Onset    Diabetes Mother     Multiple myeloma Father        Allergies:  Amoxicillin, Sulfa (sulfonamide antibiotics), Codeine, Demerol [meperidine], Hydrocodone, Epinephrine, and Nitrofurantoin monohyd/m-cryst    Medications:   Current Outpatient Medications   Medication Sig    acetaminophen (TYLENOL) 500 MG tablet Take 500 mg by mouth every 6 (six) hours as needed for Pain.    albuterol (PROAIR HFA) 90 mcg/actuation inhaler Inhale 1 puff into the lungs every 6 (six) hours as needed for Wheezing. Rescue    ALPRAZolam (XANAX) 2 MG Tab Take 1 mg by mouth nightly.     carvedilol (COREG) 25 MG tablet Take 25 mg by mouth 2 (two) times daily.    citalopram (CELEXA) 10 MG tablet Take 10 mg by mouth.    COMBIVENT RESPIMAT  mcg/actuation inhaler INL 2 PFS PO BID    cyanocobalamin (VITAMIN B-12) 1,000 mcg/mL injection Inject 1,000 mcg into the muscle every 14 (fourteen) days.    denosumab (PROLIA) 60 mg/mL Syrg Inject 60 mg into the skin every 6  (six) months.    gabapentin (NEURONTIN) 100 MG capsule Take 1 capsule (100 mg total) by mouth 3 (three) times daily.    lipase-protease-amylase 24,000-76,000-120,000 units (CREON) 24,000-76,000 -120,000 unit capsule Take 1 capsule by mouth 3 (three) times daily with meals.    lisinopril (PRINIVIL,ZESTRIL) 20 MG tablet Take 20 mg by mouth Daily.     methocarbamoL (ROBAXIN) 750 MG Tab Take 750 mg by mouth 2 (two) times daily.    ondansetron (ZOFRAN) 8 MG tablet Take 0.5 tablets (4 mg total) by mouth every 8 (eight) hours as needed for Nausea.    propranolol (INDERAL) 10 MG tablet Take 10 mg by mouth daily as needed.    simethicone (MYLICON) 80 MG chewable tablet Take 1 tablet (80 mg total) by mouth every 8 (eight) hours as needed for Flatulence.    spironolactone (ALDACTONE) 25 MG tablet Take 12.5 mg by mouth once daily.    traZODone (DESYREL) 50 MG tablet 100 mg.     valACYclovir (VALTREX) 500 MG tablet TK 1 T PO D    gabapentin (NEURONTIN) 300 MG capsule Take 1 capsule (300 mg total) by mouth 3 (three) times daily.    pantoprazole (PROTONIX) 20 MG tablet Take 20 mg by mouth once daily.    terconazole (TERAZOL 7) 0.4 % Crea U 1 APPLICATION VAGINALLY QD HS    traMADoL (ULTRAM) 50 mg tablet Take 1 tablet (50 mg total) by mouth every 6 (six) hours as needed for Pain. (Patient not taking: Reported on 9/10/2020)     No current facility-administered medications for this visit.         Review of Systems   Constitutional: Negative for activity change, appetite change, chills, diaphoresis, fatigue, fever and unexpected weight change.   HENT: Negative for drooling, ear discharge, ear pain, hearing loss, mouth sores, sinus pressure, sinus pain, sore throat, tinnitus, trouble swallowing and voice change.    Eyes: Negative for photophobia, pain, discharge, redness and itching.   Respiratory: Negative for cough, choking, chest tightness, shortness of breath and wheezing.    Cardiovascular: Negative for chest pain,  palpitations and leg swelling.   Gastrointestinal: Positive for abdominal distention, abdominal pain and constipation.   Endocrine: Negative.    Genitourinary: Negative.    Musculoskeletal: Negative for arthralgias, joint swelling, myalgias and neck stiffness.   Skin: Negative for rash and wound.   Allergic/Immunologic: Negative.    Neurological: Negative for tremors, seizures, syncope, facial asymmetry, speech difficulty, weakness and headaches.   Hematological: Negative for adenopathy. Does not bruise/bleed easily.   Psychiatric/Behavioral: Negative for decreased concentration and dysphoric mood.      Objective:      Physical Exam  Constitutional:       Appearance: Normal appearance. She is normal weight.   HENT:      Head: Normocephalic.      Right Ear: External ear normal.      Nose: Nose normal.   Eyes:      Pupils: Pupils are equal, round, and reactive to light.   Neck:      Musculoskeletal: Normal range of motion and neck supple.   Cardiovascular:      Rate and Rhythm: Normal rate and regular rhythm.      Pulses: Normal pulses.   Pulmonary:      Effort: Pulmonary effort is normal.      Breath sounds: Normal breath sounds.   Abdominal:      General: Abdomen is flat. There is no distension.      Tenderness: There is no abdominal tenderness. There is no right CVA tenderness, guarding or rebound.      Hernia: No hernia is present.      Comments: Incision looks good   Musculoskeletal: Normal range of motion.   Skin:     General: Skin is warm.   Neurological:      General: No focal deficit present.      Mental Status: She is alert and oriented to person, place, and time.   Psychiatric:         Mood and Affect: Mood normal.        Assessment:       No diagnosis found.    Laboratory Data:       Scans:   NM PET Ga68 Dotatate  Narrative: EXAMINATION:  NM PET 68GA DOTATATE WHOLE BODY    CLINICAL HISTORY:  Malignant carcinoid tumor of the ileum    TECHNIQUE:  6.1 mCi of Ga-68 DOTA-ESPINOSA was administered intravenously in  the right hand. After an approximately 60 min distribution time, PET/CT images were acquired from the skull vertex to the mid thigh. Transmission images were acquired to correct for attenuation using a whole body low-dose CT scan without contrast with the arms positioned above the head.    COMPARISON:  Dotatate 09/06/2019, 03/29/2019    FINDINGS:  Quality of the study: Adequate.    No abnormal foci of increased tracer uptake are present.    Physiologic distribution of the tracer is seen within the pituitary, salivary, and thyroid glands, stomach, liver, pancreas uncinate process, spleen, adrenal glands,  tract, and bowel.    Incidental CT findings: Postoperative changes of cholecystectomy and right shoulder arthroplasty.  Diastasis of the ventral abdominal wall.  Impression: No PET/CT findings to suggest somatostatin receptor avid disease status post resection.    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  She has multifocal neuroendocrine tumor with no evidence of metastatic disease in the lymph nodes are liver.  Her main issues are dysmotility and constipation    I talked to her and counseled her for a very long time.  She has had a postop gallium scan which does not show any evidence of disease.  She is slowly able to accommodate an Acumed is to her GI system.  She has gained some weight according to her  she is also able to eat better although and no tremendous change.    Plan:       Continue present management  Follow-up in 6 months time with MRI and NET markers          MICHELLE Fernandez MD, FACS   Associate Professor of Surgery, [facility]   Neuroendocrine Surgery, Hepatic/Pancreatic & General Surgery   [address], [address]   [place]  [place] [address]   ph. [phone]; [phone]   fax. [phone]

## 2020-09-21 ENCOUNTER — HISTORICAL (OUTPATIENT)
Dept: RADIOLOGY | Facility: HOSPITAL | Age: 70
End: 2020-09-21

## 2020-09-24 ENCOUNTER — TELEPHONE (OUTPATIENT)
Dept: NEUROLOGY | Facility: HOSPITAL | Age: 70
End: 2020-09-24

## 2020-09-24 NOTE — TELEPHONE ENCOUNTER
Spoke with pt and she had an MRI of her back and was told that she needed steroid injection and she wanted to check with Dr Laguerre to make sure that it was ok with him since he did surgery on her, if she had these injections. He said that it was ok. She verbalized understanding

## 2020-09-24 NOTE — TELEPHONE ENCOUNTER
----- Message from Zoraida Morrison sent at 9/24/2020 12:22 PM CDT -----  Contact: [phone]/Self  BRENT  Patient is requesting to speak with nurse regarding a test she recently had done. Please advise.

## 2020-10-28 LAB
BILIRUB SERPL-MCNC: NEGATIVE MG/DL
BLOOD URINE, POC: NEGATIVE
CLARITY, POC UA: CLEAR
COLOR, POC UA: NORMAL
GLUCOSE UR QL STRIP: NEGATIVE
KETONES UR QL STRIP: NEGATIVE
LEUKOCYTE EST, POC UA: NEGATIVE
NITRITE, POC UA: NEGATIVE
PH, POC UA: 7
PROTEIN, POC: NEGATIVE
SPECIFIC GRAVITY, POC UA: 1
UROBILINOGEN, POC UA: NORMAL

## 2021-01-22 ENCOUNTER — TELEPHONE (OUTPATIENT)
Dept: NEUROLOGY | Facility: HOSPITAL | Age: 71
End: 2021-01-22

## 2021-01-22 ENCOUNTER — PATIENT MESSAGE (OUTPATIENT)
Dept: NEUROLOGY | Facility: HOSPITAL | Age: 71
End: 2021-01-22

## 2021-01-22 DIAGNOSIS — C7A.012 MALIGNANT CARCINOID TUMOR OF ILEUM: Primary | ICD-10-CM

## 2021-01-27 ENCOUNTER — TELEPHONE (OUTPATIENT)
Dept: NEUROLOGY | Facility: HOSPITAL | Age: 71
End: 2021-01-27

## 2021-02-06 ENCOUNTER — HOSPITAL ENCOUNTER (OUTPATIENT)
Dept: RADIOLOGY | Facility: HOSPITAL | Age: 71
Discharge: HOME | End: 2021-02-06
Attending: SURGERY
Payer: MEDICARE

## 2021-02-06 PROCEDURE — 74183 MRI ABDOMEN W WO CONTRAST: ICD-10-PCS | Mod: 26,,, | Performed by: RADIOLOGY

## 2021-02-06 PROCEDURE — 74183 MRI ABD W/O CNTR FLWD CNTR: CPT | Mod: 26,,, | Performed by: RADIOLOGY

## 2021-02-06 PROCEDURE — A9585 GADOBUTROL INJECTION: HCPCS | Performed by: SURGERY

## 2021-02-06 PROCEDURE — 25500020 PHARM REV CODE 255: Performed by: SURGERY

## 2021-02-06 PROCEDURE — 74183 MRI ABD W/O CNTR FLWD CNTR: CPT | Mod: TC

## 2021-02-06 RX ORDER — GADOBUTROL 604.72 MG/ML
10 INJECTION INTRAVENOUS
Status: COMPLETED | OUTPATIENT
Start: 2021-02-06 | End: 2021-02-06

## 2021-02-06 RX ADMIN — GADOBUTROL 10 ML: 604.72 INJECTION INTRAVENOUS at 10:02

## 2021-03-11 ENCOUNTER — OFFICE VISIT (OUTPATIENT)
Dept: NEUROLOGY | Facility: HOSPITAL | Age: 71
End: 2021-03-11
Attending: SURGERY
Payer: MEDICARE

## 2021-03-11 VITALS
HEART RATE: 76 BPM | BODY MASS INDEX: 21.97 KG/M2 | RESPIRATION RATE: 18 BRPM | HEIGHT: 63 IN | WEIGHT: 124 LBS | DIASTOLIC BLOOD PRESSURE: 69 MMHG | TEMPERATURE: 98 F | SYSTOLIC BLOOD PRESSURE: 115 MMHG

## 2021-03-11 DIAGNOSIS — C7A.012 MALIGNANT CARCINOID TUMOR OF ILEUM: Primary | ICD-10-CM

## 2021-03-11 PROCEDURE — 99215 OFFICE O/P EST HI 40 MIN: CPT | Performed by: SURGERY

## 2021-03-11 RX ORDER — MONTELUKAST SODIUM 10 MG/1
10 TABLET ORAL DAILY
COMMUNITY
Start: 2020-08-19 | End: 2021-08-14

## 2021-03-11 RX ORDER — DICYCLOMINE HYDROCHLORIDE 20 MG/1
20 TABLET ORAL EVERY 6 HOURS PRN
COMMUNITY
Start: 2021-02-22

## 2021-03-11 RX ORDER — CONJUGATED ESTROGENS 0.62 MG/G
30 CREAM VAGINAL
COMMUNITY
Start: 2021-02-21

## 2021-03-11 RX ORDER — LAMOTRIGINE 100 MG/1
100 TABLET ORAL NIGHTLY
COMMUNITY
Start: 2021-02-23

## 2021-03-11 RX ORDER — DOCUSATE SODIUM 100 MG/1
100 CAPSULE, LIQUID FILLED ORAL 2 TIMES DAILY
COMMUNITY
Start: 2020-07-06

## 2021-03-12 ENCOUNTER — TELEPHONE (OUTPATIENT)
Dept: NEUROLOGY | Facility: HOSPITAL | Age: 71
End: 2021-03-12

## 2021-03-12 ENCOUNTER — CLINICAL SUPPORT (OUTPATIENT)
Dept: NEUROLOGY | Facility: HOSPITAL | Age: 71
End: 2021-03-12
Payer: MEDICARE

## 2021-03-18 ENCOUNTER — TELEPHONE (OUTPATIENT)
Dept: NEUROLOGY | Facility: HOSPITAL | Age: 71
End: 2021-03-18

## 2021-03-19 ENCOUNTER — TELEPHONE (OUTPATIENT)
Dept: NEUROLOGY | Facility: HOSPITAL | Age: 71
End: 2021-03-19

## 2021-03-19 ENCOUNTER — PATIENT MESSAGE (OUTPATIENT)
Dept: NEUROLOGY | Facility: HOSPITAL | Age: 71
End: 2021-03-19

## 2021-05-12 ENCOUNTER — PATIENT MESSAGE (OUTPATIENT)
Dept: RESEARCH | Facility: HOSPITAL | Age: 71
End: 2021-05-12

## 2021-07-21 ENCOUNTER — HISTORICAL (OUTPATIENT)
Dept: LAB | Facility: HOSPITAL | Age: 71
End: 2021-07-21

## 2021-07-21 LAB
ALBUMIN SERPL-MCNC: 3.4 GM/DL (ref 3.4–4.8)
ALBUMIN/GLOB SERPL: 1.2 RATIO (ref 1.1–2)
ALP SERPL-CCNC: 36 UNIT/L (ref 40–150)
ALT SERPL-CCNC: 6 UNIT/L (ref 0–55)
AST SERPL-CCNC: 10 UNIT/L (ref 5–34)
BILIRUB SERPL-MCNC: 0.3 MG/DL
BILIRUBIN DIRECT+TOT PNL SERPL-MCNC: 0.1 MG/DL (ref 0–0.5)
BILIRUBIN DIRECT+TOT PNL SERPL-MCNC: 0.2 MG/DL (ref 0–0.8)
BUN SERPL-MCNC: 13.9 MG/DL (ref 9.8–20.1)
CALCIUM SERPL-MCNC: 9.5 MG/DL (ref 8.4–10.2)
CHLORIDE SERPL-SCNC: 107 MMOL/L (ref 98–107)
CO2 SERPL-SCNC: 31 MMOL/L (ref 23–31)
CREAT SERPL-MCNC: 0.87 MG/DL (ref 0.55–1.02)
DEPRECATED CALCIDIOL+CALCIFEROL SERPL-MC: 31.8 NG/ML (ref 30–80)
GLOBULIN SER-MCNC: 2.8 GM/DL (ref 2.4–3.5)
GLUCOSE SERPL-MCNC: 79 MG/DL (ref 82–115)
POTASSIUM SERPL-SCNC: 4.9 MMOL/L (ref 3.5–5.1)
PROT SERPL-MCNC: 6.2 GM/DL (ref 5.8–7.6)
SODIUM SERPL-SCNC: 143 MMOL/L (ref 136–145)

## 2021-08-23 ENCOUNTER — HISTORICAL (OUTPATIENT)
Dept: ADMINISTRATIVE | Facility: HOSPITAL | Age: 71
End: 2021-08-23

## 2021-08-23 LAB
ABS NEUT (OLG): 4.76 X10(3)/MCL (ref 2.1–9.2)
ALBUMIN SERPL-MCNC: 3.4 GM/DL (ref 3.4–4.8)
ALBUMIN/GLOB SERPL: 1.1 RATIO (ref 1.1–2)
ALP SERPL-CCNC: 41 UNIT/L (ref 40–150)
ALT SERPL-CCNC: 7 UNIT/L (ref 0–55)
AST SERPL-CCNC: 11 UNIT/L (ref 5–34)
BASOPHILS # BLD AUTO: 0 X10(3)/MCL (ref 0–0.2)
BASOPHILS NFR BLD AUTO: 0 %
BILIRUB SERPL-MCNC: 0.4 MG/DL
BILIRUBIN DIRECT+TOT PNL SERPL-MCNC: 0.1 MG/DL (ref 0–0.5)
BILIRUBIN DIRECT+TOT PNL SERPL-MCNC: 0.3 MG/DL (ref 0–0.8)
BUN SERPL-MCNC: 11.7 MG/DL (ref 9.8–20.1)
CALCIUM SERPL-MCNC: 9.2 MG/DL (ref 8.4–10.2)
CHLORIDE SERPL-SCNC: 106 MMOL/L (ref 98–107)
CHOLEST SERPL-MCNC: 191 MG/DL
CHOLEST/HDLC SERPL: 4 {RATIO} (ref 0–5)
CO2 SERPL-SCNC: 24 MMOL/L (ref 23–31)
CREAT SERPL-MCNC: 0.81 MG/DL (ref 0.55–1.02)
EOSINOPHIL # BLD AUTO: 0.1 X10(3)/MCL (ref 0–0.9)
EOSINOPHIL NFR BLD AUTO: 1 %
ERYTHROCYTE [DISTWIDTH] IN BLOOD BY AUTOMATED COUNT: 13.5 % (ref 11.5–17)
EST. AVERAGE GLUCOSE BLD GHB EST-MCNC: 96.8 MG/DL
GLOBULIN SER-MCNC: 3.1 GM/DL (ref 2.4–3.5)
GLUCOSE SERPL-MCNC: 82 MG/DL (ref 82–115)
HBA1C MFR BLD: 5 %
HCT VFR BLD AUTO: 38.5 % (ref 37–47)
HDLC SERPL-MCNC: 49 MG/DL (ref 35–60)
HGB BLD-MCNC: 12.3 GM/DL (ref 12–16)
LDLC SERPL CALC-MCNC: 127 MG/DL (ref 50–140)
LYMPHOCYTES # BLD AUTO: 1.8 X10(3)/MCL (ref 0.6–4.6)
LYMPHOCYTES NFR BLD AUTO: 25 %
MCH RBC QN AUTO: 29.9 PG (ref 27–31)
MCHC RBC AUTO-ENTMCNC: 31.9 GM/DL (ref 33–36)
MCV RBC AUTO: 93.7 FL (ref 80–94)
MONOCYTES # BLD AUTO: 0.6 X10(3)/MCL (ref 0.1–1.3)
MONOCYTES NFR BLD AUTO: 8 %
NEUTROPHILS # BLD AUTO: 4.76 X10(3)/MCL (ref 2.1–9.2)
NEUTROPHILS NFR BLD AUTO: 65 %
PLATELET # BLD AUTO: 300 X10(3)/MCL (ref 130–400)
PMV BLD AUTO: 9.7 FL (ref 9.4–12.4)
POTASSIUM SERPL-SCNC: 4.4 MMOL/L (ref 3.5–5.1)
PROT SERPL-MCNC: 6.5 GM/DL (ref 5.8–7.6)
RBC # BLD AUTO: 4.11 X10(6)/MCL (ref 4.2–5.4)
SODIUM SERPL-SCNC: 142 MMOL/L (ref 136–145)
TRIGL SERPL-MCNC: 77 MG/DL (ref 37–140)
TSH SERPL-ACNC: 1.09 UIU/ML (ref 0.35–4.94)
VLDLC SERPL CALC-MCNC: 15 MG/DL
WBC # SPEC AUTO: 7.3 X10(3)/MCL (ref 4.5–11.5)

## 2021-09-08 LAB — BCS RECOMMENDATION EXT: NORMAL

## 2021-11-05 ENCOUNTER — TELEPHONE (OUTPATIENT)
Dept: NEUROLOGY | Facility: HOSPITAL | Age: 71
End: 2021-11-05
Payer: MEDICARE

## 2021-11-09 DIAGNOSIS — C7A.012 MALIGNANT CARCINOID TUMOR OF ILEUM: ICD-10-CM

## 2021-11-09 RX ORDER — PANCRELIPASE 24000; 76000; 120000 [USP'U]/1; [USP'U]/1; [USP'U]/1
1 CAPSULE, DELAYED RELEASE PELLETS ORAL
Qty: 90 CAPSULE | Refills: 11 | Status: SHIPPED | OUTPATIENT
Start: 2021-11-09 | End: 2022-11-09

## 2022-01-31 ENCOUNTER — TELEPHONE (OUTPATIENT)
Dept: NEUROLOGY | Facility: HOSPITAL | Age: 72
End: 2022-01-31
Payer: MEDICARE

## 2022-01-31 DIAGNOSIS — C7A.012 MALIGNANT CARCINOID TUMOR OF ILEUM: Primary | ICD-10-CM

## 2022-01-31 NOTE — TELEPHONE ENCOUNTER
----- Message from Zoraida Henry sent at 1/31/2022  8:48 AM CST -----  Contact: [phone]  Type:  Needs Medical Advice    Who Called: pt called  Would the patient rather a call back or a response via [facility]? [facility]   Best Call Back Number: [phone]  Additional Information: pt needs to schedule her yearly C/U and blood work. Please call pt to advice

## 2022-01-31 NOTE — TELEPHONE ENCOUNTER
Returned pt call.  Reviewed follow up from last clinic visit. sheduled MRI, labs and md visit.  Reviewed with patient, she verbalized understanding.  No further questions.

## 2022-02-18 ENCOUNTER — TELEPHONE (OUTPATIENT)
Dept: NEUROLOGY | Facility: HOSPITAL | Age: 72
End: 2022-02-18
Payer: MEDICARE

## 2022-02-18 NOTE — TELEPHONE ENCOUNTER
----- Message from Collin Chandler sent at 2/18/2022  2:17 PM CST -----  Contact: [phone]/self  Who Called: PT    Regarding: unknown    Would the patient rather a call back or a response via [facility]? Call back    Best Call Back Number: [phone]    Additional Information: n/a

## 2022-02-18 NOTE — PROGRESS NOTES
NOLANETS:  [facility]  A collaboration between [facility] and [facility]      PATIENT: Christiana Chan  MRN: [id number]  DATE: 2/18/2022    Subjective:      Chief Complaint:   6 month f/u with scans and tumor markers.  Depression and anxiety got worse seeing counseling  Not eating much intermittently    Vitals: There were no vitals taken for this visit. --ECOG Score: 1 - Symptomatic but completely ambulatory    Diagnosis:   No diagnosis found.     Interval History:       2/17/20 - Ms. Chan had significant GI issues with abdominal pain diarrhea intermittently.  She was finally diagnosed with carcinoid tumor when she underwent enteroscopy last year.  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  Interestingly all the lymph nodes were negative and she does not have any other lesion.  She had a gallium scan done in September which does not show any lesion.  She also had a MRI of the time which does not show any lesions anywhere else.   She developed incisional hernia she has been having some pain at the side. However she has been having intermittent episodes of crampy abdominal pain.  She was seen by Gastroenterology and she was tried on medications which has not worked.   Since the last visit in January 20, 2020 she has gained about 6 lb and feels that she is eating better but on more questions she has a she just feels the same the symptoms that she used to have before surgery have return back.     She complaints of incomplete emptying of stool consistent the pelvic floor dysfunction.  Will obtain difficult g.  A she has lot of pain around the incision area with symptoms of bowel obstruction.     3/12/2020  She continues to have pain in the upper abdomen and have constant chronic abdominal pain. She initially had diarrhea before surgery and now she has trouble having bowel movement. She is  tender at the edges of the fascial defect therefore my recommendation would be to proceed with the incisional hernia repair and if she continues to have pain then she needs a workup for chronic abdominal pain.    3/13/2020  incisional hernia repair     4/20/2020  Lost some more weight     6/15/2020  Neuroendocrine tumor of the small bowel status post resection with no evidence of any lymphatic care disease or any metastatic disease with significant issues with the GI problem.  She also developed incisional hernia which was repaired about 3 months back.  Her main complaints are with the fatigue eating abnormalities.      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  She has multifocal neuroendocrine tumor with no evidence of metastatic disease in the lymph nodes are liver.  Her main issues are dysmotility and constipation   She has had a postop gallium scan which does not show any evidence of disease.  She has gained some weight according to her  she is also able to eat better although and no tremendous change.      Oncologic History:   Oncologic History 2/2019 small bowell well diff, G1, Ki67 0%     Oncologic Treatment 2/2019 surgery   Pathology 4/2019 1. APPENDIX WITH NO EVIDENCE OF NEOPLASIA IDENTIFIED.  2. RESECTION OF SMALL INTESTINE (ILEUM) SHOWING MULTIPLE FOCI OF WELL-DIFFERENTIATED  NEUROENDOCRINE TUMOR (CARCINOID TUMOR) WITH NEGATIVE MARGINS. SEE SYNOPTIC REPORT:  PROCEDURE: SEGMENTAL RESECTION, SMALL INTESTINE  TUMOR SITE: ILEUM     Past Medical History:  Past Medical History:   Diagnosis Date    Abdominal bloating     Abdominal pain     Asthma     Atrial fibrillation     Colon polyps     Constipation     Diverticulosis of colon     Esophageal ulcer     Gastritis     GERD (gastroesophageal reflux disease)     Heartburn     Hiatal hernia     HTN (hypertension)     Intermittent diarrhea     Iron deficiency  anemia     Osteoarthritis     Osteoporosis     Rectal bleeding     Ruptured lumbar disc     Small bowel lesion     Vitamin B12 deficiency anemia     Vitamin D deficiency        Past Surgical History:  Past Surgical History:   Procedure Laterality Date    ANTEGRADE SINGLE BALLOON ENTEROSCOPY N/A 2019    Procedure: ENTEROSCOPY, DOUBLE BALLOON, ANTEGRADE;  Surgeon: Sivakumar Dorsey MD;  Location: [address];  Service: Endoscopy;  Laterality: N/A;    BREAST BIOPSY Bilateral      SECTION   &     CHOLECYSTECTOMY      COLONOSCOPY      outside facility    ENDOSCOPIC ULTRASOUND OF UPPER GASTROINTESTINAL TRACT N/A 2019    Procedure: ULTRASOUND-ENDOSCOPIC-UPPER;  Surgeon: Armando Bills MD;  Location: [facility];  Service: Endoscopy;  Laterality: N/A;  Carcinoid diagnosis    ESOPHAGOGASTRODUODENOSCOPY      outside facility    HYSTERECTOMY      LAPAROSCOPIC APPENDECTOMY N/A 2019    Procedure: APPENDECTOMY, LAPAROSCOPIC;  Surgeon: Rebecca Valdze MD;  Location: [facility];  Service: General;  Laterality: N/A;    LAPAROSCOPIC RESECTION OF SMALL INTESTINE N/A 2019    Procedure: EXCISION, SMALL INTESTINE, LAPAROSCOPIC;  Surgeon: Rebecca Valdez MD;  Location: [facility];  Service: General;  Laterality: N/A;    ROBOT-ASSISTED LAPAROSCOPIC REPAIR OF INCISIONAL HERNIA N/A 3/13/2020    Procedure: ROBOTIC REPAIR, HERNIA, INCISIONAL;  Surgeon: Rebecca Valdez MD;  Location: [facility];  Service: General;  Laterality: N/A;    SHOULDER SURGERY Right 2013    TOTAL KNEE ARTHROPLASTY Right 2013       Family History:  Family History   Problem Relation Age of Onset    Diabetes Mother     Multiple myeloma Father        Allergies:  Amoxicillin, Sulfa (sulfonamide antibiotics), Codeine, Demerol [meperidine], Hydrocodone, Epinephrine, Nitrofurantoin monohyd/m-cryst, and Ketorolac    Medications:   Current Outpatient Medications   Medication Sig     acetaminophen (TYLENOL) 500 MG tablet Take 500 mg by mouth every 6 (six) hours as needed for Pain.    albuterol (PROAIR HFA) 90 mcg/actuation inhaler Inhale 1 puff into the lungs every 6 (six) hours as needed for Wheezing. Rescue    ALPRAZolam (XANAX) 2 MG Tab Take 1 mg by mouth nightly.     carvedilol (COREG) 25 MG tablet Take 25 mg by mouth 2 (two) times daily.    citalopram (CELEXA) 10 MG tablet Take 20 mg by mouth once daily. 1 TABLET AT NIGHT.    COMBIVENT RESPIMAT  mcg/actuation inhaler Inhale 2 puffs into the lungs as needed.     cyanocobalamin (VITAMIN B-12) 1,000 mcg/mL injection Inject 1,000 mcg into the muscle every 14 (fourteen) days.    denosumab (PROLIA) 60 mg/mL Syrg Inject 60 mg into the skin every 6 (six) months.    dicyclomine (BENTYL) 20 mg tablet Take 20 mg by mouth every 6 (six) hours as needed.    docusate sodium (COLACE) 100 MG capsule Take 100 mg by mouth 2 (two) times daily. 1 TABLET IN THE MORNING AND 1 TABLET AT NIGHT.    gabapentin (NEURONTIN) 100 MG capsule Take 1 capsule (100 mg total) by mouth 3 (three) times daily.    lamoTRIgine (LAMICTAL) 100 MG tablet Take 100 mg by mouth 2 (two) times daily. AT NIGHT    lipase-protease-amylase 24,000-76,000-120,000 units (CREON) 24,000-76,000 -120,000 unit capsule Take 1 capsule by mouth 3 (three) times daily with meals.    lisinopril (PRINIVIL,ZESTRIL) 20 MG tablet Take 20 mg by mouth Daily.     methocarbamoL (ROBAXIN) 750 MG Tab Take 750 mg by mouth 2 (two) times daily.    ondansetron (ZOFRAN) 8 MG tablet Take 0.5 tablets (4 mg total) by mouth every 8 (eight) hours as needed for Nausea.    pantoprazole (PROTONIX) 20 MG tablet Take 20 mg by mouth once daily.    PREMARIN vaginal cream Place 30 mg vaginally once daily. NIGHTLY.    propranolol (INDERAL) 10 MG tablet Take 10 mg by mouth daily as needed.    simethicone (MYLICON) 80 MG chewable tablet Take 1 tablet (80 mg total) by mouth every 8 (eight) hours as needed for  Flatulence. (Patient not taking: Reported on 3/11/2021)    spironolactone (ALDACTONE) 25 MG tablet Take 12.5 mg by mouth once daily.    terconazole (TERAZOL 7) 0.4 % Crea U 1 APPLICATION VAGINALLY QD HS    traMADoL (ULTRAM) 50 mg tablet Take 1 tablet (50 mg total) by mouth every 6 (six) hours as needed for Pain. (Patient not taking: Reported on 9/10/2020)    traZODone (DESYREL) 50 MG tablet 100 mg. 1 TABLET AT NIGHT.    valACYclovir (VALTREX) 500 MG tablet TK 1 T PO D     No current facility-administered medications for this visit.        Review of Systems   Constitutional: Negative for appetite change, chills, diaphoresis, fever and unexpected weight change.   HENT: Negative for congestion, drooling, ear discharge, ear pain, facial swelling, hearing loss, mouth sores, nosebleeds, sinus pressure, sinus pain, sneezing, sore throat, tinnitus, trouble swallowing and voice change.    Eyes: Negative for photophobia, pain, redness, itching and visual disturbance.   Respiratory: Negative for apnea, cough, choking, chest tightness, wheezing and stridor.    Cardiovascular: Negative for chest pain, palpitations and leg swelling.   Gastrointestinal: Negative for anal bleeding, blood in stool, constipation and rectal pain.   Endocrine: Negative.    Genitourinary: Negative.  Negative for menstrual problem.   Musculoskeletal: Negative for back pain, gait problem, joint swelling, myalgias and neck pain.   Allergic/Immunologic: Negative.    Neurological: Negative for syncope, speech difficulty, weakness, light-headedness, numbness and headaches.   Hematological: Negative for adenopathy. Does not bruise/bleed easily.   Psychiatric/Behavioral: Negative for hallucinations. The patient is nervous/anxious.       Objective:      Physical Exam  Constitutional:       Appearance: Normal appearance.   HENT:      Head: Normocephalic and atraumatic.      Right Ear: External ear normal.      Nose: Nose normal.      Mouth/Throat:       Mouth: Mucous membranes are moist.   Neck:      Vascular: Carotid bruit present.   Cardiovascular:      Rate and Rhythm: Normal rate and regular rhythm.      Pulses: Normal pulses.      Heart sounds: Normal heart sounds.   Pulmonary:      Effort: Pulmonary effort is normal.   Abdominal:      General: Abdomen is flat. Bowel sounds are normal.      Comments: Incision completely healed no evidence of any recurrence of the hernia   Musculoskeletal:         General: Normal range of motion.      Cervical back: No rigidity or tenderness.   Lymphadenopathy:      Cervical: No cervical adenopathy.   Skin:     General: Skin is warm.   Neurological:      General: No focal deficit present.      Mental Status: She is alert and oriented to person, place, and time.   Psychiatric:         Mood and Affect: Mood normal.        Assessment:       No diagnosis found.    Laboratory Data:   Neuroendocrine Labs Latest Ref Rng & Units 2/6/2021   5 HIAA BLOOD Up to 22 ng/mL 5   SEROTONIN <=230 ng/mL <30   PANCREASTATIN 10 - 135 pg/mL 97   NEUROKININ A 0 - 40 pg/mL <5       Scans:    MRI abd/pelvis- 2/21/22      NM PET 68GA DOTATATE WHOLE BODY-3/9/2020      TECHNIQUE:  6.1 mCi of Ga-68 DOTA-ESPINOSA was administered intravenously in the right hand. After an approximately 60 min distribution time, PET/CT images were acquired from the skull vertex to the mid thigh. Transmission images were acquired to correct for attenuation using a whole body low-dose CT scan without contrast with the arms positioned above the head.     COMPARISON:  Dotatate 09/06/2019, 03/29/2019     FINDINGS:  Quality of the study: Adequate.     No abnormal foci of increased tracer uptake are present.     Physiologic distribution of the tracer is seen within the pituitary, salivary, and thyroid glands, stomach, liver, pancreas uncinate process, spleen, adrenal glands,  tract, and bowel.     Incidental CT findings: Postoperative changes of cholecystectomy and right shoulder  arthroplasty.  Diastasis of the ventral abdominal wall.     Impression:     No PET/CT findings to suggest somatostatin receptor avid disease status post resection.      Impression:  Neuroendocrine tumor of the small bowel with a history of anxiety and depression.  Her MRI from today is reviewed and shows no evidence of any disease.  At neuroendocrine tumor markers from last time looks stable her markers were drawn today.    Overall condition is stable from the neuroendocrine point of view.  Her GI functions are stable although her eating is variable depending upon her worsening of the depression and anxious state.  I saw her 1 year back weight was 124 lb and today she is 126 lb.  Postop surgery why she is stable        Plan:       Recommended protein supplementation.  Follow-up in 1 year time with MRI and neuroendocrine tumor markers discussed with her and her  in the room for about 25 minutes        MICHELLE Fernandez MD, FACS   Associate Professor of Surgery, [facility]   Neuroendocrine Surgery, Hepatic/Pancreatic & General Surgery   [address], [address]   [place] [address]   ph. [phone]; [phone]   fax. [phone]

## 2022-02-21 ENCOUNTER — HOSPITAL ENCOUNTER (OUTPATIENT)
Dept: RADIOLOGY | Facility: HOSPITAL | Age: 72
Discharge: HOME | End: 2022-02-21
Attending: SURGERY
Payer: MEDICARE

## 2022-02-21 ENCOUNTER — OFFICE VISIT (OUTPATIENT)
Dept: NEUROLOGY | Facility: HOSPITAL | Age: 72
End: 2022-02-21
Attending: SURGERY
Payer: MEDICARE

## 2022-02-21 VITALS
TEMPERATURE: 98 F | BODY MASS INDEX: 22.44 KG/M2 | RESPIRATION RATE: 18 BRPM | WEIGHT: 126.63 LBS | HEART RATE: 67 BPM | SYSTOLIC BLOOD PRESSURE: 127 MMHG | HEIGHT: 63 IN | DIASTOLIC BLOOD PRESSURE: 74 MMHG

## 2022-02-21 DIAGNOSIS — C7A.012 MALIGNANT CARCINOID TUMOR OF ILEUM: Primary | ICD-10-CM

## 2022-02-21 DIAGNOSIS — C7A.012 MALIGNANT CARCINOID TUMOR OF ILEUM: ICD-10-CM

## 2022-02-21 DIAGNOSIS — R19.00 INTRA-ABDOMINAL AND PELVIC SWELLING, MASS AND LUMP, UNSPECIFIED SITE: ICD-10-CM

## 2022-02-21 LAB
CREAT SERPL-MCNC: 0.9 MG/DL (ref 0.5–1.4)
SAMPLE: NORMAL

## 2022-02-21 PROCEDURE — A9585 GADOBUTROL INJECTION: HCPCS | Performed by: SURGERY

## 2022-02-21 PROCEDURE — 99215 OFFICE O/P EST HI 40 MIN: CPT | Mod: 25 | Performed by: SURGERY

## 2022-02-21 PROCEDURE — 74183 MRI ABDOMEN W WO CONTRAST: ICD-10-PCS | Mod: 26,,, | Performed by: STUDENT IN AN ORGANIZED HEALTH CARE EDUCATION/TRAINING PROGRAM

## 2022-02-21 PROCEDURE — 74183 MRI ABD W/O CNTR FLWD CNTR: CPT | Mod: 26,,, | Performed by: STUDENT IN AN ORGANIZED HEALTH CARE EDUCATION/TRAINING PROGRAM

## 2022-02-21 PROCEDURE — 25500020 PHARM REV CODE 255: Performed by: SURGERY

## 2022-02-21 PROCEDURE — 74183 MRI ABD W/O CNTR FLWD CNTR: CPT | Mod: TC

## 2022-02-21 RX ORDER — GADOBUTROL 604.72 MG/ML
10 INJECTION INTRAVENOUS
Status: COMPLETED | OUTPATIENT
Start: 2022-02-21 | End: 2022-02-21

## 2022-02-21 RX ADMIN — GADOBUTROL 10 ML: 604.72 INJECTION INTRAVENOUS at 09:02

## 2022-02-23 ENCOUNTER — TELEPHONE (OUTPATIENT)
Dept: NEUROLOGY | Facility: HOSPITAL | Age: 72
End: 2022-02-23
Payer: MEDICARE

## 2022-02-23 NOTE — TELEPHONE ENCOUNTER
----- Message from Hien Sosa sent at 2/22/2022 12:56 PM CST -----  There was an issue with the serotonin test ordered  02/21/2022.  The specimen was quantity not sufficient.  The ordered has been cancelled and will need to be reordered and recollected.  If there are any questions please call the sendout laboratory. Anyone in the sendout lab will be able to assist you.

## 2022-04-11 ENCOUNTER — HISTORICAL (OUTPATIENT)
Dept: ADMINISTRATIVE | Facility: HOSPITAL | Age: 72
End: 2022-04-11
Payer: MEDICARE

## 2022-04-27 VITALS
DIASTOLIC BLOOD PRESSURE: 64 MMHG | OXYGEN SATURATION: 98 % | BODY MASS INDEX: 22.46 KG/M2 | HEIGHT: 63 IN | WEIGHT: 126.75 LBS | SYSTOLIC BLOOD PRESSURE: 103 MMHG

## 2022-05-18 PROBLEM — D64.9 ANEMIA: Status: ACTIVE | Noted: 2022-05-18

## 2022-05-18 PROBLEM — F41.8 MIXED ANXIETY DEPRESSIVE DISORDER: Status: ACTIVE | Noted: 2022-05-18

## 2022-05-18 PROBLEM — F41.1 ANXIETY STATE: Status: ACTIVE | Noted: 2022-05-18

## 2022-05-18 PROBLEM — F31.81 BIPOLAR II DISORDER: Status: ACTIVE | Noted: 2022-05-18

## 2022-05-18 PROBLEM — I10 HYPERTENSION: Status: ACTIVE | Noted: 2022-05-18

## 2022-05-18 PROBLEM — R01.1 HEART MURMUR: Status: ACTIVE | Noted: 2022-05-18

## 2022-05-18 PROBLEM — E78.5 HYPERLIPIDEMIA: Status: ACTIVE | Noted: 2022-05-18

## 2022-05-18 PROBLEM — K58.9 IRRITABLE BOWEL SYNDROME: Status: ACTIVE | Noted: 2022-05-18

## 2022-05-18 PROBLEM — M81.0 AGE RELATED OSTEOPOROSIS: Status: ACTIVE | Noted: 2022-05-18

## 2022-05-18 PROBLEM — I48.0 PAROXYSMAL ATRIAL FIBRILLATION: Status: ACTIVE | Noted: 2022-05-18

## 2022-05-18 NOTE — PROGRESS NOTES
"Subjective:       Patient ID: Christiana Chan is a 71 y.o. female.    Chief Complaint: Migraine    HPI   Patient presents to the clinic complaining of migraine headaches. For a few weeks patient has been having headaches, described as pain at the base of her neck, trapezius muscles, and across the front of her head as well.  She possibly attributes this to some anxiety she has been having.  She is under a great deal of stress because of some issues at home.  She did try Tylenol at home, and went to urgent care where she was prescribed Imitrex.  Imitrex actually helps a bit although she has no past history of migraine headaches.      Review of Systems   Constitutional: Negative for activity change and unexpected weight change.   HENT: Negative for hearing loss and trouble swallowing.    Eyes: Negative for discharge.   Respiratory: Negative for chest tightness and wheezing.    Cardiovascular: Negative for chest pain and palpitations.   Gastrointestinal: Positive for constipation. Negative for blood in stool, diarrhea and vomiting.   Endocrine: Negative for polydipsia and polyuria.   Genitourinary: Negative for difficulty urinating, dysuria, hematuria and menstrual problem.   Musculoskeletal: Positive for arthralgias and joint swelling. Negative for neck pain.   Neurological: Positive for headaches. Negative for weakness.   Psychiatric/Behavioral: Positive for dysphoric mood. Negative for confusion.         Objective:     Vitals:    05/19/22 0947   BP: 133/80   BP Location: Left arm   Patient Position: Sitting   BP Method: Small (Automatic)   Pulse: 70   Resp: 18   Temp: 97.2 °F (36.2 °C)   TempSrc: Tympanic   SpO2: 98%   Weight: 57.2 kg (126 lb)   Height: 5' 2.99" (1.6 m)   Body mass index is 22.33 kg/m².     Physical Exam  Constitutional:       Appearance: Normal appearance.   Eyes:      Conjunctiva/sclera: Conjunctivae normal.   Cardiovascular:      Rate and Rhythm: Normal rate and regular rhythm.   Pulmonary:     "  Effort: Pulmonary effort is normal.      Breath sounds: Normal breath sounds.   Skin:     General: Skin is warm and dry.   Neurological:      Mental Status: She is alert.   Psychiatric:         Mood and Affect: Mood normal.         Behavior: Behavior normal.           Lab Results   Component Value Date    WBC 8.33 02/21/2022    RBC 3.92 (L) 02/21/2022    HGB 11.5 (L) 02/21/2022    HCT 35.6 (L) 02/21/2022    MCV 91 02/21/2022    MCH 29.3 02/21/2022     02/21/2022    GLU 83 02/21/2022     02/21/2022    K 4.1 02/21/2022     02/21/2022    BUN 12 02/21/2022    CREATININE 0.8 02/21/2022    CHOL 191 08/23/2021    HDL 49 08/23/2021    TRIG 77 08/23/2021    AST 13 02/21/2022    ALT 8 (L) 02/21/2022    BILITOT 0.4 02/21/2022    TSH 1.0892 08/23/2021    Q0BVUUQ 10.9 01/20/2020    HGBA1C 5.0 08/23/2021     Assessment:     Problem List Items Addressed This Visit        Psychiatric    Anxiety state      Other Visit Diagnoses     History of migraine headaches    -  Primary    Tension headache        Relevant Medications    butalbital-acetaminophen-caffeine -40 mg (FIORICET, ESGIC) -40 mg per tablet           Plan:             Clinical presentation would suggest tension headaches, I will prescribe Fioricet to use as needed for her headaches, if headaches continue she will contact this clinic or return for a modification in her treatment plan.  She also has medications for her anxiety which she should take as needed.  Medication List with Changes/Refills   New Medications    BUTALBITAL-ACETAMINOPHEN-CAFFEINE -40 MG (FIORICET, ESGIC) -40 MG PER TABLET    Take 1 tablet by mouth every 4 (four) hours as needed for Pain.   Current Medications    ACETAMINOPHEN (TYLENOL) 500 MG TABLET    Take 500 mg by mouth every 6 (six) hours as needed for Pain.    ALBUTEROL (PROVENTIL/VENTOLIN HFA) 90 MCG/ACTUATION INHALER    Inhale 1 puff into the lungs every 6 (six) hours as needed for Wheezing. Rescue     ALPRAZOLAM (XANAX) 2 MG TAB    Take 1 mg by mouth nightly.     CARVEDILOL (COREG) 25 MG TABLET    Take 25 mg by mouth 2 (two) times daily.    CITALOPRAM (CELEXA) 10 MG TABLET    Take 20 mg by mouth once daily. 1 TABLET AT NIGHT.    COMBIVENT RESPIMAT  MCG/ACTUATION INHALER    Inhale 2 puffs into the lungs as needed.     CYANOCOBALAMIN 1,000 MCG/ML INJECTION    Inject 1,000 mcg into the muscle every 14 (fourteen) days.    DENOSUMAB (PROLIA) 60 MG/ML SYRG    Inject 60 mg into the skin every 6 (six) months.    DICLOFENAC SODIUM (VOLTAREN) 1 % GEL    Apply topically.    DICYCLOMINE (BENTYL) 20 MG TABLET    Take 20 mg by mouth every 6 (six) hours as needed.    DOCUSATE SODIUM (COLACE) 100 MG CAPSULE    Take 100 mg by mouth 2 (two) times daily. 1 TABLET IN THE MORNING AND 1 TABLET AT NIGHT.    GABAPENTIN (NEURONTIN) 100 MG CAPSULE    Take 1 capsule (100 mg total) by mouth 3 (three) times daily.    LAMOTRIGINE (LAMICTAL) 100 MG TABLET    Take 100 mg by mouth 2 (two) times daily. AT NIGHT    LIPASE-PROTEASE-AMYLASE 24,000-76,000-120,000 UNITS (CREON) 24,000-76,000 -120,000 UNIT CAPSULE    Take 1 capsule by mouth 3 (three) times daily with meals.    LISINOPRIL (PRINIVIL,ZESTRIL) 20 MG TABLET    Take 20 mg by mouth Daily.     MENTHOL 5 % PTMD    APPLY PATCH TO AFFECTED AREA 1 TO 2 TIMES A DAY AS DIRECTED. WASH HANDS WITH SOAP AFTER APPLYING    METHOCARBAMOL (ROBAXIN) 750 MG TAB    Take 750 mg by mouth 2 (two) times daily.    ONDANSETRON (ZOFRAN) 8 MG TABLET    Take 0.5 tablets (4 mg total) by mouth every 8 (eight) hours as needed for Nausea.    PANTOPRAZOLE (PROTONIX) 20 MG TABLET    Take 20 mg by mouth once daily.    PREMARIN VAGINAL CREAM    Place 30 mg vaginally once daily. NIGHTLY.    PROPRANOLOL (INDERAL) 10 MG TABLET    Take 10 mg by mouth daily as needed.    SIMETHICONE (MYLICON) 80 MG CHEWABLE TABLET    Take 1 tablet (80 mg total) by mouth every 8 (eight) hours as needed for Flatulence.    SPIRONOLACTONE  (ALDACTONE) 25 MG TABLET    Take 12.5 mg by mouth once daily.    SUMATRIPTAN (IMITREX) 50 MG TABLET    Take by mouth.    TERCONAZOLE (TERAZOL 7) 0.4 % CREA    U 1 APPLICATION VAGINALLY QD HS    TRAMADOL (ULTRAM) 50 MG TABLET    Take 1 tablet (50 mg total) by mouth every 6 (six) hours as needed for Pain.    TRAZODONE (DESYREL) 50 MG TABLET    100 mg. 1 TABLET AT NIGHT.    VALACYCLOVIR (VALTREX) 500 MG TABLET    TK 1 T PO D

## 2022-05-19 ENCOUNTER — OFFICE VISIT (OUTPATIENT)
Dept: PRIMARY CARE CLINIC | Facility: CLINIC | Age: 72
End: 2022-05-19
Payer: MEDICARE

## 2022-05-19 VITALS
OXYGEN SATURATION: 98 % | SYSTOLIC BLOOD PRESSURE: 133 MMHG | HEART RATE: 70 BPM | BODY MASS INDEX: 22.32 KG/M2 | DIASTOLIC BLOOD PRESSURE: 80 MMHG | HEIGHT: 63 IN | WEIGHT: 126 LBS | TEMPERATURE: 97 F | RESPIRATION RATE: 18 BRPM

## 2022-05-19 DIAGNOSIS — Z86.69 HISTORY OF MIGRAINE HEADACHES: Primary | ICD-10-CM

## 2022-05-19 DIAGNOSIS — F41.1 ANXIETY STATE: ICD-10-CM

## 2022-05-19 DIAGNOSIS — G44.209 TENSION HEADACHE: ICD-10-CM

## 2022-05-19 PROCEDURE — 99213 OFFICE O/P EST LOW 20 MIN: CPT | Mod: ,,, | Performed by: GENERAL PRACTICE

## 2022-05-19 PROCEDURE — 99213 PR OFFICE/OUTPT VISIT, EST, LEVL III, 20-29 MIN: ICD-10-PCS | Mod: ,,, | Performed by: GENERAL PRACTICE

## 2022-05-19 RX ORDER — BUTALBITAL, ACETAMINOPHEN AND CAFFEINE 50; 325; 40 MG/1; MG/1; MG/1
1 TABLET ORAL EVERY 4 HOURS PRN
Qty: 30 TABLET | Refills: 30 | Status: SHIPPED | OUTPATIENT
Start: 2022-05-19 | End: 2022-06-18

## 2022-05-19 RX ORDER — SUMATRIPTAN 50 MG/1
TABLET, FILM COATED ORAL
COMMUNITY
Start: 2022-05-04 | End: 2022-06-13

## 2022-05-19 RX ORDER — DICLOFENAC SODIUM 10 MG/G
GEL TOPICAL
COMMUNITY
Start: 2022-05-17

## 2022-06-03 ENCOUNTER — TELEPHONE (OUTPATIENT)
Dept: PRIMARY CARE CLINIC | Facility: CLINIC | Age: 72
End: 2022-06-03
Payer: MEDICARE

## 2022-06-03 NOTE — TELEPHONE ENCOUNTER
----- Message from Lisa Alvarez LPN sent at 6/3/2022  7:52 AM CDT -----  Please call to schedule an appointment for her symptoms  ----- Message -----  From: Cedric Mcclelland MD  Sent: 6/2/2022   4:29 PM CDT  To: Lisa Alvarez LPN    Patient needs to be set up for follow-up appointment  ----- Message -----  From: Lisa Alvarez LPN  Sent: 6/2/2022   4:03 PM CDT  To: Cedric Mcclelland MD    Please Advise  ----- Message -----  From: Ashok Molina  Sent: 6/1/2022   8:27 AM CDT  To: [facility] Staff    .Type:  Needs Medical Advice    Who Called: Christiana  Symptoms (please be specific):    How long has patient had these symptoms:    Pharmacy name and phone #:    Would the patient rather a call back or a response via [facility]?   Best Call Back Number: [phone]  Additional Information: Stress and tension headaches, she needed to speak with the doctor about that, it is still continuing.

## 2022-06-06 ENCOUNTER — TELEPHONE (OUTPATIENT)
Dept: PRIMARY CARE CLINIC | Facility: CLINIC | Age: 72
End: 2022-06-06
Payer: MEDICARE

## 2022-06-06 ENCOUNTER — HOSPITAL ENCOUNTER (EMERGENCY)
Facility: HOSPITAL | Age: 72
Discharge: HOME | End: 2022-06-07
Attending: EMERGENCY MEDICINE
Payer: MEDICARE

## 2022-06-06 DIAGNOSIS — G44.52 NEW DAILY PERSISTENT HEADACHE: Primary | ICD-10-CM

## 2022-06-06 LAB
ALBUMIN SERPL-MCNC: 3.4 GM/DL (ref 3.4–4.8)
ALBUMIN/GLOB SERPL: 1.1 RATIO (ref 1.1–2)
ALP SERPL-CCNC: 67 UNIT/L (ref 40–150)
ALT SERPL-CCNC: 11 UNIT/L (ref 0–55)
AST SERPL-CCNC: 14 UNIT/L (ref 5–34)
BASOPHILS # BLD AUTO: 0.04 X10(3)/MCL (ref 0–0.2)
BASOPHILS NFR BLD AUTO: 0.5 %
BILIRUBIN DIRECT+TOT PNL SERPL-MCNC: 0.2 MG/DL
BUN SERPL-MCNC: 11.9 MG/DL (ref 9.8–20.1)
CALCIUM SERPL-MCNC: 9.1 MG/DL (ref 8.4–10.2)
CHLORIDE SERPL-SCNC: 105 MMOL/L (ref 98–107)
CO2 SERPL-SCNC: 26 MMOL/L (ref 23–31)
CREAT SERPL-MCNC: 0.83 MG/DL (ref 0.55–1.02)
EOSINOPHIL # BLD AUTO: 0.07 X10(3)/MCL (ref 0–0.9)
EOSINOPHIL NFR BLD AUTO: 0.8 %
ERYTHROCYTE [DISTWIDTH] IN BLOOD BY AUTOMATED COUNT: 13.6 % (ref 11.5–17)
GLOBULIN SER-MCNC: 3 GM/DL (ref 2.4–3.5)
GLUCOSE SERPL-MCNC: 88 MG/DL (ref 82–115)
HCT VFR BLD AUTO: 36.8 % (ref 37–47)
HGB BLD-MCNC: 11.8 GM/DL (ref 12–16)
IMM GRANULOCYTES # BLD AUTO: 0.04 X10(3)/MCL (ref 0–0.02)
IMM GRANULOCYTES NFR BLD AUTO: 0.5 % (ref 0–0.43)
LYMPHOCYTES # BLD AUTO: 2.9 X10(3)/MCL (ref 0.6–4.6)
LYMPHOCYTES NFR BLD AUTO: 33.2 %
MCH RBC QN AUTO: 29.1 PG (ref 27–31)
MCHC RBC AUTO-ENTMCNC: 32.1 MG/DL (ref 33–36)
MCV RBC AUTO: 90.9 FL (ref 80–94)
MONOCYTES # BLD AUTO: 0.78 X10(3)/MCL (ref 0.1–1.3)
MONOCYTES NFR BLD AUTO: 8.9 %
NEUTROPHILS # BLD AUTO: 4.9 X10(3)/MCL (ref 2.1–9.2)
NEUTROPHILS NFR BLD AUTO: 56.1 %
NRBC BLD AUTO-RTO: 0 %
PLATELET # BLD AUTO: 328 X10(3)/MCL (ref 130–400)
PMV BLD AUTO: 8.7 FL (ref 9.4–12.4)
POTASSIUM SERPL-SCNC: 3.6 MMOL/L (ref 3.5–5.1)
PROT SERPL-MCNC: 6.4 GM/DL (ref 5.8–7.6)
RBC # BLD AUTO: 4.05 X10(6)/MCL (ref 4.2–5.4)
SODIUM SERPL-SCNC: 142 MMOL/L (ref 136–145)
WBC # SPEC AUTO: 8.7 X10(3)/MCL (ref 4.5–11.5)

## 2022-06-06 PROCEDURE — 99284 EMERGENCY DEPT VISIT MOD MDM: CPT | Mod: 25

## 2022-06-06 PROCEDURE — 80053 COMPREHEN METABOLIC PANEL: CPT | Performed by: PHYSICIAN ASSISTANT

## 2022-06-06 PROCEDURE — 85025 COMPLETE CBC W/AUTO DIFF WBC: CPT | Performed by: PHYSICIAN ASSISTANT

## 2022-06-06 PROCEDURE — 36415 COLL VENOUS BLD VENIPUNCTURE: CPT | Performed by: PHYSICIAN ASSISTANT

## 2022-06-06 NOTE — TELEPHONE ENCOUNTER
----- Message from Ibrahima Levin sent at 6/6/2022  3:13 PM CDT -----  Regarding: Medical Advice  Type:  Needs Medical Advice    Who Called: pt     Symptoms (please be specific): headaches - sensitivity to the eyes, always have to wear sun glasses while driving    How long has patient had these symptoms:   a while     Pharmacy name and phone #:  n/a     Would the patient rather a call back or a response via [facility]?     Best Call Back Number: [phone]    Additional Information:  pt wants to know if she can get further testing done to prove that nothing serious is going on

## 2022-06-06 NOTE — FIRST PROVIDER EVALUATION
"Medical screening exam completed.  I have conducted a focused provider triage encounter, findings are as follows:    Chief Complaint   Patient presents with    Headache     Posterior headaches intermittently x1 month.  Went to an  and was prescribed migraine meds. Today, pain was in the middle of her head and felt different than before.  Called her PCP and they said if the pain continued to come to the ED.  No vision changes,  no focal deficits.       Brief history of present illness:  71 y.o. female presents to the ED with posterior headaches intermittent for the last month. States she was seen at  and prescribed migraine medication with no relief. Hx of cancer however is in remission. Denies vision changes or weakness.    Vitals:    06/06/22 1719   BP: 114/66   Pulse: 78   Resp: 17   Temp: 98.8 °F (37.1 °C)   TempSrc: Oral   SpO2: 96%   Weight: 56.2 kg (124 lb)   Height: 5' 3" (1.6 m)       Pertinent physical exam:  Awake, alert, ambulatory, non-labored respirations    Brief workup plan:  Labs, head CT    Preliminary workup initiated; this workup will be continued and followed by the physician or advanced practice provider that is assigned to the patient when roomed.  "

## 2022-06-06 NOTE — TELEPHONE ENCOUNTER
I spoke with patient. I let her know Dr Mcclelland is out of the office until Monday. I also asked to go to ER or urgent care if her headache intensifies. She verbalized understanding.

## 2022-06-07 VITALS
HEART RATE: 75 BPM | OXYGEN SATURATION: 98 % | DIASTOLIC BLOOD PRESSURE: 83 MMHG | WEIGHT: 124 LBS | RESPIRATION RATE: 17 BRPM | SYSTOLIC BLOOD PRESSURE: 147 MMHG | BODY MASS INDEX: 21.97 KG/M2 | HEIGHT: 63 IN | TEMPERATURE: 99 F

## 2022-06-07 RX ORDER — PREDNISONE 20 MG/1
40 TABLET ORAL DAILY
Qty: 10 TABLET | Refills: 0 | Status: SHIPPED | OUTPATIENT
Start: 2022-06-07 | End: 2022-06-07 | Stop reason: SDUPTHER

## 2022-06-07 RX ORDER — PREDNISONE 20 MG/1
40 TABLET ORAL DAILY
Qty: 10 TABLET | Refills: 0 | Status: SHIPPED | OUTPATIENT
Start: 2022-06-07 | End: 2022-06-12

## 2022-06-07 NOTE — ED NOTES
"Pt presents to ed with complaints of posterior HA starting in April. Pt states she was seen at , prescribed medications, "which did not help". Pt denies blurred vision, states she does have light sensitivity. Pt w/ hx of HTN. Pt in NAD, AAOx4. Cardiac-respiratory monitors in progress  "

## 2022-06-07 NOTE — ED PROVIDER NOTES
Encounter Date: 6/6/2022    SCRIBE #1 NOTE: I, Fred Adams, am scribing for, and in the presence of,  Dr. Monroe. I have scribed the entire note.       History     Chief Complaint   Patient presents with    Headache     Posterior headaches intermittently x1 month.  Went to an  and was prescribed migraine meds. Today, pain was in the middle of her head and felt different than before.  Called her PCP and they said if the pain continued to come to the ED.  No vision changes,  no focal deficits.       71 year old female with a hx of cancer, anxiety, GERD, and HTN presents to the ED for headache. Pt states since mid April she has been experiencing severe headaches. Pt was seen at urgent care where she was prescribed Esgic. Pt had an episode of a severe pounding headache around 1300 yesterday. Pt contacted her PCP who told her if her symptoms do not improve throughout the day then for her to come to the ED. Pt states she noticed she had some vision changes from her migraines.      The history is provided by the patient. No  was used.   Headache   This is a chronic problem. The current episode started more than 1 month ago. The problem occurs intermittently. The problem has been unchanged. The pain is located in the occipital region. The pain does not radiate. The pain quality is similar to prior headaches. The quality of the pain is described as pounding. Associated symptoms include a visual change. Pertinent negatives include no abdominal pain, back pain, coughing, dizziness, ear pain, fever, nausea, numbness, sore throat, vomiting or weakness. The symptoms are aggravated by bright light. Her past medical history is significant for cancer.     Review of patient's allergies indicates:   Allergen Reactions    Amoxicillin Other (See Comments)     Taking digoxin.    Sulfa (sulfonamide antibiotics) Itching and Swelling    Codeine Nausea Only and Other (See Comments)     Nausea and constipation.     Demerol [meperidine] Nausea Only and Other (See Comments)     Nausea and constipation.    Hydrocodone Nausea Only and Other (See Comments)     Nausea and constipation.    Epinephrine      Neuroendocrine Tumor patient      Nitrofurantoin monohyd/m-cryst     Ketorolac Rash     Past Medical History:   Diagnosis Date    Abdominal bloating     Abdominal pain     Asthma     Atrial fibrillation     Colon polyps     Constipation     Diverticulosis of colon     Esophageal ulcer     Gastritis     GERD (gastroesophageal reflux disease)     Heartburn     Hiatal hernia     HTN (hypertension)     Intermittent diarrhea     Iron deficiency anemia     Osteoarthritis     Osteoporosis     Rectal bleeding     Ruptured lumbar disc     Small bowel lesion     Vitamin B12 deficiency anemia     Vitamin D deficiency      Past Surgical History:   Procedure Laterality Date    ANTEGRADE SINGLE BALLOON ENTEROSCOPY N/A 2019    Procedure: ENTEROSCOPY, DOUBLE BALLOON, ANTEGRADE;  Surgeon: Sivakumar Dorsey MD;  Location: [address]);  Service: Endoscopy;  Laterality: N/A;    BREAST BIOPSY Bilateral      SECTION   &     CHOLECYSTECTOMY      COLONOSCOPY      outside facility    ENDOSCOPIC ULTRASOUND OF UPPER GASTROINTESTINAL TRACT N/A 2019    Procedure: ULTRASOUND-ENDOSCOPIC-UPPER;  Surgeon: Armando Bills MD;  Location: [facility];  Service: Endoscopy;  Laterality: N/A;  Carcinoid diagnosis    ESOPHAGOGASTRODUODENOSCOPY      outside facility    HYSTERECTOMY      LAPAROSCOPIC APPENDECTOMY N/A 2019    Procedure: APPENDECTOMY, LAPAROSCOPIC;  Surgeon: Rebecca Valdez MD;  Location: [facility];  Service: General;  Laterality: N/A;    LAPAROSCOPIC RESECTION OF SMALL INTESTINE N/A 2019    Procedure: EXCISION, SMALL INTESTINE, LAPAROSCOPIC;  Surgeon: Rebecca Valdez MD;  Location: [facility];  Service: General;  Laterality: N/A;    ROBOT-ASSISTED LAPAROSCOPIC  REPAIR OF INCISIONAL HERNIA N/A 3/13/2020    Procedure: ROBOTIC REPAIR, HERNIA, INCISIONAL;  Surgeon: Rebecca Valdez MD;  Location: [facility];  Service: General;  Laterality: N/A;    SHOULDER SURGERY Right 03/13/2013    TOTAL KNEE ARTHROPLASTY Right 09/16/2013     Family History   Problem Relation Age of Onset    Diabetes Mother     Multiple myeloma Father      Social History     Tobacco Use    Smoking status: Former Smoker     Packs/day: 0.90     Years: 5.00     Pack years: 4.50    Smokeless tobacco: Never Used   Substance Use Topics    Alcohol use: Yes    Drug use: No     Review of Systems   Constitutional: Negative for activity change, chills, diaphoresis, fatigue and fever.   HENT: Negative for congestion, ear pain, sinus pain and sore throat.    Eyes: Positive for visual disturbance.   Respiratory: Negative for cough, shortness of breath, wheezing and stridor.    Cardiovascular: Negative for chest pain, palpitations and leg swelling.   Gastrointestinal: Negative for abdominal pain, constipation, diarrhea, nausea, rectal pain and vomiting.   Genitourinary: Negative for dysuria and hematuria.   Musculoskeletal: Negative for arthralgias, back pain and myalgias.   Skin: Negative for rash.   Neurological: Positive for headaches. Negative for dizziness, syncope, weakness and numbness.   All other systems reviewed and are negative.      Physical Exam     Initial Vitals [06/06/22 1719]   BP Pulse Resp Temp SpO2   114/66 78 17 98.8 °F (37.1 °C) 96 %      MAP       --         Physical Exam    Nursing note and vitals reviewed.  Constitutional: She appears well-developed and well-nourished. No distress.   HENT:   Head: Normocephalic and atraumatic.   Eyes: EOM are normal. Pupils are equal, round, and reactive to light.   Neck: Trachea normal. Neck supple.   No meningeal symptoms   Normal range of motion.  Cardiovascular: Normal rate, regular rhythm and normal heart sounds.   No murmur heard.  Pulmonary/Chest:  Breath sounds normal. No respiratory distress. She has no wheezes. She has no rhonchi. She has no rales.   Abdominal: Abdomen is soft. Bowel sounds are normal. She exhibits no distension. There is no abdominal tenderness. There is no rebound and no guarding.   Musculoskeletal:         General: Normal range of motion.      Cervical back: Normal range of motion and neck supple.      Lumbar back: Normal range of motion.     Neurological: She is alert and oriented to person, place, and time. She has normal strength.   Skin: Skin is warm and dry. No rash noted.   Psychiatric: She has a normal mood and affect.         ED Course   Procedures  Labs Reviewed   CBC WITH DIFFERENTIAL - Abnormal; Notable for the following components:       Result Value    RBC 4.05 (*)     Hgb 11.8 (*)     Hct 36.8 (*)     MCHC 32.1 (*)     MPV 8.7 (*)     IG# 0.04 (*)     IG% 0.5 (*)     All other components within normal limits   CBC W/ AUTO DIFFERENTIAL    Narrative:     The following orders were created for panel order CBC auto differential.  Procedure                               Abnormality         Status                     ---------                               -----------         ------                     CBC with Differential[[id number]]        Abnormal            Final result                 Please view results for these tests on the individual orders.   COMPREHENSIVE METABOLIC PANEL          Imaging Results          CT Head Without Contrast (Final result)  Result time 06/06/22 18:16:10    Final result by Dariela Arroyo MD (06/06/22 18:16:10)                 Impression:      No acute intracranial abnormality.      Electronically signed by: Dariela Arroyo  Date:    06/06/2022  Time:    18:16             Narrative:    EXAMINATION:  CT HEAD WITHOUT CONTRAST    CLINICAL HISTORY:  Headache, new or worsening (Age >= 50y);    TECHNIQUE:  Axial scans were obtained from skull base to the vertex.    Coronal and sagittal reconstructions  obtained from the axial data.    Automatic exposure control was utilized to limit radiation dose.    Contrast: None    Radiation Dose:    Total DLP: 883 mGy*cm    COMPARISON:  CT head dated 03/06/2019    FINDINGS:  There is no acute intracranial hemorrhage or edema. The gray-white matter differentiation is preserved.    There is no mass effect or midline shift.  There is diffuse parenchymal volume loss.  The basal cisterns are patent. There is no abnormal extra-axial fluid collection.    The calvarium and skull base are intact. The visualized paranasal sinuses and the mastoid air cells are clear.                                 Medications - No data to display  Medical Decision Making:   Initial Assessment:   71-year-old lady presenting with worsening and more continuous generalize headache.  Patient is currently on Esgic, is also on anxiety medication and sumatriptan.  Differential Diagnosis:   Intracranial hemorrhage, tumor, benign headache.  Clinical Tests:   Lab Tests: Reviewed  Radiological Study: Reviewed          Scribe Attestation:   Scribe #1: I performed the above scribed service and the documentation accurately describes the services I performed. I attest to the accuracy of the note.    Attending Attestation:           Physician Attestation for Scribe:  Physician Attestation Statement for Scribe #1: I, Dr. Monroe, reviewed documentation, as scribed by Fred Adams in my presence, and it is both accurate and complete.                      Clinical Impression:   Final diagnoses:  [G44.52] New daily persistent headache (Primary)          ED Disposition Condition    Discharge Stable        ED Prescriptions     Medication Sig Dispense Start Date End Date Auth. Provider    predniSONE (DELTASONE) 20 MG tablet  (Status: Discontinued) Take 2 tablets (40 mg total) by mouth once daily. for 5 days 10 tablet 6/7/2022 6/7/2022 Bill Monroe MD    predniSONE (DELTASONE) 20 MG tablet Take 2 tablets (40 mg total) by  mouth once daily. for 5 days 10 tablet 6/7/2022 6/12/2022 Bill Monroe MD        Follow-up Information     Follow up With Specialties Details Why Contact Info    Cedric Mcclelland MD Family Medicine In 3 days  [address] [facility].  [address] [place] [id number]  [phone]      [facility] - Emergency Dept Emergency Medicine  As needed, If symptoms worsen [address] [facility] [id number] [phone]           Bill Monroe MD  06/07/22 0404

## 2022-06-09 ENCOUNTER — TELEPHONE (OUTPATIENT)
Dept: PRIMARY CARE CLINIC | Facility: CLINIC | Age: 72
End: 2022-06-09
Payer: MEDICARE

## 2022-06-09 NOTE — TELEPHONE ENCOUNTER
----- Message from Ashok Molina sent at 6/9/2022 11:18 AM CDT -----  .Type:  Needs Medical Advice    Who Called: Christiana  Symptoms (please be specific):    How long has patient had these symptoms:    Pharmacy name and phone #:  [facility] in [place]  Would the patient rather a call back or a response via [facility]?   Best Call Back Number: [phone]  Additional Information: She would like to speak with nurse in re: an ER visit, and the medication that she has to take migranes have gotten worst. She needs to call in another script, does she have to make an adjustment with [facility].

## 2022-06-09 NOTE — TELEPHONE ENCOUNTER
I spoke with patient, she is aware Dr Mcclelland and his nurse are out of the office until Monday. She would like a return call on Monday.

## 2022-06-09 NOTE — TELEPHONE ENCOUNTER
----- Message from Ashok Molina sent at 6/9/2022 11:18 AM CDT -----  .Type:  Needs Medical Advice    Who Called: Christiana  Symptoms (please be specific):    How long has patient had these symptoms:    Pharmacy name and phone #:  [facility] in [place]  Would the patient rather a call back or a response via [facility]?   Best Call Back Number: [phone]  Additional Information: She would like to speak with nurse in re: an ER visit, and the medication that she has to take migranes have gotten worst. She needs to call in another script, does she have to make an adjustment with [facility].

## 2022-06-09 NOTE — TELEPHONE ENCOUNTER
Called pt and she wanted to make sure she had refills on her Butalbital-acetaminophen-caffeine. She stated she is taking them every 4 hours for her pain. Expressed to her she does have refills on that medication

## 2022-06-13 ENCOUNTER — OFFICE VISIT (OUTPATIENT)
Dept: PRIMARY CARE CLINIC | Facility: CLINIC | Age: 72
End: 2022-06-13
Payer: MEDICARE

## 2022-06-13 VITALS
OXYGEN SATURATION: 99 % | TEMPERATURE: 98 F | BODY MASS INDEX: 21.97 KG/M2 | SYSTOLIC BLOOD PRESSURE: 114 MMHG | DIASTOLIC BLOOD PRESSURE: 73 MMHG | HEART RATE: 68 BPM | WEIGHT: 124 LBS | RESPIRATION RATE: 18 BRPM

## 2022-06-13 DIAGNOSIS — Z86.69 HX OF MIGRAINE HEADACHES: ICD-10-CM

## 2022-06-13 DIAGNOSIS — G43.909 MIGRAINE WITHOUT STATUS MIGRAINOSUS, NOT INTRACTABLE, UNSPECIFIED MIGRAINE TYPE: ICD-10-CM

## 2022-06-13 PROCEDURE — 99213 OFFICE O/P EST LOW 20 MIN: CPT | Mod: ,,, | Performed by: GENERAL PRACTICE

## 2022-06-13 PROCEDURE — 99213 PR OFFICE/OUTPT VISIT, EST, LEVL III, 20-29 MIN: ICD-10-PCS | Mod: ,,, | Performed by: GENERAL PRACTICE

## 2022-06-13 RX ORDER — TERCONAZOLE 8 MG/G
1 CREAM VAGINAL NIGHTLY
COMMUNITY
Start: 2022-06-08 | End: 2023-02-28 | Stop reason: ALTCHOICE

## 2022-06-13 RX ORDER — CITALOPRAM 20 MG/1
20 TABLET, FILM COATED ORAL NIGHTLY
COMMUNITY
Start: 2022-05-17

## 2022-06-13 RX ORDER — RIZATRIPTAN BENZOATE 10 MG/1
10 TABLET ORAL
Qty: 6 TABLET | Refills: 5 | Status: SHIPPED | OUTPATIENT
Start: 2022-06-13 | End: 2022-07-13

## 2022-06-13 NOTE — ASSESSMENT & PLAN NOTE
Patient prescribed rizatriptan, we will try that medication for now, and re-evaluated 3 weeks.  If we cannot find an adequate treatment plan we will consider referring her to a neurologist for further care.  ER precautions discussed, patient will contact this clinic if any problems before her next visit.

## 2022-06-13 NOTE — PROGRESS NOTES
Subjective:       Patient ID: Christiana Chan is a 71 y.o. female.    Chief Complaint: No chief complaint on file.    HPI   Patient presents to the clinic for discussion of headaches.  Notably, she went to the emergency room on 06/06/2022 with headaches, had a CT scan that was negative, was prescribed oral cortisone.  She has been having headaches now for approximately 2 months, occurs daily, she thinks it is stress related, I prescribed Fioricet which did not help.  She was prescribed Imitrex in the past, that did not help either.  Headaches are described as periorbital, across the top of her head, into the back of her head as well, she does have associated photophobia.  No peripheral weakness or numbness, no visual change today.    Review of Systems   Constitutional: Negative.  Negative for fatigue and fever.   Respiratory: Negative.  Negative for shortness of breath.    Cardiovascular: Negative.  Negative for chest pain.   Gastrointestinal: Negative.  Negative for abdominal pain, change in bowel habit and change in bowel habit.   Integumentary:  Negative.   Neurological: Positive for headaches. Negative for weakness.         Objective:   There were no vitals filed for this visit.There is no height or weight on file to calculate BMI.     Physical Exam  Constitutional:       Appearance: Normal appearance.   Eyes:      Conjunctiva/sclera: Conjunctivae normal.   Cardiovascular:      Rate and Rhythm: Normal rate and regular rhythm.   Pulmonary:      Effort: Pulmonary effort is normal.      Breath sounds: Normal breath sounds.   Skin:     General: Skin is warm and dry.   Neurological:      Mental Status: She is alert.   Psychiatric:         Mood and Affect: Mood normal.         Behavior: Behavior normal.           Lab Results   Component Value Date     06/06/2022    K 3.6 06/06/2022     02/21/2022    CO2 26 06/06/2022    BUN 11.9 06/06/2022    CREATININE 0.83 06/06/2022    GLU 83 02/21/2022    AST 14  06/06/2022    ALT 11 06/06/2022    CHOL 191 08/23/2021    .00 08/23/2021    TRIG 77 08/23/2021    HDL 49 08/23/2021    TSH 1.0892 08/23/2021    F3YSOSX 10.9 01/20/2020    HGBA1C 5.0 08/23/2021    WBC 8.7 06/06/2022    RBC 4.05 (L) 06/06/2022    HGB 11.8 (L) 06/06/2022    HCT 36.8 (L) 06/06/2022    MCV 90.9 06/06/2022     06/06/2022     Assessment:     Problem List Items Addressed This Visit        Neuro    Hx of migraine headaches    Migraine headache    Current Assessment & Plan     Patient prescribed rizatriptan, we will try that medication for now, and re-evaluated 3 weeks.  If we cannot find an adequate treatment plan we will consider referring her to a neurologist for further care.  ER precautions discussed, patient will contact this clinic if any problems before her next visit.           Relevant Medications    rizatriptan (MAXALT) 10 MG tablet             Medication List with Changes/Refills   New Medications    RIZATRIPTAN (MAXALT) 10 MG TABLET    Take 1 tablet (10 mg total) by mouth as needed for Migraine (one at onset of headache, then a second 2 hours later if necessary).   Current Medications    ACETAMINOPHEN (TYLENOL) 500 MG TABLET    Take 500 mg by mouth every 6 (six) hours as needed for Pain.    ALBUTEROL (PROVENTIL/VENTOLIN HFA) 90 MCG/ACTUATION INHALER    Inhale 1 puff into the lungs every 6 (six) hours as needed for Wheezing. Rescue    ALPRAZOLAM (XANAX) 2 MG TAB    Take 1 mg by mouth nightly.     BUTALBITAL-ACETAMINOPHEN-CAFFEINE -40 MG (FIORICET, ESGIC) -40 MG PER TABLET    Take 1 tablet by mouth every 4 (four) hours as needed for Pain.    CARVEDILOL (COREG) 25 MG TABLET    Take 25 mg by mouth 2 (two) times daily.    CITALOPRAM (CELEXA) 10 MG TABLET    Take 20 mg by mouth once daily. 1 TABLET AT NIGHT.    COMBIVENT RESPIMAT  MCG/ACTUATION INHALER    Inhale 2 puffs into the lungs as needed.     CYANOCOBALAMIN 1,000 MCG/ML INJECTION    Inject 1,000 mcg into the  muscle every 14 (fourteen) days.    DENOSUMAB (PROLIA) 60 MG/ML SYRG    Inject 60 mg into the skin every 6 (six) months.    DICLOFENAC SODIUM (VOLTAREN) 1 % GEL    Apply topically.    DICYCLOMINE (BENTYL) 20 MG TABLET    Take 20 mg by mouth every 6 (six) hours as needed.    DOCUSATE SODIUM (COLACE) 100 MG CAPSULE    Take 100 mg by mouth 2 (two) times daily. 1 TABLET IN THE MORNING AND 1 TABLET AT NIGHT.    GABAPENTIN (NEURONTIN) 100 MG CAPSULE    Take 1 capsule (100 mg total) by mouth 3 (three) times daily.    LAMOTRIGINE (LAMICTAL) 100 MG TABLET    Take 100 mg by mouth 2 (two) times daily. AT NIGHT    LIPASE-PROTEASE-AMYLASE 24,000-76,000-120,000 UNITS (CREON) 24,000-76,000 -120,000 UNIT CAPSULE    Take 1 capsule by mouth 3 (three) times daily with meals.    LISINOPRIL (PRINIVIL,ZESTRIL) 20 MG TABLET    Take 20 mg by mouth Daily.     MENTHOL 5 % PTMD    APPLY PATCH TO AFFECTED AREA 1 TO 2 TIMES A DAY AS DIRECTED. WASH HANDS WITH SOAP AFTER APPLYING    METHOCARBAMOL (ROBAXIN) 750 MG TAB    Take 750 mg by mouth 2 (two) times daily.    ONDANSETRON (ZOFRAN) 8 MG TABLET    Take 0.5 tablets (4 mg total) by mouth every 8 (eight) hours as needed for Nausea.    PANTOPRAZOLE (PROTONIX) 20 MG TABLET    Take 20 mg by mouth once daily.    PREMARIN VAGINAL CREAM    Place 30 mg vaginally once daily. NIGHTLY.    PROPRANOLOL (INDERAL) 10 MG TABLET    Take 10 mg by mouth daily as needed.    SIMETHICONE (MYLICON) 80 MG CHEWABLE TABLET    Take 1 tablet (80 mg total) by mouth every 8 (eight) hours as needed for Flatulence.    SPIRONOLACTONE (ALDACTONE) 25 MG TABLET    Take 12.5 mg by mouth once daily.    TERCONAZOLE (TERAZOL 7) 0.4 % CREA    U 1 APPLICATION VAGINALLY QD HS    TRAMADOL (ULTRAM) 50 MG TABLET    Take 1 tablet (50 mg total) by mouth every 6 (six) hours as needed for Pain.    TRAZODONE (DESYREL) 50 MG TABLET    100 mg. 1 TABLET AT NIGHT.    VALACYCLOVIR (VALTREX) 500 MG TABLET    TK 1 T PO D   Discontinued Medications     SUMATRIPTAN (IMITREX) 50 MG TABLET    Take by mouth.     Plan:             Follow up in about 3 weeks (around 7/4/2022).

## 2022-06-29 ENCOUNTER — TELEPHONE (OUTPATIENT)
Dept: PRIMARY CARE CLINIC | Facility: CLINIC | Age: 72
End: 2022-06-29
Payer: MEDICARE

## 2022-06-29 NOTE — TELEPHONE ENCOUNTER
----- Message from Ludivina Diamond MA sent at 6/29/2022 11:39 AM CDT -----    ----- Message -----  From: Ashok Molina  Sent: 6/29/2022  11:25 AM CDT  To: [facility] Staff    .Type:  Needs Medical Advice    Who Called: Symptoms (please be specific):    How long has patient had these symptoms:    Pharmacy name and phone #:    Would the patient rather a call back or a response via [facility]?   Best Call Back Number: [phone]  Additional Information: She wanted to speak with nurse about her medication. The pharmacy told her that she should not be taking that much.

## 2022-06-29 NOTE — TELEPHONE ENCOUNTER
Spoke to patient regarding message. Patient stated [facility] stated that her insurance will only cover 12 rizatriptan pills per month.

## 2022-07-11 ENCOUNTER — PATIENT OUTREACH (OUTPATIENT)
Dept: ADMINISTRATIVE | Facility: HOSPITAL | Age: 72
End: 2022-07-11
Payer: MEDICARE

## 2022-07-11 PROBLEM — Z86.69 HX OF MIGRAINE HEADACHES: Chronic | Status: ACTIVE | Noted: 2022-06-13

## 2022-07-11 PROBLEM — F31.81 BIPOLAR II DISORDER: Chronic | Status: ACTIVE | Noted: 2022-05-18

## 2022-07-11 PROBLEM — C7A.012 MALIGNANT CARCINOID TUMOR OF ILEUM: Chronic | Status: ACTIVE | Noted: 2019-04-11

## 2022-07-11 PROBLEM — I48.0 PAROXYSMAL ATRIAL FIBRILLATION: Chronic | Status: ACTIVE | Noted: 2022-05-18

## 2022-07-11 PROBLEM — I10 HYPERTENSION: Chronic | Status: ACTIVE | Noted: 2022-05-18

## 2022-07-11 PROBLEM — F41.8 MIXED ANXIETY DEPRESSIVE DISORDER: Chronic | Status: ACTIVE | Noted: 2022-05-18

## 2022-07-11 PROBLEM — E78.5 HYPERLIPIDEMIA: Chronic | Status: ACTIVE | Noted: 2022-05-18

## 2022-07-11 PROBLEM — F41.1 ANXIETY STATE: Chronic | Status: ACTIVE | Noted: 2022-05-18

## 2022-07-11 PROBLEM — R01.1 HEART MURMUR: Chronic | Status: ACTIVE | Noted: 2022-05-18

## 2022-07-11 PROBLEM — G43.909 MIGRAINE HEADACHE: Chronic | Status: ACTIVE | Noted: 2022-06-13

## 2022-07-11 NOTE — PROGRESS NOTES
Population Health Outreach.Records Received, hyper-linked into chart at this time. The following record(s)  below were uploaded for Health Maintenance .    9/8/2021-MAMMOGRAM  8/27/2019-COLONOSCOPY

## 2022-07-11 NOTE — PROGRESS NOTES
"Subjective:       Patient ID: Christiana Chan is a 71 y.o. female.    Chief Complaint: Follow-up (Migraines)    HPI   Established patient, presents for follow-up, started on rizatriptan for migraine headaches.  She states that the rizatriptan is effective, but she has to take it fairly often, and her insurance will only pay for 12 pills per month.  She is also undergoing therapy, she does see a psychiatrist, she is under a great deal of stress, she does plan on getting some therapy fairly soon.    Review of Systems   Constitutional: Negative.  Negative for fatigue and fever.   Respiratory: Negative.  Negative for shortness of breath.    Cardiovascular: Negative.  Negative for chest pain.   Gastrointestinal: Negative.  Negative for abdominal pain, change in bowel habit and change in bowel habit.   Integumentary:  Negative.   Neurological: Negative for weakness.         Objective:     Vitals:    07/12/22 0832   BP: 127/80   BP Location: Left arm   Patient Position: Sitting   BP Method: Small (Automatic)   Pulse: 83   Resp: 18   Temp: 98.7 °F (37.1 °C)   TempSrc: Tympanic   SpO2: 97%   Weight: 56.7 kg (125 lb)   Height: 5' 3" (1.6 m)   Body mass index is 22.14 kg/m².     Physical Exam  Constitutional:       Appearance: Normal appearance.   Eyes:      Conjunctiva/sclera: Conjunctivae normal.   Cardiovascular:      Rate and Rhythm: Normal rate and regular rhythm.   Pulmonary:      Effort: Pulmonary effort is normal.      Breath sounds: Normal breath sounds.   Skin:     General: Skin is warm and dry.   Neurological:      Mental Status: She is alert.   Psychiatric:         Mood and Affect: Mood normal.         Behavior: Behavior normal.           Lab Results   Component Value Date     06/06/2022    K 3.6 06/06/2022     02/21/2022    CO2 26 06/06/2022    BUN 11.9 06/06/2022    CREATININE 0.83 06/06/2022    GLU 83 02/21/2022    AST 14 06/06/2022    ALT 11 06/06/2022    CHOL 191 08/23/2021    .00 " 08/23/2021    TRIG 77 08/23/2021    HDL 49 08/23/2021    TSH 1.0892 08/23/2021    U9TNNPY 10.9 01/20/2020    HGBA1C 5.0 08/23/2021    WBC 8.7 06/06/2022    RBC 4.05 (L) 06/06/2022    HGB 11.8 (L) 06/06/2022    HCT 36.8 (L) 06/06/2022    MCV 90.9 06/06/2022     06/06/2022     Assessment:     Problem List Items Addressed This Visit        Neuro    Migraine headache - Primary (Chronic)    Current Assessment & Plan     Continue rizatriptan, after discussion it was decided to refer her to see a neurologist for her continue headaches.           Relevant Orders    Ambulatory referral/consult to Neurology       Psychiatric    Anxiety state (Chronic)    Current Assessment & Plan     Continue therapy, continue medications                    Medication List with Changes/Refills   Current Medications    ACETAMINOPHEN (TYLENOL) 500 MG TABLET    Take 500 mg by mouth every 6 (six) hours as needed for Pain.    ALBUTEROL (PROVENTIL/VENTOLIN HFA) 90 MCG/ACTUATION INHALER    Inhale 1 puff into the lungs every 6 (six) hours as needed for Wheezing. Rescue    ALPRAZOLAM (XANAX) 2 MG TAB    Take 1 mg by mouth nightly.     CARVEDILOL (COREG) 25 MG TABLET    Take 25 mg by mouth 2 (two) times daily.    CITALOPRAM (CELEXA) 20 MG TABLET        CLENPIQ 10 MG-3.5 GRAM -12 GRAM/160 ML SOLN    Take 1 kit by mouth.    COMBIVENT RESPIMAT  MCG/ACTUATION INHALER    Inhale 2 puffs into the lungs as needed.     CYANOCOBALAMIN 1,000 MCG/ML INJECTION    Inject 1,000 mcg into the muscle every 14 (fourteen) days.    DENOSUMAB (PROLIA) 60 MG/ML SYRG    Inject 60 mg into the skin every 6 (six) months.    DICLOFENAC SODIUM (VOLTAREN) 1 % GEL    Apply topically.    DICYCLOMINE (BENTYL) 20 MG TABLET    Take 20 mg by mouth every 6 (six) hours as needed.    DOCUSATE SODIUM (COLACE) 100 MG CAPSULE    Take 100 mg by mouth 2 (two) times daily. 1 TABLET IN THE MORNING AND 1 TABLET AT NIGHT.    GABAPENTIN (NEURONTIN) 100 MG CAPSULE    Take 1 capsule (100  mg total) by mouth 3 (three) times daily.    LAMOTRIGINE (LAMICTAL) 100 MG TABLET    Take 100 mg by mouth 2 (two) times daily. AT NIGHT    LIPASE-PROTEASE-AMYLASE 24,000-76,000-120,000 UNITS (CREON) 24,000-76,000 -120,000 UNIT CAPSULE    Take 1 capsule by mouth 3 (three) times daily with meals.    LISINOPRIL (PRINIVIL,ZESTRIL) 20 MG TABLET    Take 20 mg by mouth Daily.     MENTHOL 5 % PTMD    APPLY PATCH TO AFFECTED AREA 1 TO 2 TIMES A DAY AS DIRECTED. WASH HANDS WITH SOAP AFTER APPLYING    METHOCARBAMOL (ROBAXIN) 750 MG TAB    Take 750 mg by mouth 2 (two) times daily.    ONDANSETRON (ZOFRAN) 8 MG TABLET    Take 0.5 tablets (4 mg total) by mouth every 8 (eight) hours as needed for Nausea.    ONDANSETRON (ZOFRAN-ODT) 8 MG TBDL    SMARTSI.5-1 Tablet(s) Sublingual Every 4 Hours PRN    PANTOPRAZOLE (PROTONIX) 20 MG TABLET    Take 20 mg by mouth once daily.    PREMARIN VAGINAL CREAM    Place 30 mg vaginally once daily. NIGHTLY.    PROPRANOLOL (INDERAL) 10 MG TABLET    Take 10 mg by mouth daily as needed.    RIZATRIPTAN (MAXALT) 10 MG TABLET    Take 1 tablet (10 mg total) by mouth as needed for Migraine (one at onset of headache, then a second 2 hours later if necessary).    SIMETHICONE (MYLICON) 80 MG CHEWABLE TABLET    Take 1 tablet (80 mg total) by mouth every 8 (eight) hours as needed for Flatulence.    SPIRONOLACTONE (ALDACTONE) 25 MG TABLET    Take 12.5 mg by mouth once daily.    TERCONAZOLE (TERAZOL 3) 0.8 % VAGINAL CREAM    Place 1 applicator vaginally nightly.    TRAMADOL (ULTRAM) 50 MG TABLET    Take 1 tablet (50 mg total) by mouth every 6 (six) hours as needed for Pain.    TRAZODONE (DESYREL) 100 MG TABLET    Take 100 mg by mouth nightly.    VALACYCLOVIR (VALTREX) 500 MG TABLET    TK 1 T PO D   Discontinued Medications    TRAZODONE (DESYREL) 50 MG TABLET    100 mg. 1 TABLET AT NIGHT.     Plan:           Neurology referral, continue to take rizatriptan, get next refill when that is due.  Continue therapy,  keep next appointment as scheduled, wellness exam on 10/25/2022.

## 2022-07-12 ENCOUNTER — OFFICE VISIT (OUTPATIENT)
Dept: PRIMARY CARE CLINIC | Facility: CLINIC | Age: 72
End: 2022-07-12
Payer: MEDICARE

## 2022-07-12 VITALS
BODY MASS INDEX: 22.15 KG/M2 | TEMPERATURE: 99 F | OXYGEN SATURATION: 97 % | WEIGHT: 125 LBS | RESPIRATION RATE: 18 BRPM | HEIGHT: 63 IN | DIASTOLIC BLOOD PRESSURE: 80 MMHG | HEART RATE: 83 BPM | SYSTOLIC BLOOD PRESSURE: 127 MMHG

## 2022-07-12 DIAGNOSIS — F41.1 ANXIETY STATE: Chronic | ICD-10-CM

## 2022-07-12 DIAGNOSIS — G43.909 MIGRAINE WITHOUT STATUS MIGRAINOSUS, NOT INTRACTABLE, UNSPECIFIED MIGRAINE TYPE: Primary | Chronic | ICD-10-CM

## 2022-07-12 PROBLEM — K58.9 IRRITABLE BOWEL SYNDROME: Chronic | Status: ACTIVE | Noted: 2022-05-18

## 2022-07-12 PROBLEM — K43.0 INCARCERATED INCISIONAL HERNIA: Chronic | Status: ACTIVE | Noted: 2020-03-13

## 2022-07-12 PROBLEM — M81.0 AGE RELATED OSTEOPOROSIS: Chronic | Status: ACTIVE | Noted: 2022-05-18

## 2022-07-12 PROBLEM — K86.2 PANCREATIC CYST: Chronic | Status: ACTIVE | Noted: 2019-04-09

## 2022-07-12 PROBLEM — D64.9 ANEMIA: Chronic | Status: ACTIVE | Noted: 2022-05-18

## 2022-07-12 PROCEDURE — 99213 OFFICE O/P EST LOW 20 MIN: CPT | Mod: ,,, | Performed by: GENERAL PRACTICE

## 2022-07-12 PROCEDURE — 99213 PR OFFICE/OUTPT VISIT, EST, LEVL III, 20-29 MIN: ICD-10-PCS | Mod: ,,, | Performed by: GENERAL PRACTICE

## 2022-07-12 RX ORDER — SODIUM PICOSULFATE, MAGNESIUM OXIDE, AND ANHYDROUS CITRIC ACID 10; 3.5; 12 MG/160ML; G/160ML; G/160ML
1 LIQUID ORAL
COMMUNITY
Start: 2022-06-16

## 2022-07-12 RX ORDER — ONDANSETRON 8 MG/1
TABLET, ORALLY DISINTEGRATING ORAL
COMMUNITY
Start: 2022-06-16 | End: 2023-02-28 | Stop reason: SDUPTHER

## 2022-07-12 RX ORDER — TRAZODONE HYDROCHLORIDE 100 MG/1
100 TABLET ORAL NIGHTLY
COMMUNITY
Start: 2022-07-06

## 2022-07-12 NOTE — ASSESSMENT & PLAN NOTE
Continue rizatriptan, after discussion it was decided to refer her to see a neurologist for her continue headaches.

## 2022-07-18 ENCOUNTER — TELEPHONE (OUTPATIENT)
Dept: PRIMARY CARE CLINIC | Facility: CLINIC | Age: 72
End: 2022-07-18
Payer: MEDICARE

## 2022-07-18 NOTE — TELEPHONE ENCOUNTER
----- Message from Mindi Jimenez sent at 7/18/2022  7:54 AM CDT -----  Type:  Patient Requesting Referral    Who Called:pt  Does the patient already have the specialty appointment scheduled?:  If yes, what is the date of that appointment?:  Referral to What Specialty:neurology  Reason for Referral:headaches/migraines  Does the patient want the referral with a specific physician?:  Is the specialist an [facility]?:  Patient Requesting a Response?:yes  Would the patient rather a call back or a response via [facility]? C/b  Best Call Back Number:[phone]  Additional Information: pt stated headaches are getting worse, pt would like to know if pcp is still looking for a neurologist  or sent a referral

## 2022-08-11 ENCOUNTER — OFFICE VISIT (OUTPATIENT)
Dept: URGENT CARE | Facility: CLINIC | Age: 72
End: 2022-08-11
Payer: MEDICARE

## 2022-08-11 ENCOUNTER — TELEPHONE (OUTPATIENT)
Dept: ENDOCRINOLOGY | Facility: CLINIC | Age: 72
End: 2022-08-11
Payer: MEDICARE

## 2022-08-11 VITALS
HEIGHT: 63 IN | OXYGEN SATURATION: 98 % | SYSTOLIC BLOOD PRESSURE: 110 MMHG | WEIGHT: 125 LBS | RESPIRATION RATE: 16 BRPM | BODY MASS INDEX: 22.15 KG/M2 | DIASTOLIC BLOOD PRESSURE: 73 MMHG | TEMPERATURE: 100 F | HEART RATE: 76 BPM

## 2022-08-11 DIAGNOSIS — J01.90 ACUTE SINUSITIS, RECURRENCE NOT SPECIFIED, UNSPECIFIED LOCATION: Primary | ICD-10-CM

## 2022-08-11 PROCEDURE — 99213 OFFICE O/P EST LOW 20 MIN: CPT | Mod: ,,, | Performed by: PHYSICIAN ASSISTANT

## 2022-08-11 PROCEDURE — 99213 PR OFFICE/OUTPT VISIT, EST, LEVL III, 20-29 MIN: ICD-10-PCS | Mod: ,,, | Performed by: PHYSICIAN ASSISTANT

## 2022-08-11 RX ORDER — FLUTICASONE PROPIONATE 50 MCG
2 SPRAY, SUSPENSION (ML) NASAL DAILY
Qty: 18.2 ML | Refills: 0 | Status: SHIPPED | OUTPATIENT
Start: 2022-08-11

## 2022-08-11 RX ORDER — DOXYCYCLINE 100 MG/1
100 CAPSULE ORAL EVERY 12 HOURS
Qty: 20 CAPSULE | Refills: 0 | Status: SHIPPED | OUTPATIENT
Start: 2022-08-11 | End: 2022-08-21

## 2022-08-11 NOTE — TELEPHONE ENCOUNTER
----- Message from Gregoria Mcdonnell sent at 8/11/2022 10:52 AM CDT -----  Regarding: [facility]: RS appt  [facility]       Pt rescheduled her appt for tomorrow bc she is sick. She is rescheduled for 3/3/22 @ 9am.   She wants to know if it is okay if she waits that long to see the doctor. Please advise.   Pt #[phone]

## 2022-08-11 NOTE — PROGRESS NOTES
"Subjective:       Patient ID: Christiana Chan is a 71 y.o. female.    Vitals:  height is 5' 3" (1.6 m) and weight is 56.7 kg (125 lb). Her oral temperature is 99.7 °F (37.6 °C). Her blood pressure is 110/73 and her pulse is 76. Her respiration is 16 and oxygen saturation is 98%.     Chief Complaint: Cough    Congestion, runny nose, cough, and coughing up mucus. x6 days. Patient tested positive on august 2nd.  Patient did take Paxlovid.  She does not report any relief of symptoms.  She complains of an increase in sinus pressure and nasal discharge.  Denies any fever or shortness of breath.    Cough        Respiratory: Positive for cough.        Objective:      Physical Exam   Constitutional: She is oriented to person, place, and time. She appears well-developed. She is cooperative.  Non-toxic appearance. She does not appear ill. No distress.   HENT:   Head: Normocephalic and atraumatic.   Ears:   Right Ear: Hearing, tympanic membrane, external ear and ear canal normal.   Left Ear: Hearing, tympanic membrane, external ear and ear canal normal.   Nose: Nose normal. No mucosal edema, rhinorrhea or nasal deformity. No epistaxis. Right sinus exhibits no maxillary sinus tenderness and no frontal sinus tenderness. Left sinus exhibits no maxillary sinus tenderness and no frontal sinus tenderness.   Mouth/Throat: Uvula is midline, oropharynx is clear and moist and mucous membranes are normal. No trismus in the jaw. Normal dentition. No uvula swelling. No oropharyngeal exudate, posterior oropharyngeal edema or posterior oropharyngeal erythema.   Eyes: Conjunctivae and lids are normal. No scleral icterus.   Neck: Trachea normal and phonation normal. Neck supple. No edema present. No erythema present. No neck rigidity present.   Cardiovascular: Normal rate, regular rhythm, normal heart sounds and normal pulses.   Pulmonary/Chest: Effort normal and breath sounds normal. No respiratory distress. She has no decreased breath " sounds. She has no rhonchi.   Abdominal: Normal appearance.   Musculoskeletal: Normal range of motion.         General: No deformity. Normal range of motion.   Neurological: She is alert and oriented to person, place, and time. She exhibits normal muscle tone. Coordination normal.   Skin: Skin is warm, dry, intact, not diaphoretic and not pale.   Psychiatric: Her speech is normal and behavior is normal. Judgment and thought content normal.   Nursing note and vitals reviewed.         Previous History      Review of patient's allergies indicates:   Allergen Reactions    Amoxicillin Other (See Comments)     Taking digoxin.    Sulfa (sulfonamide antibiotics) Itching and Swelling    Codeine Nausea Only and Other (See Comments)     Nausea and constipation.    Demerol [meperidine] Nausea Only and Other (See Comments)     Nausea and constipation.    Hydrocodone Nausea Only and Other (See Comments)     Nausea and constipation.    Epinephrine      Neuroendocrine Tumor patient      Nitrofurantoin monohyd/m-cryst     Ketorolac Rash       Past Medical History:   Diagnosis Date    Abdominal bloating     Abdominal pain     Asthma     Atrial fibrillation     Colon polyps 08/27/2019    COLONOSCOPY    Constipation     Diverticulosis of colon     Esophageal ulcer     Gastritis     GERD (gastroesophageal reflux disease)     Heartburn     Hiatal hernia     HTN (hypertension)     Intermittent diarrhea     Iron deficiency anemia     Osteoarthritis     Osteoporosis     Rectal bleeding     Ruptured lumbar disc     Small bowel lesion     Vitamin B12 deficiency anemia     Vitamin D deficiency      Current Outpatient Medications   Medication Instructions    acetaminophen (TYLENOL) 500 mg, Oral, Every 6 hours PRN    albuterol (PROVENTIL/VENTOLIN HFA) 90 mcg/actuation inhaler 1 puff, Inhalation, Every 6 hours PRN, Rescue     ALPRAZolam (XANAX) 1 mg, Oral, Nightly    carvediloL (COREG) 25 mg, Oral, 2 times daily     citalopram (CELEXA) 20 MG tablet No dose, route, or frequency recorded.    CLENPIQ 10 mg-3.5 gram -12 gram/160 mL Soln 1 kit, Oral    COMBIVENT RESPIMAT  mcg/actuation inhaler 2 puffs, Inhalation, As needed (PRN)    cyanocobalamin 1,000 mcg, Intramuscular, Every 14 days    denosumab (PROLIA) 60 mg, Subcutaneous, Every 6 months    diclofenac sodium (VOLTAREN) 1 % Gel Topical (Top)    dicyclomine (BENTYL) 20 mg, Oral, Every 6 hours PRN    docusate sodium (COLACE) 100 mg, Oral, 2 times daily, 1 TABLET IN THE MORNING AND 1 TABLET AT NIGHT.    doxycycline (MONODOX) 100 mg, Oral, Every 12 hours    fluticasone propionate (FLONASE) 100 mcg, Each Nostril, Daily    gabapentin (NEURONTIN) 100 mg, Oral, 3 times daily    lamoTRIgine (LAMICTAL) 100 mg, Oral, 2 times daily, AT NIGHT    lipase-protease-amylase 24,000-76,000-120,000 units (CREON) 24,000-76,000 -120,000 unit capsule 1 capsule, Oral, 3 times daily with meals    lisinopriL (PRINIVIL,ZESTRIL) 20 mg, Oral, Daily    menthol 5 % PtMd APPLY PATCH TO AFFECTED AREA 1 TO 2 TIMES A DAY AS DIRECTED. WASH HANDS WITH SOAP AFTER APPLYING    methocarbamoL (ROBAXIN) 750 mg, Oral, 2 times daily    ondansetron (ZOFRAN) 4 mg, Oral, Every 8 hours PRN    ondansetron (ZOFRAN-ODT) 8 MG TbDL SMARTSI.5-1 Tablet(s) Sublingual Every 4 Hours PRN    pantoprazole (PROTONIX) 20 mg, Oral, Daily    PREMARIN 30 mg, Vaginal, Daily, NIGHTLY.    propranoloL (INDERAL) 10 mg, Oral, Daily PRN    rizatriptan (MAXALT) 10 mg, Oral, As needed (PRN)    simethicone (MYLICON) 80 mg, Oral, Every 8 hours PRN    spironolactone (ALDACTONE) 12.5 mg, Oral, Daily    terconazole (TERAZOL 3) 0.8 % vaginal cream 1 applicator, Vaginal, Nightly    traMADoL (ULTRAM) 50 mg, Oral, Every 6 hours PRN    traZODone (DESYREL) 100 mg, Oral, Nightly    valACYclovir (VALTREX) 500 MG tablet TK 1 T PO D     Past Surgical History:   Procedure Laterality Date    ANTEGRADE SINGLE BALLOON ENTEROSCOPY  "N/A 2019    Procedure: ENTEROSCOPY, DOUBLE BALLOON, ANTEGRADE;  Surgeon: Sivakumar Dorsey MD;  Location: [facility] ([address] [place] [address]);  Service: Endoscopy;  Laterality: N/A;    BREAST BIOPSY Bilateral      SECTION   &     CHOLECYSTECTOMY      COLONOSCOPY      outside facility    COLONOSCOPY  2019    Ashok Ramirez MD    ENDOSCOPIC ULTRASOUND OF UPPER GASTROINTESTINAL TRACT N/A 2019    Procedure: ULTRASOUND-ENDOSCOPIC-UPPER;  Surgeon: Armando Bills MD;  Location[facility];  Service: Endoscopy;  Laterality: N/A;  Carcinoid diagnosis    ESOPHAGOGASTRODUODENOSCOPY      outside facility    HYSTERECTOMY      LAPAROSCOPIC APPENDECTOMY N/A 2019    Procedure: APPENDECTOMY, LAPAROSCOPIC;  Surgeon: Rebecca Valdez MD;  Location: [facility];  Service: General;  Laterality: N/A;    LAPAROSCOPIC RESECTION OF SMALL INTESTINE N/A 2019    Procedure: EXCISION, SMALL INTESTINE, LAPAROSCOPIC;  Surgeon: Rebecca Valdez MD;  Location: [facility];  Service: General;  Laterality: N/A;    ROBOT-ASSISTED LAPAROSCOPIC REPAIR OF INCISIONAL HERNIA N/A 2020    Procedure: ROBOTIC REPAIR, HERNIA, INCISIONAL;  Surgeon: Rebecca Valdez MD;  Location: [facility] OR;  Service: General;  Laterality: N/A;    SHOULDER SURGERY Right 2013    TOTAL KNEE ARTHROPLASTY Right 2013     Family History   Problem Relation Age of Onset    Diabetes Mother     Multiple myeloma Father        Social History     Tobacco Use    Smoking status: Former Smoker     Packs/day: 0.90     Years: 5.00     Pack years: 4.50    Smokeless tobacco: Never Used   Substance Use Topics    Alcohol use: Yes    Drug use: No        Physical Exam      Vital Signs Reviewed   /73   Pulse 76   Temp 99.7 °F (37.6 °C) (Oral)   Resp 16   Ht 5' 3" (1.6 m)   Wt 56.7 kg (125 lb)   SpO2 98%   BMI 22.14 kg/m²        Procedures    Procedures     Labs     Results for orders placed or performed in " visit on 07/11/22    MAMMOGRAPHY   Result Value Ref Range    BCS Recommendation External Repeat mammogram in 1 year     COLONOSCOPY   Result Value Ref Range    CRC Recommendation External Repeat colonoscopy in 3 years          Assessment:       1. Acute sinusitis, recurrence not specified, unspecified location          Plan:         Acute sinusitis, recurrence not specified, unspecified location  -     doxycycline (MONODOX) 100 MG capsule; Take 1 capsule (100 mg total) by mouth every 12 (twelve) hours. for 10 days  Dispense: 20 capsule; Refill: 0  -     fluticasone propionate (FLONASE) 50 mcg/actuation nasal spray; 2 sprays (100 mcg total) by Each Nostril route once daily.  Dispense: 18.2 mL; Refill: 0    Drink plenty of fluids    Get plenty of rest.    Follow-up with your primary care doctor      Go to emergency department with any significant change or worsening symptoms.

## 2022-08-11 NOTE — TELEPHONE ENCOUNTER
She was Covid positive on 8/2, still not feeling well and did not want to go out in public when she is still feeling bad.  Appointment was rescheduled to next available which is 3/3/23, she is concerned about waiting that long for her prolia.  Should we schedule a prolia sooner once she is feeling better or try and fill a cancellation spot once she is feeling better.

## 2022-08-12 NOTE — TELEPHONE ENCOUNTER
Clinic Care Coordination Contact  [facility]/Voicemail       Clinical Data: Care Coordinator Outreach  Outreach attempted x 1.  Left message on patient's voicemail with call back information and requested return call.  . Care Coordinator will try to reach patient again in 10 business days.       Ok to rebook her into a cancellation slot sometime between next week and the end of next month of which there appear to be several.

## 2022-08-15 ENCOUNTER — TELEPHONE (OUTPATIENT)
Dept: PRIMARY CARE CLINIC | Facility: CLINIC | Age: 72
End: 2022-08-15
Payer: MEDICARE

## 2022-08-15 NOTE — TELEPHONE ENCOUNTER
Sujata GONSALES [facility] Endocrinology Clinical Support Staff  Caller: Unspecified (Today,  9:03 AM)  DR. DE LA CRUZ PT     Per Dr. De La Cruz's request, pt was called to come in to an appt that became available on 8/16/22; however, pt declined b/c she is still not feeling well.     Pt is going to see her PCP on 8/17/22 (Wednesday), and will call us to r/s that missed appt once feeling better.     I added her to the cancellation list

## 2022-08-15 NOTE — TELEPHONE ENCOUNTER
----- Message from Mindi Jimenez sent at 8/15/2022  7:35 AM CDT -----  Regarding: advice  Type:  Needs Medical Advice    Who Called: pt  Symptoms (please be specific): congested, coughing, scratchy throat   How long has patient had these symptoms:  3 weeks  Pharmacy name and phone #:  [facility] [place]  Would the patient rather a call back or a response via [facility]? C/b  Best Call Back Number: [phone]  Additional Information: pt tested positive for covid 3 weeks ago and took paxlovid, 2 weeks ago still sick took antibiotics, pt is still ill and would like pcp to contact pt to find out what can be done now, pt has been unwell for too long

## 2022-08-16 NOTE — PROGRESS NOTES
"Subjective:       Patient ID: Christiana Chan is a 71 y.o. female.    Chief Complaint: Nasal Congestion and Cough (Testing positive for covid at [facility] the beginning of August)    HPI   Patient presents to clinic with coughing and congestion. Seen in Urgent Care six days ago, tested positive for COVID two weeks ago.  She was prescribed doxycycline and fluticasone.  She did take antiviral medications.  She also has a history of asthma, she does have a nebulizer machine at home, and her cough is be controlled with over-the-counter cough medication.    Review of Systems   Constitutional: Negative for fever.   Respiratory: Positive for cough. Negative for shortness of breath.    Cardiovascular: Negative for chest pain.         Objective:     Vitals:    08/17/22 0932   BP: 112/73   BP Location: Left arm   Patient Position: Sitting   BP Method: X-Large (Automatic)   Pulse: 66   Temp: 98.3 °F (36.8 °C)   TempSrc: Oral   SpO2: 99%   Weight: 56.7 kg (125 lb)   Height: 5' 3" (1.6 m)   Body mass index is 22.14 kg/m².     Physical Exam  Constitutional:       Appearance: Normal appearance.   HENT:      Right Ear: Tympanic membrane normal.      Left Ear: Tympanic membrane normal.      Mouth/Throat:      Pharynx: No posterior oropharyngeal erythema.   Eyes:      Conjunctiva/sclera: Conjunctivae normal.   Cardiovascular:      Rate and Rhythm: Normal rate and regular rhythm.   Pulmonary:      Effort: Pulmonary effort is normal.      Breath sounds: Normal breath sounds.           Lab Results   Component Value Date     06/06/2022    K 3.6 06/06/2022     02/21/2022    CO2 26 06/06/2022    BUN 11.9 06/06/2022    CREATININE 0.83 06/06/2022    EGFRNONAA >60 02/21/2022    GLU 83 02/21/2022    AST 14 06/06/2022    ALT 11 06/06/2022    CHOL 191 08/23/2021    .00 08/23/2021    TRIG 77 08/23/2021    HDL 49 08/23/2021    TSH 1.0892 08/23/2021    O0YARZT 10.9 01/20/2020    HGBA1C 5.0 08/23/2021    WBC 8.7 06/06/2022    " RBC 4.05 (L) 06/06/2022    HGB 11.8 (L) 06/06/2022    HCT 36.8 (L) 06/06/2022    MCV 90.9 06/06/2022     06/06/2022      Assessment:     Problem List Items Addressed This Visit        Pulmonary    Mild intermittent asthma (Chronic)    Current Assessment & Plan     Continue nebulizer at home, continue to watch for any significant symptoms or problems and contact this clinic for any issues.  Continue to use cough medication as needed for significant coughing.           Relevant Medications    betamethasone acetate-betamethasone sodium phosphate injection 9 mg      Other Visit Diagnoses     Long COVID    -  Primary    Still suffering with symptoms, watch for any significant shortness of breath or severe weakness, contact this clinic if necessary.             Medication List with Changes/Refills   Current Medications    ACETAMINOPHEN (TYLENOL) 500 MG TABLET    Take 500 mg by mouth every 6 (six) hours as needed for Pain.    ALBUTEROL (PROVENTIL/VENTOLIN HFA) 90 MCG/ACTUATION INHALER    Inhale 1 puff into the lungs every 6 (six) hours as needed for Wheezing. Rescue    ALPRAZOLAM (XANAX) 2 MG TAB    Take 1 mg by mouth nightly.     CARVEDILOL (COREG) 25 MG TABLET    Take 25 mg by mouth 2 (two) times daily.    CITALOPRAM (CELEXA) 20 MG TABLET        CLENPIQ 10 MG-3.5 GRAM -12 GRAM/160 ML SOLN    Take 1 kit by mouth.    COMBIVENT RESPIMAT  MCG/ACTUATION INHALER    Inhale 2 puffs into the lungs as needed.     CYANOCOBALAMIN 1,000 MCG/ML INJECTION    Inject 1,000 mcg into the muscle every 14 (fourteen) days.    DENOSUMAB (PROLIA) 60 MG/ML SYRG    Inject 60 mg into the skin every 6 (six) months.    DICLOFENAC SODIUM (VOLTAREN) 1 % GEL    Apply topically.    DICYCLOMINE (BENTYL) 20 MG TABLET    Take 20 mg by mouth every 6 (six) hours as needed.    DOCUSATE SODIUM (COLACE) 100 MG CAPSULE    Take 100 mg by mouth 2 (two) times daily. 1 TABLET IN THE MORNING AND 1 TABLET AT NIGHT.    DOXYCYCLINE (MONODOX) 100 MG CAPSULE     Take 1 capsule (100 mg total) by mouth every 12 (twelve) hours. for 10 days    FLUTICASONE PROPIONATE (FLONASE) 50 MCG/ACTUATION NASAL SPRAY    2 sprays (100 mcg total) by Each Nostril route once daily.    GABAPENTIN (NEURONTIN) 100 MG CAPSULE    Take 1 capsule (100 mg total) by mouth 3 (three) times daily.    LAMOTRIGINE (LAMICTAL) 100 MG TABLET    Take 100 mg by mouth 2 (two) times daily. AT NIGHT    LIPASE-PROTEASE-AMYLASE 24,000-76,000-120,000 UNITS (CREON) 24,000-76,000 -120,000 UNIT CAPSULE    Take 1 capsule by mouth 3 (three) times daily with meals.    LISINOPRIL (PRINIVIL,ZESTRIL) 20 MG TABLET    Take 20 mg by mouth Daily.     MENTHOL 5 % PTMD    APPLY PATCH TO AFFECTED AREA 1 TO 2 TIMES A DAY AS DIRECTED. WASH HANDS WITH SOAP AFTER APPLYING    METHOCARBAMOL (ROBAXIN) 750 MG TAB    Take 750 mg by mouth 2 (two) times daily.    ONDANSETRON (ZOFRAN) 8 MG TABLET    Take 0.5 tablets (4 mg total) by mouth every 8 (eight) hours as needed for Nausea.    ONDANSETRON (ZOFRAN-ODT) 8 MG TBDL    SMARTSI.5-1 Tablet(s) Sublingual Every 4 Hours PRN    PANTOPRAZOLE (PROTONIX) 20 MG TABLET    Take 20 mg by mouth once daily.    PREMARIN VAGINAL CREAM    Place 30 mg vaginally once daily. NIGHTLY.    PROPRANOLOL (INDERAL) 10 MG TABLET    Take 10 mg by mouth daily as needed.    RIZATRIPTAN (MAXALT) 10 MG TABLET    Take 1 tablet (10 mg total) by mouth as needed for Migraine (one at onset of headache, then a second 2 hours later if necessary).    SIMETHICONE (MYLICON) 80 MG CHEWABLE TABLET    Take 1 tablet (80 mg total) by mouth every 8 (eight) hours as needed for Flatulence.    SPIRONOLACTONE (ALDACTONE) 25 MG TABLET    Take 12.5 mg by mouth once daily.    TERCONAZOLE (TERAZOL 3) 0.8 % VAGINAL CREAM    Place 1 applicator vaginally nightly.    TRAMADOL (ULTRAM) 50 MG TABLET    Take 1 tablet (50 mg total) by mouth every 6 (six) hours as needed for Pain.    TRAZODONE (DESYREL) 100 MG TABLET    Take 100 mg by mouth nightly.     VALACYCLOVIR (VALTREX) 500 MG TABLET    TK 1 T PO D     Plan:             No follow-ups on file.

## 2022-08-17 ENCOUNTER — OFFICE VISIT (OUTPATIENT)
Dept: PRIMARY CARE CLINIC | Facility: CLINIC | Age: 72
End: 2022-08-17
Payer: MEDICARE

## 2022-08-17 VITALS
BODY MASS INDEX: 22.15 KG/M2 | DIASTOLIC BLOOD PRESSURE: 73 MMHG | WEIGHT: 125 LBS | HEART RATE: 66 BPM | TEMPERATURE: 98 F | HEIGHT: 63 IN | OXYGEN SATURATION: 99 % | SYSTOLIC BLOOD PRESSURE: 112 MMHG

## 2022-08-17 DIAGNOSIS — J45.21 MILD INTERMITTENT ASTHMA WITH ACUTE EXACERBATION: ICD-10-CM

## 2022-08-17 DIAGNOSIS — U09.9 LONG COVID: Primary | ICD-10-CM

## 2022-08-17 PROBLEM — J45.20 MILD INTERMITTENT ASTHMA: Chronic | Status: ACTIVE | Noted: 2022-08-17

## 2022-08-17 PROBLEM — J45.20 MILD INTERMITTENT ASTHMA: Status: ACTIVE | Noted: 2022-08-17

## 2022-08-17 PROCEDURE — 96372 PR INJECTION,THERAP/PROPH/DIAG2ST, IM OR SUBCUT: ICD-10-PCS | Mod: ,,, | Performed by: GENERAL PRACTICE

## 2022-08-17 PROCEDURE — 99213 PR OFFICE/OUTPT VISIT, EST, LEVL III, 20-29 MIN: ICD-10-PCS | Mod: 25,,, | Performed by: GENERAL PRACTICE

## 2022-08-17 PROCEDURE — 96372 THER/PROPH/DIAG INJ SC/IM: CPT | Mod: ,,, | Performed by: GENERAL PRACTICE

## 2022-08-17 PROCEDURE — 99213 OFFICE O/P EST LOW 20 MIN: CPT | Mod: 25,,, | Performed by: GENERAL PRACTICE

## 2022-08-17 RX ORDER — BETAMETHASONE SODIUM PHOSPHATE AND BETAMETHASONE ACETATE 3; 3 MG/ML; MG/ML
9 INJECTION, SUSPENSION INTRA-ARTICULAR; INTRALESIONAL; INTRAMUSCULAR; SOFT TISSUE
Status: COMPLETED | OUTPATIENT
Start: 2022-08-17 | End: 2022-08-17

## 2022-08-17 RX ADMIN — BETAMETHASONE SODIUM PHOSPHATE AND BETAMETHASONE ACETATE 9 MG: 3; 3 INJECTION, SUSPENSION INTRA-ARTICULAR; INTRALESIONAL; INTRAMUSCULAR; SOFT TISSUE at 09:08

## 2022-08-17 NOTE — ASSESSMENT & PLAN NOTE
Continue nebulizer at home, continue to watch for any significant symptoms or problems and contact this clinic for any issues.  Continue to use cough medication as needed for significant coughing.

## 2022-08-18 ENCOUNTER — OFFICE VISIT (OUTPATIENT)
Dept: ENDOCRINOLOGY | Facility: CLINIC | Age: 72
End: 2022-08-18
Payer: MEDICARE

## 2022-08-18 VITALS
TEMPERATURE: 97 F | SYSTOLIC BLOOD PRESSURE: 103 MMHG | WEIGHT: 128.88 LBS | DIASTOLIC BLOOD PRESSURE: 66 MMHG | HEIGHT: 64 IN | BODY MASS INDEX: 22 KG/M2 | RESPIRATION RATE: 16 BRPM | HEART RATE: 71 BPM

## 2022-08-18 DIAGNOSIS — Z01.89 ROUTINE LAB DRAW: ICD-10-CM

## 2022-08-18 DIAGNOSIS — M81.0 AGE-RELATED OSTEOPOROSIS WITHOUT CURRENT PATHOLOGICAL FRACTURE: Chronic | ICD-10-CM

## 2022-08-18 DIAGNOSIS — E55.9 HYPOVITAMINOSIS D: Primary | ICD-10-CM

## 2022-08-18 DIAGNOSIS — M81.0 AGE-RELATED OSTEOPOROSIS WITHOUT CURRENT PATHOLOGICAL FRACTURE: Primary | Chronic | ICD-10-CM

## 2022-08-18 LAB
ANION GAP SERPL CALC-SCNC: 9 MEQ/L
BUN SERPL-MCNC: 12.3 MG/DL (ref 9.8–20.1)
CALCIUM SERPL-MCNC: 9.9 MG/DL (ref 8.4–10.2)
CHLORIDE SERPL-SCNC: 103 MMOL/L (ref 98–107)
CO2 SERPL-SCNC: 28 MMOL/L (ref 23–31)
CREAT SERPL-MCNC: 0.79 MG/DL (ref 0.55–1.02)
CREAT/UREA NIT SERPL: 16
DEPRECATED CALCIDIOL+CALCIFEROL SERPL-MC: 23.8 NG/ML (ref 30–80)
GFR SERPLBLD CREATININE-BSD FMLA CKD-EPI: >60 MLS/MIN/1.73/M2
GLUCOSE SERPL-MCNC: 90 MG/DL (ref 82–115)
POTASSIUM SERPL-SCNC: 4 MMOL/L (ref 3.5–5.1)
SODIUM SERPL-SCNC: 140 MMOL/L (ref 136–145)

## 2022-08-18 PROCEDURE — 99215 OFFICE O/P EST HI 40 MIN: CPT | Mod: PBBFAC

## 2022-08-18 PROCEDURE — 96372 THER/PROPH/DIAG INJ SC/IM: CPT | Mod: PBBFAC

## 2022-08-18 PROCEDURE — 36415 COLL VENOUS BLD VENIPUNCTURE: CPT

## 2022-08-18 RX ADMIN — DENOSUMAB 60 MG: 60 INJECTION SUBCUTANEOUS at 10:08

## 2022-08-18 NOTE — TELEPHONE ENCOUNTER
Vit d was low.  Erx ergocalciferol 50,000 IU po weekly, #12, no refills.  Once course complete, let us know and will change ergo to monthly.  Change nurse visit in 6 months to a provider visit w/ vit d prior to regroup.

## 2022-08-18 NOTE — PROGRESS NOTES
"[facility] ENDOCRINOLOGY  OUTPATIENT OFFICE VISIT NOTE    SUBJECTIVE:      HPI: Christiana Chan is a 71 y.o. female w/ PMH of Postmenopausal Osteoporosis with history of vertebral fracture now on Prolia, h/o carcinoid tumor, Vitamin D deficiency, lumbar stenosis, hypertension, asthma, migraines, anxiety who presents to clinic for follow up visit.  Last seen 07/2021.      Here for Prolia injection   States she is doing well   No acute concerns or complaints noted  No bone pains, fractures, falls, jaw symptoms    ROS:  (+)  (-) Chest pain, palpitations, SOB, syncope, fever, chills, abdominal pain, vomiting, diarrhea, dysuria, bleeding    OBJECTIVE:     Vital signs:   /66 (BP Location: Right arm, Patient Position: Sitting, BP Method: Medium (Automatic))   Pulse 71   Temp 97.3 °F (36.3 °C) (Oral)   Resp 16   Ht 5' 3.6" (1.615 m)   Wt 58.4 kg (128 lb 13.7 oz)   BMI 22.40 kg/m²      Physical Examination:  General:  Well-nourished, well-developed, no acute distress, on room air  HEENT: Normocephalic, atraumatic. EOMI.  MMM  Neck: Supple. No obvious JVD.  Normal range of motion.  Respiratory: CTAB.  No obvious crackles wheeze or rhonchi.  Cardiovascular:  RRR.  No obvious M/G/R. Warm extremities.  Peripheral pulses intact.  No edema.  Gastrointestinal:  Soft, nontender, nondistended.  Normal bowel sounds.  Neurologic: ANOx3.  Answering questions/following commands appropriately.  No FND      Current Outpatient Medications:     acetaminophen (TYLENOL) 500 MG tablet, Take 500 mg by mouth every 6 (six) hours as needed for Pain., Disp: , Rfl:     albuterol (PROVENTIL/VENTOLIN HFA) 90 mcg/actuation inhaler, Inhale 1 puff into the lungs every 6 (six) hours as needed for Wheezing. Rescue, Disp: , Rfl:     ALPRAZolam (XANAX) 2 MG Tab, Take 1 mg by mouth nightly. , Disp: , Rfl:     carvedilol (COREG) 25 MG tablet, Take 25 mg by mouth 2 (two) times daily., Disp: , Rfl:     citalopram (CELEXA) 20 MG tablet, , Disp: , Rfl: "     COMBIVENT RESPIMAT  mcg/actuation inhaler, Inhale 2 puffs into the lungs as needed. , Disp: , Rfl: 0    cyanocobalamin 1,000 mcg/mL injection, Inject 1,000 mcg into the muscle every 14 (fourteen) days., Disp: , Rfl:     denosumab (PROLIA) 60 mg/mL Syrg, Inject 60 mg into the skin every 6 (six) months., Disp: , Rfl:     diclofenac sodium (VOLTAREN) 1 % Gel, Apply topically., Disp: , Rfl:     dicyclomine (BENTYL) 20 mg tablet, Take 20 mg by mouth every 6 (six) hours as needed., Disp: , Rfl:     docusate sodium (COLACE) 100 MG capsule, Take 100 mg by mouth 2 (two) times daily. 1 TABLET IN THE MORNING AND 1 TABLET AT NIGHT., Disp: , Rfl:     doxycycline (MONODOX) 100 MG capsule, Take 1 capsule (100 mg total) by mouth every 12 (twelve) hours. for 10 days, Disp: 20 capsule, Rfl: 0    fluticasone propionate (FLONASE) 50 mcg/actuation nasal spray, 2 sprays (100 mcg total) by Each Nostril route once daily., Disp: 18.2 mL, Rfl: 0    lamoTRIgine (LAMICTAL) 100 MG tablet, Take 100 mg by mouth 2 (two) times daily. AT NIGHT, Disp: , Rfl:     lisinopril (PRINIVIL,ZESTRIL) 20 MG tablet, Take 20 mg by mouth Daily. , Disp: , Rfl:     PREMARIN vaginal cream, Place 30 mg vaginally once daily. NIGHTLY., Disp: , Rfl:     spironolactone (ALDACTONE) 25 MG tablet, Take 12.5 mg by mouth once daily., Disp: , Rfl:     terconazole (TERAZOL 3) 0.8 % vaginal cream, Place 1 applicator vaginally nightly., Disp: , Rfl:     traZODone (DESYREL) 100 MG tablet, Take 100 mg by mouth nightly., Disp: , Rfl:     CLENPIQ 10 mg-3.5 gram -12 gram/160 mL Soln, Take 1 kit by mouth., Disp: , Rfl:     gabapentin (NEURONTIN) 100 MG capsule, Take 1 capsule (100 mg total) by mouth 3 (three) times daily. (Patient not taking: Reported on 8/18/2022), Disp: 90 capsule, Rfl: 11    lipase-protease-amylase 24,000-76,000-120,000 units (CREON) 24,000-76,000 -120,000 unit capsule, Take 1 capsule by mouth 3 (three) times daily with meals., Disp: 90  capsule, Rfl: 11    menthol 5 % PtMd, APPLY PATCH TO AFFECTED AREA 1 TO 2 TIMES A DAY AS DIRECTED. WASH HANDS WITH SOAP AFTER APPLYING, Disp: , Rfl:     methocarbamoL (ROBAXIN) 750 MG Tab, Take 750 mg by mouth 2 (two) times daily., Disp: , Rfl:     ondansetron (ZOFRAN) 8 MG tablet, Take 0.5 tablets (4 mg total) by mouth every 8 (eight) hours as needed for Nausea. (Patient not taking: Reported on 2022), Disp: 30 tablet, Rfl: 1    ondansetron (ZOFRAN-ODT) 8 MG TbDL, SMARTSI.5-1 Tablet(s) Sublingual Every 4 Hours PRN, Disp: , Rfl:     pantoprazole (PROTONIX) 20 MG tablet, Take 20 mg by mouth once daily., Disp: , Rfl:     propranolol (INDERAL) 10 MG tablet, Take 10 mg by mouth daily as needed., Disp: , Rfl:     rizatriptan (MAXALT) 10 MG tablet, Take 1 tablet (10 mg total) by mouth as needed for Migraine (one at onset of headache, then a second 2 hours later if necessary)., Disp: 6 tablet, Rfl: 5    simethicone (MYLICON) 80 MG chewable tablet, Take 1 tablet (80 mg total) by mouth every 8 (eight) hours as needed for Flatulence., Disp: 30 tablet, Rfl: 0    traMADoL (ULTRAM) 50 mg tablet, Take 1 tablet (50 mg total) by mouth every 6 (six) hours as needed for Pain. (Patient not taking: Reported on 2022), Disp: 20 tablet, Rfl: 0    valACYclovir (VALTREX) 500 MG tablet, TK 1 T PO D, Disp: , Rfl: 1    Current Facility-Administered Medications:     denosumab (PROLIA) injection 60 mg, 60 mg, Subcutaneous, 1 time in Clinic/HOD, Dolly Santa MD     ASSESSMENT & PLAN:     71 y.o. female w/ PMH of Postmenopausal Osteoporosis with history of vertebral fracture now on Prolia, h/o carcinoid tumor, Vitamin D deficiency, lumbar stenosis, hypertension, asthma, migraines, anxiety who presents to clinic for follow up visit.    -providing Prolia injection today   -has completed approx. 8 years   -ordered BMP and Vit D level   -nurse visit 6 months for repeat Prolia     RTC 1 year     Joi Santa MD, PGY-3

## 2022-08-19 RX ORDER — ERGOCALCIFEROL 1.25 MG/1
CAPSULE ORAL
Qty: 12 CAPSULE | Refills: 0 | Status: SHIPPED | OUTPATIENT
Start: 2022-08-19 | End: 2022-10-18

## 2022-08-19 NOTE — TELEPHONE ENCOUNTER
Spoke with patient informed of vitamin d being low and of need for erx.  Vit D erx sent to pharmacy will change nurse visit on 2/22/23 to provider visit with Vit d level prior.

## 2022-08-30 LAB — CRC RECOMMENDATION EXT: NORMAL

## 2022-08-31 ENCOUNTER — DOCUMENTATION ONLY (OUTPATIENT)
Dept: PRIMARY CARE CLINIC | Facility: CLINIC | Age: 72
End: 2022-08-31
Payer: MEDICARE

## 2022-09-21 ENCOUNTER — HISTORICAL (OUTPATIENT)
Dept: ADMINISTRATIVE | Facility: HOSPITAL | Age: 72
End: 2022-09-21
Payer: MEDICARE

## 2022-09-27 ENCOUNTER — PATIENT MESSAGE (OUTPATIENT)
Dept: HEMATOLOGY/ONCOLOGY | Facility: CLINIC | Age: 72
End: 2022-09-27
Payer: MEDICARE

## 2022-10-03 ENCOUNTER — TELEPHONE (OUTPATIENT)
Dept: HEMATOLOGY/ONCOLOGY | Facility: CLINIC | Age: 72
End: 2022-10-03
Payer: MEDICARE

## 2022-10-18 ENCOUNTER — TELEPHONE (OUTPATIENT)
Dept: PRIMARY CARE CLINIC | Facility: CLINIC | Age: 72
End: 2022-10-18
Payer: MEDICARE

## 2022-10-18 ENCOUNTER — LAB VISIT (OUTPATIENT)
Dept: LAB | Facility: HOSPITAL | Age: 72
End: 2022-10-18
Attending: GENERAL PRACTICE
Payer: MEDICARE

## 2022-10-18 DIAGNOSIS — Z00.00 WELLNESS EXAMINATION: Primary | ICD-10-CM

## 2022-10-18 DIAGNOSIS — R73.01 IMPAIRED FASTING GLUCOSE: ICD-10-CM

## 2022-10-18 DIAGNOSIS — Z13.6 ENCOUNTER FOR SCREENING FOR CARDIOVASCULAR DISORDERS: ICD-10-CM

## 2022-10-18 DIAGNOSIS — R53.83 FATIGUE, UNSPECIFIED TYPE: ICD-10-CM

## 2022-10-18 DIAGNOSIS — Z00.00 WELLNESS EXAMINATION: ICD-10-CM

## 2022-10-18 DIAGNOSIS — E55.9 HYPOVITAMINOSIS D: ICD-10-CM

## 2022-10-18 LAB
ALBUMIN SERPL-MCNC: 3.4 GM/DL (ref 3.4–4.8)
ALBUMIN/GLOB SERPL: 1 RATIO (ref 1.1–2)
ALP SERPL-CCNC: 48 UNIT/L (ref 40–150)
ALT SERPL-CCNC: 7 UNIT/L (ref 0–55)
AST SERPL-CCNC: 17 UNIT/L (ref 5–34)
BASOPHILS # BLD AUTO: 0.02 X10(3)/MCL (ref 0–0.2)
BASOPHILS NFR BLD AUTO: 0.3 %
BILIRUBIN DIRECT+TOT PNL SERPL-MCNC: 0.5 MG/DL
BUN SERPL-MCNC: 9.1 MG/DL (ref 9.8–20.1)
CALCIUM SERPL-MCNC: 8.7 MG/DL (ref 8.4–10.2)
CHLORIDE SERPL-SCNC: 108 MMOL/L (ref 98–107)
CHOLEST SERPL-MCNC: 183 MG/DL
CHOLEST/HDLC SERPL: 3 {RATIO} (ref 0–5)
CO2 SERPL-SCNC: 26 MMOL/L (ref 23–31)
CREAT SERPL-MCNC: 0.74 MG/DL (ref 0.55–1.02)
DEPRECATED CALCIDIOL+CALCIFEROL SERPL-MC: 30.2 NG/ML (ref 30–80)
EOSINOPHIL # BLD AUTO: 0.07 X10(3)/MCL (ref 0–0.9)
EOSINOPHIL NFR BLD AUTO: 0.9 %
ERYTHROCYTE [DISTWIDTH] IN BLOOD BY AUTOMATED COUNT: 14 % (ref 11.5–17)
EST. AVERAGE GLUCOSE BLD GHB EST-MCNC: 105.4 MG/DL
GFR SERPLBLD CREATININE-BSD FMLA CKD-EPI: >60 MLS/MIN/1.73/M2
GLOBULIN SER-MCNC: 3.3 GM/DL (ref 2.4–3.5)
GLUCOSE SERPL-MCNC: 83 MG/DL (ref 82–115)
HBA1C MFR BLD: 5.3 %
HCT VFR BLD AUTO: 38.6 % (ref 37–47)
HDLC SERPL-MCNC: 54 MG/DL (ref 35–60)
HGB BLD-MCNC: 12.1 GM/DL (ref 12–16)
IMM GRANULOCYTES # BLD AUTO: 0.03 X10(3)/MCL (ref 0–0.04)
IMM GRANULOCYTES NFR BLD AUTO: 0.4 %
LDLC SERPL CALC-MCNC: 114 MG/DL (ref 50–140)
LYMPHOCYTES # BLD AUTO: 1.99 X10(3)/MCL (ref 0.6–4.6)
LYMPHOCYTES NFR BLD AUTO: 25.8 %
MCH RBC QN AUTO: 29.3 PG (ref 27–31)
MCHC RBC AUTO-ENTMCNC: 31.3 MG/DL (ref 33–36)
MCV RBC AUTO: 93.5 FL (ref 80–94)
MONOCYTES # BLD AUTO: 0.58 X10(3)/MCL (ref 0.1–1.3)
MONOCYTES NFR BLD AUTO: 7.5 %
NEUTROPHILS # BLD AUTO: 5 X10(3)/MCL (ref 2.1–9.2)
NEUTROPHILS NFR BLD AUTO: 65.1 %
NRBC BLD AUTO-RTO: 0 %
PLATELET # BLD AUTO: 326 X10(3)/MCL (ref 130–400)
PMV BLD AUTO: 9.2 FL (ref 7.4–10.4)
POTASSIUM SERPL-SCNC: 5.2 MMOL/L (ref 3.5–5.1)
PROT SERPL-MCNC: 6.7 GM/DL (ref 5.8–7.6)
RBC # BLD AUTO: 4.13 X10(6)/MCL (ref 4.2–5.4)
SODIUM SERPL-SCNC: 141 MMOL/L (ref 136–145)
TRIGL SERPL-MCNC: 77 MG/DL (ref 37–140)
TSH SERPL-ACNC: 0.94 UIU/ML (ref 0.35–4.94)
VLDLC SERPL CALC-MCNC: 15 MG/DL
WBC # SPEC AUTO: 7.7 X10(3)/MCL (ref 4.5–11.5)

## 2022-10-18 PROCEDURE — 85025 COMPLETE CBC W/AUTO DIFF WBC: CPT

## 2022-10-18 PROCEDURE — 83036 HEMOGLOBIN GLYCOSYLATED A1C: CPT

## 2022-10-18 PROCEDURE — 80053 COMPREHEN METABOLIC PANEL: CPT

## 2022-10-18 PROCEDURE — 84443 ASSAY THYROID STIM HORMONE: CPT

## 2022-10-18 PROCEDURE — 36415 COLL VENOUS BLD VENIPUNCTURE: CPT

## 2022-10-18 PROCEDURE — 82306 VITAMIN D 25 HYDROXY: CPT

## 2022-10-18 PROCEDURE — 80061 LIPID PANEL: CPT

## 2022-10-18 RX ORDER — ERGOCALCIFEROL 1.25 MG/1
CAPSULE ORAL
Qty: 6 CAPSULE | Refills: 0 | Status: SHIPPED | OUTPATIENT
Start: 2022-10-18 | End: 2023-02-28

## 2022-10-18 NOTE — TELEPHONE ENCOUNTER
----- Message from Ana Peraza sent at 10/18/2022  9:03 AM CDT -----  Regarding: lab work  .Type:  Needs Medical Advice    Who Called: pt   Symptoms (please be specific):    How long has patient had these symptoms:    Pharmacy name and phone #:    Would the patient rather a call back or a response via [facility]? Call back   Best Call Back Number: [phone]  Additional Information: pt is at [facility] and needs lab orders to be put in the system. I attempted back line multiple times

## 2022-10-18 NOTE — TELEPHONE ENCOUNTER
Vit d was done early and improving.  Continue w/ plan for ergo for 12 weeks then once complete change to monthly, vit d prior to f/u 2/23 as already scheduled.  Vit d needs to be reordered.

## 2022-10-19 LAB — BCS RECOMMENDATION EXT: NORMAL

## 2022-10-24 PROBLEM — I10 HYPERTENSION: Chronic | Status: RESOLVED | Noted: 2022-05-18 | Resolved: 2022-10-24

## 2022-10-24 NOTE — PROGRESS NOTES
Patient ID: [id number]     Chief Complaint: Annual Exam      HPI:     Christiana Chan is a 71 y.o. female here today for a Medicare Wellness. No other complaints today.       ----------------------------  Abdominal bloating  Abdominal pain  Asthma  Atrial fibrillation  Colon polyps      Comment:  COLONOSCOPY  Constipation  Diverticulosis of colon  Esophageal ulcer  Gastritis  GERD (gastroesophageal reflux disease)  Heartburn  Hiatal hernia  HTN (hypertension)  Intermittent diarrhea  Iron deficiency anemia  Osteoarthritis  Osteoporosis  Rectal bleeding  Ruptured lumbar disc  Small bowel lesion  Vitamin B12 deficiency anemia  Vitamin D deficiency     Past Surgical History:   Procedure Laterality Date    ANTEGRADE SINGLE BALLOON ENTEROSCOPY N/A 2019    Procedure: ENTEROSCOPY, DOUBLE BALLOON, ANTEGRADE;  Surgeon: Sivakumar Dorsey MD;  Location: [address];  Service: Endoscopy;  Laterality: N/A;    BREAST BIOPSY Bilateral      SECTION   &     CHOLECYSTECTOMY      COLONOSCOPY      outside facility    COLONOSCOPY  2019    Ashok Ramirez MD    ENDOSCOPIC ULTRASOUND OF UPPER GASTROINTESTINAL TRACT N/A 2019    Procedure: ULTRASOUND-ENDOSCOPIC-UPPER;  Surgeon: Armando Bills MD;  Location: [facility];  Service: Endoscopy;  Laterality: N/A;  Carcinoid diagnosis    ESOPHAGOGASTRODUODENOSCOPY      outside facility    HYSTERECTOMY      LAPAROSCOPIC APPENDECTOMY N/A 2019    Procedure: APPENDECTOMY, LAPAROSCOPIC;  Surgeon: Rebecca Valdez MD;  Location: [facility];  Service: General;  Laterality: N/A;    LAPAROSCOPIC RESECTION OF SMALL INTESTINE N/A 2019    Procedure: EXCISION, SMALL INTESTINE, LAPAROSCOPIC;  Surgeon: Rebecca Valdez MD;  Location: [facility];  Service: General;  Laterality: N/A;    ROBOT-ASSISTED LAPAROSCOPIC REPAIR OF INCISIONAL HERNIA N/A 2020    Procedure: ROBOTIC REPAIR, HERNIA, INCISIONAL;  Surgeon: Rebecca Valdez MD;   Location[facility] OR;  Service: General;  Laterality: N/A;    SHOULDER SURGERY Right 03/13/2013    TOTAL KNEE ARTHROPLASTY Right 09/16/2013       Review of patient's allergies indicates:   Allergen Reactions    Amoxicillin Other (See Comments)     Taking digoxin.    Sulfa (sulfonamide antibiotics) Itching and Swelling    Codeine Nausea Only and Other (See Comments)     Nausea and constipation.    Demerol [meperidine] Nausea Only and Other (See Comments)     Nausea and constipation.    Hydrocodone Nausea Only and Other (See Comments)     Nausea and constipation.    Nitrofurantoin monohyd/m-cryst     Ketorolac Rash       No outpatient medications have been marked as taking for the 10/25/22 encounter (Office Visit) with Cedric Mcclelland MD.       Social History     Socioeconomic History    Marital status:    Tobacco Use    Smoking status: Former     Packs/day: 0.90     Years: 5.00     Pack years: 4.50     Types: Cigarettes    Smokeless tobacco: Never   Substance and Sexual Activity    Alcohol use: Not Currently    Drug use: Never        Family History   Problem Relation Age of Onset    Diabetes Mother     Multiple myeloma Father         Patient Care Team:  Cedric Mcclelland MD as PCP - General (Family Medicine)  Ashok Ramirez MD as Consulting Physician (Gastroenterology)  MD Jayson Conte Mandi as Psychologist (Psychology)  Elvin Mishra MD as Consulting Physician (Neurosurgery)  Justin Schulte MD as Consulting Physician (Endocrinology)  Dolly Santa MD as Resident (Internal Medicine)  Randolph Callahan DPM as Consulting Physician (Podiatry)     Opioid Screening: Patient medication list reviewed, patient is not taking prescription opioids. Patient is not using additional opioids than prescribed. Patient is at low risk of substance abuse based on this opioid use history.       Subjective:     Review of Systems   Constitutional: Negative.  Negative for malaise/fatigue and weight loss.   HENT:  Negative.     Eyes: Negative.    Respiratory: Negative.  Negative for shortness of breath.    Cardiovascular: Negative.  Negative for chest pain.   Gastrointestinal: Negative.    Genitourinary: Negative.    Musculoskeletal: Negative.    Skin: Negative.    Neurological: Negative.  Negative for focal weakness, weakness and headaches.   Endo/Heme/Allergies: Negative.    Psychiatric/Behavioral: Negative.  Negative for depression and memory loss. The patient is not nervous/anxious.        Patient Reported Health Risk Assessment  [age]  Are you male or female?: Female  During the past four weeks, how much have you been bothered by emotional problems such as feeling anxious, depressed, irritable, sad, or downhearted and blue?: Not at all  During the past five weeks, has your physical and/or emotional health limited your social activities with family, friends, neighbors, or groups?: Not at all  During the past four weeks, how much bodily pain have you generally had?: Very mild pain  During the past four weeks, was someone available to help if you needed and wanted help?: Yes, as much as I wanted  During the past four weeks, what was the hardest physical activity you could do for at least two minutes?: Very light  Can you get to places out of walking distance without help?  (For example, can you travel alone on buses or taxis, or drive your own car?): Yes  Can you go shopping for groceries or clothes without someone's help?: Yes  Can you prepare your own meals?: Yes  Can you do your own housework without help?: Yes  Because of any health problems, do you need the help of another person with your personal care needs such as eating, bathing, dressing, or getting around the house?: No  Can you handle your own money without help?: Yes  During the past four weeks, how would you rate your health in general?: Very good  How have things been going for you during the past four weeks?: Pretty well  Are you having  difficulties driving your car?: Sometimes (Patient has problems seeing at night to drive)  Do you always fasten your seat belt when you are in a car?: Yes, usually  How often in the past four weeks have you been bothered by falling or dizzy when standing up?: Never  How often in the past four weeks have you been bothered by sexual problems?: Never  How often in the past four weeks have you been bothered by trouble eating well?: Never  How often in the past four weeks have you been bothered by teeth or denture problems?: Never  How often in the past four weeks have you been bothered with problems using the telephone?: Never  How often in the past four weeks have you been bothered by tiredness or fatigue?: Never  Have you fallen two or more times in the past year?: No  Are you afraid of falling?: No  Are you a smoker?: No  During the past four weeks, how many drinks of wine, beer, or other alcoholic beverages did you have?: No alcohol at all  Do you exercise for about 20 minutes three or more days a week?: No, I usually do not exercise this much  Have you been given any information to help you with hazards in your house that might hurt you?: No  Have you been given any information to help you with keeping track of your medications?: No  How often do you have trouble taking medicines the way you've been told to take them?: I always take them as prescribed  How confident are you that you can control and manage most of your health problems?: Very confident  What is your race? (Check all that apply.):     Objective:     There were no vitals taken for this visit.    Physical Exam  Constitutional:       Appearance: Normal appearance.   HENT:      Head: Normocephalic.      Mouth/Throat:      Pharynx: Oropharynx is clear.   Eyes:      Conjunctiva/sclera: Conjunctivae normal.      Pupils: Pupils are equal, round, and reactive to light.   Cardiovascular:      Rate and Rhythm: Normal rate and regular rhythm.       Heart sounds: Normal heart sounds.   Pulmonary:      Effort: Pulmonary effort is normal.      Breath sounds: Normal breath sounds.   Abdominal:      General: Abdomen is flat.      Palpations: Abdomen is soft.   Musculoskeletal:         General: Normal range of motion.      Cervical back: Neck supple.   Skin:     General: Skin is warm and dry.   Neurological:      General: No focal deficit present.      Mental Status: She is oriented to person, place, and time.   Psychiatric:         Mood and Affect: Mood normal.         Behavior: Behavior normal.         Thought Content: Thought content normal.         Judgment: Judgment normal.     Lab Results   Component Value Date     10/18/2022    K 5.2 (H) 10/18/2022     02/21/2022    CO2 26 10/18/2022    BUN 9.1 (L) 10/18/2022    CREATININE 0.74 10/18/2022    GLU 83 02/21/2022    AST 17 10/18/2022    ALT 7 10/18/2022    CHOL 183 10/18/2022    .00 10/18/2022    TRIG 77 10/18/2022    HDL 54 10/18/2022    TSH 0.9387 10/18/2022    K6WAWAS 10.9 01/20/2020    HGBA1C 5.3 10/18/2022    WBC 7.7 10/18/2022    RBC 4.13 (L) 10/18/2022    HGB 12.1 10/18/2022    HCT 38.6 10/18/2022    MCV 93.5 10/18/2022     10/18/2022              No flowsheet data found.  Fall Risk Assessment - Outpatient 8/18/2022 8/11/2022 6/13/2022 5/19/2022 2/21/2022 3/11/2021 6/15/2020   Mobility Status Ambulatory Ambulatory Ambulatory Ambulatory Ambulatory Ambulatory Ambulatory   Number of falls 0 0 0 0 0 0 0   Identified as fall risk 0 0 0 - 0 0 0           Depression Screening  Over the past two weeks, has the patient felt down, depressed, or hopeless?: No  Over the past two weeks, has the patient felt little interest or pleasure in doing things?: No  Functional Ability/Safety Screening  Was the patient's timed Up & Go test unsteady or longer than 30 seconds?: No  Does the patient need help with phone, transportation, shopping, preparing meals, housework, laundry, meds, or managing  money?: No  Does the patient's home have rugs in the hallway, lack grab bars in the bathroom, lack handrails on the stairs or have poor lighting?: No  Have you noticed any hearing difficulties?: No  Cognitive Function (Assessed through direct observation with due consideration of information obtained by way of patient reports and/or concerns raised by family, friends, caretakers, or others)    Does the patient repeat questions/statements in the same day?: No  Does the patient have trouble remembering the date, year, and time?: No  Does the patient have difficulty managing finances?: No  Does the patient have a decreased sense of direction?: No  Assessment:       ICD-10-CM ICD-9-CM   1. Wellness examination  Z00.00 V70.0   2. Paroxysmal atrial fibrillation  I48.0 427.31   3. Primary hypertension  I10 401.9   4. Dyslipidemia  E78.5 272.4   5. Anxiety state  F41.1 300.00        Plan:     Medicare Annual Wellness and Personalized Prevention Plan:   Fall Risk + Home Safety + Hearing Impairment + Depression Screen + Cognitive Impairment Screen + Health Risk Assessment all reviewed.       Health Maintenance Topics with due status: Not Due       Topic Last Completion Date    DEXA Scan 08/03/2020    Colorectal Cancer Screening 08/30/2022    Lipid Panel 10/18/2022      The patient's Health Maintenance was reviewed and the following appears to be due at this time:   Health Maintenance Due   Topic Date Due    Hepatitis C Screening  Never done    TETANUS VACCINE  Never done    Shingles Vaccine (1 of 2) Never done    COVID-19 Vaccine (5 - Booster for Pfizer series) 09/15/2022    Mammogram  09/15/2022         1. Wellness examination  Comments:  Overall health status was reviewed.  Good health habits reinforced.  Annual wellness exams recommended.    2. Paroxysmal atrial fibrillation  Assessment & Plan:  Stable condition, continue follow-up with Cardiology.      3. Primary hypertension  Assessment & Plan:  Blood pressures appear to  be adequately controlled with current treatment plan, continue medical management and continue home blood pressure checks.  Blood pressure should be checked as discussed during medical visits, and average blood pressure should be no greater than 130/80.      4. Dyslipidemia  Assessment & Plan:  Cholesterol/lipid blood testing shows adequate control, continue current treatment plan, including prescription medications if prescribed, and/or diet high in fiber, low in saturated fats as discussed during clinic visit.      5. Anxiety state  Assessment & Plan:  Medical condition is currently stable, continue current treatment plan.         Advance Care Planning   I attest to discussing Advance Care Planning with patient and/or family member.  Education was provided including the importance of the Health Care Power of , Advance Directives, and/or [place] documentation.  The patient expressed understanding to the importance of this information and discussion.           Medication List with Changes/Refills   Current Medications    ACETAMINOPHEN (TYLENOL) 500 MG TABLET    Take 500 mg by mouth every 6 (six) hours as needed for Pain.       Start Date: --        End Date: --    ALBUTEROL (PROVENTIL/VENTOLIN HFA) 90 MCG/ACTUATION INHALER    Inhale 1 puff into the lungs every 6 (six) hours as needed for Wheezing. Rescue       Start Date: --        End Date: --    ALPRAZOLAM (XANAX) 2 MG TAB    Take 1 mg by mouth nightly.        Start Date: --        End Date: --    CARVEDILOL (COREG) 25 MG TABLET    Take 25 mg by mouth 2 (two) times daily.       Start Date: --        End Date: --    CITALOPRAM (CELEXA) 20 MG TABLET           Start Date: 5/17/2022 End Date: --    CLENPIQ 10 MG-3.5 GRAM -12 GRAM/160 ML SOLN    Take 1 kit by mouth.       Start Date: 6/16/2022 End Date: --    COMBIVENT RESPIMAT  MCG/ACTUATION INHALER    Inhale 2 puffs into the lungs as needed.        Start Date: 8/5/2019  End Date: --    CYANOCOBALAMIN  1,000 MCG/ML INJECTION    Inject 1,000 mcg into the muscle every 14 (fourteen) days.       Start Date: --        End Date: --    DENOSUMAB (PROLIA) 60 MG/ML SYRG    Inject 60 mg into the skin every 6 (six) months.       Start Date: --        End Date: --    DICLOFENAC SODIUM (VOLTAREN) 1 % GEL    Apply topically.       Start Date: 5/17/2022 End Date: --    DICYCLOMINE (BENTYL) 20 MG TABLET    Take 20 mg by mouth every 6 (six) hours as needed.       Start Date: 2/22/2021 End Date: --    DOCUSATE SODIUM (COLACE) 100 MG CAPSULE    Take 100 mg by mouth 2 (two) times daily. 1 TABLET IN THE MORNING AND 1 TABLET AT NIGHT.       Start Date: 7/6/2020  End Date: --    ERGOCALCIFEROL (ERGOCALCIFEROL) 50,000 UNIT CAP    Take 1 capsule monthly.       Start Date: 10/18/2022End Date: --    FLUTICASONE PROPIONATE (FLONASE) 50 MCG/ACTUATION NASAL SPRAY    2 sprays (100 mcg total) by Each Nostril route once daily.       Start Date: 8/11/2022 End Date: --    GABAPENTIN (NEURONTIN) 100 MG CAPSULE    Take 1 capsule (100 mg total) by mouth 3 (three) times daily.       Start Date: 7/2/2020  End Date: 7/2/2021    LAMOTRIGINE (LAMICTAL) 100 MG TABLET    Take 100 mg by mouth 2 (two) times daily. AT NIGHT       Start Date: 2/23/2021 End Date: --    LIPASE-PROTEASE-AMYLASE 24,000-76,000-120,000 UNITS (CREON) 24,000-76,000 -120,000 UNIT CAPSULE    Take 1 capsule by mouth 3 (three) times daily with meals.       Start Date: 11/9/2021 End Date: 11/9/2022    LISINOPRIL (PRINIVIL,ZESTRIL) 20 MG TABLET    Take 20 mg by mouth Daily.        Start Date: --        End Date: --    MENTHOL 5 % PTMD    APPLY PATCH TO AFFECTED AREA 1 TO 2 TIMES A DAY AS DIRECTED. WASH HANDS WITH SOAP AFTER APPLYING       Start Date: 5/4/2022  End Date: --    METHOCARBAMOL (ROBAXIN) 750 MG TAB    Take 750 mg by mouth 2 (two) times daily.       Start Date: --        End Date: --    ONDANSETRON (ZOFRAN) 8 MG TABLET    Take 0.5 tablets (4 mg total) by mouth every 8 (eight) hours  as needed for Nausea.       Start Date: 2020  End Date: --    ONDANSETRON (ZOFRAN-ODT) 8 MG TBDL    SMARTSI.5-1 Tablet(s) Sublingual Every 4 Hours PRN       Start Date: 2022 End Date: --    PANTOPRAZOLE (PROTONIX) 20 MG TABLET    Take 20 mg by mouth once daily.       Start Date: --        End Date: --    PREMARIN VAGINAL CREAM    Place 30 mg vaginally once daily. NIGHTLY.       Start Date: 2021 End Date: --    PROPRANOLOL (INDERAL) 10 MG TABLET    Take 10 mg by mouth daily as needed.       Start Date: --        End Date: --    RIZATRIPTAN (MAXALT) 10 MG TABLET    Take 1 tablet (10 mg total) by mouth as needed for Migraine (one at onset of headache, then a second 2 hours later if necessary).       Start Date: 2022 End Date: 2022    SIMETHICONE (MYLICON) 80 MG CHEWABLE TABLET    Take 1 tablet (80 mg total) by mouth every 8 (eight) hours as needed for Flatulence.       Start Date: 3/15/2020 End Date: --    SPIRONOLACTONE (ALDACTONE) 25 MG TABLET    Take 12.5 mg by mouth once daily.       Start Date: --        End Date: --    TERCONAZOLE (TERAZOL 3) 0.8 % VAGINAL CREAM    Place 1 applicator vaginally nightly.       Start Date: 2022  End Date: --    TRAMADOL (ULTRAM) 50 MG TABLET    Take 1 tablet (50 mg total) by mouth every 6 (six) hours as needed for Pain.       Start Date: 3/30/2020 End Date: --    TRAZODONE (DESYREL) 100 MG TABLET    Take 100 mg by mouth nightly.       Start Date: 2022  End Date: --    VALACYCLOVIR (VALTREX) 500 MG TABLET    TK 1 T PO D       Start Date: 2019 End Date: --        Follow up in about 6 months (around 2023) for Chronic condition F/up. In addition to their scheduled follow up, the patient has also been instructed to follow up on as needed basis.

## 2022-10-25 ENCOUNTER — OFFICE VISIT (OUTPATIENT)
Dept: PRIMARY CARE CLINIC | Facility: CLINIC | Age: 72
End: 2022-10-25
Payer: MEDICARE

## 2022-10-25 DIAGNOSIS — E78.5 DYSLIPIDEMIA: Chronic | ICD-10-CM

## 2022-10-25 DIAGNOSIS — I48.0 PAROXYSMAL ATRIAL FIBRILLATION: ICD-10-CM

## 2022-10-25 DIAGNOSIS — Z00.00 WELLNESS EXAMINATION: Primary | ICD-10-CM

## 2022-10-25 DIAGNOSIS — F41.1 ANXIETY STATE: Chronic | ICD-10-CM

## 2022-10-25 DIAGNOSIS — I10 PRIMARY HYPERTENSION: ICD-10-CM

## 2022-10-25 PROCEDURE — G0439 PR MEDICARE ANNUAL WELLNESS SUBSEQUENT VISIT: ICD-10-PCS | Mod: ,,, | Performed by: GENERAL PRACTICE

## 2022-10-25 PROCEDURE — G0439 PPPS, SUBSEQ VISIT: HCPCS | Mod: ,,, | Performed by: GENERAL PRACTICE

## 2022-10-28 ENCOUNTER — DOCUMENTATION ONLY (OUTPATIENT)
Dept: PRIMARY CARE CLINIC | Facility: CLINIC | Age: 72
End: 2022-10-28
Payer: MEDICARE

## 2022-11-09 ENCOUNTER — TELEPHONE (OUTPATIENT)
Dept: ENDOCRINOLOGY | Facility: CLINIC | Age: 72
End: 2022-11-09
Payer: MEDICARE

## 2022-11-09 NOTE — TELEPHONE ENCOUNTER
----- Message from Gregoria Mcdonnell sent at 11/9/2022  8:50 AM CST -----  Regarding: Pt called  [facility] pt       Pt called with questions regarding her Vitamin C and pain in her right hip. She is requesting a call back.   Pt # [phone]

## 2022-11-09 NOTE — TELEPHONE ENCOUNTER
States over the past few days she has had increasing discomfort to bilateral hips right more so than left, but has a pain that will radiate down left leg.  States pain is like a tenderness.   Not relieved by heat, stretching or Biofreeze usage, denies fall but states she has stumbled.

## 2022-11-09 NOTE — TELEPHONE ENCOUNTER
Spoke with patient informed that is doesn't sound like a hip fx and if she would like we can order a bilateral hip xray to rule out but the more efficient alternative may be to schedule a visit with her PCP.  States she will call her primary MD to see what he says and will keep us updated.

## 2022-11-09 NOTE — TELEPHONE ENCOUNTER
Doesn't sound like a hip fx.  Happy to order bilateral hip xray to rule out but the more efficient alternative may be to schedule a visit with her PCP.

## 2022-11-14 PROBLEM — J01.90 ACUTE SINUSITIS: Status: RESOLVED | Noted: 2022-08-11 | Resolved: 2022-11-14

## 2022-11-16 ENCOUNTER — OFFICE VISIT (OUTPATIENT)
Dept: NEUROLOGY | Facility: CLINIC | Age: 72
End: 2022-11-16
Payer: MEDICARE

## 2022-11-16 VITALS
DIASTOLIC BLOOD PRESSURE: 66 MMHG | SYSTOLIC BLOOD PRESSURE: 112 MMHG | HEIGHT: 63 IN | BODY MASS INDEX: 22.68 KG/M2 | WEIGHT: 128 LBS

## 2022-11-16 DIAGNOSIS — G44.86 CERVICOGENIC HEADACHE: Primary | ICD-10-CM

## 2022-11-16 DIAGNOSIS — G43.709 CHRONIC MIGRAINE WITHOUT AURA WITHOUT STATUS MIGRAINOSUS, NOT INTRACTABLE: ICD-10-CM

## 2022-11-16 PROCEDURE — 99999 PR PBB SHADOW E&M-EST. PATIENT-LVL V: ICD-10-PCS | Mod: PBBFAC,,, | Performed by: PSYCHIATRY & NEUROLOGY

## 2022-11-16 PROCEDURE — 99215 OFFICE O/P EST HI 40 MIN: CPT | Mod: PBBFAC | Performed by: PSYCHIATRY & NEUROLOGY

## 2022-11-16 PROCEDURE — 99204 PR OFFICE/OUTPT VISIT, NEW, LEVL IV, 45-59 MIN: ICD-10-PCS | Mod: S$PBB,,, | Performed by: PSYCHIATRY & NEUROLOGY

## 2022-11-16 PROCEDURE — 99999 PR PBB SHADOW E&M-EST. PATIENT-LVL V: CPT | Mod: PBBFAC,,, | Performed by: PSYCHIATRY & NEUROLOGY

## 2022-11-16 PROCEDURE — 99204 OFFICE O/P NEW MOD 45 MIN: CPT | Mod: S$PBB,,, | Performed by: PSYCHIATRY & NEUROLOGY

## 2022-11-16 RX ORDER — LIDOCAINE 50 MG/G
1 PATCH TOPICAL DAILY
COMMUNITY
Start: 2022-10-26

## 2022-11-16 RX ORDER — BUTALBITAL, ACETAMINOPHEN AND CAFFEINE 50; 325; 40 MG/1; MG/1; MG/1
1 TABLET ORAL EVERY 4 HOURS PRN
COMMUNITY

## 2022-11-16 NOTE — PROGRESS NOTES
Neurology  Office Visit Note    Subjective:      Patient ID: Christiana Chan, : 1950.    Chief Complaint: Establish Care (New Pt referred by Dr Cedric Mcclelland for neuro consult) and Migraine (Onset ~2022/C/o pain in neck; occipital pain; family stressors; +n/v, sens to light, sound, smell/Tried & Failed: Fioricet, Rizatriptan, Propranolol, Gabapentin, Ondansetron, Lamictal, Sumatriptan, Tramadol, Trazodone, Alprazolam, Citalopram, )      71 y.o. female presents with headaches for 7 months.    Pt reports sharp occipital headaches which come on mostly in the A.M. upon awakening, are associated with neck stiffness and crepitus, aggravated by lights and nighttime driving, minimally alleviated by rizatriptan, and resolve after 2-3 hours with Yoga and rest.  Headaches have progressively been decreasing in intensity and now come on nearly every day.  Pt underwent CT head without contrast in 2022, secondary to headaches; scan was unremarkable.      ROS  Constitutional: Denies fever   Eye: Denies eye pain, tearing, or vision changes   ENT: + phonophobia  GI: Denies nausea or vomiting   Neuro: Denies aura or vertigo      Current Outpatient Medications:     acetaminophen (TYLENOL) 500 MG tablet, Take 500 mg by mouth every 6 (six) hours as needed for Pain., Disp: , Rfl:     albuterol (PROVENTIL/VENTOLIN HFA) 90 mcg/actuation inhaler, Inhale 1 puff into the lungs every 6 (six) hours as needed for Wheezing. Rescue, Disp: , Rfl:     ALPRAZolam (XANAX) 2 MG Tab, Take 1 mg by mouth nightly. , Disp: , Rfl:     butalbital-acetaminophen-caffeine -40 mg (FIORICET, ESGIC) -40 mg per tablet, Take 1 tablet by mouth every 4 (four) hours as needed for Pain., Disp: , Rfl:     carvedilol (COREG) 25 MG tablet, Take 25 mg by mouth 2 (two) times daily., Disp: , Rfl:     citalopram (CELEXA) 20 MG tablet, , Disp: , Rfl:     COMBIVENT RESPIMAT  mcg/actuation inhaler, Inhale 2 puffs into the lungs as needed. , Disp: ,  Rfl: 0    cyanocobalamin 1,000 mcg/mL injection, Inject 1,000 mcg into the muscle every 14 (fourteen) days., Disp: , Rfl:     denosumab (PROLIA) 60 mg/mL Syrg, Inject 60 mg into the skin every 6 (six) months., Disp: , Rfl:     diclofenac sodium (VOLTAREN) 1 % Gel, Apply topically., Disp: , Rfl:     dicyclomine (BENTYL) 20 mg tablet, Take 20 mg by mouth every 6 (six) hours as needed., Disp: , Rfl:     docusate sodium (COLACE) 100 MG capsule, Take 100 mg by mouth 2 (two) times daily. 1 TABLET IN THE MORNING AND 1 TABLET AT NIGHT., Disp: , Rfl:     ergocalciferol (ERGOCALCIFEROL) 50,000 unit Cap, Take 1 capsule monthly., Disp: 6 capsule, Rfl: 0    fluticasone propionate (FLONASE) 50 mcg/actuation nasal spray, 2 sprays (100 mcg total) by Each Nostril route once daily., Disp: 18.2 mL, Rfl: 0    lamoTRIgine (LAMICTAL) 100 MG tablet, Take 100 mg by mouth 2 (two) times daily. AT NIGHT, Disp: , Rfl:     linaclotide (LINZESS ORAL), Take by mouth., Disp: , Rfl:     lisinopril (PRINIVIL,ZESTRIL) 20 MG tablet, Take 20 mg by mouth Daily. , Disp: , Rfl:     menthol 5 % PtMd, APPLY PATCH TO AFFECTED AREA 1 TO 2 TIMES A DAY AS DIRECTED. WASH HANDS WITH SOAP AFTER APPLYING, Disp: , Rfl:     menthol 5 % PtMd, Apply 1 patch topically 2 (two) times a day., Disp: , Rfl:     methocarbamoL (ROBAXIN) 750 MG Tab, Take 750 mg by mouth 2 (two) times daily., Disp: , Rfl:     ondansetron (ZOFRAN) 8 MG tablet, Take 0.5 tablets (4 mg total) by mouth every 8 (eight) hours as needed for Nausea., Disp: 30 tablet, Rfl: 1    ondansetron (ZOFRAN-ODT) 8 MG TbDL, SMARTSI.5-1 Tablet(s) Sublingual Every 4 Hours PRN, Disp: , Rfl:     pantoprazole (PROTONIX) 20 MG tablet, Take 20 mg by mouth once daily., Disp: , Rfl:     PREMARIN vaginal cream, Place 30 mg vaginally once daily. NIGHTLY., Disp: , Rfl:     propranolol (INDERAL) 10 MG tablet, Take 10 mg by mouth daily as needed., Disp: , Rfl:     simethicone (MYLICON) 80 MG chewable tablet, Take 1 tablet (80  "mg total) by mouth every 8 (eight) hours as needed for Flatulence., Disp: 30 tablet, Rfl: 0    spironolactone (ALDACTONE) 25 MG tablet, Take 12.5 mg by mouth once daily., Disp: , Rfl:     terconazole (TERAZOL 3) 0.8 % vaginal cream, Place 1 applicator vaginally nightly., Disp: , Rfl:     traMADoL (ULTRAM) 50 mg tablet, Take 1 tablet (50 mg total) by mouth every 6 (six) hours as needed for Pain., Disp: 20 tablet, Rfl: 0    traZODone (DESYREL) 100 MG tablet, Take 100 mg by mouth nightly., Disp: , Rfl:     valACYclovir (VALTREX) 500 MG tablet, TK 1 T PO D, Disp: , Rfl: 1    CLENPIQ 10 mg-3.5 gram -12 gram/160 mL Soln, Take 1 kit by mouth., Disp: , Rfl:     gabapentin (NEURONTIN) 100 MG capsule, Take 1 capsule (100 mg total) by mouth 3 (three) times daily. (Patient not taking: Reported on 8/18/2022), Disp: 90 capsule, Rfl: 11    LIDOcaine (LIDODERM) 5 %, Place 1 patch onto the skin., Disp: , Rfl:     lipase-protease-amylase 24,000-76,000-120,000 units (CREON) 24,000-76,000 -120,000 unit capsule, Take 1 capsule by mouth 3 (three) times daily with meals., Disp: 90 capsule, Rfl: 11    rimegepant 75 mg odt, Take 1 tablet (75 mg total) by mouth every 48 hours. Place ODT tablet on the tongue; alternatively the ODT tablet may be placed under the tongue, Disp: 16 tablet, Rfl: 11    rizatriptan (MAXALT) 10 MG tablet, Take 1 tablet (10 mg total) by mouth as needed for Migraine (one at onset of headache, then a second 2 hours later if necessary)., Disp: 6 tablet, Rfl: 5    Objective:     PHYSICAL EXAM  Blood pressure 112/66, height 5' 3" (1.6 m), weight 58.1 kg (128 lb).    Gen: Pleasant, elderly female, NAD.    Mental Status: Alert and oriented x3. Language is fluent with good comprehension.    Cranial Nerve: Pupils are equal, round, and reactive to light. Visual fields are intact to confrontation. Normal fundi. Ocular movements are intact. Face is symmetric at rest and with activation with intact sensation throughout. Hearing " intact to finger rub bilaterally. Muscles of tongue and palate activate symmetrically. No dysarthria. Strength is full in sternocleidomastoid and trapezius bilaterally.    Motor: Muscle bulk and tone are normal. Strength is 5/5 in all four extremities both proximally and distally. Intact fine motor movements bilaterally. There is no pronator drift or satelliting on arm roll.    Sensory: Sensation is intact to light touch, pinprick, vibration, and proprioception throughout. Romberg is negative.    Reflexes: 2+ and symmetric at the biceps, triceps, brachioradialis, patella, and Achilles bilaterally. Plantar response is flexor bilaterally.    Coordination: No dysmetria on finger-nose-finger or heel-knee-shin. Normal rapid alternating movements. Fast finger tapping with normal amplitude and speed.    Gait: Narrow based with normal stride length and good arm swing bilaterally. Able to walk on heels, toes, and in tandem.    Assessment:     1. Cervicogenic headache    2. Chronic migraine without aura without status migrainosus, not intractable         Plan:     Normal neuro exam today.  CT head in 6/2022 unremarkable.  Pt not amenable to PT at this time, desires to continue Yoga and exercises for relief along with trial of Nurtec.  Start Nurtec 75 mg qod.      Follow up in about 2 months (around 1/16/2023).    Jose Juan Liu MD  [facility]  [facility]      New Prescriptions    RIMEGEPANT 75 MG ODT    Take 1 tablet (75 mg total) by mouth every 48 hours. Place ODT tablet on the tongue; alternatively the ODT tablet may be placed under the tongue

## 2022-11-21 ENCOUNTER — TELEPHONE (OUTPATIENT)
Dept: NEUROLOGY | Facility: CLINIC | Age: 72
End: 2022-11-21
Payer: MEDICARE

## 2022-11-21 RX ORDER — TIZANIDINE 4 MG/1
4 TABLET ORAL DAILY PRN
Qty: 30 TABLET | Refills: 5 | Status: SHIPPED | OUTPATIENT
Start: 2022-11-21 | End: 2022-12-01

## 2022-11-21 NOTE — TELEPHONE ENCOUNTER
Rtn call and advised  May try 1/2 (2 mg) of Tizanidine if concerned re initial tolerance  Nurtec dc'd, per PT rqst

## 2022-11-21 NOTE — TELEPHONE ENCOUNTER
The older migraine meds may be problematic in her, given her PMH and current medications.  I can send some tizanidine for as needed

## 2022-11-21 NOTE — TELEPHONE ENCOUNTER
Pt states this medication is too expensive.  States she was told by pharmacy this medication is $2000.00 before insurance coverage and is still expensive after covered.  Pt states seeking advice on generic or alterative options that are more affordable.     Medication: Rimegepant 75 mg ODT    [facility]: [facility]    Last Appointment: 11/16/22    Next Appointment: 1/17/23

## 2022-12-12 ENCOUNTER — TELEPHONE (OUTPATIENT)
Dept: ENDOCRINOLOGY | Facility: CLINIC | Age: 72
End: 2022-12-12
Payer: MEDICARE

## 2022-12-12 DIAGNOSIS — M25.552 BILATERAL HIP PAIN: Primary | ICD-10-CM

## 2022-12-12 DIAGNOSIS — M25.551 BILATERAL HIP PAIN: Primary | ICD-10-CM

## 2022-12-12 NOTE — TELEPHONE ENCOUNTER
----- Message from Deandra Prakash sent at 12/12/2022  9:01 AM CST -----  Regarding: Pt issues  #GABRIEL/DR STOUT#    Pt experiencing hip pain, causing immobility ~2wks. Pt concerned and would like instruction on how to proceed. Please advise pt @ [phone]

## 2022-12-12 NOTE — TELEPHONE ENCOUNTER
Spoke with patient she states her hip pain is getting worse (did not see PCP).   Over the last 2 weeks she has had a worsening in pain with stiffness, that is affecting her mobility, increased difficulty performing daily tasks, and she is afraid of falling.  She continues her stretching exercises and yoga better after stretching but pain does not go away.  Requesting x-ray of hip would like performed at [facility].

## 2022-12-13 ENCOUNTER — HOSPITAL ENCOUNTER (OUTPATIENT)
Dept: RADIOLOGY | Facility: HOSPITAL | Age: 72
Discharge: HOME | End: 2022-12-13
Attending: INTERNAL MEDICINE
Payer: MEDICARE

## 2022-12-13 DIAGNOSIS — M25.552 BILATERAL HIP PAIN: ICD-10-CM

## 2022-12-13 DIAGNOSIS — M25.551 BILATERAL HIP PAIN: ICD-10-CM

## 2022-12-13 PROCEDURE — 73523 X-RAY EXAM HIPS BI 5/> VIEWS: CPT | Mod: TC

## 2022-12-13 NOTE — TELEPHONE ENCOUNTER
Xrays showed arthritic changes not osteoporotic fractures.  Recommend she schedule a PCP f/u for further eval and tx of her hip pain.

## 2022-12-14 NOTE — TELEPHONE ENCOUNTER
Spoke with patient, informed xrays showed arthritic changes not osteoporotic fractures, and Dr. Schulte recommends she schedule a PCP f/u for further eval and tx of her hip pain.  Voiced understanding.  Xray results sent to Dr. Cedric Mcclelland.

## 2022-12-30 DIAGNOSIS — N28.89 RENAL MASS: ICD-10-CM

## 2022-12-30 DIAGNOSIS — R93.5 ABNORMAL ABDOMINAL ULTRASOUND: Primary | ICD-10-CM

## 2023-01-17 ENCOUNTER — OFFICE VISIT (OUTPATIENT)
Dept: NEUROLOGY | Facility: CLINIC | Age: 73
End: 2023-01-17
Payer: MEDICARE

## 2023-01-17 VITALS
HEIGHT: 63 IN | SYSTOLIC BLOOD PRESSURE: 108 MMHG | DIASTOLIC BLOOD PRESSURE: 64 MMHG | WEIGHT: 128 LBS | BODY MASS INDEX: 22.68 KG/M2

## 2023-01-17 DIAGNOSIS — G44.86 CERVICOGENIC HEADACHE: Primary | ICD-10-CM

## 2023-01-17 PROCEDURE — 99999 PR PBB SHADOW E&M-EST. PATIENT-LVL V: ICD-10-PCS | Mod: PBBFAC,,, | Performed by: PSYCHIATRY & NEUROLOGY

## 2023-01-17 PROCEDURE — 99213 OFFICE O/P EST LOW 20 MIN: CPT | Mod: S$PBB,,, | Performed by: PSYCHIATRY & NEUROLOGY

## 2023-01-17 PROCEDURE — 99215 OFFICE O/P EST HI 40 MIN: CPT | Mod: PBBFAC | Performed by: PSYCHIATRY & NEUROLOGY

## 2023-01-17 PROCEDURE — 99999 PR PBB SHADOW E&M-EST. PATIENT-LVL V: CPT | Mod: PBBFAC,,, | Performed by: PSYCHIATRY & NEUROLOGY

## 2023-01-17 PROCEDURE — 99213 PR OFFICE/OUTPT VISIT, EST, LEVL III, 20-29 MIN: ICD-10-PCS | Mod: S$PBB,,, | Performed by: PSYCHIATRY & NEUROLOGY

## 2023-01-17 NOTE — PROGRESS NOTES
Subjective:       Patient ID: Christiana Chan is a 72 y.o. female.    Chief Complaint: Migraine    HPI  Headaches respond to zanaflex  No SE other than sleepiness  Currently under a lot of famil stress, so HA have increased lately  Does a lot of nonmedical things for pain/depression and it works  Review of Systems    The remainder of the 14 system ROS is noncontributory or negative unless mentioned/reviewed above.    Objective:      Physical Exam  Mental Status: Alert and oriented x3. Language is fluent with good comprehension.    Cranial Nerve: Pupils are equal, round, and reactive to light. Visual fields are intact to confrontation. Normal fundi. Ocular movements are intact. Face is symmetric at rest and with activation with intact sensation throughout. Hearing intact to finger rub bilaterally. Muscles of tongue and palate activate symmetrically. No dysarthria. Strength is full in sternocleidomastoid and trapezius bilaterally.    Motor: Muscle bulk and tone are normal. Strength is 5/5 in all four extremities both proximally and distally. Intact fine motor movements bilaterally. There is no pronator drift or satelliting on arm roll.    Sensory: Sensation is intact to light touch, pinprick, vibration, and proprioception throughout. Romberg is negative.    Reflexes: 2+ and symmetric at the biceps, triceps, brachioradialis, patella, and Achilles bilaterally. Plantar response is flexor bilaterally.    Coordination: No dysmetria on finger-nose-finger or heel-knee-shin. Normal rapid alternating movements. Fast finger tapping with normal amplitude and speed.    Gait: Narrow based with normal stride length and good arm swing bilaterally. Able to walk on heels, toes, and in tandem.    Assessment:       Problem List Items Addressed This Visit    None      Plan:       Zanaflex prn

## 2023-01-30 DIAGNOSIS — E55.9 HYPOVITAMINOSIS D: ICD-10-CM

## 2023-01-30 RX ORDER — ERGOCALCIFEROL 1.25 MG/1
CAPSULE ORAL
Qty: 6 CAPSULE | Refills: 0 | OUTPATIENT
Start: 2023-01-30

## 2023-01-30 NOTE — TELEPHONE ENCOUNTER
Last visit in [facility] ENDOCRINOLOG[facility]: 8/18/2022    Patient's next visit in [facility] ENDOCRINOLOG[facility]: 2/28/2023

## 2023-01-31 NOTE — TELEPHONE ENCOUNTER
Informed pt after she takes her last ergocalciferol pill in February, she need to do labs (Vitamin D level), reminded pt of upcoming appt (2/28/2023 at 8.20) She will do labs before that appt.

## 2023-01-31 NOTE — TELEPHONE ENCOUNTER
----- Message from Faith Hurtado sent at 1/30/2023  3:14 PM CST -----  Regarding: med refill  Stout    Pt needing a refill on Vitamin D    Sent to:  [facility][id number] - [address] AT [place] [address] [address] [facility] [address]  Phone: [phone] Fax: [phone]

## 2023-02-02 ENCOUNTER — TELEPHONE (OUTPATIENT)
Dept: PRIMARY CARE CLINIC | Facility: CLINIC | Age: 73
End: 2023-02-02
Payer: MEDICARE

## 2023-02-02 NOTE — TELEPHONE ENCOUNTER
Patient called wanting your opinion of her CT scan that was done on 1/6/23, she stated that Dr Ramirez's office called and stated that scan was normal.

## 2023-02-02 NOTE — TELEPHONE ENCOUNTER
----- Message from Anjana Marinelli sent at 2/2/2023  2:15 PM CST -----  Regarding: CT Results  Patient requesting callback concerning her CT results.    [phone]

## 2023-02-16 ENCOUNTER — LAB VISIT (OUTPATIENT)
Dept: LAB | Facility: HOSPITAL | Age: 73
End: 2023-02-16
Attending: INTERNAL MEDICINE
Payer: MEDICARE

## 2023-02-16 DIAGNOSIS — E55.9 HYPOVITAMINOSIS D: ICD-10-CM

## 2023-02-16 LAB — DEPRECATED CALCIDIOL+CALCIFEROL SERPL-MC: 27.6 NG/ML (ref 30–80)

## 2023-02-16 PROCEDURE — 82306 VITAMIN D 25 HYDROXY: CPT

## 2023-02-16 PROCEDURE — 36415 COLL VENOUS BLD VENIPUNCTURE: CPT

## 2023-02-28 ENCOUNTER — OFFICE VISIT (OUTPATIENT)
Dept: ENDOCRINOLOGY | Facility: CLINIC | Age: 73
End: 2023-02-28
Payer: MEDICARE

## 2023-02-28 VITALS
DIASTOLIC BLOOD PRESSURE: 42 MMHG | BODY MASS INDEX: 23.04 KG/M2 | SYSTOLIC BLOOD PRESSURE: 96 MMHG | WEIGHT: 130 LBS | HEART RATE: 80 BPM | HEIGHT: 63 IN | RESPIRATION RATE: 16 BRPM | TEMPERATURE: 99 F

## 2023-02-28 DIAGNOSIS — E55.9 HYPOVITAMINOSIS D: ICD-10-CM

## 2023-02-28 DIAGNOSIS — M81.0 AGE-RELATED OSTEOPOROSIS WITHOUT CURRENT PATHOLOGICAL FRACTURE: Primary | ICD-10-CM

## 2023-02-28 PROCEDURE — 96372 THER/PROPH/DIAG INJ SC/IM: CPT | Mod: PBBFAC

## 2023-02-28 PROCEDURE — 99215 OFFICE O/P EST HI 40 MIN: CPT | Mod: PBBFAC,25

## 2023-02-28 RX ORDER — ERGOCALCIFEROL 1.25 MG/1
CAPSULE ORAL
Qty: 6 CAPSULE | Refills: 0 | Status: CANCELLED | OUTPATIENT
Start: 2023-02-28

## 2023-02-28 RX ORDER — ERGOCALCIFEROL 1.25 MG/1
50000 CAPSULE ORAL
Qty: 12 CAPSULE | Refills: 0 | Status: SHIPPED | OUTPATIENT
Start: 2023-02-28 | End: 2023-05-23 | Stop reason: SDUPTHER

## 2023-02-28 RX ADMIN — DENOSUMAB 60 MG: 60 INJECTION SUBCUTANEOUS at 09:02

## 2023-02-28 NOTE — PROGRESS NOTES
"[facility] ENDOCRINOLOGY  OUTPATIENT OFFICE VISIT NOTE    SUBJECTIVE:      HPI: Christiana Chan is a 72 y.o. female w/ PMH of Postmenopausal Osteoporosis with history of vertebral fracture now on Prolia, h/o carcinoid tumor, Vitamin D deficiency, lumbar stenosis, hypertension, asthma, migraines, anxiety who presents to clinic for follow up visit.  Last seen 8/22.      Here for Prolia injection   States she is doing well  a little tired at times, asks refills for vit d  No acute concerns or complaints noted  No bone pains, fractures, falls, jaw symptoms    ROS:  (+)fatigue  (-) Chest pain, palpitations, SOB, syncope, fever, chills, abdominal pain, vomiting, diarrhea, dysuria, bleeding    OBJECTIVE:     Vital signs:   BP (!) 96/42 (BP Location: Left arm, Patient Position: Sitting, BP Method: Medium (Automatic))   Pulse 80   Temp 98.6 °F (37 °C) (Oral)   Resp 16   Ht 5' 3" (1.6 m)   Wt 59 kg (130 lb)   BMI 23.03 kg/m²      Physical Examination:  General:  Well-nourished, well-developed, no acute distress, on room air  HEENT: Normocephalic, atraumatic. EOMI.  MMM  Neck: Supple. No obvious JVD.  Normal range of motion.  Respiratory: CTAB.  No obvious crackles wheeze or rhonchi.  Cardiovascular:  RRR.  No obvious M/G/R. Warm extremities.  Peripheral pulses intact.  No edema.  Gastrointestinal:  Soft, nontender, nondistended.  Normal bowel sounds.  Neurologic: ANOx3.  Answering questions/following commands appropriately.  No FND      Current Outpatient Medications:     acetaminophen (TYLENOL) 500 MG tablet, Take 500 mg by mouth every 6 (six) hours as needed for Pain., Disp: , Rfl:     albuterol (PROVENTIL/VENTOLIN HFA) 90 mcg/actuation inhaler, Inhale 1 puff into the lungs every 6 (six) hours as needed for Wheezing. Rescue, Disp: , Rfl:     ALPRAZolam (XANAX) 2 MG Tab, Take 1 mg by mouth nightly. , Disp: , Rfl:     butalbital-acetaminophen-caffeine -40 mg (FIORICET, ESGIC) -40 mg per tablet, Take 1 tablet by " mouth every 4 (four) hours as needed for Pain., Disp: , Rfl:     carvedilol (COREG) 25 MG tablet, Take 25 mg by mouth 2 (two) times daily., Disp: , Rfl:     citalopram (CELEXA) 20 MG tablet, Take 20 mg by mouth every evening., Disp: , Rfl:     COMBIVENT RESPIMAT  mcg/actuation inhaler, Inhale 2 puffs into the lungs as needed. , Disp: , Rfl: 0    cyanocobalamin 1,000 mcg/mL injection, Inject 1,000 mcg into the muscle every 14 (fourteen) days., Disp: , Rfl:     denosumab (PROLIA) 60 mg/mL Syrg, Inject 60 mg into the skin every 6 (six) months., Disp: , Rfl:     diclofenac sodium (VOLTAREN) 1 % Gel, Apply topically., Disp: , Rfl:     dicyclomine (BENTYL) 20 mg tablet, Take 20 mg by mouth every 6 (six) hours as needed., Disp: , Rfl:     docusate sodium (COLACE) 100 MG capsule, Take 100 mg by mouth 2 (two) times daily. 1 TABLET IN THE MORNING AND 1 TABLET AT NIGHT., Disp: , Rfl:     ergocalciferol (ERGOCALCIFEROL) 50,000 unit Cap, Take 1 capsule monthly., Disp: 6 capsule, Rfl: 0    fluticasone propionate (FLONASE) 50 mcg/actuation nasal spray, 2 sprays (100 mcg total) by Each Nostril route once daily., Disp: 18.2 mL, Rfl: 0    lamoTRIgine (LAMICTAL) 100 MG tablet, Take 100 mg by mouth every evening. AT NIGHT, Disp: , Rfl:     LIDOcaine (LIDODERM) 5 %, Place 1 patch onto the skin., Disp: , Rfl:     linaclotide (LINZESS ORAL), Take 1 capsule by mouth every other day., Disp: , Rfl:     lisinopril (PRINIVIL,ZESTRIL) 20 MG tablet, Take 20 mg by mouth Daily. , Disp: , Rfl:     ondansetron (ZOFRAN) 8 MG tablet, Take 0.5 tablets (4 mg total) by mouth every 8 (eight) hours as needed for Nausea., Disp: 30 tablet, Rfl: 1    pantoprazole (PROTONIX) 20 MG tablet, Take 20 mg by mouth once daily., Disp: , Rfl:     PREMARIN vaginal cream, Place 30 mg vaginally once daily. NIGHTLY., Disp: , Rfl:     propranolol (INDERAL) 10 MG tablet, Take 10 mg by mouth daily as needed., Disp: , Rfl:     simethicone (MYLICON) 80 MG chewable tablet,  Take 1 tablet (80 mg total) by mouth every 8 (eight) hours as needed for Flatulence., Disp: 30 tablet, Rfl: 0    spironolactone (ALDACTONE) 25 MG tablet, Take 12.5 mg by mouth once daily., Disp: , Rfl:     traZODone (DESYREL) 100 MG tablet, Take 100 mg by mouth nightly., Disp: , Rfl:     CLENPIQ 10 mg-3.5 gram -12 gram/160 mL Soln, Take 1 kit by mouth., Disp: , Rfl:     gabapentin (NEURONTIN) 100 MG capsule, Take 1 capsule (100 mg total) by mouth 3 (three) times daily. (Patient not taking: Reported on 2022), Disp: 90 capsule, Rfl: 11    lipase-protease-amylase 24,000-76,000-120,000 units (CREON) 24,000-76,000 -120,000 unit capsule, Take 1 capsule by mouth 3 (three) times daily with meals., Disp: 90 capsule, Rfl: 11    menthol 5 % PtMd, APPLY PATCH TO AFFECTED AREA 1 TO 2 TIMES A DAY AS DIRECTED. WASH HANDS WITH SOAP AFTER APPLYING, Disp: , Rfl:     menthol 5 % PtMd, Apply 1 patch topically 2 (two) times a day., Disp: , Rfl:     methocarbamoL (ROBAXIN) 750 MG Tab, Take 750 mg by mouth 2 (two) times daily., Disp: , Rfl:     ondansetron (ZOFRAN-ODT) 8 MG TbDL, SMARTSI.5-1 Tablet(s) Sublingual Every 4 Hours PRN, Disp: , Rfl:     rizatriptan (MAXALT) 10 MG tablet, Take 1 tablet (10 mg total) by mouth as needed for Migraine (one at onset of headache, then a second 2 hours later if necessary)., Disp: 6 tablet, Rfl: 5    terconazole (TERAZOL 3) 0.8 % vaginal cream, Place 1 applicator vaginally nightly., Disp: , Rfl:     traMADoL (ULTRAM) 50 mg tablet, Take 1 tablet (50 mg total) by mouth every 6 (six) hours as needed for Pain. (Patient not taking: Reported on 2023), Disp: 20 tablet, Rfl: 0    valACYclovir (VALTREX) 500 MG tablet, TK 1 T PO D, Disp: , Rfl: 1     ASSESSMENT & PLAN:     71 y.o. female w/ PMH of Postmenopausal Osteoporosis with history of vertebral fracture now on Prolia, h/o carcinoid tumor, Vitamin D deficiency, lumbar stenosis, hypertension, asthma, migraines, anxiety who presents to clinic for  follow up visit.    -providing Prolia injection today   -has completed approx. 8 years   -ordered BMP and Vit D level prior to next visit  -RTC in 6 mnths for repeat Prolia     RTC 1 year     Nayely Quevedo MD  [employer] IM Resident PGY-3

## 2023-04-17 ENCOUNTER — TELEPHONE (OUTPATIENT)
Dept: PRIMARY CARE CLINIC | Facility: CLINIC | Age: 73
End: 2023-04-17
Payer: MEDICARE

## 2023-04-17 DIAGNOSIS — R06.02 SOB (SHORTNESS OF BREATH): Primary | ICD-10-CM

## 2023-04-17 RX ORDER — IPRATROPIUM BROMIDE AND ALBUTEROL 20; 100 UG/1; UG/1
2 SPRAY, METERED RESPIRATORY (INHALATION)
Qty: 4 G | Refills: 5 | Status: SHIPPED | OUTPATIENT
Start: 2023-04-17

## 2023-04-17 NOTE — TELEPHONE ENCOUNTER
----- Message from Amalia Siegel sent at 2023  1:03 PM CDT -----  Regardin mess  .Type:  RX Refill Request    Who Called: pt  Refill or New Rx:Refill  RX Name and Strength:COMBIVENT RESPIMAT  mcg/actuation inhaler  How is the patient currently taking it? (ex. ):Inhale 2 puffs into the lungs as needed  Is this a 30 day or 90 day RX:  Preferred Pharmacy with phone number:[id number] [place], [place] - [address] AT [facility]  Local or Mail Order:  Ordering Provider:  Would the patient rather a call back or a response via [facility]?   Best Call Back [phone]  Additional Information:    Almost out will not last till Monday

## 2023-04-25 PROBLEM — D51.9 VITAMIN B12 DEFICIENCY ANEMIA, UNSPECIFIED: Status: ACTIVE | Noted: 2023-04-25

## 2023-04-25 PROBLEM — K90.0 CELIAC DISEASE: Status: ACTIVE | Noted: 2023-04-25

## 2023-04-25 PROBLEM — K90.49 MILK INTOLERANCE: Status: ACTIVE | Noted: 2023-04-25

## 2023-04-25 PROBLEM — Z88.9 PREDISPOSITION TO ALLERGIC REACTION: Status: ACTIVE | Noted: 2023-04-25

## 2023-04-25 PROBLEM — N28.89 RENAL MASS: Status: ACTIVE | Noted: 2023-04-25

## 2023-04-25 PROBLEM — J45.998 SEASONAL ASTHMA: Status: ACTIVE | Noted: 2023-04-25

## 2023-04-25 PROBLEM — K52.9 OTHER AND UNSPECIFIED NONINFECTIOUS GASTROENTERITIS AND COLITIS: Status: ACTIVE | Noted: 2023-04-25

## 2023-04-25 PROBLEM — M48.061 SPINAL STENOSIS OF LUMBAR REGION: Status: ACTIVE | Noted: 2023-04-25

## 2023-04-25 PROBLEM — R26.89 IMPAIRMENT OF BALANCE: Status: ACTIVE | Noted: 2023-04-25

## 2023-04-25 PROBLEM — C7A.8 PRIMARY MALIGNANT NEUROENDOCRINE TUMOR OF ILEUM: Status: ACTIVE | Noted: 2023-04-25

## 2023-04-25 PROBLEM — Z85.060 PERSONAL HISTORY OF MALIGNANT CARCINOID TUMOR OF SMALL INTESTINE: Status: ACTIVE | Noted: 2019-08-27

## 2023-04-25 PROBLEM — K86.1 PANCREATITIS, CHRONIC: Status: ACTIVE | Noted: 2019-02-01

## 2023-04-25 PROBLEM — K62.1 RECTAL POLYP: Status: ACTIVE | Noted: 2019-08-27

## 2023-04-25 PROBLEM — E73.9 LACTOSE INTOLERANCE: Status: ACTIVE | Noted: 2023-04-25

## 2023-04-25 PROBLEM — S13.0XXA: Status: ACTIVE | Noted: 2023-04-25

## 2023-04-25 PROBLEM — M85.80 OSTEOPENIA: Status: ACTIVE | Noted: 2023-04-25

## 2023-04-25 RX ORDER — GABAPENTIN 100 MG/1
100 CAPSULE ORAL
COMMUNITY

## 2023-04-25 RX ORDER — ONDANSETRON 8 MG/1
TABLET, ORALLY DISINTEGRATING ORAL
COMMUNITY
Start: 2023-03-09 | End: 2023-04-25 | Stop reason: SDUPTHER

## 2023-04-25 NOTE — ASSESSMENT & PLAN NOTE
Most recent cholesterol/lipid blood testing shows adequate control, continue current treatment plan, including prescription medications if prescribed, and/or diet high in fiber, low in saturated fats as discussed during clinic visit.

## 2023-04-25 NOTE — ASSESSMENT & PLAN NOTE
Patient prescribed lisinopril 20 mg daily, Coreg 20 mg b.i.d., spironolactone 12.5 mg daily, propranolol 10 mg daily p.r.n. Blood pressures appear to be adequately controlled with current treatment plan, including prescription blood pressure medication. Continue medical management and continue home blood pressure checks.  Blood pressure should be checked as discussed during medical visits, and average blood pressure should be no greater than 130/80.

## 2023-04-25 NOTE — ASSESSMENT & PLAN NOTE
Patient prescribed Maxalt 10 mg as needed.  This medical condition is currently stable on prescription medication, continue current treatment plan.

## 2023-04-25 NOTE — PROGRESS NOTES
"Subjective:       Patient ID: Christiana Chan is a 72 y.o. female.    Chief Complaint: Follow-up    Patient presents to the clinic today for chronic condition follow-up.  Doing well, no specific complaints, tolerating all medications, reporting no significant side effects or problems.    Last wellness exam was 10/25/2022.      Review of Systems   Constitutional: Negative.  Negative for fatigue and fever.   HENT: Negative.  Negative for sore throat and trouble swallowing.    Eyes: Negative.  Negative for redness and visual disturbance.   Respiratory: Negative.  Negative for chest tightness and shortness of breath.    Cardiovascular: Negative.  Negative for chest pain.   Gastrointestinal: Negative.  Negative for abdominal pain, blood in stool and nausea.   Endocrine: Negative.  Negative for cold intolerance, heat intolerance and polyuria.   Genitourinary: Negative.    Musculoskeletal: Negative.  Negative for arthralgias, gait problem and joint swelling.   Integumentary:  Negative for rash. Negative.   Neurological: Negative.  Negative for dizziness, weakness and headaches.   Psychiatric/Behavioral: Negative.  Negative for agitation and dysphoric mood.        Objective:     Vitals:    04/26/23 0849   BP: 113/75   Pulse: 74   Resp: 18   Temp: 98.3 °F (36.8 °C)   SpO2: 98%   Weight: 60.3 kg (133 lb)   Height: 5' 3" (1.6 m)   Body mass index is 23.56 kg/m².     Physical Exam  Constitutional:       Appearance: Normal appearance.   Eyes:      Conjunctiva/sclera: Conjunctivae normal.   Cardiovascular:      Rate and Rhythm: Normal rate and regular rhythm.   Pulmonary:      Effort: Pulmonary effort is normal.      Breath sounds: Normal breath sounds.   Skin:     General: Skin is warm and dry.   Neurological:      Mental Status: She is alert.   Psychiatric:         Mood and Affect: Mood normal.         Behavior: Behavior normal.         Lab Results   Component Value Date    GLU 83 02/21/2022     10/18/2022     " 02/21/2022    K 5.2 (H) 10/18/2022    K 4.1 02/21/2022     02/21/2022    CO2 26 10/18/2022    CO2 25 02/21/2022    BUN 9.1 (L) 10/18/2022    BUN 12 02/21/2022    CREATININE 0.74 10/18/2022    CREATININE 0.8 02/21/2022    CALCIUM 8.7 10/18/2022    CALCIUM 9.2 02/21/2022    PROT 6.5 02/21/2022    ALBUMIN 3.4 10/18/2022    ALBUMIN 3.3 (L) 02/21/2022    BILITOT 0.5 10/18/2022    BILITOT 0.4 02/21/2022    ALKPHOS 48 10/18/2022    ALKPHOS 41 (L) 02/21/2022    AST 17 10/18/2022    AST 13 02/21/2022    ALT 7 10/18/2022    ALT 8 (L) 02/21/2022    ANIONGAP 10 02/21/2022    ESTGFRAFRICA >60 02/21/2022    EGFRNORACEVR >60 10/18/2022     Lab Results   Component Value Date    WBC 7.7 10/18/2022    WBC 8.33 02/21/2022    RBC 4.13 (L) 10/18/2022    RBC 3.92 (L) 02/21/2022    HGB 12.1 10/18/2022    HGB 11.5 (L) 02/21/2022    HCT 38.6 10/18/2022    HCT 35.6 (L) 02/21/2022    MCV 93.5 10/18/2022    MCV 91 02/21/2022    MCV 95.6 (A) 08/17/2020    RDW 14.0 10/18/2022    RDW 13.5 02/21/2022     10/18/2022     02/21/2022      Lab Results   Component Value Date    CHOL 183 10/18/2022    TRIG 77 10/18/2022    HDL 54 10/18/2022    .00 10/18/2022    TOTALCHOLEST 3 10/18/2022     Lab Results   Component Value Date    TSH 0.9387 10/18/2022    TSH 0.55 01/20/2020     Lab Results   Component Value Date    HGBA1C 5.3 10/18/2022    HGBA1C 5.3 08/17/2020          Assessment:     Problem List Items Addressed This Visit          Neuro    Hx of migraine headaches (Chronic)    Current Assessment & Plan     Patient prescribed Maxalt 10 mg as needed.  This medical condition is currently stable on prescription medication, continue current treatment plan.              Psychiatric    Bipolar II disorder (Chronic)    Current Assessment & Plan     Patient does appear to be seeing a psychologist, Jessi Tyler,  condition appears to be stable.              Pulmonary    Mild intermittent asthma (Chronic)    Relevant Medications     albuterol (PROVENTIL/VENTOLIN HFA) 90 mcg/actuation inhaler       Cardiac/Vascular    Primary hypertension - Primary (Chronic)    Current Assessment & Plan     Patient prescribed lisinopril 20 mg daily, Coreg 20 mg b.i.d., spironolactone 12.5 mg daily, propranolol 10 mg daily p.r.n. Blood pressures appear to be adequately controlled with current treatment plan, including prescription blood pressure medication. Continue medical management and continue home blood pressure checks.  Blood pressure should be checked as discussed during medical visits, and average blood pressure should be no greater than 130/80.           Relevant Orders    Hemoglobin A1C    TSH    Lipid Panel    Comprehensive Metabolic Panel    CBC Auto Differential    Dyslipidemia (Chronic)    Current Assessment & Plan     Most recent cholesterol/lipid blood testing shows adequate control, continue current treatment plan, including prescription medications if prescribed, and/or diet high in fiber, low in saturated fats as discussed during clinic visit.           Relevant Orders    Lipid Panel    Paroxysmal atrial fibrillation (Chronic)    Current Assessment & Plan     By her history, she has had atrial fibrillation in the past, controlled as long as she stays on her medications, she is in sinus rhythm today.           Relevant Orders    Hemoglobin A1C    TSH    Lipid Panel    Comprehensive Metabolic Panel    CBC Auto Differential    Other cardiomyopathies       Endocrine    Hypovitaminosis D (Chronic)    Relevant Orders    Vitamin D       Orthopedic    Osteoporosis (Chronic)    Relevant Orders    Vitamin D    Ganglion cyst of finger (Chronic)     Other Visit Diagnoses       Wellness examination        Relevant Orders    Hemoglobin A1C    TSH    Lipid Panel    Comprehensive Metabolic Panel    CBC Auto Differential    Vitamin D    Abnormal blood chemistry        Relevant Orders    Hemoglobin A1C               Medication List with Changes/Refills   Current  Medications    ACETAMINOPHEN (TYLENOL) 500 MG TABLET    Take 500 mg by mouth every 6 (six) hours as needed for Pain.    ALPRAZOLAM (XANAX) 2 MG TAB    Take 1 mg by mouth nightly.     BETAMETHASONE VALERATE 0.1% (VALISONE) 0.1 % CREA    SMARTSIG:sparingly Topical Twice Daily    BUTALBITAL-ACETAMINOPHEN-CAFFEINE -40 MG (FIORICET, ESGIC) -40 MG PER TABLET    Take 1 tablet by mouth every 4 (four) hours as needed for Pain.    CARVEDILOL (COREG) 25 MG TABLET    Take 25 mg by mouth 2 (two) times daily.    CITALOPRAM (CELEXA) 20 MG TABLET    Take 20 mg by mouth every evening.    CLENPIQ 10 MG-3.5 GRAM -12 GRAM/160 ML SOLN    Take 1 kit by mouth.    COMBIVENT RESPIMAT  MCG/ACTUATION INHALER    Inhale 2 puffs into the lungs as needed for Shortness of Breath.    CYANOCOBALAMIN 1,000 MCG/ML INJECTION    Inject 1,000 mcg into the muscle every 14 (fourteen) days.    DENOSUMAB (PROLIA) 60 MG/ML SYRG    Inject 60 mg into the skin every 6 (six) months.    DICLOFENAC SODIUM (VOLTAREN) 1 % GEL    Apply topically.    DICYCLOMINE (BENTYL) 20 MG TABLET    Take 20 mg by mouth every 6 (six) hours as needed.    DOCUSATE SODIUM (COLACE) 100 MG CAPSULE    Take 100 mg by mouth 2 (two) times daily. 1 TABLET IN THE MORNING AND 1 TABLET AT NIGHT.    ERGOCALCIFEROL (ERGOCALCIFEROL) 50,000 UNIT CAP    Take 1 capsule (50,000 Units total) by mouth every 14 (fourteen) days.    FLUTICASONE PROPIONATE (FLONASE) 50 MCG/ACTUATION NASAL SPRAY    2 sprays (100 mcg total) by Each Nostril route once daily.    GABAPENTIN (NEURONTIN) 100 MG CAPSULE    Take 100 mg by mouth.    LAMOTRIGINE (LAMICTAL) 100 MG TABLET    Take 100 mg by mouth every evening. AT NIGHT    LIDOCAINE (LIDODERM) 5 %    Place 1 patch onto the skin.    LINACLOTIDE (LINZESS ORAL)    Take 1 capsule by mouth every other day.    LIPASE-PROTEASE-AMYLASE 24,000-76,000-120,000 UNITS (CREON) 24,000-76,000 -120,000 UNIT CAPSULE    Take 1 capsule by mouth 3 (three) times daily with  meals.    LISINOPRIL (PRINIVIL,ZESTRIL) 20 MG TABLET    Take 20 mg by mouth Daily.     MENTHOL 5 % PTMD    Apply 1 patch topically.    METHOCARBAMOL (ROBAXIN) 750 MG TAB    Take 750 mg by mouth 2 (two) times daily.    ONDANSETRON (ZOFRAN) 8 MG TABLET    Take 0.5 tablets (4 mg total) by mouth every 8 (eight) hours as needed for Nausea.    PANTOPRAZOLE (PROTONIX) 20 MG TABLET    Take 20 mg by mouth once daily.    PREDNISONE (DELTASONE) 10 MG TABLET    Take 10 mg by mouth.    PREMARIN VAGINAL CREAM    Place 30 mg vaginally once daily. NIGHTLY.    PROPRANOLOL (INDERAL) 10 MG TABLET    Take 10 mg by mouth daily as needed.    RIZATRIPTAN (MAXALT) 10 MG TABLET    Take 1 tablet (10 mg total) by mouth as needed for Migraine (one at onset of headache, then a second 2 hours later if necessary).    SIMETHICONE (MYLICON) 80 MG CHEWABLE TABLET    Take 1 tablet (80 mg total) by mouth every 8 (eight) hours as needed for Flatulence.    SPIRONOLACTONE (ALDACTONE) 25 MG TABLET    Take 12.5 mg by mouth once daily.    TERCONAZOLE (TERAZOL 3) 0.8 % VAGINAL CREAM    Place 1 applicator vaginally.    TRAMADOL (ULTRAM) 50 MG TABLET    Take 50 mg by mouth every 6 (six) hours as needed.    TRAZODONE (DESYREL) 100 MG TABLET    Take 100 mg by mouth nightly.   Changed and/or Refilled Medications    Modified Medication Previous Medication    ALBUTEROL (PROVENTIL/VENTOLIN HFA) 90 MCG/ACTUATION INHALER albuterol (PROVENTIL/VENTOLIN HFA) 90 mcg/actuation inhaler       Inhale 1 puff into the lungs every 6 (six) hours as needed for Wheezing. Rescue    Inhale 1 puff into the lungs every 6 (six) hours as needed for Wheezing. Rescue   Discontinued Medications    ONDANSETRON (ZOFRAN-ODT) 8 MG TBDL    SMARTSI.5-1 Tablet(s) Sublingual Every 4 Hours PRN     Plan:             Follow up in about 6 months (around 2023) for Medicare Wellness.

## 2023-04-26 ENCOUNTER — OFFICE VISIT (OUTPATIENT)
Dept: PRIMARY CARE CLINIC | Facility: CLINIC | Age: 73
End: 2023-04-26
Payer: MEDICARE

## 2023-04-26 VITALS
RESPIRATION RATE: 18 BRPM | OXYGEN SATURATION: 98 % | WEIGHT: 133 LBS | BODY MASS INDEX: 23.57 KG/M2 | DIASTOLIC BLOOD PRESSURE: 75 MMHG | HEART RATE: 74 BPM | SYSTOLIC BLOOD PRESSURE: 113 MMHG | TEMPERATURE: 98 F | HEIGHT: 63 IN

## 2023-04-26 DIAGNOSIS — Z00.00 WELLNESS EXAMINATION: ICD-10-CM

## 2023-04-26 DIAGNOSIS — J45.20 MILD INTERMITTENT ASTHMA WITHOUT COMPLICATION: Chronic | ICD-10-CM

## 2023-04-26 DIAGNOSIS — M81.0 AGE-RELATED OSTEOPOROSIS WITHOUT CURRENT PATHOLOGICAL FRACTURE: Chronic | ICD-10-CM

## 2023-04-26 DIAGNOSIS — I48.0 PAROXYSMAL ATRIAL FIBRILLATION: Chronic | ICD-10-CM

## 2023-04-26 DIAGNOSIS — I42.8 OTHER CARDIOMYOPATHIES: ICD-10-CM

## 2023-04-26 DIAGNOSIS — Z86.69 HX OF MIGRAINE HEADACHES: Chronic | ICD-10-CM

## 2023-04-26 DIAGNOSIS — F31.81 BIPOLAR II DISORDER: ICD-10-CM

## 2023-04-26 DIAGNOSIS — E78.5 DYSLIPIDEMIA: Chronic | ICD-10-CM

## 2023-04-26 DIAGNOSIS — I10 PRIMARY HYPERTENSION: Primary | Chronic | ICD-10-CM

## 2023-04-26 DIAGNOSIS — E55.9 HYPOVITAMINOSIS D: Chronic | ICD-10-CM

## 2023-04-26 DIAGNOSIS — M67.449 GANGLION CYST OF FINGER: Chronic | ICD-10-CM

## 2023-04-26 DIAGNOSIS — R79.9 ABNORMAL BLOOD CHEMISTRY: ICD-10-CM

## 2023-04-26 PROBLEM — K90.0 CELIAC DISEASE: Status: RESOLVED | Noted: 2023-04-25 | Resolved: 2023-04-26

## 2023-04-26 PROBLEM — K90.49 MILK INTOLERANCE: Chronic | Status: RESOLVED | Noted: 2023-04-25 | Resolved: 2023-04-26

## 2023-04-26 PROBLEM — S13.0XXA: Status: RESOLVED | Noted: 2023-04-25 | Resolved: 2023-04-26

## 2023-04-26 PROBLEM — N28.89 RENAL MASS: Chronic | Status: ACTIVE | Noted: 2023-04-25

## 2023-04-26 PROBLEM — E73.9 LACTOSE INTOLERANCE: Chronic | Status: ACTIVE | Noted: 2023-04-25

## 2023-04-26 PROBLEM — K86.1 PANCREATITIS, CHRONIC: Status: RESOLVED | Noted: 2019-02-01 | Resolved: 2023-04-26

## 2023-04-26 PROBLEM — K62.1 RECTAL POLYP: Status: RESOLVED | Noted: 2019-08-27 | Resolved: 2023-04-26

## 2023-04-26 PROBLEM — R26.89 IMPAIRMENT OF BALANCE: Status: RESOLVED | Noted: 2023-04-25 | Resolved: 2023-04-26

## 2023-04-26 PROBLEM — K43.0 INCARCERATED INCISIONAL HERNIA: Chronic | Status: RESOLVED | Noted: 2020-03-13 | Resolved: 2023-04-26

## 2023-04-26 PROBLEM — Z85.060 PERSONAL HISTORY OF MALIGNANT CARCINOID TUMOR OF SMALL INTESTINE: Chronic | Status: ACTIVE | Noted: 2019-08-27

## 2023-04-26 PROBLEM — K52.9 OTHER AND UNSPECIFIED NONINFECTIOUS GASTROENTERITIS AND COLITIS: Status: RESOLVED | Noted: 2023-04-25 | Resolved: 2023-04-26

## 2023-04-26 PROBLEM — Z88.9 PREDISPOSITION TO ALLERGIC REACTION: Status: RESOLVED | Noted: 2023-04-25 | Resolved: 2023-04-26

## 2023-04-26 PROBLEM — K90.49 MILK INTOLERANCE: Chronic | Status: ACTIVE | Noted: 2023-04-25

## 2023-04-26 PROBLEM — M85.80 OSTEOPENIA: Status: RESOLVED | Noted: 2023-04-25 | Resolved: 2023-04-26

## 2023-04-26 PROCEDURE — 99213 OFFICE O/P EST LOW 20 MIN: CPT | Mod: ,,, | Performed by: GENERAL PRACTICE

## 2023-04-26 PROCEDURE — 99213 PR OFFICE/OUTPT VISIT, EST, LEVL III, 20-29 MIN: ICD-10-PCS | Mod: ,,, | Performed by: GENERAL PRACTICE

## 2023-04-26 RX ORDER — TERCONAZOLE 8 MG/G
1 CREAM VAGINAL
COMMUNITY
Start: 2022-06-08

## 2023-04-26 RX ORDER — ALBUTEROL SULFATE 90 UG/1
1 AEROSOL, METERED RESPIRATORY (INHALATION) EVERY 6 HOURS PRN
Qty: 18 G | Refills: 5 | Status: SHIPPED | OUTPATIENT
Start: 2023-04-26 | End: 2024-01-25

## 2023-04-26 RX ORDER — PREDNISONE 10 MG/1
10 TABLET ORAL
COMMUNITY
Start: 2023-04-20

## 2023-04-26 RX ORDER — TRAMADOL HYDROCHLORIDE 50 MG/1
50 TABLET ORAL EVERY 6 HOURS PRN
COMMUNITY

## 2023-04-26 RX ORDER — BETAMETHASONE VALERATE 1.2 MG/G
CREAM TOPICAL
COMMUNITY
Start: 2023-03-27

## 2023-04-26 NOTE — ASSESSMENT & PLAN NOTE
By her history, she has had atrial fibrillation in the past, controlled as long as she stays on her medications, she is in sinus rhythm today.

## 2023-05-23 NOTE — TELEPHONE ENCOUNTER
----- Message from Lynsey Melo sent at 5/23/2023  9:43 AM CDT -----  Regarding: Pt Call  #STOUT#    Pt is having trouble getting her medication. Her pharmacy declined her refill and she doesn't know which one of her providers prescribed it to her. She said its her Vit 2 50,000 units. Pt is very confused. Please call pt at [phone]

## 2023-05-23 NOTE — TELEPHONE ENCOUNTER
Spoke with patient she states she received a partial prescription of 6 capsules in February, attempted to refill and was unable.  Call placed to [facility] spoke with Obdulio he states for some reason their system cancelled the other portion of the ergocalciferol, requesting a new erx.       Will have PARs schedule nurse visit in August for Prolia, do you want to get a vitamin D level at this time?  Will also  have them schedule a follow-up in February 2024.

## 2023-05-25 RX ORDER — ERGOCALCIFEROL 1.25 MG/1
50000 CAPSULE ORAL
Qty: 6 CAPSULE | Refills: 0 | Status: SHIPPED | OUTPATIENT
Start: 2023-05-25 | End: 2023-08-15 | Stop reason: SDUPTHER

## 2023-05-25 NOTE — TELEPHONE ENCOUNTER
When she was here February 28, under assessment and plan I read the note to say Prolia in 6 months, RTC 1yr then under instructions it says RTC one year.  You will be out August 28 thru Sept 12.   See dede.

## 2023-05-25 NOTE — TELEPHONE ENCOUNTER
Is her 8/29/23 visit not at 6 months and one where we can do prolia?    As for the ergocalciferol, it was to be taken every 2 weeks which should have lasted her until now.  Have renewed another 6 caps to take one every 2 weeks.

## 2023-08-15 RX ORDER — ERGOCALCIFEROL 1.25 MG/1
50000 CAPSULE ORAL
Qty: 6 CAPSULE | Refills: 1 | Status: SHIPPED | OUTPATIENT
Start: 2023-08-15 | End: 2024-02-09

## 2023-08-15 NOTE — TELEPHONE ENCOUNTER
Last visit in [facility] ENDOCRINOLOGY: 2/28/2023    Patient's next visit in [facility] ENDOCRINOLOGY: 8/29/2023

## 2023-08-15 NOTE — TELEPHONE ENCOUNTER
----- Message from Mony Davis sent at 8/15/2023 11:07 AM CDT -----  Patient needs refill for Vit D     Pharmacy listed on chart     [phone]    Thanks     Oculoplastic Surgeon Procedure Text (C): After obtaining clear surgical margins the patient was sent to oculoplastics for surgical repair.  The patient understands they will receive post-surgical care and follow-up from the referring physician's office.

## 2023-08-28 NOTE — TELEPHONE ENCOUNTER
Pt notified per Dr. Vazquez, video capsule study reviewed and is negative for evidence of carcinoid on evaluation.  Pt acknowledged understanding   [

## 2023-08-29 ENCOUNTER — CLINICAL SUPPORT (OUTPATIENT)
Dept: ENDOCRINOLOGY | Facility: CLINIC | Age: 73
End: 2023-08-29
Payer: MEDICARE

## 2023-08-29 DIAGNOSIS — M81.0 OSTEOPOROSIS, UNSPECIFIED OSTEOPOROSIS TYPE, UNSPECIFIED PATHOLOGICAL FRACTURE PRESENCE: Primary | ICD-10-CM

## 2023-08-29 PROCEDURE — 96372 THER/PROPH/DIAG INJ SC/IM: CPT | Mod: PBBFAC

## 2023-08-29 RX ADMIN — DENOSUMAB 60 MG: 60 INJECTION SUBCUTANEOUS at 09:08

## 2023-11-02 ENCOUNTER — CLINICAL SUPPORT (OUTPATIENT)
Dept: PRIMARY CARE CLINIC | Facility: CLINIC | Age: 73
End: 2023-11-02
Payer: MEDICARE

## 2023-11-02 DIAGNOSIS — R79.9 ABNORMAL BLOOD CHEMISTRY: ICD-10-CM

## 2023-11-02 DIAGNOSIS — M81.0 AGE-RELATED OSTEOPOROSIS WITHOUT CURRENT PATHOLOGICAL FRACTURE: Chronic | ICD-10-CM

## 2023-11-02 DIAGNOSIS — E78.5 DYSLIPIDEMIA: Chronic | ICD-10-CM

## 2023-11-02 DIAGNOSIS — Z00.00 WELLNESS EXAMINATION: ICD-10-CM

## 2023-11-02 DIAGNOSIS — I48.0 PAROXYSMAL ATRIAL FIBRILLATION: ICD-10-CM

## 2023-11-02 DIAGNOSIS — E55.9 HYPOVITAMINOSIS D: Chronic | ICD-10-CM

## 2023-11-02 DIAGNOSIS — I10 PRIMARY HYPERTENSION: ICD-10-CM

## 2023-11-02 LAB
ALBUMIN SERPL-MCNC: 3.4 G/DL (ref 3.4–4.8)
ALBUMIN/GLOB SERPL: 1.1 RATIO (ref 1.1–2)
ALP SERPL-CCNC: 39 UNIT/L (ref 40–150)
ALT SERPL-CCNC: 11 UNIT/L (ref 0–55)
AST SERPL-CCNC: 13 UNIT/L (ref 5–34)
BASOPHILS # BLD AUTO: 0.03 X10(3)/MCL
BASOPHILS NFR BLD AUTO: 0.4 %
BILIRUB SERPL-MCNC: 0.4 MG/DL
BUN SERPL-MCNC: 10.2 MG/DL (ref 9.8–20.1)
CALCIUM SERPL-MCNC: 8.9 MG/DL (ref 8.4–10.2)
CHLORIDE SERPL-SCNC: 107 MMOL/L (ref 98–107)
CHOLEST SERPL-MCNC: 165 MG/DL
CHOLEST/HDLC SERPL: 3 {RATIO} (ref 0–5)
CO2 SERPL-SCNC: 27 MMOL/L (ref 23–31)
CREAT SERPL-MCNC: 0.89 MG/DL (ref 0.55–1.02)
DEPRECATED CALCIDIOL+CALCIFEROL SERPL-MC: 34.3 NG/ML (ref 30–80)
EOSINOPHIL # BLD AUTO: 0.11 X10(3)/MCL (ref 0–0.9)
EOSINOPHIL NFR BLD AUTO: 1.3 %
ERYTHROCYTE [DISTWIDTH] IN BLOOD BY AUTOMATED COUNT: 13.8 % (ref 11.5–17)
EST. AVERAGE GLUCOSE BLD GHB EST-MCNC: 102.5 MG/DL
GFR SERPLBLD CREATININE-BSD FMLA CKD-EPI: >60 MLS/MIN/1.73/M2
GLOBULIN SER-MCNC: 3.1 GM/DL (ref 2.4–3.5)
GLUCOSE SERPL-MCNC: 86 MG/DL (ref 82–115)
HBA1C MFR BLD: 5.2 %
HCT VFR BLD AUTO: 36 % (ref 37–47)
HDLC SERPL-MCNC: 48 MG/DL (ref 35–60)
HGB BLD-MCNC: 11.8 G/DL (ref 12–16)
IMM GRANULOCYTES # BLD AUTO: 0.04 X10(3)/MCL (ref 0–0.04)
IMM GRANULOCYTES NFR BLD AUTO: 0.5 %
LDLC SERPL CALC-MCNC: 104 MG/DL (ref 50–140)
LYMPHOCYTES # BLD AUTO: 2.36 X10(3)/MCL (ref 0.6–4.6)
LYMPHOCYTES NFR BLD AUTO: 27.8 %
MCH RBC QN AUTO: 29.7 PG (ref 27–31)
MCHC RBC AUTO-ENTMCNC: 32.8 G/DL (ref 33–36)
MCV RBC AUTO: 90.7 FL (ref 80–94)
MONOCYTES # BLD AUTO: 0.66 X10(3)/MCL (ref 0.1–1.3)
MONOCYTES NFR BLD AUTO: 7.8 %
NEUTROPHILS # BLD AUTO: 5.29 X10(3)/MCL (ref 2.1–9.2)
NEUTROPHILS NFR BLD AUTO: 62.2 %
NRBC BLD AUTO-RTO: 0 %
PLATELET # BLD AUTO: 293 X10(3)/MCL (ref 130–400)
PLATELETS.RETICULATED NFR BLD AUTO: 1.5 % (ref 0.9–11.2)
PMV BLD AUTO: 9.7 FL (ref 7.4–10.4)
POTASSIUM SERPL-SCNC: 4.1 MMOL/L (ref 3.5–5.1)
PROT SERPL-MCNC: 6.5 GM/DL (ref 5.8–7.6)
RBC # BLD AUTO: 3.97 X10(6)/MCL (ref 4.2–5.4)
SODIUM SERPL-SCNC: 141 MMOL/L (ref 136–145)
TRIGL SERPL-MCNC: 63 MG/DL (ref 37–140)
TSH SERPL-ACNC: 1.18 UIU/ML (ref 0.35–4.94)
VLDLC SERPL CALC-MCNC: 13 MG/DL
WBC # SPEC AUTO: 8.49 X10(3)/MCL (ref 4.5–11.5)

## 2023-11-02 PROCEDURE — 36415 COLL VENOUS BLD VENIPUNCTURE: CPT

## 2023-11-02 PROCEDURE — 36415 PR COLLECTION VENOUS BLOOD,VENIPUNCTURE: ICD-10-PCS | Mod: ,,, | Performed by: GENERAL PRACTICE

## 2023-11-02 PROCEDURE — 36415 COLL VENOUS BLD VENIPUNCTURE: CPT | Mod: ,,, | Performed by: GENERAL PRACTICE

## 2023-11-02 NOTE — PROGRESS NOTES
Patient verified name and .Patient here for nurse visit to draw AW labs with butterfly needle. Patient was drawn in left antecubital .No complications or issues occurred. Patient tolerated venipuncture well. Patient expressed no questions or concerns.

## 2023-11-06 PROBLEM — D64.89 OTHER SPECIFIED ANEMIAS: Chronic | Status: ACTIVE | Noted: 2022-05-18

## 2023-11-06 RX ORDER — ONDANSETRON 8 MG/1
TABLET, ORALLY DISINTEGRATING ORAL
COMMUNITY
Start: 2023-10-07 | End: 2023-11-06 | Stop reason: SDUPTHER

## 2023-11-06 NOTE — ASSESSMENT & PLAN NOTE
Component Ref Range & Units 4 d ago  (11/2/23) 1 yr ago  (10/18/22) 1 yr ago  (6/6/22) 1 yr ago  (2/21/22) 2 yr ago  (8/23/21) 2 yr ago  (8/23/21) 3 yr ago  (8/17/20) 3 yr ago  (8/17/20)   WBC 4.50 - 11.50 x10(3)/mcL 8.49  7.7 R  8.7 R  8.33 R  7.3 R   9.6 R     RBC 4.20 - 5.40 x10(6)/mcL 3.97 Low   4.13 Low   4.05 Low   3.92 Low  R  4.11 Low    4.10 Low      Hgb 12.0 - 16.0 g/dL 11.8 Low   12.1 R  11.8 Low  R  11.5 Low   12.3 R   12.3 R     Hct 37.0 - 47.0 % 36.0 Low   38.6  36.8 Low   35.6 Low  R  38.5   39.2     MCV 80.0 - 94.0 fL 90.7  93.5  90.9  91 R  93.7   95.6 High      MCH 27.0 - 31.0 pg 29.7  29.3  29.1  29.3  29.9   30.0     MCHC 33.0 - 36.0 g/dL 32.8 Low   31.3 Low  R  32.1 Low  R  32.3 R  31.9 Low  R   31.4 Low  R     RDW 11.5 - 17.0 % 13.8  14.0  13.6  13.5 R  13.5   14.6     Platelet 130 - 400 x10(3)/mcL 293  326  328  300 R  300   315       Anemia has been mild, we will continue to check annually.

## 2023-11-06 NOTE — PROGRESS NOTES
Patient ID: [id number]     Chief Complaint: Annual Exam      HPI:     Christiana Chan is a 72 y.o. female here today for a Medicare Wellness. No other complaints today.       ----------------------------  Abdominal bloating  Abdominal pain  Asthma  Atrial fibrillation  Celiac disease  Chronic appendicitis  Colon polyps      Comment:  COLONOSCOPY  Constipation  Depression  Diverticulosis of colon  Esophageal ulcer  Gastritis  GERD (gastroesophageal reflux disease)  Heartburn  Hiatal hernia  HTN (hypertension)  Incarcerated incisional hernia  Intermittent diarrhea  Iron deficiency anemia  Kidney cysts      Comment:  both  Osteoarthritis  Osteoporosis  Other and unspecified noninfectious gastroenteritis and colitis  Pancreatitis, chronic  Rectal bleeding  Rectal polyp  Ruptured lumbar disc  Small bowel lesion  Traumatic rupture of cervical intervertebral disc  Vitamin B12 deficiency anemia  Vitamin D deficiency     Past Surgical History:   Procedure Laterality Date    ANTEGRADE SINGLE BALLOON ENTEROSCOPY N/A 2019    Procedure: ENTEROSCOPY, DOUBLE BALLOON, ANTEGRADE;  Surgeon: Sivakumar Dorsey MD;  Location: [address];  Service: Endoscopy;  Laterality: N/A;    BREAST BIOPSY Bilateral      SECTION   &     CHOLECYSTECTOMY      COLONOSCOPY      outside facility    COLONOSCOPY  2019    Ashok Ramirez MD    ENDOSCOPIC ULTRASOUND OF UPPER GASTROINTESTINAL TRACT N/A 2019    Procedure: ULTRASOUND-ENDOSCOPIC-UPPER;  Surgeon: Armando Bills MD;  Location: [facility];  Service: Endoscopy;  Laterality: N/A;  Carcinoid diagnosis    ESOPHAGOGASTRODUODENOSCOPY      outside facility    HYSTERECTOMY      LAPAROSCOPIC APPENDECTOMY N/A 2019    Procedure: APPENDECTOMY, LAPAROSCOPIC;  Surgeon: Rebecca Valdez MD;  Location: [facility];  Service: General;  Laterality: N/A;    LAPAROSCOPIC RESECTION OF SMALL INTESTINE N/A 2019    Procedure: EXCISION, SMALL INTESTINE,  "LAPAROSCOPIC;  Surgeon: Rebecca Valdez MD;  Location: [facility] OR;  Service: General;  Laterality: N/A;    ROBOT-ASSISTED LAPAROSCOPIC REPAIR OF INCISIONAL HERNIA N/A 03/13/2020    Procedure: ROBOTIC REPAIR, HERNIA, INCISIONAL;  Surgeon: Rebecca Valdez MD;  Location: [facility] OR;  Service: General;  Laterality: N/A;    SHOULDER SURGERY Right 03/13/2013    TOTAL KNEE ARTHROPLASTY Right 09/16/2013       Review of patient's allergies indicates:   Allergen Reactions    Amoxicillin Other (See Comments)     Taking digoxin.    Penicillins      Other reaction(s): "unable to take because of digoxin", Other (See Comments)  Taking digoxin.      Sulfa (sulfonamide antibiotics) Itching and Swelling    Codeine Nausea Only and Other (See Comments)     Nausea and constipation.    Hydrocodone Nausea Only and Other (See Comments)     Nausea and constipation.    Meperidine Nausea Only and Other (See Comments)     Nausea and constipation.  Other reaction(s): nausea and constipation    Nitrofurantoin monohyd/m-cryst Rash    Hydromorphone Other (See Comments)    Ketorolac Rash       Outpatient Medications Marked as Taking for the 11/7/23 encounter (Office Visit) with Cedric Mcclelland MD   Medication Sig Dispense Refill    denosumab (PROLIA) 60 mg/mL Syrg Prolia 60 mg/mL subcutaneous syringe, [RxNorm: 212136]      valACYclovir (VALTREX) 500 MG tablet valACYclovir 500 mg tablet, [RxNorm: 469976]         Social History     Socioeconomic History    Marital status:    Tobacco Use    Smoking status: Former    Smokeless tobacco: Never   Substance and Sexual Activity    Alcohol use: Not Currently     Comment: Not since 2018    Drug use: Never        Family History   Problem Relation Age of Onset    Diabetes Mother     Multiple myeloma Father         Patient Care Team:  Cedric Mcclelland MD as PCP - General (Family Medicine)  Ashok Ramirez MD as Consulting Physician (Gastroenterology)  Jessi Tyler as Psychologist " "(Psychology)  Elvin Mishra MD as Consulting Physician (Neurosurgery)  Justin Schulte MD as Consulting Physician (Endocrinology)  Randolph Callahan DPM as Consulting Physician (Podiatry)     Opioid Screening: Patient medication list reviewed, patient is not taking prescription opioids. Patient is not using additional opioids than prescribed. Patient is at low risk of substance abuse based on this opioid use history.       Subjective:     Review of Systems   Constitutional: Negative.  Negative for malaise/fatigue and weight loss.   HENT: Negative.     Eyes: Negative.    Respiratory: Negative.  Negative for shortness of breath.    Cardiovascular: Negative.  Negative for chest pain.   Gastrointestinal: Negative.    Genitourinary: Negative.    Musculoskeletal: Negative.    Skin: Negative.    Neurological: Negative.  Negative for focal weakness, weakness and headaches.   Endo/Heme/Allergies: Negative.    Psychiatric/Behavioral: Negative.  Negative for depression and memory loss. The patient is not nervous/anxious.          Patient Reported Health Risk Assessment       Objective:     /61   Pulse 80   Resp 18   Ht 5' 3" (1.6 m)   Wt 59.9 kg (132 lb)   SpO2 99%   BMI 23.38 kg/m²     Physical Exam  Constitutional:       Appearance: Normal appearance.   HENT:      Head: Normocephalic.      Mouth/Throat:      Mouth: Mucous membranes are moist.      Pharynx: Oropharynx is clear.   Eyes:      Conjunctiva/sclera: Conjunctivae normal.      Pupils: Pupils are equal, round, and reactive to light.   Cardiovascular:      Rate and Rhythm: Normal rate and regular rhythm.      Heart sounds: Normal heart sounds.   Pulmonary:      Effort: Pulmonary effort is normal.      Breath sounds: Normal breath sounds.   Abdominal:      General: Abdomen is flat.      Palpations: Abdomen is soft.   Musculoskeletal:         General: Normal range of motion.      Cervical back: Neck supple.   Skin:     General: Skin is warm and dry. "   Neurological:      General: No focal deficit present.      Mental Status: She is alert and oriented to person, place, and time.   Psychiatric:         Mood and Affect: Mood normal.         Behavior: Behavior normal.         Thought Content: Thought content normal.         Judgment: Judgment normal.       Lab Results   Component Value Date    GLU 83 02/21/2022     11/02/2023    K 4.1 11/02/2023     02/21/2022    CO2 27 11/02/2023    BUN 10.2 11/02/2023    CREATININE 0.89 11/02/2023    CALCIUM 8.9 11/02/2023    PROT 6.5 02/21/2022    ALBUMIN 3.4 11/02/2023    BILITOT 0.4 11/02/2023    ALKPHOS 39 (L) 11/02/2023    AST 13 11/02/2023    ALT 11 11/02/2023    ANIONGAP 10 02/21/2022    ESTGFRAFRICA >60 02/21/2022    EGFRNORACEVR >60 11/02/2023     Lab Results   Component Value Date    WBC 8.49 11/02/2023    RBC 3.97 (L) 11/02/2023    HGB 11.8 (L) 11/02/2023    HCT 36.0 (L) 11/02/2023    MCV 90.7 11/02/2023    RDW 13.8 11/02/2023     11/02/2023      Lab Results   Component Value Date    CHOL 165 11/02/2023    TRIG 63 11/02/2023    HDL 48 11/02/2023    .00 11/02/2023    TOTALCHOLEST 3 11/02/2023     Lab Results   Component Value Date    TSH 1.177 11/02/2023     Lab Results   Component Value Date    HGBA1C 5.2 11/02/2023             No data to display                  11/7/2023     9:00 AM 4/26/2023     9:00 AM 2/28/2023     8:20 AM 11/16/2022     8:30 AM 8/18/2022     8:40 AM 8/11/2022     9:05 AM 6/13/2022     8:15 AM   Fall Risk Assessment - Outpatient   Mobility Status Ambulatory Ambulatory Ambulatory Ambulatory Ambulatory Ambulatory Ambulatory   Number of falls 0 0 0 0 0 0 0   Identified as fall risk False False  False False False False              Assessment:       ICD-10-CM ICD-9-CM   1. Medicare annual wellness visit, subsequent  Z00.00 V70.0   2. Primary hypertension  I10 401.9   3. Paroxysmal atrial fibrillation  I48.0 427.31   4. Dyslipidemia  E78.5 272.4   5. Anemia due to other cause,  not classified  D64.89 285.8   6. Hx of migraine headaches  Z86.69 V12.49   7. Mild intermittent asthma without complication  J45.20 493.90   8. Hypovitaminosis D  E55.9 268.9   9. Age-related osteoporosis without current pathological fracture  M81.0 733.01   10. Ganglion cyst of finger  M67.449 727.43        Plan:     Medicare Annual Wellness and Personalized Prevention Plan:   Fall Risk + Home Safety + Hearing Impairment + Depression Screen + Cognitive Impairment Screen + Health Risk Assessment all reviewed.       Health Maintenance Topics with due status: Not Due       Topic Last Completion Date    Colorectal Cancer Screening 08/30/2022    Hemoglobin A1c (Prediabetes) 11/02/2023    Lipid Panel 11/02/2023      The patient's Health Maintenance was reviewed and the following appears to be due at this time:   Health Maintenance Due   Topic Date Due    Hepatitis C Screening  Never done    DEXA Scan  08/03/2023    Mammogram  10/19/2023         1. Medicare annual wellness visit, subsequent  Comments:  Overall health status was reviewed.  Good health habits reinforced.  Annual wellness exams recommended.    2. Primary hypertension  Overview:  Prescribed lisinopril 10 mg daily (dose decreased on 11/07/2023), Coreg 20 mg b.i.d., spironolactone 12.5 mg daily, propranolol 10 mg daily p.r.n..    Assessment & Plan:  Continue to monitor blood pressures, blood pressure is currently seeing a bit low, recheck in three months with a decrease in lisinopril to 10 mg to see if her blood pressures remain control and if her fatigue improves.      3. Paroxysmal atrial fibrillation  Overview:  History of paroxysmal AFib, in sinus rhythm today.    Assessment & Plan:  Continue cardiology follow-up.      4. Dyslipidemia  Overview:  Not prescribed any lipid-lowering medication.    Assessment & Plan:  Most recent cholesterol/lipid blood testing shows adequate control, continue current treatment plan, including prescription medications if  prescribed, and/or diet high in fiber, low in saturated fats as discussed during clinic visit.      5. Anemia due to other cause, not classified  Overview:  Mild stable anemia.    Assessment & Plan:  Component Ref Range & Units 4 d ago  (11/2/23) 1 yr ago  (10/18/22) 1 yr ago  (6/6/22) 1 yr ago  (2/21/22) 2 yr ago  (8/23/21) 2 yr ago  (8/23/21) 3 yr ago  (8/17/20) 3 yr ago  (8/17/20)   WBC 4.50 - 11.50 x10(3)/mcL 8.49  7.7 R  8.7 R  8.33 R  7.3 R   9.6 R     RBC 4.20 - 5.40 x10(6)/mcL 3.97 Low   4.13 Low   4.05 Low   3.92 Low  R  4.11 Low    4.10 Low      Hgb 12.0 - 16.0 g/dL 11.8 Low   12.1 R  11.8 Low  R  11.5 Low   12.3 R   12.3 R     Hct 37.0 - 47.0 % 36.0 Low   38.6  36.8 Low   35.6 Low  R  38.5   39.2     MCV 80.0 - 94.0 fL 90.7  93.5  90.9  91 R  93.7   95.6 High      MCH 27.0 - 31.0 pg 29.7  29.3  29.1  29.3  29.9   30.0     MCHC 33.0 - 36.0 g/dL 32.8 Low   31.3 Low  R  32.1 Low  R  32.3 R  31.9 Low  R   31.4 Low  R     RDW 11.5 - 17.0 % 13.8  14.0  13.6  13.5 R  13.5   14.6     Platelet 130 - 400 x10(3)/mcL 293  326  328  300 R  300   315       Anemia has been mild, we will continue to check annually.      6. Hx of migraine headaches  Overview:  Prescribed Maxalt 10 mg.    Assessment & Plan:  This medical condition is currently stable on prescription medication, continue current treatment plan.      7. Mild intermittent asthma without complication  Overview:  Prescribed Combivent, albuterol MDI.    Assessment & Plan:  This medical condition is currently stable on prescription medication, continue current treatment plan.      8. Hypovitaminosis D  Assessment & Plan:  Component Ref Range & Units 4 d ago  (11/2/23) 8 mo ago  (2/16/23) 1 yr ago  (10/18/22) 1 yr ago  (8/18/22) 2 yr ago  (7/21/21) 3 yr ago  (8/17/20) 4 yr ago  (8/8/19)   Vit D 25 OH 30.0 - 80.0 ng/mL 34.3  27.6 Low   30.2  23.8 Low   31.8  30.6 R  30.80 R      Either start or increase dose of vitamin-D supplement      9. Age-related osteoporosis  without current pathological fracture  Overview:  Prescribed Prolia 60 mg injection every six months.  From DEXA scan August 2020:        REASON FOR EXAM: Osteoporosis     COMPARISON: No relevant comparison studies available at the time of  dictation.     FINDINGS: Density measurements of the lumbar spine and bilateral hips  were obtained.     The L spine (L1-L4) BMD is 1.144, this corresponds to a T score of  -0.4     The total left femoral BMD is 0.791, this corresponds to a T score of  -1.7.     The total right femoral BMD is 0.816, this corresponds to T score of  -1.5.      10. Ganglion cyst of finger  -     Ambulatory referral/consult to Orthopedics; Future; Expected date: 11/14/2023            Advance Care Planning     Date: 11/06/2023    Living Will  During this visit, I engaged the patient  in the voluntary advance care planning process.  The patient and I reviewed the role for advance directives and their purpose in directing future healthcare if the patient's unable to speak for him/herself.  At this point in time, the patient does have full decision-making capacity.  We discussed different extreme health states that she could experience, and reviewed what kind of medical care she would want in those situations.  The patient communicated that if she were comatose and had little chance of a meaningful recovery, she would not want machines/life-sustaining treatments used. In addition to the above preference, other important end-of-life issues for the patient include no other issues.  Patient does have a living will/advanced care planning, will bring a copy to the clinic so that we can place it in the chart.  Patient may also be given the option to complete our advanced care planning paperwork so that we may enter it into the medical record today.  I spent a total of 10 minutes engaging the patient in this advance care planning discussion.              Current Outpatient Medications   Medication Instructions     acetaminophen (TYLENOL) 500 mg, Oral, Every 6 hours PRN    albuterol (PROVENTIL/VENTOLIN HFA) 90 mcg/actuation inhaler 1 puff, Inhalation, Every 6 hours PRN, Rescue     ALPRAZolam (XANAX) 1 mg, Oral, Nightly    betamethasone valerate 0.1% (VALISONE) 0.1 % Crea SMARTSIG:sparingly Topical Twice Daily    butalbital-acetaminophen-caffeine -40 mg (FIORICET, ESGIC) -40 mg per tablet 1 tablet, Oral, Every 4 hours PRN    carvediloL (COREG) 25 mg, Oral, 2 times daily    citalopram (CELEXA) 20 mg, Oral, Nightly    CLENPIQ 10 mg-3.5 gram -12 gram/160 mL Soln 1 kit, Oral    COMBIVENT RESPIMAT  mcg/actuation inhaler 2 puffs, Inhalation, As needed (PRN)    cyanocobalamin 1,000 mcg, Intramuscular, Every 14 days    denosumab (PROLIA) 60 mg/mL Syrg Prolia 60 mg/mL subcutaneous syringe, [RxNorm: 988875]    diclofenac sodium (VOLTAREN) 1 % Gel Topical (Top)    dicyclomine (BENTYL) 20 mg, Oral, Every 6 hours PRN    docusate sodium (COLACE) 100 mg, Oral, 2 times daily, 1 TABLET IN THE MORNING AND 1 TABLET AT NIGHT.    ergocalciferol (ERGOCALCIFEROL) 50,000 Units, Oral, Every 14 days    fluticasone propionate (FLONASE) 100 mcg, Each Nostril, Daily    gabapentin (NEURONTIN) 100 mg, Oral    lamoTRIgine (LAMICTAL) 100 mg, Oral, Nightly, AT NIGHT    LIDOcaine (LIDODERM) 5 % 1 patch, Transdermal    linaclotide (LINZESS ORAL) 1 capsule, Oral, Every other day    lipase-protease-amylase 24,000-76,000-120,000 units (CREON) 24,000-76,000 -120,000 unit capsule 1 capsule, Oral, 3 times daily with meals    lisinopriL (PRINIVIL,ZESTRIL) 10 mg, Oral, Daily    menthol 5 % PtMd 1 patch, Topical    methocarbamoL (ROBAXIN) 750 mg, Oral, 2 times daily    ondansetron (ZOFRAN) 4 mg, Oral, Every 8 hours PRN    pantoprazole (PROTONIX) 20 mg, Oral, Daily    predniSONE (DELTASONE) 10 mg, Oral    PREMARIN 30 mg, Vaginal, Daily, NIGHTLY.    propranoloL (INDERAL) 10 mg, Oral, Daily PRN    rizatriptan (MAXALT) 10 mg, Oral, As needed (PRN)     simethicone (MYLICON) 80 mg, Oral, Every 8 hours PRN    spironolactone (ALDACTONE) 12.5 mg, Oral, Daily    terconazole (TERAZOL 3) 0.8 % vaginal cream 1 applicator, Vaginal    traMADoL (ULTRAM) 50 mg, Oral, Every 6 hours PRN    traZODone (DESYREL) 100 mg, Oral, Nightly    valACYclovir (VALTREX) 500 MG tablet valACYclovir 500 mg tablet, [RxNorm: 145186]        Follow up in about 3 months (around 2/7/2024) for Extended chronic condition F/up. In addition to their scheduled follow up, the patient has also been instructed to follow up on as needed basis.

## 2023-11-06 NOTE — ASSESSMENT & PLAN NOTE
Continue to monitor blood pressures, blood pressure is currently seeing a bit low, recheck in three months with a decrease in lisinopril to 10 mg to see if her blood pressures remain control and if her fatigue improves.

## 2023-11-06 NOTE — ASSESSMENT & PLAN NOTE
Component Ref Range & Units 4 d ago  (11/2/23) 8 mo ago  (2/16/23) 1 yr ago  (10/18/22) 1 yr ago  (8/18/22) 2 yr ago  (7/21/21) 3 yr ago  (8/17/20) 4 yr ago  (8/8/19)   Vit D 25 OH 30.0 - 80.0 ng/mL 34.3  27.6 Low   30.2  23.8 Low   31.8  30.6 R  30.80 R      Either start or increase dose of vitamin-D supplement

## 2023-11-07 ENCOUNTER — OFFICE VISIT (OUTPATIENT)
Dept: PRIMARY CARE CLINIC | Facility: CLINIC | Age: 73
End: 2023-11-07
Payer: MEDICARE

## 2023-11-07 VITALS
DIASTOLIC BLOOD PRESSURE: 61 MMHG | HEART RATE: 80 BPM | HEIGHT: 63 IN | RESPIRATION RATE: 18 BRPM | SYSTOLIC BLOOD PRESSURE: 104 MMHG | WEIGHT: 132 LBS | OXYGEN SATURATION: 99 % | BODY MASS INDEX: 23.39 KG/M2

## 2023-11-07 DIAGNOSIS — M81.0 AGE-RELATED OSTEOPOROSIS WITHOUT CURRENT PATHOLOGICAL FRACTURE: Chronic | ICD-10-CM

## 2023-11-07 DIAGNOSIS — Z00.00 MEDICARE ANNUAL WELLNESS VISIT, SUBSEQUENT: Primary | ICD-10-CM

## 2023-11-07 DIAGNOSIS — E78.5 DYSLIPIDEMIA: Chronic | ICD-10-CM

## 2023-11-07 DIAGNOSIS — D64.89 ANEMIA DUE TO OTHER CAUSE, NOT CLASSIFIED: Chronic | ICD-10-CM

## 2023-11-07 DIAGNOSIS — J45.20 MILD INTERMITTENT ASTHMA WITHOUT COMPLICATION: Chronic | ICD-10-CM

## 2023-11-07 DIAGNOSIS — I10 PRIMARY HYPERTENSION: Chronic | ICD-10-CM

## 2023-11-07 DIAGNOSIS — I48.0 PAROXYSMAL ATRIAL FIBRILLATION: Chronic | ICD-10-CM

## 2023-11-07 DIAGNOSIS — M67.449 GANGLION CYST OF FINGER: Chronic | ICD-10-CM

## 2023-11-07 DIAGNOSIS — Z86.69 HX OF MIGRAINE HEADACHES: Chronic | ICD-10-CM

## 2023-11-07 DIAGNOSIS — E55.9 HYPOVITAMINOSIS D: Chronic | ICD-10-CM

## 2023-11-07 PROCEDURE — G0439 PPPS, SUBSEQ VISIT: HCPCS | Mod: ,,, | Performed by: GENERAL PRACTICE

## 2023-11-07 PROCEDURE — G0439 PR MEDICARE ANNUAL WELLNESS SUBSEQUENT VISIT: ICD-10-PCS | Mod: ,,, | Performed by: GENERAL PRACTICE

## 2023-11-07 RX ORDER — DENOSUMAB 60 MG/ML
INJECTION SUBCUTANEOUS
COMMUNITY
Start: 2023-09-19

## 2023-11-07 RX ORDER — VALACYCLOVIR HYDROCHLORIDE 500 MG/1
500 TABLET, FILM COATED ORAL
COMMUNITY
Start: 2023-09-19 | End: 2024-01-25

## 2023-11-27 ENCOUNTER — HOSPITAL ENCOUNTER (OUTPATIENT)
Dept: RADIOLOGY | Facility: CLINIC | Age: 73
Discharge: HOME | End: 2023-11-27
Attending: STUDENT IN AN ORGANIZED HEALTH CARE EDUCATION/TRAINING PROGRAM
Payer: MEDICARE

## 2023-11-27 ENCOUNTER — OFFICE VISIT (OUTPATIENT)
Dept: ORTHOPEDICS | Facility: CLINIC | Age: 73
End: 2023-11-27
Payer: MEDICARE

## 2023-11-27 VITALS
DIASTOLIC BLOOD PRESSURE: 77 MMHG | HEART RATE: 69 BPM | SYSTOLIC BLOOD PRESSURE: 124 MMHG | BODY MASS INDEX: 23.39 KG/M2 | WEIGHT: 132 LBS | HEIGHT: 63 IN

## 2023-11-27 DIAGNOSIS — M67.449 GANGLION CYST OF FINGER: Primary | Chronic | ICD-10-CM

## 2023-11-27 DIAGNOSIS — M67.449 GANGLION CYST OF FINGER: ICD-10-CM

## 2023-11-27 PROCEDURE — 73130 X-RAY EXAM OF HAND: CPT | Mod: RT,,, | Performed by: STUDENT IN AN ORGANIZED HEALTH CARE EDUCATION/TRAINING PROGRAM

## 2023-11-27 PROCEDURE — 99203 PR OFFICE/OUTPT VISIT, NEW, LEVL III, 30-44 MIN: ICD-10-PCS | Mod: ,,, | Performed by: STUDENT IN AN ORGANIZED HEALTH CARE EDUCATION/TRAINING PROGRAM

## 2023-11-27 PROCEDURE — 73130 XR HAND COMPLETE 3 VIEW RIGHT: ICD-10-PCS | Mod: RT,,, | Performed by: STUDENT IN AN ORGANIZED HEALTH CARE EDUCATION/TRAINING PROGRAM

## 2023-11-27 PROCEDURE — 99203 OFFICE O/P NEW LOW 30 MIN: CPT | Mod: ,,, | Performed by: STUDENT IN AN ORGANIZED HEALTH CARE EDUCATION/TRAINING PROGRAM

## 2023-11-27 NOTE — PROGRESS NOTES
Chief Complaint:  Right index finger mass    Consulting Physician: Cedric Mcclelland MD    History of present illness:    Patient is a 72-year-old right-hand dominant female who presents for initial evaluation of a right index finger mass.  This has been going on for about a year.  She states that the mass has been growing larger.  She now has numbness into the index finger.  She is now unable to move the index finger as well as the other fingers.  She states that this continues to get larger.  She does not have significant pain.  She is no problems with any of the other fingers in her hand    Past Medical History:   Diagnosis Date    Abdominal bloating     Abdominal pain     Asthma     Atrial fibrillation     Celiac disease 2023    Chronic appendicitis     Colon polyps 2019    COLONOSCOPY    Constipation     Depression     Diverticulosis of colon     Esophageal ulcer     Gastritis     GERD (gastroesophageal reflux disease)     Heartburn     Hiatal hernia     HTN (hypertension)     Incarcerated incisional hernia 3/13/2020    Intermittent diarrhea     Iron deficiency anemia     Kidney cysts     both    Osteoarthritis     Osteoporosis     Other and unspecified noninfectious gastroenteritis and colitis 2023    Pancreatitis, chronic 2019    Rectal bleeding     Rectal polyp 2019    Ruptured lumbar disc     Small bowel lesion     Traumatic rupture of cervical intervertebral disc 2023    Vitamin B12 deficiency anemia     Vitamin D deficiency        Past Surgical History:   Procedure Laterality Date    ANTEGRADE SINGLE BALLOON ENTEROSCOPY N/A 2019    Procedure: ENTEROSCOPY, DOUBLE BALLOON, ANTEGRADE;  Surgeon: Sivakumar Dorsey MD;  Location: [facility] ([address] [place] [address]);  Service: Endoscopy;  Laterality: N/A;    BREAST BIOPSY Bilateral      SECTION   &     CHOLECYSTECTOMY      COLONOSCOPY      outside facility    COLONOSCOPY  2019    Ashok Ramirez MD    ENDOSCOPIC  ULTRASOUND OF UPPER GASTROINTESTINAL TRACT N/A 04/09/2019    Procedure: ULTRASOUND-ENDOSCOPIC-UPPER;  Surgeon: Armando Bills MD;  Location: [facility] ENDO;  Service: Endoscopy;  Laterality: N/A;  Carcinoid diagnosis    ESOPHAGOGASTRODUODENOSCOPY      outside facility    HYSTERECTOMY  1991    LAPAROSCOPIC APPENDECTOMY N/A 04/11/2019    Procedure: APPENDECTOMY, LAPAROSCOPIC;  Surgeon: Rebecca Valdez MD;  Location: [facility] OR;  Service: General;  Laterality: N/A;    LAPAROSCOPIC RESECTION OF SMALL INTESTINE N/A 04/11/2019    Procedure: EXCISION, SMALL INTESTINE, LAPAROSCOPIC;  Surgeon: Rebecca Valdez MD;  Location: [facility] OR;  Service: General;  Laterality: N/A;    ROBOT-ASSISTED LAPAROSCOPIC REPAIR OF INCISIONAL HERNIA N/A 03/13/2020    Procedure: ROBOTIC REPAIR, HERNIA, INCISIONAL;  Surgeon: Rebecca Valdez MD;  Location: [facility] OR;  Service: General;  Laterality: N/A;    SHOULDER SURGERY Right 03/13/2013    TOTAL KNEE ARTHROPLASTY Right 09/16/2013       Current Outpatient Medications   Medication Sig    acetaminophen (TYLENOL) 500 MG tablet Take 500 mg by mouth every 6 (six) hours as needed for Pain.    albuterol (PROVENTIL/VENTOLIN HFA) 90 mcg/actuation inhaler Inhale 1 puff into the lungs every 6 (six) hours as needed for Wheezing. Rescue    ALPRAZolam (XANAX) 2 MG Tab Take 1 mg by mouth nightly.     betamethasone valerate 0.1% (VALISONE) 0.1 % Crea SMARTSIG:sparingly Topical Twice Daily    butalbital-acetaminophen-caffeine -40 mg (FIORICET, ESGIC) -40 mg per tablet Take 1 tablet by mouth every 4 (four) hours as needed for Pain.    carvedilol (COREG) 25 MG tablet Take 25 mg by mouth 2 (two) times daily.    citalopram (CELEXA) 20 MG tablet Take 20 mg by mouth every evening.    COMBIVENT RESPIMAT  mcg/actuation inhaler Inhale 2 puffs into the lungs as needed for Shortness of Breath.    cyanocobalamin 1,000 mcg/mL injection Inject 1,000 mcg into the muscle every 14 (fourteen) days.     denosumab (PROLIA) 60 mg/mL Syrg Prolia 60 mg/mL subcutaneous syringe, [RxNorm: 958458]    diclofenac sodium (VOLTAREN) 1 % Gel Apply topically.    dicyclomine (BENTYL) 20 mg tablet Take 20 mg by mouth every 6 (six) hours as needed.    docusate sodium (COLACE) 100 MG capsule Take 100 mg by mouth 2 (two) times daily. 1 TABLET IN THE MORNING AND 1 TABLET AT NIGHT.    ergocalciferol (ERGOCALCIFEROL) 50,000 unit Cap Take 1 capsule (50,000 Units total) by mouth every 14 (fourteen) days.    fluticasone propionate (FLONASE) 50 mcg/actuation nasal spray 2 sprays (100 mcg total) by Each Nostril route once daily.    lamoTRIgine (LAMICTAL) 100 MG tablet Take 100 mg by mouth every evening. AT NIGHT    linaclotide (LINZESS ORAL) Take 1 capsule by mouth every other day.    lisinopril (PRINIVIL,ZESTRIL) 20 MG tablet Take 10 mg by mouth Daily.    ondansetron (ZOFRAN) 8 MG tablet Take 0.5 tablets (4 mg total) by mouth every 8 (eight) hours as needed for Nausea.    pantoprazole (PROTONIX) 20 MG tablet Take 20 mg by mouth once daily.    PREMARIN vaginal cream Place 30 mg vaginally once daily. NIGHTLY.    propranolol (INDERAL) 10 MG tablet Take 10 mg by mouth daily as needed.    spironolactone (ALDACTONE) 25 MG tablet Take 12.5 mg by mouth once daily.    terconazole (TERAZOL 3) 0.8 % vaginal cream Place 1 applicator vaginally.    traMADoL (ULTRAM) 50 mg tablet Take 50 mg by mouth every 6 (six) hours as needed.    traZODone (DESYREL) 100 MG tablet Take 100 mg by mouth nightly.    valACYclovir (VALTREX) 500 MG tablet valACYclovir 500 mg tablet, [RxNorm: 842809]    CLENPIQ 10 mg-3.5 gram -12 gram/160 mL Soln Take 1 kit by mouth.    gabapentin (NEURONTIN) 100 MG capsule Take 100 mg by mouth.    LIDOcaine (LIDODERM) 5 % Place 1 patch onto the skin.    lipase-protease-amylase 24,000-76,000-120,000 units (CREON) 24,000-76,000 -120,000 unit capsule Take 1 capsule by mouth 3 (three) times daily with meals.    menthol 5 % PtMd Apply 1 patch  "topically.    methocarbamoL (ROBAXIN) 750 MG Tab Take 750 mg by mouth 2 (two) times daily.    predniSONE (DELTASONE) 10 MG tablet Take 10 mg by mouth.    rizatriptan (MAXALT) 10 MG tablet Take 1 tablet (10 mg total) by mouth as needed for Migraine (one at onset of headache, then a second 2 hours later if necessary).    simethicone (MYLICON) 80 MG chewable tablet Take 1 tablet (80 mg total) by mouth every 8 (eight) hours as needed for Flatulence.     No current facility-administered medications for this visit.       Review of patient's allergies indicates:   Allergen Reactions    Amoxicillin Other (See Comments)     Taking digoxin.    Penicillins      Other reaction(s): "unable to take because of digoxin", Other (See Comments)  Taking digoxin.      Sulfa (sulfonamide antibiotics) Itching and Swelling    Codeine Nausea Only and Other (See Comments)     Nausea and constipation.    Hydrocodone Nausea Only and Other (See Comments)     Nausea and constipation.    Meperidine Nausea Only and Other (See Comments)     Nausea and constipation.  Other reaction(s): nausea and constipation    Nitrofurantoin monohyd/m-cryst Rash    Hydromorphone Other (See Comments)    Ketorolac Rash       Family History   Problem Relation Age of Onset    Diabetes Mother     Multiple myeloma Father        Social History     Socioeconomic History    Marital status:    Tobacco Use    Smoking status: Former    Smokeless tobacco: Never   Substance and Sexual Activity    Alcohol use: Not Currently     Comment: Not since 2018    Drug use: Never       Review of Systems:    Constitution:   Denies chills, fever, and sweats.  HENT:   Denies headaches or blurry vision.  Cardiovascular:  Denies chest pain or irregular heart beat.  Respiratory:   Denies cough or shortness of breath.  Gastrointestinal:  Denies abdominal pain, nausea, or vomiting.  Musculoskeletal:   Denies muscle cramps.  Neurological:   Denies dizziness or focal " "weakness.  Psychiatric/Behavior: Normal mental status.  Hematology/Lymph:  Denies bleeding problem or easy bruising/bleeding.  Skin:    Denies rash or suspicious lesions.    Examination:    Vital Signs:    Vitals:    11/27/23 0814   BP: 124/77   Pulse: 69   Weight: 59.9 kg (132 lb)   Height: 5' 3" (1.6 m)       Body mass index is 23.38 kg/m².    Constitution:   Well-developed, well nourished patient in no acute distress.  Neurological:   Alert and oriented x 3 and cooperative to examination.     Psychiatric/Behavior: Normal mental status.  Respiratory:   No shortness of breath.  Eyes:    Extraoccular muscles intact  Skin:    No scars, rash or suspicious lesions.    MSK:   Right index finger:  No open wounds.  There is a multilobulated 2 cm mass on the volar aspect of the index finger on the volar side of the middle phalanx which wraps around to a separate 5 mm spongy mass on the dorsum of the middle phalanx overlying the extensor tendon.  This is firm nontender to palpation.  She is diminished sensation over the ulnar aspect of the index finger.  Upon trying to flex the index finger, she can flex to 1 cm away from the distal palmar crease.  She can fully extend the finger    Imaging:   X-ray of the right hand three views shows index finger mass especially on the volar side no bony involvement or fractures     Assessment:  Right index finger tumor    Plan:  I think she has 2 separate masses in the index finger.  On the volar side I think this is giant cell tumor of tendon sheath.  On the dorsal side I think this is a ganglion cyst coming from the extensor tendon.  I would like to get an MRI of the right index finger to better evaluate for these tumors.  Given the dysfunction and the numbness that these are causing I think she would benefit from excisional biopsy of the masses.  I will see her back once she gets the MRI to discuss treatment options    Follow Up:  After MRI  Xray at next visit:  None      "

## 2023-11-29 ENCOUNTER — OFFICE VISIT (OUTPATIENT)
Dept: ORTHOPEDICS | Facility: CLINIC | Age: 73
End: 2023-11-29
Payer: MEDICARE

## 2023-11-29 ENCOUNTER — TELEPHONE (OUTPATIENT)
Dept: ORTHOPEDICS | Facility: CLINIC | Age: 73
End: 2023-11-29

## 2023-11-29 ENCOUNTER — HOSPITAL ENCOUNTER (OUTPATIENT)
Dept: RADIOLOGY | Facility: HOSPITAL | Age: 73
Discharge: HOME | End: 2023-11-29
Attending: STUDENT IN AN ORGANIZED HEALTH CARE EDUCATION/TRAINING PROGRAM
Payer: MEDICARE

## 2023-11-29 VITALS
SYSTOLIC BLOOD PRESSURE: 145 MMHG | TEMPERATURE: 98 F | WEIGHT: 129.81 LBS | HEIGHT: 63 IN | DIASTOLIC BLOOD PRESSURE: 82 MMHG | BODY MASS INDEX: 23 KG/M2 | HEART RATE: 76 BPM

## 2023-11-29 DIAGNOSIS — D48.19 TENOSYNOVIAL GIANT CELL TUMOR OF HAND: Primary | ICD-10-CM

## 2023-11-29 DIAGNOSIS — D48.19 TENOSYNOVIAL GIANT CELL TUMOR OF HAND: ICD-10-CM

## 2023-11-29 DIAGNOSIS — Z01.818 PREOP EXAMINATION: ICD-10-CM

## 2023-11-29 PROCEDURE — 71046 X-RAY EXAM CHEST 2 VIEWS: CPT | Mod: TC

## 2023-11-29 PROCEDURE — 99214 PR OFFICE/OUTPT VISIT, EST, LEVL IV, 30-39 MIN: ICD-10-PCS | Mod: ,,, | Performed by: STUDENT IN AN ORGANIZED HEALTH CARE EDUCATION/TRAINING PROGRAM

## 2023-11-29 PROCEDURE — 99214 OFFICE O/P EST MOD 30 MIN: CPT | Mod: ,,, | Performed by: STUDENT IN AN ORGANIZED HEALTH CARE EDUCATION/TRAINING PROGRAM

## 2023-11-29 RX ORDER — SODIUM CHLORIDE 9 MG/ML
INJECTION, SOLUTION INTRAVENOUS CONTINUOUS
Status: CANCELLED | OUTPATIENT
Start: 2023-11-29

## 2023-11-29 NOTE — LETTER
Patients Name: Christiana MYERS Dustin PLASENCIAO.B.: 1950  Today's Date: 23     Dr. Cedric Mcclelland,          The above named patient is scheduled to have a right index finger mass excisional biopsy on 23.    Surgeon: Dr. Dao Bills  Type of Anesthesia: Local MAC     This patient needs surgical clearance from your office for this procedure.  Please perform necessary tests in order for this patient to be medically cleared for surgery. Please send or fax the clearance letter with most recent ECHO, EKG or stress test, to our office as soon as possible.     Please include any medication instructions that should be held prior to surgery.     We appreciate your help in getting our patient cleared for surgery.    Please feel free to call our office should you have any questions.     Thank you,   Dao Bills MD/HRL      Phone Number: [phone]  Fax Number: [phone]   
  Patients Name: Christiana Chan  : 1950  Today's Date: 23     Dr. Uriarte ,          The above named patient is scheduled to have a right index finger mass excisional biopsy on 23.    Surgeon: Dr. Dao Bills  Type of Anesthesia: Local MAC     This patient needs surgical clearance from your office for this procedure.  Please perform necessary tests in order for this patient to be medically cleared for surgery. Please send or fax the clearance letter with most recent ECHO, EKG or stress test, to our office as soon as possible.     Please include any medication instructions that should be held prior to surgery.     We appreciate your help in getting our patient cleared for surgery.    Please feel free to call our office should you have any questions.     Thank you,   Dao Bills MD/HRL      Phone Number: [phone]  Fax Number: [phone]   
yes

## 2023-11-29 NOTE — H&P (VIEW-ONLY)
Chief Complaint:  Right index finger mass    Consulting Physician: Cedric Mcclelland MD    History of present illness:    Patient is a 72-year-old right-hand dominant female who presents for follow up evaluation of a right index finger mass.  I saw her in my clinic last time where I ordered a MRI of the right index finger to better evaluate for this mass.  I suspect that it was giant cell tumor of tendon sheath.  She is here for the results    Past Medical History:   Diagnosis Date    Abdominal bloating     Abdominal pain     Asthma     Atrial fibrillation     Celiac disease 2023    Chronic appendicitis     Colon polyps 2019    COLONOSCOPY    Constipation     Depression     Diverticulosis of colon     Esophageal ulcer     Gastritis     GERD (gastroesophageal reflux disease)     Heartburn     Hiatal hernia     HTN (hypertension)     Incarcerated incisional hernia 3/13/2020    Intermittent diarrhea     Iron deficiency anemia     Kidney cysts     both    Osteoarthritis     Osteoporosis     Other and unspecified noninfectious gastroenteritis and colitis 2023    Pancreatitis, chronic 2019    Rectal bleeding     Rectal polyp 2019    Ruptured lumbar disc     Small bowel lesion     Traumatic rupture of cervical intervertebral disc 2023    Vitamin B12 deficiency anemia     Vitamin D deficiency        Past Surgical History:   Procedure Laterality Date    ANTEGRADE SINGLE BALLOON ENTEROSCOPY N/A 2019    Procedure: ENTEROSCOPY, DOUBLE BALLOON, ANTEGRADE;  Surgeon: Sivakumar Dorsey MD;  Location: [facility] ([address] [place] [address]);  Service: Endoscopy;  Laterality: N/A;    BREAST BIOPSY Bilateral      SECTION   &     CHOLECYSTECTOMY      COLONOSCOPY      outside facility    COLONOSCOPY  2019    Ashok Ramirez MD    ENDOSCOPIC ULTRASOUND OF UPPER GASTROINTESTINAL TRACT N/A 2019    Procedure: ULTRASOUND-ENDOSCOPIC-UPPER;  Surgeon: Armando Bills MD;  Location: [facility];   Service: Endoscopy;  Laterality: N/A;  Carcinoid diagnosis    ESOPHAGOGASTRODUODENOSCOPY      outside facility    HYSTERECTOMY  1991    LAPAROSCOPIC APPENDECTOMY N/A 04/11/2019    Procedure: APPENDECTOMY, LAPAROSCOPIC;  Surgeon: Rebecca Valdez MD;  Location: [facility] OR;  Service: General;  Laterality: N/A;    LAPAROSCOPIC RESECTION OF SMALL INTESTINE N/A 04/11/2019    Procedure: EXCISION, SMALL INTESTINE, LAPAROSCOPIC;  Surgeon: Rebecca Valdez MD;  Location: [facility] OR;  Service: General;  Laterality: N/A;    ROBOT-ASSISTED LAPAROSCOPIC REPAIR OF INCISIONAL HERNIA N/A 03/13/2020    Procedure: ROBOTIC REPAIR, HERNIA, INCISIONAL;  Surgeon: Rebecca Valdez MD;  Location: [facility] OR;  Service: General;  Laterality: N/A;    SHOULDER SURGERY Right 03/13/2013    TOTAL KNEE ARTHROPLASTY Right 09/16/2013       Current Outpatient Medications   Medication Sig    acetaminophen (TYLENOL) 500 MG tablet Take 500 mg by mouth every 6 (six) hours as needed for Pain.    albuterol (PROVENTIL/VENTOLIN HFA) 90 mcg/actuation inhaler Inhale 1 puff into the lungs every 6 (six) hours as needed for Wheezing. Rescue    ALPRAZolam (XANAX) 2 MG Tab Take 1 mg by mouth nightly.     betamethasone valerate 0.1% (VALISONE) 0.1 % Crea SMARTSIG:sparingly Topical Twice Daily    butalbital-acetaminophen-caffeine -40 mg (FIORICET, ESGIC) -40 mg per tablet Take 1 tablet by mouth every 4 (four) hours as needed for Pain.    carvedilol (COREG) 25 MG tablet Take 25 mg by mouth 2 (two) times daily.    citalopram (CELEXA) 20 MG tablet Take 20 mg by mouth every evening.    CLENPIQ 10 mg-3.5 gram -12 gram/160 mL Soln Take 1 kit by mouth.    COMBIVENT RESPIMAT  mcg/actuation inhaler Inhale 2 puffs into the lungs as needed for Shortness of Breath.    cyanocobalamin 1,000 mcg/mL injection Inject 1,000 mcg into the muscle every 14 (fourteen) days.    denosumab (PROLIA) 60 mg/mL Syrg Prolia 60 mg/mL subcutaneous syringe, [RxNorm: 499361]     diclofenac sodium (VOLTAREN) 1 % Gel Apply topically.    dicyclomine (BENTYL) 20 mg tablet Take 20 mg by mouth every 6 (six) hours as needed.    docusate sodium (COLACE) 100 MG capsule Take 100 mg by mouth 2 (two) times daily. 1 TABLET IN THE MORNING AND 1 TABLET AT NIGHT.    ergocalciferol (ERGOCALCIFEROL) 50,000 unit Cap Take 1 capsule (50,000 Units total) by mouth every 14 (fourteen) days.    fluticasone propionate (FLONASE) 50 mcg/actuation nasal spray 2 sprays (100 mcg total) by Each Nostril route once daily.    gabapentin (NEURONTIN) 100 MG capsule Take 100 mg by mouth.    lamoTRIgine (LAMICTAL) 100 MG tablet Take 100 mg by mouth every evening. AT NIGHT    LIDOcaine (LIDODERM) 5 % Place 1 patch onto the skin.    linaclotide (LINZESS ORAL) Take 1 capsule by mouth every other day.    lisinopril (PRINIVIL,ZESTRIL) 20 MG tablet Take 10 mg by mouth Daily.    menthol 5 % PtMd Apply 1 patch topically.    methocarbamoL (ROBAXIN) 750 MG Tab Take 750 mg by mouth 2 (two) times daily.    ondansetron (ZOFRAN) 8 MG tablet Take 0.5 tablets (4 mg total) by mouth every 8 (eight) hours as needed for Nausea.    pantoprazole (PROTONIX) 20 MG tablet Take 20 mg by mouth once daily.    predniSONE (DELTASONE) 10 MG tablet Take 10 mg by mouth.    PREMARIN vaginal cream Place 30 mg vaginally once daily. NIGHTLY.    propranolol (INDERAL) 10 MG tablet Take 10 mg by mouth daily as needed.    simethicone (MYLICON) 80 MG chewable tablet Take 1 tablet (80 mg total) by mouth every 8 (eight) hours as needed for Flatulence.    spironolactone (ALDACTONE) 25 MG tablet Take 12.5 mg by mouth once daily.    terconazole (TERAZOL 3) 0.8 % vaginal cream Place 1 applicator vaginally.    traMADoL (ULTRAM) 50 mg tablet Take 50 mg by mouth every 6 (six) hours as needed.    traZODone (DESYREL) 100 MG tablet Take 100 mg by mouth nightly.    lipase-protease-amylase 24,000-76,000-120,000 units (CREON) 24,000-76,000 -120,000 unit capsule Take 1 capsule by  "mouth 3 (three) times daily with meals.    rizatriptan (MAXALT) 10 MG tablet Take 1 tablet (10 mg total) by mouth as needed for Migraine (one at onset of headache, then a second 2 hours later if necessary).    valACYclovir (VALTREX) 500 MG tablet valACYclovir 500 mg tablet, [RxNorm: 480637]     No current facility-administered medications for this visit.       Review of patient's allergies indicates:   Allergen Reactions    Amoxicillin Other (See Comments)     Taking digoxin.    Penicillins      Other reaction(s): "unable to take because of digoxin", Other (See Comments)  Taking digoxin.      Sulfa (sulfonamide antibiotics) Itching and Swelling    Codeine Nausea Only and Other (See Comments)     Nausea and constipation.    Hydrocodone Nausea Only and Other (See Comments)     Nausea and constipation.    Meperidine Nausea Only and Other (See Comments)     Nausea and constipation.  Other reaction(s): nausea and constipation    Nitrofurantoin monohyd/m-cryst Rash    Hydromorphone Other (See Comments)    Ketorolac Rash       Family History   Problem Relation Age of Onset    Diabetes Mother     Multiple myeloma Father        Social History     Socioeconomic History    Marital status:    Tobacco Use    Smoking status: Former    Smokeless tobacco: Never   Substance and Sexual Activity    Alcohol use: Not Currently     Comment: Not since 2018    Drug use: Never       Review of Systems:    Constitution:   Denies chills, fever, and sweats.  HENT:   Denies headaches or blurry vision.  Cardiovascular:  Denies chest pain or irregular heart beat.  Respiratory:   Denies cough or shortness of breath.  Gastrointestinal:  Denies abdominal pain, nausea, or vomiting.  Musculoskeletal:   Denies muscle cramps.  Neurological:   Denies dizziness or focal weakness.  Psychiatric/Behavior: Normal mental status.  Hematology/Lymph:  Denies bleeding problem or easy bruising/bleeding.  Skin:    Denies rash or suspicious " "lesions.    Examination:    Vital Signs:    Vitals:    11/29/23 0851   BP: (!) 145/82   Pulse: 76   Temp: 98.2 °F (36.8 °C)   Weight: 58.9 kg (129 lb 12.8 oz)   Height: 5' 3" (1.6 m)       Body mass index is 22.99 kg/m².    Constitution:   Well-developed, well nourished patient in no acute distress.  Neurological:   Alert and oriented x 3 and cooperative to examination.     Psychiatric/Behavior: Normal mental status.  Respiratory:   No shortness of breath.  Eyes:    Extraoccular muscles intact  Skin:    No scars, rash or suspicious lesions.    MSK:   Right index finger:  No open wounds.  There is a multilobulated 2 cm mass on the volar aspect of the index finger on the volar side of the middle phalanx which wraps around to a separate 5 mm spongy mass on the dorsum of the middle phalanx overlying the extensor tendon.  This is firm nontender to palpation.  She is diminished sensation over the ulnar aspect of the index finger.  Upon trying to flex the index finger, she can flex to 1 cm away from the distal palmar crease.  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  She has numbness on the ulnar side of her finger as well as restricted motion in his finger due to the mass effect of the tumor.  I recommend excisional biopsy to decompress this finger to restore her finger motion as well as restore sensation to the ulnar aspect of the digit.  I will schedule her for excisional biopsy of the right index finger on 12/12/2023    I explained that surgery and the nature of their " condition are not without risks. These include, but are not limited to, bleeding, infection, neurovascular compromise, wound complications, scarring, cosmetic defects, need for later and/or repeated surgeries, pain, loss of ROM, loss of function, deformity, functional abnormalities, stiffness, thromboembolic complications, compartment syndrome, loss of limb, loss of life, anesthetic complications, and other imponderables. I explained that these can occur despite the adequacy of treatments rendered, and that their risks are heightened given the nature of their condition. They verbalized understanding. They would like to continue with surgery at this time. If appropriate family was involved with surgical discussion.      Follow Up:  After surgery  Xray at next visit:  None       - - -

## 2023-11-29 NOTE — TELEPHONE ENCOUNTER
I faxed the surgery clearance request.     Provider: Cedric Mcclelland MD  Sx Date: 12/12/23  Date Faxed: 11/29/23    I faxed and routed the surgery clearance request to PCP.

## 2023-11-29 NOTE — PROGRESS NOTES
Chief Complaint:  Right index finger mass    Consulting Physician: Cedric Mcclelland MD    History of present illness:    Patient is a 72-year-old right-hand dominant female who presents for follow up evaluation of a right index finger mass.  I saw her in my clinic last time where I ordered a MRI of the right index finger to better evaluate for this mass.  I suspect that it was giant cell tumor of tendon sheath.  She is here for the results    Past Medical History:   Diagnosis Date    Abdominal bloating     Abdominal pain     Asthma     Atrial fibrillation     Celiac disease 2023    Chronic appendicitis     Colon polyps 2019    COLONOSCOPY    Constipation     Depression     Diverticulosis of colon     Esophageal ulcer     Gastritis     GERD (gastroesophageal reflux disease)     Heartburn     Hiatal hernia     HTN (hypertension)     Incarcerated incisional hernia 3/13/2020    Intermittent diarrhea     Iron deficiency anemia     Kidney cysts     both    Osteoarthritis     Osteoporosis     Other and unspecified noninfectious gastroenteritis and colitis 2023    Pancreatitis, chronic 2019    Rectal bleeding     Rectal polyp 2019    Ruptured lumbar disc     Small bowel lesion     Traumatic rupture of cervical intervertebral disc 2023    Vitamin B12 deficiency anemia     Vitamin D deficiency        Past Surgical History:   Procedure Laterality Date    ANTEGRADE SINGLE BALLOON ENTEROSCOPY N/A 2019    Procedure: ENTEROSCOPY, DOUBLE BALLOON, ANTEGRADE;  Surgeon: Sivakumar Dorsey MD;  Location: [facility] ([address] [place] [address]);  Service: Endoscopy;  Laterality: N/A;    BREAST BIOPSY Bilateral      SECTION   &     CHOLECYSTECTOMY      COLONOSCOPY      outside facility    COLONOSCOPY  2019    Ashok Ramirez MD    ENDOSCOPIC ULTRASOUND OF UPPER GASTROINTESTINAL TRACT N/A 2019    Procedure: ULTRASOUND-ENDOSCOPIC-UPPER;  Surgeon: Armando Bills MD;  Location: [facility];   Service: Endoscopy;  Laterality: N/A;  Carcinoid diagnosis    ESOPHAGOGASTRODUODENOSCOPY      outside facility    HYSTERECTOMY  1991    LAPAROSCOPIC APPENDECTOMY N/A 04/11/2019    Procedure: APPENDECTOMY, LAPAROSCOPIC;  Surgeon: Rebecca Valdez MD;  Location: [facility] OR;  Service: General;  Laterality: N/A;    LAPAROSCOPIC RESECTION OF SMALL INTESTINE N/A 04/11/2019    Procedure: EXCISION, SMALL INTESTINE, LAPAROSCOPIC;  Surgeon: Rebecca Valdez MD;  Location: [facility] OR;  Service: General;  Laterality: N/A;    ROBOT-ASSISTED LAPAROSCOPIC REPAIR OF INCISIONAL HERNIA N/A 03/13/2020    Procedure: ROBOTIC REPAIR, HERNIA, INCISIONAL;  Surgeon: Rebecca Valdez MD;  Location: [facility] OR;  Service: General;  Laterality: N/A;    SHOULDER SURGERY Right 03/13/2013    TOTAL KNEE ARTHROPLASTY Right 09/16/2013       Current Outpatient Medications   Medication Sig    acetaminophen (TYLENOL) 500 MG tablet Take 500 mg by mouth every 6 (six) hours as needed for Pain.    albuterol (PROVENTIL/VENTOLIN HFA) 90 mcg/actuation inhaler Inhale 1 puff into the lungs every 6 (six) hours as needed for Wheezing. Rescue    ALPRAZolam (XANAX) 2 MG Tab Take 1 mg by mouth nightly.     betamethasone valerate 0.1% (VALISONE) 0.1 % Crea SMARTSIG:sparingly Topical Twice Daily    butalbital-acetaminophen-caffeine -40 mg (FIORICET, ESGIC) -40 mg per tablet Take 1 tablet by mouth every 4 (four) hours as needed for Pain.    carvedilol (COREG) 25 MG tablet Take 25 mg by mouth 2 (two) times daily.    citalopram (CELEXA) 20 MG tablet Take 20 mg by mouth every evening.    CLENPIQ 10 mg-3.5 gram -12 gram/160 mL Soln Take 1 kit by mouth.    COMBIVENT RESPIMAT  mcg/actuation inhaler Inhale 2 puffs into the lungs as needed for Shortness of Breath.    cyanocobalamin 1,000 mcg/mL injection Inject 1,000 mcg into the muscle every 14 (fourteen) days.    denosumab (PROLIA) 60 mg/mL Syrg Prolia 60 mg/mL subcutaneous syringe, [RxNorm: 513011]     diclofenac sodium (VOLTAREN) 1 % Gel Apply topically.    dicyclomine (BENTYL) 20 mg tablet Take 20 mg by mouth every 6 (six) hours as needed.    docusate sodium (COLACE) 100 MG capsule Take 100 mg by mouth 2 (two) times daily. 1 TABLET IN THE MORNING AND 1 TABLET AT NIGHT.    ergocalciferol (ERGOCALCIFEROL) 50,000 unit Cap Take 1 capsule (50,000 Units total) by mouth every 14 (fourteen) days.    fluticasone propionate (FLONASE) 50 mcg/actuation nasal spray 2 sprays (100 mcg total) by Each Nostril route once daily.    gabapentin (NEURONTIN) 100 MG capsule Take 100 mg by mouth.    lamoTRIgine (LAMICTAL) 100 MG tablet Take 100 mg by mouth every evening. AT NIGHT    LIDOcaine (LIDODERM) 5 % Place 1 patch onto the skin.    linaclotide (LINZESS ORAL) Take 1 capsule by mouth every other day.    lisinopril (PRINIVIL,ZESTRIL) 20 MG tablet Take 10 mg by mouth Daily.    menthol 5 % PtMd Apply 1 patch topically.    methocarbamoL (ROBAXIN) 750 MG Tab Take 750 mg by mouth 2 (two) times daily.    ondansetron (ZOFRAN) 8 MG tablet Take 0.5 tablets (4 mg total) by mouth every 8 (eight) hours as needed for Nausea.    pantoprazole (PROTONIX) 20 MG tablet Take 20 mg by mouth once daily.    predniSONE (DELTASONE) 10 MG tablet Take 10 mg by mouth.    PREMARIN vaginal cream Place 30 mg vaginally once daily. NIGHTLY.    propranolol (INDERAL) 10 MG tablet Take 10 mg by mouth daily as needed.    simethicone (MYLICON) 80 MG chewable tablet Take 1 tablet (80 mg total) by mouth every 8 (eight) hours as needed for Flatulence.    spironolactone (ALDACTONE) 25 MG tablet Take 12.5 mg by mouth once daily.    terconazole (TERAZOL 3) 0.8 % vaginal cream Place 1 applicator vaginally.    traMADoL (ULTRAM) 50 mg tablet Take 50 mg by mouth every 6 (six) hours as needed.    traZODone (DESYREL) 100 MG tablet Take 100 mg by mouth nightly.    lipase-protease-amylase 24,000-76,000-120,000 units (CREON) 24,000-76,000 -120,000 unit capsule Take 1 capsule by  "mouth 3 (three) times daily with meals.    rizatriptan (MAXALT) 10 MG tablet Take 1 tablet (10 mg total) by mouth as needed for Migraine (one at onset of headache, then a second 2 hours later if necessary).    valACYclovir (VALTREX) 500 MG tablet valACYclovir 500 mg tablet, [RxNorm: 806256]     No current facility-administered medications for this visit.       Review of patient's allergies indicates:   Allergen Reactions    Amoxicillin Other (See Comments)     Taking digoxin.    Penicillins      Other reaction(s): "unable to take because of digoxin", Other (See Comments)  Taking digoxin.      Sulfa (sulfonamide antibiotics) Itching and Swelling    Codeine Nausea Only and Other (See Comments)     Nausea and constipation.    Hydrocodone Nausea Only and Other (See Comments)     Nausea and constipation.    Meperidine Nausea Only and Other (See Comments)     Nausea and constipation.  Other reaction(s): nausea and constipation    Nitrofurantoin monohyd/m-cryst Rash    Hydromorphone Other (See Comments)    Ketorolac Rash       Family History   Problem Relation Age of Onset    Diabetes Mother     Multiple myeloma Father        Social History     Socioeconomic History    Marital status:    Tobacco Use    Smoking status: Former    Smokeless tobacco: Never   Substance and Sexual Activity    Alcohol use: Not Currently     Comment: Not since 2018    Drug use: Never       Review of Systems:    Constitution:   Denies chills, fever, and sweats.  HENT:   Denies headaches or blurry vision.  Cardiovascular:  Denies chest pain or irregular heart beat.  Respiratory:   Denies cough or shortness of breath.  Gastrointestinal:  Denies abdominal pain, nausea, or vomiting.  Musculoskeletal:   Denies muscle cramps.  Neurological:   Denies dizziness or focal weakness.  Psychiatric/Behavior: Normal mental status.  Hematology/Lymph:  Denies bleeding problem or easy bruising/bleeding.  Skin:    Denies rash or suspicious " "lesions.    Examination:    Vital Signs:    Vitals:    11/29/23 0851   BP: (!) 145/82   Pulse: 76   Temp: 98.2 °F (36.8 °C)   Weight: 58.9 kg (129 lb 12.8 oz)   Height: 5' 3" (1.6 m)       Body mass index is 22.99 kg/m².    Constitution:   Well-developed, well nourished patient in no acute distress.  Neurological:   Alert and oriented x 3 and cooperative to examination.     Psychiatric/Behavior: Normal mental status.  Respiratory:   No shortness of breath.  Eyes:    Extraoccular muscles intact  Skin:    No scars, rash or suspicious lesions.    MSK:   Right index finger:  No open wounds.  There is a multilobulated 2 cm mass on the volar aspect of the index finger on the volar side of the middle phalanx which wraps around to a separate 5 mm spongy mass on the dorsum of the middle phalanx overlying the extensor tendon.  This is firm nontender to palpation.  She is diminished sensation over the ulnar aspect of the index finger.  Upon trying to flex the index finger, she can flex to 1 cm away from the distal palmar crease.  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  She has numbness on the ulnar side of her finger as well as restricted motion in his finger due to the mass effect of the tumor.  I recommend excisional biopsy to decompress this finger to restore her finger motion as well as restore sensation to the ulnar aspect of the digit.  I will schedule her for excisional biopsy of the right index finger on 12/12/2023    I explained that surgery and the nature of their " condition are not without risks. These include, but are not limited to, bleeding, infection, neurovascular compromise, wound complications, scarring, cosmetic defects, need for later and/or repeated surgeries, pain, loss of ROM, loss of function, deformity, functional abnormalities, stiffness, thromboembolic complications, compartment syndrome, loss of limb, loss of life, anesthetic complications, and other imponderables. I explained that these can occur despite the adequacy of treatments rendered, and that their risks are heightened given the nature of their condition. They verbalized understanding. They would like to continue with surgery at this time. If appropriate family was involved with surgical discussion.      Follow Up:  After surgery  Xray at next visit:  None

## 2023-11-29 NOTE — TELEPHONE ENCOUNTER
I faxed the surgery clearance request.     Provider: Dr. Uriarte  Group: [id number]  Sx Date: 12/12/23  Date Faxed: 11/29/23  F #: [phone]

## 2023-11-30 ENCOUNTER — OFFICE VISIT (OUTPATIENT)
Dept: PRIMARY CARE CLINIC | Facility: CLINIC | Age: 73
End: 2023-11-30
Payer: MEDICARE

## 2023-11-30 VITALS
SYSTOLIC BLOOD PRESSURE: 111 MMHG | HEIGHT: 63 IN | BODY MASS INDEX: 23.04 KG/M2 | OXYGEN SATURATION: 99 % | DIASTOLIC BLOOD PRESSURE: 71 MMHG | RESPIRATION RATE: 18 BRPM | HEART RATE: 70 BPM | WEIGHT: 130 LBS

## 2023-11-30 DIAGNOSIS — Z01.818 PREOPERATIVE CLEARANCE: Primary | ICD-10-CM

## 2023-11-30 DIAGNOSIS — I70.0 ABDOMINAL AORTIC ATHEROSCLEROSIS: Chronic | ICD-10-CM

## 2023-11-30 DIAGNOSIS — R22.31 SUBCUTANEOUS MASS OF FINGER OF RIGHT HAND: ICD-10-CM

## 2023-11-30 PROCEDURE — 99214 OFFICE O/P EST MOD 30 MIN: CPT | Mod: ,,, | Performed by: GENERAL PRACTICE

## 2023-11-30 PROCEDURE — 99214 PR OFFICE/OUTPT VISIT, EST, LEVL IV, 30-39 MIN: ICD-10-PCS | Mod: ,,, | Performed by: GENERAL PRACTICE

## 2023-11-30 NOTE — ASSESSMENT & PLAN NOTE
Based on her abnormal EKG, and what appears to be the presence of an LBBB, I do feel that she needs cardiology clearance before proceeding with this type of surgery.  However, if she is cleared by Cardiology, I find no other reasons medically that would preclude her from this type would require MAC anesthesia.

## 2023-11-30 NOTE — PROGRESS NOTES
"Subjective:       Patient ID: Christiana Chan is a 73 y.o. female.    Chief Complaint: Pre-op Exam    Established patient, presents to the clinic for the purposes of preoperative evaluation to have a mass lesion removed from her right hand.  She does not have any pulmonary issues which would preclude this type of surgery.  She does have some cardiac issues, all of which seem to be stable.  She does see a cardiologist, Dr. Cabrera Uriarte.      Review of Systems   Constitutional: Negative.  Negative for fatigue and fever.   HENT: Negative.  Negative for sore throat and trouble swallowing.    Eyes: Negative.  Negative for redness and visual disturbance.   Respiratory: Negative.  Negative for chest tightness and shortness of breath.    Cardiovascular: Negative.  Negative for chest pain.   Gastrointestinal: Negative.  Negative for abdominal pain, blood in stool and nausea.   Endocrine: Negative.  Negative for cold intolerance, heat intolerance and polyuria.   Genitourinary: Negative.    Musculoskeletal: Negative.  Negative for arthralgias, gait problem and joint swelling.   Integumentary:  Negative for rash. Negative.   Neurological: Negative.  Negative for dizziness, weakness and headaches.   Psychiatric/Behavioral: Negative.  Negative for agitation and dysphoric mood.            Patient Care Team:  Cedric Mcclelland MD as PCP - General (Family Medicine)  Ashok Ramirez MD as Consulting Physician (Gastroenterology)  Jessi Tyler as Psychologist (Psychology)  Elvin Mishra MD as Consulting Physician (Neurosurgery)  Justin Schulte MD as Consulting Physician (Endocrinology)  Randolph Callahan DPM as Consulting Physician (Podiatry)  Objective:   Visit Vitals  /71   Pulse 70   Resp 18   Ht 5' 3" (1.6 m)   Wt 59 kg (130 lb)   SpO2 99%   BMI 23.03 kg/m²        Physical Exam  Constitutional:       Appearance: Normal appearance.   HENT:      Head: Normocephalic.      Mouth/Throat:      Mouth: Mucous membranes are " moist.      Pharynx: Oropharynx is clear.   Eyes:      Conjunctiva/sclera: Conjunctivae normal.      Pupils: Pupils are equal, round, and reactive to light.   Cardiovascular:      Rate and Rhythm: Normal rate and regular rhythm.      Heart sounds: Normal heart sounds.   Pulmonary:      Effort: Pulmonary effort is normal.      Breath sounds: Normal breath sounds.   Abdominal:      General: Abdomen is flat.      Palpations: Abdomen is soft.   Musculoskeletal:         General: Normal range of motion.      Cervical back: Neck supple.   Skin:     General: Skin is warm and dry.   Neurological:      General: No focal deficit present.      Mental Status: She is alert and oriented to person, place, and time.   Psychiatric:         Mood and Affect: Mood normal.         Behavior: Behavior normal.         Thought Content: Thought content normal.         Judgment: Judgment normal.           Lab Results   Component Value Date    GLU 83 02/21/2022     11/29/2023    K 4.1 11/29/2023     02/21/2022    CO2 26 11/29/2023    BUN 10.8 11/29/2023    CREATININE 0.81 11/29/2023    CALCIUM 9.3 11/29/2023    PROT 6.5 02/21/2022    ALBUMIN 3.7 11/29/2023    BILITOT 0.4 11/29/2023    ALKPHOS 41 11/29/2023    AST 18 11/29/2023    ALT 9 11/29/2023    ANIONGAP 10 02/21/2022    ESTGFRAFRICA >60 02/21/2022    EGFRNORACEVR >60 11/29/2023     Lab Results   Component Value Date    WBC 8.61 11/29/2023    RBC 4.07 (L) 11/29/2023    HGB 12.2 11/29/2023    HCT 37.2 11/29/2023    MCV 91.4 11/29/2023    RDW 13.9 11/29/2023     11/29/2023      Lab Results   Component Value Date    CHOL 165 11/02/2023    TRIG 63 11/02/2023    HDL 48 11/02/2023    .00 11/02/2023    TOTALCHOLEST 3 11/02/2023     Lab Results   Component Value Date    TSH 1.177 11/02/2023     Lab Results   Component Value Date    HGBA1C 5.2 11/02/2023      EKG:  Although there was some artifact, there was what appears to be a left bundle branch block on the patient's  EKG, with normal sinus rhythm, and a rate of 66 beats per minute.    CXR: FINDINGS:  Cardiopericardial silhouette is within normal limits. Lungs are without dense focal or segmental consolidation, congestion, pleural effusion or pneumothorax.  Right shoulder arthroplasty.     Impression:     No acute cardiopulmonary process identified.  Assessment:       ICD-10-CM ICD-9-CM   1. Preoperative clearance  Z01.818 V72.84   2. Subcutaneous mass of finger of right hand  R22.31 782.2   3. Abdominal aortic atherosclerosis  I70.0 440.0       Current Outpatient Medications   Medication Instructions    acetaminophen (TYLENOL) 500 mg, Oral, Every 6 hours PRN    albuterol (PROVENTIL/VENTOLIN HFA) 90 mcg/actuation inhaler 1 puff, Inhalation, Every 6 hours PRN, Rescue     ALPRAZolam (XANAX) 2 mg, Oral, Nightly    betamethasone valerate 0.1% (VALISONE) 0.1 % Crea As needed (PRN)    butalbital-acetaminophen-caffeine -40 mg (FIORICET, ESGIC) -40 mg per tablet 1 tablet, Oral, Every 4 hours PRN    carvediloL (COREG) 25 mg, Oral, 2 times daily    citalopram (CELEXA) 20 mg, Oral, Nightly    CLENPIQ 10 mg-3.5 gram -12 gram/160 mL Soln 1 kit, Oral    COMBIVENT RESPIMAT  mcg/actuation inhaler 2 puffs, Inhalation, As needed (PRN)    cyanocobalamin 1,000 mcg, Intramuscular, Every 14 days    denosumab (PROLIA) 60 mg/mL Syrg Every 6 months    diclofenac sodium (VOLTAREN) 1 % Gel Topical (Top), As needed (PRN)    dicyclomine (BENTYL) 20 mg, Oral, Every 6 hours PRN    docusate sodium (COLACE) 100 mg, Oral, 2 times daily, 1 TABLET IN THE MORNING AND 1 TABLET AT NIGHT.    ergocalciferol (ERGOCALCIFEROL) 50,000 Units, Oral, Every 14 days    fluticasone propionate (FLONASE) 100 mcg, Each Nostril, Daily    gabapentin (NEURONTIN) 100 mg, Oral    lamoTRIgine (LAMICTAL) 100 mg, Oral, Nightly, AT NIGHT    LIDOcaine (LIDODERM) 5 % 1 patch, Transdermal, Daily    linaclotide (LINZESS ORAL) 1 capsule, Oral, Every other day     lipase-protease-amylase 24,000-76,000-120,000 units (CREON) 24,000-76,000 -120,000 unit capsule 1 capsule, Oral, 3 times daily with meals    lisinopriL (PRINIVIL,ZESTRIL) 10 mg, Oral, Daily    menthol 5 % PtMd 1 patch, Topical    methocarbamoL (ROBAXIN) 750 mg, Oral, 2 times daily    ondansetron (ZOFRAN) 4 mg, Oral, Every 8 hours PRN    pantoprazole (PROTONIX) 20 mg, Oral, Daily    predniSONE (DELTASONE) 10 mg, Oral    PREMARIN 30 mg, Vaginal, NIGHTLY.    propranoloL (INDERAL) 10 mg, Oral, Daily PRN    rizatriptan (MAXALT) 10 mg, Oral, As needed (PRN)    simethicone (MYLICON) 80 mg, Oral, Every 8 hours PRN    spironolactone (ALDACTONE) 12.5 mg, Oral, Daily    terconazole (TERAZOL 3) 0.8 % vaginal cream 1 applicator, Vaginal    traMADoL (ULTRAM) 50 mg, Oral, Every 6 hours PRN    traZODone (DESYREL) 100 mg, Oral, Nightly    valACYclovir (VALTREX) 500 mg     Plan:     Problem List Items Addressed This Visit          Cardiac/Vascular    Abdominal aortic atherosclerosis (Chronic)    Overview     From CT of abdomen, December 2022:    Surgical clips are present in the small bowel mesentery.  No retroperitoneal lymphadenopathy or abdominal aortic aneurysm is observed.  Mild calcified atherosclerosis is present in the aorta.  Degenerative changes are present in the spine.  The subcutaneous soft tissues are unremarkable.          Current Assessment & Plan     While this is a vascular medical condition, there is no sign of aortic aneurysm, and it does not preclude this type of surgical procedure.            Other    Preoperative clearance - Primary    Current Assessment & Plan     Based on her abnormal EKG, and what appears to be the presence of an LBBB, I do feel that she needs cardiology clearance before proceeding with this type of surgery.  However, if she is cleared by Cardiology, I find no other reasons medically that would preclude her from this type would require MAC anesthesia.         Relevant Orders    POCT EKG  12-LEAD (NOT FOR [facility] USE)    Subcutaneous mass of finger of right hand    Overview     MRI of right hand on 11/27/2023:    Imaging:   X-ray of the right hand three views shows index finger mass especially on the volar side no bony involvement or fractures     MRI of the right index finger shows multilobulated mass over the volar aspect of the index finger wrapping around to the dorsum consistent with giant cell tumor of tendon sheath                    Keep next follow-up appointment.

## 2023-12-07 ENCOUNTER — ANESTHESIA EVENT (OUTPATIENT)
Dept: SURGERY | Facility: HOSPITAL | Age: 73
End: 2023-12-07
Payer: MEDICARE

## 2023-12-08 NOTE — TELEPHONE ENCOUNTER
I messaged Dr. Steele and read in her office visit note that   if she is cleared by Cardiology, I find no other reasons medically that would preclude her from this type would require MAC anesthesia.

## 2023-12-11 ENCOUNTER — DOCUMENTATION ONLY (OUTPATIENT)
Dept: PRIMARY CARE CLINIC | Facility: CLINIC | Age: 73
End: 2023-12-11
Payer: MEDICARE

## 2023-12-11 LAB — BCS RECOMMENDATION EXT: NORMAL

## 2023-12-12 ENCOUNTER — HOSPITAL ENCOUNTER (OUTPATIENT)
Facility: HOSPITAL | Age: 73
Discharge: HOME | End: 2023-12-12
Attending: STUDENT IN AN ORGANIZED HEALTH CARE EDUCATION/TRAINING PROGRAM | Admitting: STUDENT IN AN ORGANIZED HEALTH CARE EDUCATION/TRAINING PROGRAM
Payer: MEDICARE

## 2023-12-12 ENCOUNTER — ANESTHESIA (OUTPATIENT)
Dept: SURGERY | Facility: HOSPITAL | Age: 73
End: 2023-12-12
Payer: MEDICARE

## 2023-12-12 DIAGNOSIS — Z01.818 PREOP EXAMINATION: ICD-10-CM

## 2023-12-12 DIAGNOSIS — D48.19 TENOSYNOVIAL GIANT CELL TUMOR OF HAND: ICD-10-CM

## 2023-12-12 PROCEDURE — 25000003 PHARM REV CODE 250: Performed by: ANESTHESIOLOGY

## 2023-12-12 PROCEDURE — 36000707: Performed by: STUDENT IN AN ORGANIZED HEALTH CARE EDUCATION/TRAINING PROGRAM

## 2023-12-12 PROCEDURE — 88307 TISSUE EXAM BY PATHOLOGIST: CPT | Performed by: STUDENT IN AN ORGANIZED HEALTH CARE EDUCATION/TRAINING PROGRAM

## 2023-12-12 PROCEDURE — 71000015 HC POSTOP RECOV 1ST HR: Performed by: STUDENT IN AN ORGANIZED HEALTH CARE EDUCATION/TRAINING PROGRAM

## 2023-12-12 PROCEDURE — 25000003 PHARM REV CODE 250: Performed by: STUDENT IN AN ORGANIZED HEALTH CARE EDUCATION/TRAINING PROGRAM

## 2023-12-12 PROCEDURE — 71000033 HC RECOVERY, INTIAL HOUR: Performed by: STUDENT IN AN ORGANIZED HEALTH CARE EDUCATION/TRAINING PROGRAM

## 2023-12-12 PROCEDURE — 26113 PR EX TUM/VASC MAL SFT TIS HAND/FNGR SUBFSC 1.5+CM: ICD-10-PCS | Mod: F6,,, | Performed by: STUDENT IN AN ORGANIZED HEALTH CARE EDUCATION/TRAINING PROGRAM

## 2023-12-12 PROCEDURE — D9220A PRA ANESTHESIA: ICD-10-PCS | Mod: CRNA,,, | Performed by: NURSE ANESTHETIST, CERTIFIED REGISTERED

## 2023-12-12 PROCEDURE — 63600175 PHARM REV CODE 636 W HCPCS: Performed by: NURSE ANESTHETIST, CERTIFIED REGISTERED

## 2023-12-12 PROCEDURE — 26113 EXC HAND TUM DEEP 1.5 CM/>: CPT | Mod: F6,,, | Performed by: STUDENT IN AN ORGANIZED HEALTH CARE EDUCATION/TRAINING PROGRAM

## 2023-12-12 PROCEDURE — 63600175 PHARM REV CODE 636 W HCPCS: Performed by: STUDENT IN AN ORGANIZED HEALTH CARE EDUCATION/TRAINING PROGRAM

## 2023-12-12 PROCEDURE — 37000009 HC ANESTHESIA EA ADD 15 MINS: Performed by: STUDENT IN AN ORGANIZED HEALTH CARE EDUCATION/TRAINING PROGRAM

## 2023-12-12 PROCEDURE — 37000008 HC ANESTHESIA 1ST 15 MINUTES: Performed by: STUDENT IN AN ORGANIZED HEALTH CARE EDUCATION/TRAINING PROGRAM

## 2023-12-12 PROCEDURE — D9220A PRA ANESTHESIA: Mod: CRNA,,, | Performed by: NURSE ANESTHETIST, CERTIFIED REGISTERED

## 2023-12-12 PROCEDURE — D9220A PRA ANESTHESIA: ICD-10-PCS | Mod: ANES,,, | Performed by: ANESTHESIOLOGY

## 2023-12-12 PROCEDURE — 71000016 HC POSTOP RECOV ADDL HR: Performed by: STUDENT IN AN ORGANIZED HEALTH CARE EDUCATION/TRAINING PROGRAM

## 2023-12-12 PROCEDURE — D9220A PRA ANESTHESIA: Mod: ANES,,, | Performed by: ANESTHESIOLOGY

## 2023-12-12 PROCEDURE — 36000706: Performed by: STUDENT IN AN ORGANIZED HEALTH CARE EDUCATION/TRAINING PROGRAM

## 2023-12-12 PROCEDURE — 25000003 PHARM REV CODE 250: Performed by: NURSE ANESTHETIST, CERTIFIED REGISTERED

## 2023-12-12 PROCEDURE — 26113 EXC HAND TUM DEEP 1.5 CM/>: CPT | Mod: AS,F6,,

## 2023-12-12 PROCEDURE — 26113 PR EX TUM/VASC MAL SFT TIS HAND/FNGR SUBFSC 1.5+CM: ICD-10-PCS | Mod: AS,F6,,

## 2023-12-12 RX ORDER — BUPIVACAINE HYDROCHLORIDE 2.5 MG/ML
INJECTION, SOLUTION EPIDURAL; INFILTRATION; INTRACAUDAL
Status: DISCONTINUED | OUTPATIENT
Start: 2023-12-12 | End: 2023-12-12 | Stop reason: HOSPADM

## 2023-12-12 RX ORDER — LIDOCAINE HYDROCHLORIDE 20 MG/ML
INJECTION INTRAVENOUS
Status: DISCONTINUED | OUTPATIENT
Start: 2023-12-12 | End: 2023-12-12

## 2023-12-12 RX ORDER — HYDROCODONE BITARTRATE AND ACETAMINOPHEN 5; 325 MG/1; MG/1
1 TABLET ORAL EVERY 4 HOURS PRN
Status: DISCONTINUED | OUTPATIENT
Start: 2023-12-12 | End: 2023-12-12 | Stop reason: HOSPADM

## 2023-12-12 RX ORDER — ACETAMINOPHEN 10 MG/ML
1000 INJECTION, SOLUTION INTRAVENOUS ONCE
Status: DISCONTINUED | OUTPATIENT
Start: 2023-12-12 | End: 2023-12-12 | Stop reason: HOSPADM

## 2023-12-12 RX ORDER — ONDANSETRON 2 MG/ML
4 INJECTION INTRAMUSCULAR; INTRAVENOUS EVERY 6 HOURS PRN
Status: DISCONTINUED | OUTPATIENT
Start: 2023-12-12 | End: 2023-12-12 | Stop reason: HOSPADM

## 2023-12-12 RX ORDER — MIDAZOLAM HYDROCHLORIDE 1 MG/ML
2 INJECTION INTRAMUSCULAR; INTRAVENOUS ONCE AS NEEDED
Status: DISCONTINUED | OUTPATIENT
Start: 2023-12-12 | End: 2023-12-12 | Stop reason: HOSPADM

## 2023-12-12 RX ORDER — LIDOCAINE HYDROCHLORIDE 10 MG/ML
INJECTION, SOLUTION EPIDURAL; INFILTRATION; INTRACAUDAL; PERINEURAL
Status: DISCONTINUED | OUTPATIENT
Start: 2023-12-12 | End: 2023-12-12 | Stop reason: HOSPADM

## 2023-12-12 RX ORDER — SODIUM CHLORIDE, SODIUM GLUCONATE, SODIUM ACETATE, POTASSIUM CHLORIDE AND MAGNESIUM CHLORIDE 30; 37; 368; 526; 502 MG/100ML; MG/100ML; MG/100ML; MG/100ML; MG/100ML
INJECTION, SOLUTION INTRAVENOUS CONTINUOUS
Status: DISCONTINUED | OUTPATIENT
Start: 2023-12-12 | End: 2023-12-12 | Stop reason: HOSPADM

## 2023-12-12 RX ORDER — BUPIVACAINE HYDROCHLORIDE 2.5 MG/ML
INJECTION, SOLUTION EPIDURAL; INFILTRATION; INTRACAUDAL
Status: DISCONTINUED
Start: 2023-12-12 | End: 2023-12-12 | Stop reason: HOSPADM

## 2023-12-12 RX ORDER — LIDOCAINE HYDROCHLORIDE 10 MG/ML
1 INJECTION, SOLUTION EPIDURAL; INFILTRATION; INTRACAUDAL; PERINEURAL ONCE
Status: DISCONTINUED | OUTPATIENT
Start: 2023-12-12 | End: 2023-12-12 | Stop reason: HOSPADM

## 2023-12-12 RX ORDER — MIDAZOLAM HYDROCHLORIDE 5 MG/ML
INJECTION INTRAMUSCULAR; INTRAVENOUS
Status: DISCONTINUED | OUTPATIENT
Start: 2023-12-12 | End: 2023-12-12

## 2023-12-12 RX ORDER — SODIUM CHLORIDE 9 MG/ML
INJECTION, SOLUTION INTRAVENOUS CONTINUOUS
Status: DISCONTINUED | OUTPATIENT
Start: 2023-12-12 | End: 2023-12-12 | Stop reason: HOSPADM

## 2023-12-12 RX ORDER — METOCLOPRAMIDE HYDROCHLORIDE 5 MG/ML
10 INJECTION INTRAMUSCULAR; INTRAVENOUS EVERY 6 HOURS PRN
Status: DISCONTINUED | OUTPATIENT
Start: 2023-12-12 | End: 2023-12-12 | Stop reason: HOSPADM

## 2023-12-12 RX ORDER — HYDROCODONE BITARTRATE AND ACETAMINOPHEN 5; 325 MG/1; MG/1
1 TABLET ORAL EVERY 6 HOURS PRN
Qty: 28 TABLET | Refills: 0 | Status: SHIPPED | OUTPATIENT
Start: 2023-12-12 | End: 2024-01-25

## 2023-12-12 RX ORDER — INDOMETHACIN 25 MG/1
CAPSULE ORAL
Status: DISCONTINUED | OUTPATIENT
Start: 2023-12-12 | End: 2023-12-12 | Stop reason: HOSPADM

## 2023-12-12 RX ORDER — DIPHENHYDRAMINE HYDROCHLORIDE 50 MG/ML
25 INJECTION INTRAMUSCULAR; INTRAVENOUS EVERY 6 HOURS PRN
Status: DISCONTINUED | OUTPATIENT
Start: 2023-12-12 | End: 2023-12-12 | Stop reason: HOSPADM

## 2023-12-12 RX ORDER — DIAZEPAM 5 MG/1
5 TABLET ORAL
Status: COMPLETED | OUTPATIENT
Start: 2023-12-12 | End: 2023-12-12

## 2023-12-12 RX ORDER — DEXMEDETOMIDINE HYDROCHLORIDE 100 UG/ML
INJECTION, SOLUTION INTRAVENOUS
Status: DISCONTINUED | OUTPATIENT
Start: 2023-12-12 | End: 2023-12-12

## 2023-12-12 RX ORDER — INDOMETHACIN 25 MG/1
CAPSULE ORAL
Status: DISCONTINUED
Start: 2023-12-12 | End: 2023-12-12 | Stop reason: HOSPADM

## 2023-12-12 RX ORDER — ONDANSETRON 2 MG/ML
4 INJECTION INTRAMUSCULAR; INTRAVENOUS DAILY PRN
Status: DISCONTINUED | OUTPATIENT
Start: 2023-12-12 | End: 2023-12-12 | Stop reason: HOSPADM

## 2023-12-12 RX ORDER — PROPOFOL 10 MG/ML
VIAL (ML) INTRAVENOUS
Status: DISCONTINUED | OUTPATIENT
Start: 2023-12-12 | End: 2023-12-12

## 2023-12-12 RX ORDER — DEXAMETHASONE SODIUM PHOSPHATE 4 MG/ML
INJECTION, SOLUTION INTRA-ARTICULAR; INTRALESIONAL; INTRAMUSCULAR; INTRAVENOUS; SOFT TISSUE
Status: DISCONTINUED | OUTPATIENT
Start: 2023-12-12 | End: 2023-12-12

## 2023-12-12 RX ORDER — METHOCARBAMOL 500 MG/1
500 TABLET, FILM COATED ORAL EVERY 6 HOURS PRN
Status: DISCONTINUED | OUTPATIENT
Start: 2023-12-12 | End: 2023-12-12 | Stop reason: HOSPADM

## 2023-12-12 RX ORDER — MORPHINE SULFATE 4 MG/ML
4 INJECTION, SOLUTION INTRAMUSCULAR; INTRAVENOUS
Status: DISCONTINUED | OUTPATIENT
Start: 2023-12-12 | End: 2023-12-12 | Stop reason: HOSPADM

## 2023-12-12 RX ORDER — LIDOCAINE HYDROCHLORIDE 10 MG/ML
INJECTION INFILTRATION; PERINEURAL
Status: DISCONTINUED
Start: 2023-12-12 | End: 2023-12-12 | Stop reason: HOSPADM

## 2023-12-12 RX ORDER — GLYCOPYRROLATE 0.2 MG/ML
INJECTION INTRAMUSCULAR; INTRAVENOUS
Status: DISCONTINUED | OUTPATIENT
Start: 2023-12-12 | End: 2023-12-12

## 2023-12-12 RX ORDER — ONDANSETRON 2 MG/ML
INJECTION INTRAMUSCULAR; INTRAVENOUS
Status: DISCONTINUED | OUTPATIENT
Start: 2023-12-12 | End: 2023-12-12

## 2023-12-12 RX ADMIN — DIAZEPAM 5 MG: 5 TABLET ORAL at 07:12

## 2023-12-12 RX ADMIN — DEXMEDETOMIDINE HYDROCHLORIDE 2 MCG: 100 INJECTION, SOLUTION INTRAVENOUS at 09:12

## 2023-12-12 RX ADMIN — PROPOFOL 50 MG: 10 INJECTION, EMULSION INTRAVENOUS at 09:12

## 2023-12-12 RX ADMIN — CEFAZOLIN 2 G: 2 INJECTION, POWDER, FOR SOLUTION INTRAMUSCULAR; INTRAVENOUS at 09:12

## 2023-12-12 RX ADMIN — SODIUM CHLORIDE, SODIUM GLUCONATE, SODIUM ACETATE, POTASSIUM CHLORIDE AND MAGNESIUM CHLORIDE: 526; 502; 368; 37; 30 INJECTION, SOLUTION INTRAVENOUS at 09:12

## 2023-12-12 RX ADMIN — GLYCOPYRROLATE 0.2 MG: 0.2 INJECTION INTRAMUSCULAR; INTRAVENOUS at 09:12

## 2023-12-12 RX ADMIN — ONDANSETRON HYDROCHLORIDE 4 MG: 2 SOLUTION INTRAMUSCULAR; INTRAVENOUS at 09:12

## 2023-12-12 RX ADMIN — MIDAZOLAM HYDROCHLORIDE 1 MG: 5 INJECTION, SOLUTION INTRAMUSCULAR; INTRAVENOUS at 09:12

## 2023-12-12 RX ADMIN — SODIUM CHLORIDE, SODIUM GLUCONATE, SODIUM ACETATE, POTASSIUM CHLORIDE AND MAGNESIUM CHLORIDE: 526; 502; 368; 37; 30 INJECTION, SOLUTION INTRAVENOUS at 07:12

## 2023-12-12 RX ADMIN — DEXMEDETOMIDINE HYDROCHLORIDE 4 MCG: 100 INJECTION, SOLUTION INTRAVENOUS at 09:12

## 2023-12-12 RX ADMIN — DEXAMETHASONE SODIUM PHOSPHATE 4 MG: 4 INJECTION, SOLUTION INTRA-ARTICULAR; INTRALESIONAL; INTRAMUSCULAR; INTRAVENOUS; SOFT TISSUE at 09:12

## 2023-12-12 RX ADMIN — LIDOCAINE HYDROCHLORIDE 50 MG: 20 INJECTION INTRAVENOUS at 09:12

## 2023-12-12 NOTE — ANESTHESIA POSTPROCEDURE EVALUATION
Anesthesia Post Evaluation    Patient: Christiana Chan    Procedure(s) Performed: Procedure(s) (LRB):  BIOPSY, MASS, HAND TF with [facility] partial knee team (Right)    Final Anesthesia Type: general      Patient location during evaluation: PACU  Patient participation: Yes- Able to Participate  Level of consciousness: awake and alert  Post-procedure vital signs: reviewed and stable  Pain management: adequate  Airway patency: patent      Anesthetic complications: no      Cardiovascular status: blood pressure returned to baseline  Respiratory status: unassisted  Hydration status: euvolemic  Follow-up not needed.              Vitals Value Taken Time   /78 12/12/23 1115   Temp 35.6 °C (96 °F) 12/12/23 1100   Pulse 57 12/12/23 1115   Resp 20 12/12/23 1115   SpO2 97 % 12/12/23 1115         Event Time   Out of Recovery 10:36:21         Pain/Trey Score: Trey Score: 10 (12/12/2023 11:26 AM)  Modified Trey Score: 20 (12/12/2023 11:26 AM)

## 2023-12-12 NOTE — DISCHARGE INSTRUCTIONS
[facility] [place]. [address]  [phone]    DIET    YOUR FIRST MEAL SHOULD BE LIQUID: I.E. SOUPS, JELLO, JUICE. IF YOUR LIQUID MEAL IS TOLERATED WELL THEN YOU MAY PROGRESS TO A SMALL LIGHT MEAL.   IF NAUSEA AND VOMITING OCCUR RETURN TO THE LIQUID DIET AND PROGRESS TO A NORMAL SOLID DIET SLOWLY.  IF THE NAUSEA AND VOMITING DOES NOT STOP, NOTIFY YOUR HEALTH CARE PROVIDER.      GENERAL ANESTHESIA OR SEDATION    DO NOT DRIVE OR PARTICIPATE IN ANY ACTIVITIES THAT REQUIRE COORDINATION FOR THE NEXT 24 HOURS: I.E. SWIMMING, BIKING, OPERATING HEAVY MACHINERY, COOKING, USING POWER TOOLS, CLIMBING LADDERS.   FOR THE NEXT 24 HOURS DO NOT: DRIVE, DRINK ALCOHOL, MAKE ANY IMPORTANT DECISIONS OR SIGN ANY LEGAL DOCUMENTS.   STAY WITH AN ADULT DURING THE 24 HOURS AFTER YOUR SURGERY.   DRINK ENOUGH FLUIDS TO KEEP YOUR URINE CLEAR TO PALE YELLOW.      PREVENTING CONSTIPATION AFTER SURGERY    EAT FOOD HIGH IN FIBER AND DRINK PLENTY OF FLUIDS, ESPECIALLY WATER.   YOU MAY TAKE AN OVER THE COUNTER FIBER SUPPLEMENT OR STOOL SOFTENER AS DIRECTED ON THE PACKAGE.   STAYING MOBILE, IF POSSIBLE, HELPS TO PREVENT CONSTIPATION.  CONTACT YOUR DOCTOR IF YOU HAVE NOT HAD A BOWEL MOVEMENT IN 3 DAYS AFTER SURGERY.       INFECTION CONTROL    KEEP YOUR DRESSING CLEAN, DRY AND INTACT.   FOLLOW PHYSICIAN INSTRUCTIONS REGARDING REMOVAL OF DRESSING.  DO NOT TOUCH SURGICAL SITE OR APPLY LOTIONS, POWDERS, CREAMS OR OINTMENTS ON YOUR INCISION UNLESS INSTRUCTED TO DO SO BY YOUR HEALTH CARE PROVIDER.  ONCE THE DRESSING HAS BEEN REMOVED, CHECK THE INCISION SITE DAILY FOR: INCREASED REDNESS, SWELLING, FLUID OR BLOOD, WARMTH, PUS, FOUL SMELL OR INCREASED PAIN.      DEEP VEIN THROMBOSIS (DVT)    A DVT IS A BLOOD CLOT THAT CAN DEVELOP IN THE DEEP AND LARGER VEINS OF THE LEG, ARM OR PELVIS.   RISK FACTORS INCLUDE SITTING OR LYING FOR LONG PERIODS OF TIME. THIS INCLUDES RECOVERING FROM SURGERY.  PREVENTION INCLUDES:  AVOID SITTING STILL FOR LONG PERIODS WITHOUT MOVING YOUR LEGS, DO NOT SMOKE AND IF POSSIBLE AVOID MEDICATIONS THAT CONTAIN ESTROGEN.   SIGNS AND SYMPTOMS THAT SHOULD BE REPORTED IMMEDIATELY INCLUDE: SWELLING IN AN ARM OR LEG, WARMTH, REDNESS OR PAIN IN ONE AREA OF THE LEG OR ARM THAT DOES NOT COME FROM THE INCISION. IF THE CLOT IS IN YOUR LEG, THE SYMPTOMS WILL BE WORSE WHEN STANDING OR WALKING.   SIGNS OF A PULMONARY EMBOLISM OR PE (A CLOT THAT MOVED TO YOUR LUNG): SHORTNESS OF BREATH, COUGHING , ESPECIALLY IF ACCOMPANIED WITH BLOODY MUCUS, CHEST PAIN OR RAPID HEART RATE.  IF YOU EXPERIENCE THESE SYMPTOMS, YOU SHOULD GET EMERGENCY TREATMENT RIGHT AWAY. DO NOT WAIT TO SEE IF THESE SYMPTOMS WILL GO AWAY. DO NOT DRIVE YOURSELF TO THE HOSPITAL.       CONTACT YOUR HEALTH CARE PROVIDER IF:    YOU HAVE REDNESS, SWELLING OR PAIN AROUND YOUR INCISION.  YOUR INCISION FEELS WARM TO THE TOUCH OR IS LEAKING EXCESSIVE FLUID/ BLOOD.  YOUR SUTURES OR STAPLES HAVE COME UNDONE.  YOU HAVE A TEMPERATURE .5 OR GREATER.  YOU ARE NOT ABLE TO URINATE WITHIN 6 HOURS AFTER SURGERY.  YOU HAVE UNRESOLVED NAUSEA AND VOMITING.       SEEK IMMEDIATE MEDICAL CARE IF:    YOU HAVE PERSISTENT NAUSEA AND VOMITING.   YOU ARE UNABLE TO EAT OR DRINK.   YOU HAVE DIFFICULTY SPEAKING AND/ OR BREATHING.  YOUR SKIN COLOR APPEARS BLUE OR GRAY.  YOU HAVE A RED STREAK COMING FROM YOUR INCISION.  YOUR INCISION BLEEDS THROUGH THE DRESSING AND DOES NOT STOP WITH GENTLY PRESSURE.  YOU HAVE SEVERE PAIN THAT DOES NOT DECREASE WITH YOUR MEDICATIONS.

## 2023-12-12 NOTE — OP NOTE
Operative Note    Patient Information:  Christiana Chan    Date of Surgery:  12/12/2023    Surgeon:  Dao Bills MD    Assistant:  Shannon Lerma PA-C  who was necessary and essential for all aspects of the operation, including but not limited to patient positioning, surgical exposure, wound closure, and dressing placement.        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 female that has a right index finger mass.  Saw her in clinic right diagnosed her with a giant cell tumor tendon sheath which was confirmed on MRI.  The decision was made to perform an excisional biopsy of this mass    Risks and benefits of the procedure were discussed with the patient. I explained that surgery and the nature of their condition are not without risks. These include, but are not limited to, bleeding, infection, neurovascular compromise, wound complications, scarring, cosmetic defects, need for later and/or repeated surgeries, pain, loss of ROM, loss of function, deformity, functional abnormalities, stiffness, thromboembolic complications, compartment syndrome, loss of limb, loss of life, anesthetic complications, and other imponderables. I explained that these can occur despite the adequacy of treatments rendered, and that their risks are heightened given the nature of their condition. They verbalized understanding. They would like to continue with surgery at this time. If appropriate family was involved with surgical discussion. The patient expressed understanding of and agreement with the plan. Informed consent  was obtained and signed prior going to the operative room.    Procedure in Detail:  Patient was brought to the operating room by the anesthesia team. Anesthesia was administered per the anesthesia record. The patient was left on the stretcher and a hand table was placed on the side of the bed.     Time out was performed and all parties present agreed with correct patient, correct procedure, correct side, correct site. The operative extremity was prepped and draped in standard normal fashion.     A digital block was performed of the right index finger.    Pre-incision antibiotics were administered prior to skin incision. The tourniquet was inflated to 250mm HG.     A mid lateral incision was made over the lateral aspect of the index finger on the ulnar side with a Brunner towards the tip of the distal phalanx.  Skin flap was elevated.  A yellow multilobulated tumor was encountered going into the flexor tendon sheath.  This tumor was carefully freed from the neurovascular bundle on the ulnar side of the finger.  It was excised in its entirety with scissors.  The tumor measured 3 cm x 1.5 cm.  This appeared to look like a giant cell tumor of tendon sheath.  This was sent for pathology.  The wound was copiously irrigated and closed with 4-0 nylon and 5 0 plain gut suture.  A sterile dressing was applied.      Tourniquet was deflated. Patient was awoken from anesthesia without complications and transferred to the recovery room in stable condition.       Post-operative Plan:  Patient will come to clinic in 2 weeks for wound check.  We will get her into hand therapy to start working on digit range of motion.

## 2023-12-12 NOTE — ANESTHESIA PREPROCEDURE EVALUATION
"                                                                                                             12/12/2023  Christiana Chan is a 73 y.o., female with finger mass for excision under MAC with local per surgeon.  She has been seen and optimized for anesthesia by cardiology, see note in chart.  LVEF is WNL at 50%.  She is quite anxious today.    "Chief Complaint:  Right index finger mass     Consulting Physician: Cedric Mcclelland MD     History of present illness:     Patient is a 72-year-old right-hand dominant female who presents for follow up evaluation of a right index finger mass.  I saw her in my clinic last time where I ordered a MRI of the right index finger to better evaluate for this mass.  I suspect that it was giant cell tumor of tendon sheath.  She is here for the results          Past Medical History:   Diagnosis Date    Abdominal bloating      Abdominal pain      Asthma      Atrial fibrillation      Celiac disease 4/25/2023    Chronic appendicitis      Colon polyps 08/27/2019     COLONOSCOPY    Constipation      Depression      Diverticulosis of colon      Esophageal ulcer      Gastritis      GERD (gastroesophageal reflux disease)      Heartburn      Hiatal hernia      HTN (hypertension)      Incarcerated incisional hernia 3/13/2020    Intermittent diarrhea      Iron deficiency anemia      Kidney cysts       both    Osteoarthritis      Osteoporosis      Other and unspecified noninfectious gastroenteritis and colitis 4/25/2023    Pancreatitis, chronic 2/1/2019    Rectal bleeding      Rectal polyp 8/27/2019    Ruptured lumbar disc      Small bowel lesion      Traumatic rupture of cervical intervertebral disc 4/25/2023    Vitamin B12 deficiency anemia      Vitamin D deficiency                 Past Surgical History:   Procedure Laterality Date    ANTEGRADE SINGLE BALLOON ENTEROSCOPY N/A 02/14/2019     Procedure: ENTEROSCOPY, DOUBLE BALLOON, ANTEGRADE;  Surgeon: Sivakumar Dorsey MD;  Location: " "[facility] (2ND FLR);  Service: Endoscopy;  Laterality: N/A;    BREAST BIOPSY Bilateral       SECTION    &     CHOLECYSTECTOMY       COLONOSCOPY         outside facility    COLONOSCOPY   2019     Ashok Ramirez MD    ENDOSCOPIC ULTRASOUND OF UPPER GASTROINTESTINAL TRACT N/A 2019     Procedure: ULTRASOUND-ENDOSCOPIC-UPPER;  Surgeon: Armando Bills MD;  Location: [facility];  Service: Endoscopy;  Laterality: N/A;  Carcinoid diagnosis    ESOPHAGOGASTRODUODENOSCOPY         outside facility    HYSTERECTOMY       LAPAROSCOPIC APPENDECTOMY N/A 2019     Procedure: APPENDECTOMY, LAPAROSCOPIC;  Surgeon: Rebecca Valdez MD;  Location: [facility] OR;  Service: General;  Laterality: N/A;    LAPAROSCOPIC RESECTION OF SMALL INTESTINE N/A 2019     Procedure: EXCISION, SMALL INTESTINE, LAPAROSCOPIC;  Surgeon: Rebecca Valdez MD;  Location: [facility] OR;  Service: General;  Laterality: N/A;    ROBOT-ASSISTED LAPAROSCOPIC REPAIR OF INCISIONAL HERNIA N/A 2020     Procedure: ROBOTIC REPAIR, HERNIA, INCISIONAL;  Surgeon: Rebecca Valdez MD;  Location: [facility] OR;  Service: General;  Laterality: N/A;    SHOULDER SURGERY Right 2013    TOTAL KNEE ARTHROPLASTY Right 2013    ...    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  She has numbness on the ulnar side of her finger as well as restricted motion in his finger due to the mass effect of the tumor.  I recommend excisional biopsy to decompress this finger to restore her finger motion as well as restore sensation to the ulnar aspect of the digit.  I will schedule her for excisional biopsy of the right index finger on 2023"      Pre-op Assessment    I have reviewed the Patient Summary Reports.     I have reviewed the Nursing Notes. I have reviewed the NPO Status.   I have reviewed the Medications.     Review of " Systems  Anesthesia Hx:  No problems with previous Anesthesia             Denies Family Hx of Anesthesia complications.    Denies Personal Hx of Anesthesia complications.                    Cardiovascular:     Hypertension    Dysrhythmias atrial fibrillation                                   Pulmonary:    Asthma                    Renal/:  Chronic Renal Disease                Hepatic/GI:   PUD, Hiatal Hernia, GERD             Musculoskeletal:  Arthritis               Neurological:    Neuromuscular Disease,  Headaches                                 Psych:  Psychiatric History                  Physical Exam  General: Well nourished, Cooperative, Alert, Oriented and Anxious    Airway:  Mallampati: II   Mouth Opening: Normal  TM Distance: Normal  Tongue: Normal  Neck ROM: Normal ROM    Dental:  Dentures    Chest/Lungs:  Clear to auscultation, Normal Respiratory Rate    Heart:  Rate: Normal  Rhythm: Regular Rhythm        Anesthesia Plan  Type of Anesthesia, risks & benefits discussed:    Anesthesia Type: Gen Natural Airway  Intra-op Monitoring Plan: Standard ASA Monitors  Post Op Pain Control Plan: multimodal analgesia  Informed Consent: Informed consent signed with the Patient and all parties understand the risks and agree with anesthesia plan.  All questions answered.   ASA Score: 3  Day of Surgery Review of History & Physical: H&P Update referred to the surgeon/provider.    Ready For Surgery From Anesthesia Perspective.     .

## 2023-12-12 NOTE — TRANSFER OF CARE
"Anesthesia Transfer of Care Note    Patient: Christiana Chan    Procedure(s) Performed: Procedure(s) (LRB):  BIOPSY, MASS, HAND TF with [facility] partial knee team (Right)    Patient location: PACU    Anesthesia Type: MAC    Transport from OR: Transported from OR on room air with adequate spontaneous ventilation    Post pain: adequate analgesia    Post assessment: no apparent anesthetic complications and tolerated procedure well    Post vital signs: stable    Level of consciousness: awake and alert    Nausea/Vomiting: no nausea/vomiting    Complications: none    Transfer of care protocol was followed      Last vitals: Visit Vitals  /65   Pulse 68   Temp 35.9 °C (96.7 °F) (Tympanic)   Resp 20   Ht 5' 3" (1.6 m)   Wt 59.6 kg (131 lb 6.3 oz)   SpO2 98%   Breastfeeding No   BMI 23.28 kg/m²     "

## 2023-12-12 NOTE — DISCHARGE SUMMARY
[facility] - Periop Services  Discharge Note  Short Stay    Procedure(s) (LRB):  BIOPSY, MASS, HAND TF with [facility] partial knee team (Right)      OUTCOME: Patient tolerated treatment/procedure well without complication and is now ready for discharge.    DISPOSITION: Home or Self Care    FINAL DIAGNOSIS:  <principal problem not specified>    FOLLOWUP: In clinic    DISCHARGE INSTRUCTIONS:    Discharge Procedure Orders   Diet general     Activity as tolerated     Keep surgical extremity elevated     Ice to affected area     Lifting restrictions   Order Comments: No lifting, pushing, pulling with operative extremity     No driving, operating heavy equipment or signing legal documents while taking pain medication.     Other restrictions (specify):   Order Comments: Okay to wean sling as tolerated.     Leave dressing on - Keep it clean, dry, and intact until clinic visit     Call MD for:  temperature >100.4     Call MD for:  persistent nausea and vomiting     Call MD for:  severe uncontrolled pain     Call MD for:  difficulty breathing, headache or visual disturbances     Call MD for:  redness, tenderness, or signs of infection (pain, swelling, redness, odor or green/yellow discharge around incision site)     Call MD for:  hives     Call MD for:  persistent dizziness or light-headedness     Call MD for:  extreme fatigue     Shower on day dressing removed (No bath)        TIME SPENT ON DISCHARGE: 15 minutes

## 2023-12-13 ENCOUNTER — TELEPHONE (OUTPATIENT)
Dept: ORTHOPEDICS | Facility: CLINIC | Age: 73
End: 2023-12-13
Payer: MEDICARE

## 2023-12-13 VITALS
HEIGHT: 63 IN | TEMPERATURE: 96 F | OXYGEN SATURATION: 97 % | DIASTOLIC BLOOD PRESSURE: 78 MMHG | WEIGHT: 131.38 LBS | SYSTOLIC BLOOD PRESSURE: 114 MMHG | BODY MASS INDEX: 23.28 KG/M2 | RESPIRATION RATE: 20 BRPM | HEART RATE: 57 BPM

## 2023-12-13 DIAGNOSIS — D48.19 TENOSYNOVIAL GIANT CELL TUMOR OF HAND: Primary | ICD-10-CM

## 2023-12-13 NOTE — TELEPHONE ENCOUNTER
I called the patient back. States that the Norco made her sick. Patient wants to try Tylenol. I explained that is fine just let us know if it does not control her pain.    Patient is also asking about therapy.

## 2023-12-14 NOTE — TELEPHONE ENCOUNTER
I spoke to the patient and informed her of what is stated by Dr. Bills below. Patient voiced a clear understanding.     I faxed the referral to [facility].

## 2023-12-15 LAB — VIEW PATHOLOGY REPORT (RELIAPATH): NORMAL

## 2023-12-27 ENCOUNTER — OFFICE VISIT (OUTPATIENT)
Dept: ORTHOPEDICS | Facility: CLINIC | Age: 73
End: 2023-12-27
Payer: MEDICARE

## 2023-12-27 VITALS — WEIGHT: 131 LBS | HEIGHT: 63 IN | BODY MASS INDEX: 23.21 KG/M2

## 2023-12-27 DIAGNOSIS — D48.19 TENOSYNOVIAL GIANT CELL TUMOR OF HAND: Primary | ICD-10-CM

## 2023-12-27 PROCEDURE — 99024 PR POST-OP FOLLOW-UP VISIT: ICD-10-PCS | Mod: POP,,, | Performed by: STUDENT IN AN ORGANIZED HEALTH CARE EDUCATION/TRAINING PROGRAM

## 2023-12-27 PROCEDURE — 99024 POSTOP FOLLOW-UP VISIT: CPT | Mod: POP,,, | Performed by: STUDENT IN AN ORGANIZED HEALTH CARE EDUCATION/TRAINING PROGRAM

## 2023-12-27 NOTE — PROGRESS NOTES
"Postop Clinic Note    Surgery:2023  Excision tumor right index finger    History of present illness:    Patient is doing very well. She denies any pain or numbness. No fever or chills.      Past Surgical History:   Procedure Laterality Date    ANTEGRADE SINGLE BALLOON ENTEROSCOPY N/A 2019    Procedure: ENTEROSCOPY, DOUBLE BALLOON, ANTEGRADE;  Surgeon: Sivakumar Dorsey MD;  Location: [facility] ([address] [place] [address]);  Service: Endoscopy;  Laterality: N/A;    BIOPSY OF MASS OF HAND Right 2023    Procedure: BIOPSY, MASS, HAND TF with [facility] partial knee team;  Surgeon: Dao Bills MD;  Location: [facility];  Service: Orthopedics;  Laterality: Right;    BREAST BIOPSY Bilateral      SECTION   &     CHOLECYSTECTOMY      COLONOSCOPY      outside facility    COLONOSCOPY  2019    Ashok Ramirez MD    ENDOSCOPIC ULTRASOUND OF UPPER GASTROINTESTINAL TRACT N/A 2019    Procedure: ULTRASOUND-ENDOSCOPIC-UPPER;  Surgeon: Armando Bills MD;  Location: [facility];  Service: Endoscopy;  Laterality: N/A;  Carcinoid diagnosis    ESOPHAGOGASTRODUODENOSCOPY      outside facility    HYSTERECTOMY      LAPAROSCOPIC APPENDECTOMY N/A 2019    Procedure: APPENDECTOMY, LAPAROSCOPIC;  Surgeon: Rebecca Valdez MD;  Location: [facility];  Service: General;  Laterality: N/A;    LAPAROSCOPIC RESECTION OF SMALL INTESTINE N/A 2019    Procedure: EXCISION, SMALL INTESTINE, LAPAROSCOPIC;  Surgeon: Rebecca Valdez MD;  Location: [facility];  Service: General;  Laterality: N/A;    ROBOT-ASSISTED LAPAROSCOPIC REPAIR OF INCISIONAL HERNIA N/A 2020    Procedure: ROBOTIC REPAIR, HERNIA, INCISIONAL;  Surgeon: Rebecca Valdez MD;  Location: [facility];  Service: General;  Laterality: N/A;    SHOULDER SURGERY Right 2013    TOTAL KNEE ARTHROPLASTY Right 2013           Examination:    Vital Signs:    Vitals:    23 0821   Weight: 59.4 kg (131 lb)   Height: 5' 3" (1.6 m) "       Body mass index is 23.21 kg/m².    Constitution:   Well-developed, well nourished patient in no acute distress.  Neurological:   Alert and oriented x 3 and cooperative to examination.     Psychiatric/Behavior: Normal mental status.  Respiratory:   No shortness of breath.  Eyes:    Extraoccular muscles intact  Skin:    No scars, rash or suspicious lesions.    MSK:   Right hand: Incision is healing well with no signs of infection. Radial pulse 2+ warm and well perfused. She has slight stiffness with flexion of the index finger.             Imaging:   No new imaging.     Assessment: Post excision tumor right index finger    Plan:  Patient will have sutures removed in clinic today. She will continue therapy for digit ROM.     Follow Up: PRN  Xray at next visit: None

## 2024-01-25 ENCOUNTER — TELEPHONE (OUTPATIENT)
Dept: PRIMARY CARE CLINIC | Facility: CLINIC | Age: 74
End: 2024-01-25
Payer: MEDICARE

## 2024-01-25 DIAGNOSIS — B00.1 FEVER BLISTER: Primary | ICD-10-CM

## 2024-01-25 RX ORDER — VALACYCLOVIR HYDROCHLORIDE 1 G/1
TABLET, FILM COATED ORAL
Qty: 10 TABLET | Refills: 5 | Status: SHIPPED | OUTPATIENT
Start: 2024-01-25

## 2024-01-25 NOTE — TELEPHONE ENCOUNTER
Pt called and stated she has cold sores that's on her lip and would like to know if Dr Mcclelland can prescribe her the medication he did years ago for same issue. I explained he may want to see her and that it could be virtual if available. She stated she is in [place] and has never did a virtual appointment before. Pt would like a phone call back.

## 2024-02-09 RX ORDER — ERGOCALCIFEROL 1.25 MG/1
CAPSULE ORAL
Qty: 6 CAPSULE | Refills: 0 | Status: SHIPPED | OUTPATIENT
Start: 2024-02-09 | End: 2024-04-22

## 2024-02-09 NOTE — TELEPHONE ENCOUNTER
Last visit in [facility] ENDOCRINOLOGY: 8/29/2023    Patient's next visit in [facility] ENDOCRINOLOGY: 3/12/2024

## 2024-02-16 ENCOUNTER — OFFICE VISIT (OUTPATIENT)
Dept: PRIMARY CARE CLINIC | Facility: CLINIC | Age: 74
End: 2024-02-16
Payer: MEDICARE

## 2024-02-16 VITALS
DIASTOLIC BLOOD PRESSURE: 73 MMHG | WEIGHT: 132 LBS | HEART RATE: 80 BPM | OXYGEN SATURATION: 99 % | HEIGHT: 63 IN | SYSTOLIC BLOOD PRESSURE: 113 MMHG | BODY MASS INDEX: 23.39 KG/M2 | RESPIRATION RATE: 18 BRPM

## 2024-02-16 DIAGNOSIS — I73.9 PERIPHERAL VASCULAR DISEASE, UNSPECIFIED: Chronic | ICD-10-CM

## 2024-02-16 DIAGNOSIS — C7A.012 MALIGNANT CARCINOID TUMOR OF ILEUM: Chronic | ICD-10-CM

## 2024-02-16 DIAGNOSIS — M81.0 AGE-RELATED OSTEOPOROSIS WITHOUT CURRENT PATHOLOGICAL FRACTURE: Chronic | ICD-10-CM

## 2024-02-16 DIAGNOSIS — E78.5 DYSLIPIDEMIA: Chronic | ICD-10-CM

## 2024-02-16 DIAGNOSIS — I70.0 ABDOMINAL AORTIC ATHEROSCLEROSIS: Chronic | ICD-10-CM

## 2024-02-16 DIAGNOSIS — R79.9 ABNORMAL BLOOD CHEMISTRY: ICD-10-CM

## 2024-02-16 DIAGNOSIS — C7A.8 PRIMARY MALIGNANT NEUROENDOCRINE TUMOR OF ILEUM: ICD-10-CM

## 2024-02-16 DIAGNOSIS — D64.89 ANEMIA DUE TO OTHER CAUSE, NOT CLASSIFIED: Chronic | ICD-10-CM

## 2024-02-16 DIAGNOSIS — R22.31 SUBCUTANEOUS MASS OF FINGER OF RIGHT HAND: ICD-10-CM

## 2024-02-16 DIAGNOSIS — I44.7 LEFT BUNDLE BRANCH BLOCK (LBBB): ICD-10-CM

## 2024-02-16 DIAGNOSIS — F31.81 BIPOLAR II DISORDER: Chronic | ICD-10-CM

## 2024-02-16 DIAGNOSIS — Z86.69 HX OF MIGRAINE HEADACHES: Chronic | ICD-10-CM

## 2024-02-16 DIAGNOSIS — E55.9 HYPOVITAMINOSIS D: Chronic | ICD-10-CM

## 2024-02-16 DIAGNOSIS — I10 PRIMARY HYPERTENSION: Primary | Chronic | ICD-10-CM

## 2024-02-16 DIAGNOSIS — Z85.060 PERSONAL HISTORY OF MALIGNANT CARCINOID TUMOR OF SMALL INTESTINE: Chronic | ICD-10-CM

## 2024-02-16 DIAGNOSIS — J45.20 MILD INTERMITTENT ASTHMA WITHOUT COMPLICATION: Chronic | ICD-10-CM

## 2024-02-16 DIAGNOSIS — I50.42 CHRONIC COMBINED SYSTOLIC AND DIASTOLIC CONGESTIVE HEART FAILURE: Chronic | ICD-10-CM

## 2024-02-16 PROBLEM — B00.1 FEVER BLISTER: Status: RESOLVED | Noted: 2024-01-25 | Resolved: 2024-02-16

## 2024-02-16 PROBLEM — R63.4 ABNORMAL WEIGHT LOSS: Status: ACTIVE | Noted: 2024-02-16

## 2024-02-16 PROBLEM — M19.90 OSTEOARTHRITIS: Status: ACTIVE | Noted: 2024-02-16

## 2024-02-16 PROBLEM — E53.8 COBALAMIN DEFICIENCY: Status: ACTIVE | Noted: 2024-02-16

## 2024-02-16 PROCEDURE — 99214 OFFICE O/P EST MOD 30 MIN: CPT | Mod: ,,, | Performed by: GENERAL PRACTICE

## 2024-02-16 NOTE — ASSESSMENT & PLAN NOTE
Component Ref Range & Units 2 mo ago  (11/29/23) 3 mo ago  (11/2/23) 1 yr ago  (10/18/22) 1 yr ago  (6/6/22) 1 yr ago  (2/21/22) 2 yr ago  (8/23/21) 2 yr ago  (8/23/21)   WBC 4.50 - 11.50 x10(3)/mcL 8.61 8.49 7.7 R 8.7 R 8.33 R 7.3 R    RBC 4.20 - 5.40 x10(6)/mcL 4.07 Low  3.97 Low  4.13 Low  4.05 Low  3.92 Low  R 4.11 Low     Hgb 12.0 - 16.0 g/dL 12.2 11.8 Low  12.1 R 11.8 Low  R 11.5 Low  12.3 R    Hct 37.0 - 47.0 % 37.2 36.0 Low  38.6 36.8 Low  35.6 Low  R 38.5    MCV 80.0 - 94.0 fL 91.4 90.7 93.5 90.9 91 R 93.7    MCH 27.0 - 31.0 pg 30.0 29.7 29.3 29.1 29.3 29.9    MCHC 33.0 - 36.0 g/dL 32.8 Low  32.8 Low  31.3 Low  R 32.1 Low  R 32.3 R 31.9 Low  R    RDW 11.5 - 17.0 % 13.9 13.8 14.0 13.6 13.5 R 13.5    Platelet 130 - 400 x10(3)/mcL 362 293 326 328 300 R 300

## 2024-02-16 NOTE — PROGRESS NOTES
"Subjective:       Patient ID: Christiana Chan is a 73 y.o. female.    Chief Complaint: Headache and Follow-up    Patient presents to the clinic today for chronic condition follow-up.  Doing well, no specific complaints, tolerating all medications, reporting no significant side effects or problems.    Last wellness exam was 11/07/2023.    Chronic conditions being treated include but are not limited to primary hypertension, dyslipidemia, osteoporosis, osteoarthritis.      Review of Systems   Constitutional: Negative.  Negative for fatigue and fever.   HENT: Negative.  Negative for sore throat and trouble swallowing.    Eyes: Negative.  Negative for redness and visual disturbance.   Respiratory: Negative.  Negative for chest tightness and shortness of breath.    Cardiovascular: Negative.  Negative for chest pain.   Gastrointestinal: Negative.  Negative for abdominal pain, blood in stool and nausea.   Endocrine: Negative.  Negative for cold intolerance, heat intolerance and polyuria.   Genitourinary: Negative.    Musculoskeletal: Negative.  Negative for arthralgias, gait problem and joint swelling.   Integumentary:  Negative for rash. Negative.   Neurological: Negative.  Negative for dizziness, weakness and headaches.   Psychiatric/Behavioral: Negative.  Negative for agitation and dysphoric mood.            Patient Care Team:  Cedric Mcclelland MD as PCP - General (Family Medicine)  Ashok Ramirez MD as Consulting Physician (Gastroenterology)  Jessi Tyler as Psychologist (Psychology)  Elvin Mishra MD as Consulting Physician (Neurosurgery)  Justin Schulte MD as Consulting Physician (Endocrinology)  Randolph Callahan DPM as Consulting Physician (Podiatry)  Objective:   Visit Vitals  /73   Pulse 80   Resp 18   Ht 5' 3" (1.6 m)   Wt 59.9 kg (132 lb)   SpO2 99%   BMI 23.38 kg/m²        Physical Exam  Constitutional:       Appearance: Normal appearance.   HENT:      Head: Normocephalic.      Mouth/Throat:    " "  Mouth: Mucous membranes are moist.      Pharynx: Oropharynx is clear.   Eyes:      Conjunctiva/sclera: Conjunctivae normal.      Pupils: Pupils are equal, round, and reactive to light.   Cardiovascular:      Rate and Rhythm: Normal rate and regular rhythm.      Heart sounds: Normal heart sounds.   Pulmonary:      Effort: Pulmonary effort is normal.      Breath sounds: Normal breath sounds.   Abdominal:      General: Abdomen is flat.      Palpations: Abdomen is soft.   Musculoskeletal:         General: Normal range of motion.      Cervical back: Neck supple.   Skin:     General: Skin is warm and dry.   Neurological:      General: No focal deficit present.      Mental Status: She is alert and oriented to person, place, and time.   Psychiatric:         Mood and Affect: Mood normal.         Behavior: Behavior normal.         Thought Content: Thought content normal.         Judgment: Judgment normal.           Lab Results   Component Value Date    GLU 83 02/21/2022     11/29/2023    K 4.1 11/29/2023     02/21/2022    CO2 26 11/29/2023    BUN 10.8 11/29/2023    CREATININE 0.81 11/29/2023    CALCIUM 9.3 11/29/2023    PROT 6.5 02/21/2022    ALBUMIN 3.7 11/29/2023    BILITOT 0.4 11/29/2023    ALKPHOS 41 11/29/2023    AST 18 11/29/2023    ALT 9 11/29/2023    ANIONGAP 10 02/21/2022    ESTGFRAFRICA >60 02/21/2022    EGFRNORACEVR >60 11/29/2023     Lab Results   Component Value Date    WBC 8.61 11/29/2023    RBC 4.07 (L) 11/29/2023    HGB 12.2 11/29/2023    HCT 37.2 11/29/2023    MCV 91.4 11/29/2023    RDW 13.9 11/29/2023     11/29/2023      Lab Results   Component Value Date    CHOL 165 11/02/2023    TRIG 63 11/02/2023    HDL 48 11/02/2023    .00 11/02/2023    TOTALCHOLEST 3 11/02/2023     Lab Results   Component Value Date    TSH 1.177 11/02/2023     Lab Results   Component Value Date    HGBA1C 5.2 11/02/2023        No results found for: "PSA"      Assessment:       ICD-10-CM ICD-9-CM   1. Primary " hypertension  I10 401.9   2. Dyslipidemia  E78.5 272.4   3. Abdominal aortic atherosclerosis  I70.0 440.0   4. Mild intermittent asthma without complication  J45.20 493.90   5. Bipolar II disorder  F31.81 296.89   6. Hx of migraine headaches  Z86.69 V12.49   7. Hypovitaminosis D  E55.9 268.9   8. Age-related osteoporosis without current pathological fracture  M81.0 733.01   9. Chronic combined systolic and diastolic congestive heart failure  I50.42 428.42     428.0   10. Left bundle branch block (LBBB)  I44.7 426.3   11. Peripheral vascular disease, unspecified  I73.9 443.9   12. Abnormal blood chemistry  R79.9 790.6   13. Primary malignant neuroendocrine tumor of ileum  C7A.8 209.03   14. Personal history of malignant carcinoid tumor of small intestine  Z85.060 V10.09   15. Malignant carcinoid tumor of ileum  C7A.012 209.03   16. Anemia due to other cause, not classified  D64.89 285.8   17. Subcutaneous mass of finger of right hand  R22.31 782.2       Current Outpatient Medications   Medication Instructions    acetaminophen (TYLENOL) 500 mg, Oral, Every 6 hours PRN    ALPRAZolam (XANAX) 2 mg, Oral, Nightly    betamethasone valerate 0.1% (VALISONE) 0.1 % Crea As needed (PRN)    butalbital-acetaminophen-caffeine -40 mg (FIORICET, ESGIC) -40 mg per tablet 1 tablet, Oral, Every 4 hours PRN    carvediloL (COREG) 25 mg, Oral, 2 times daily    citalopram (CELEXA) 20 mg, Oral, Nightly    CLENPIQ 10 mg-3.5 gram -12 gram/160 mL Soln 1 kit, Oral    COMBIVENT RESPIMAT  mcg/actuation inhaler 2 puffs, Inhalation, As needed (PRN)    cyanocobalamin 1,000 mcg, Intramuscular, Every 14 days    denosumab (PROLIA) 60 mg/mL Syrg Every 6 months    diclofenac sodium (VOLTAREN) 1 % Gel Topical (Top), As needed (PRN)    dicyclomine (BENTYL) 20 mg, Oral, Every 6 hours PRN    docusate sodium (COLACE) 100 mg, Oral, 2 times daily, 1 TABLET IN THE MORNING AND 1 TABLET AT NIGHT.    ergocalciferol (ERGOCALCIFEROL) 50,000 unit Cap  TAKE 1 CAPSULE BY MOUTH EVERY 14 DAYS    fluticasone propionate (FLONASE) 100 mcg, Each Nostril, Daily    gabapentin (NEURONTIN) 100 mg, Oral    lamoTRIgine (LAMICTAL) 100 mg, Oral, Nightly, AT NIGHT    LIDOcaine (LIDODERM) 5 % 1 patch, Transdermal, Daily    linaclotide (LINZESS ORAL) 1 capsule, Oral, Every other day    lipase-protease-amylase 24,000-76,000-120,000 units (CREON) 24,000-76,000 -120,000 unit capsule 1 capsule, Oral, 3 times daily with meals    lisinopriL (PRINIVIL,ZESTRIL) 10 mg, Oral, Daily    menthol 5 % PtMd 1 patch, Topical    methocarbamoL (ROBAXIN) 750 mg, Oral, 2 times daily    pantoprazole (PROTONIX) 20 mg, Oral, Daily    predniSONE (DELTASONE) 10 mg, Oral    PREMARIN 30 mg, Vaginal, NIGHTLY.    propranoloL (INDERAL) 10 mg, Oral, Daily PRN    rizatriptan (MAXALT) 10 mg, Oral, As needed (PRN)    simethicone (MYLICON) 80 mg, Oral, Every 8 hours PRN    spironolactone (ALDACTONE) 12.5 mg, Oral, Daily    terconazole (TERAZOL 3) 0.8 % vaginal cream 1 applicator, Vaginal    traMADoL (ULTRAM) 50 mg, Oral, Every 6 hours PRN    traZODone (DESYREL) 100 mg, Oral, Nightly    valACYclovir (VALTREX) 1000 MG tablet At the earliest onset of a fever blister, take two tablets by mouth, followed by two more tablets by mouth 12 hours later.     Plan:     Problem List Items Addressed This Visit          Neuro    Hx of migraine headaches (Chronic)    Overview     Prescribed Maxalt 10 mg.    This medical condition is currently stable on prescription medication, continue current treatment plan.            Psychiatric    Bipolar II disorder (Chronic)    Overview     Patient does appear to be seeing a psychologist, Jessi Tyler,  condition appears to be stable.             Pulmonary    Mild intermittent asthma without complication (Chronic)    Overview     Prescribed Combivent, albuterol MDI.    This medical condition is currently stable on prescription medication, continue current treatment plan.             Cardiac/Vascular    Primary hypertension - Primary (Chronic)    Overview     Prescribed lisinopril 10 mg daily (dose decreased on 11/07/2023), Coreg 20 mg b.i.d., spironolactone 12.5 mg daily, propranolol 10 mg daily p.r.n..    Blood pressures appear to be adequately controlled with current treatment plan, including prescription blood pressure medication. Continue medical management and continue home blood pressure checks.  Blood pressure should be checked as discussed during medical visits, and average blood pressure should be no greater than 130/80.         Relevant Orders    Comprehensive Metabolic Panel    CBC Auto Differential    TSH    Dyslipidemia (Chronic)    Overview     Not prescribed any lipid-lowering medication.    Most recent cholesterol/lipid blood testing shows adequate control, continue current treatment plan, including prescription medications if prescribed, and/or diet high in fiber, low in saturated fats as discussed during clinic visit.         Relevant Orders    Lipid Panel    Abdominal aortic atherosclerosis (Chronic)    Overview     From CT of abdomen, December 2022:    Surgical clips are present in the small bowel mesentery.  No retroperitoneal lymphadenopathy or abdominal aortic aneurysm is observed.  Mild calcified atherosclerosis is present in the aorta.  Degenerative changes are present in the spine.  The subcutaneous soft tissues are unremarkable.          Chronic combined systolic and diastolic congestive heart failure (Chronic)    Overview     From echocardiogram April 2019:      Summary    Mildly decreased left ventricular systolic function. The estimated ejection fraction is 45%  Concentric left ventricular hypertrophy.  Grade I (mild) left ventricular diastolic dysfunction consistent with impaired relaxation.  Septal wall has abnormal motion consistent with left bundle branch block.  Mild mitral regurgitation.  Mild left atrial enlargement.  The estimated PA systolic pressure is 22  mm Hg  Normal right ventricular systolic function.  Mild tricuspid regurgitation.  Normal central venous pressure (3 mm Hg).         Peripheral vascular disease, unspecified (Chronic)    Overview     From ultrasound of lower extremities August 2023:    Impressions    Impression - [id number]  The study quality is average.    Impression - QO-RCIQHSAQ-ZO  Less than 30% stenosis in the right Profunda, Popliteal, DPA, and dist PTA.   Impression - QC-HFRQWSAG-BY  Less than 30% stenosis in the left CFA, Profunda, prox SFA, Popliteal, TIMBO, DPA, and dist PTA.   Impression - VM-[id number]-WD  Multiphasic waveforms throughout the bilateral lower extremities.     We will continue to monitor all modifiable risk factors to reduce overall risk of cardiac/cardiovascular disease.         Left bundle branch block (LBBB)    Overview     From recent cardiology documentation seeing Dr. Uriarte in 2023:    ENCOUNTER DIAGNOSIS    ENCOUNTER DIAGNOSIS  Problem Effective Dates Date resolved Problem Status   HTN (hypertension), [SNOMED-CT: 05193084] 4/5/2016 - Active   Cardiomyopathy, unspecified - CMO, [SNOMED-CT: 86705386] 4/5/2016 - Active   LBBB (left bundle branch block) - BBB, [SNOMED-CT: 43703461] 4/5/2016 - Active   Mitral valve (nonrheumatic) insufficiency or regurgitation, [SNOMED-CT: 83753626] 4/5/2016 - Active             Oncology    Other specified anemias (Chronic)    Overview     Mild stable anemia.         Current Assessment & Plan                Component Ref Range & Units 2 mo ago  (11/29/23) 3 mo ago  (11/2/23) 1 yr ago  (10/18/22) 1 yr ago  (6/6/22) 1 yr ago  (2/21/22) 2 yr ago  (8/23/21) 2 yr ago  (8/23/21)   WBC 4.50 - 11.50 x10(3)/mcL 8.61 8.49 7.7 R 8.7 R 8.33 R 7.3 R    RBC 4.20 - 5.40 x10(6)/mcL 4.07 Low  3.97 Low  4.13 Low  4.05 Low  3.92 Low  R 4.11 Low     Hgb 12.0 - 16.0 g/dL 12.2 11.8 Low  12.1 R 11.8 Low  R 11.5 Low  12.3 R    Hct 37.0 - 47.0 % 37.2 36.0 Low  38.6 36.8 Low  35.6 Low  R 38.5    MCV 80.0 - 94.0 fL  91.4 90.7 93.5 90.9 91 R 93.7    MCH 27.0 - 31.0 pg 30.0 29.7 29.3 29.1 29.3 29.9    MCHC 33.0 - 36.0 g/dL 32.8 Low  32.8 Low  31.3 Low  R 32.1 Low  R 32.3 R 31.9 Low  R    RDW 11.5 - 17.0 % 13.9 13.8 14.0 13.6 13.5 R 13.5    Platelet 130 - 400 x10(3)/mcL 362 293 326 328 300 R 300              Personal history of malignant carcinoid tumor of small intestine (Chronic)    Overview     From hematology/oncology note:    6/15/2020 Neuroendocrine tumor of the small bowel status post resection with no evidence of any lymphatic care disease or any metastatic disease with significant issues with the GI problem. She also developed incisional hernia which was repaired about 3 months back. Her main complaints are with the fatigue eating abnormalities.     9/10/2020 69-year-old female with significant GI issues and eventually found have neuroendocrine tumor of the small bowel. She has multifocal neuroendocrine tumor with no evidence of metastatic disease in the lymph nodes are liver. Her main issues are dysmotility and constipation  She has had a postop gallium scan which does not show any evidence of disease. She has gained some weight according to her  she is also able to eat better although and no tremendous change.          Primary malignant neuroendocrine tumor of ileum    Overview     Patient is still undergoing screening for recurrence of this tumor which was removed in 2019, she will be doing her last screening MRI/evaluation this year.    6/15/2020 Neuroendocrine tumor of the small bowel status post resection with no evidence of any lymphatic care disease or any metastatic disease with significant issues with the GI problem. She also developed incisional hernia which was repaired about 3 months back. Her main complaints are with the fatigue eating abnormalities.     9/10/2020 69-year-old female with significant GI issues and eventually found have neuroendocrine tumor of the small bowel. She has multifocal  neuroendocrine tumor with no evidence of metastatic disease in the lymph nodes are liver. Her main issues are dysmotility and constipation  She has had a postop gallium scan which does not show any evidence of disease. She has gained some weight according to her  she is also able to eat better although and no tremendous change.             Endocrine    Age-related osteoporosis without current pathological fracture (Chronic)    Overview     Prescribed Prolia 60 mg injection every six months.  From DEXA scan August 2020:        REASON FOR EXAM: Osteoporosis     COMPARISON: No relevant comparison studies available at the time of  dictation.     FINDINGS: Density measurements of the lumbar spine and bilateral hips  were obtained.     The L spine (L1-L4) BMD is 1.144, this corresponds to a T score of  -0.4     The total left femoral BMD is 0.791, this corresponds to a T score of  -1.7.     The total right femoral BMD is 0.816, this corresponds to T score of  -1.5.         Hypovitaminosis D (Chronic)    Overview     Continue vitamin-D supplementation at current level.         Relevant Orders    Vitamin D       Other    Subcutaneous mass of finger of right hand    Overview     MRI of right hand on 11/27/2023:    Imaging:   X-ray of the right hand three views shows index finger mass especially on the volar side no bony involvement or fractures     MRI of the right index finger shows multilobulated mass over the volar aspect of the index finger wrapping around to the dorsum consistent with giant cell tumor of tendon sheath    Tumor removed successfully without residual problem.          Other Visit Diagnoses       Abnormal blood chemistry        Relevant Orders    Hemoglobin A1C    Malignant carcinoid tumor of ileum  (Chronic)                     I spent 30 minutes face-to-face with the patient, over half in discussion of the diagnosis and the importance of compliance with the treatment plan.    Follow up in about 9  months (around 11/11/2024) for Medicare Wellness.

## 2024-02-26 NOTE — DISCHARGE SUMMARY
[facility] - Periop Services  Discharge Note  Short Stay    Procedure(s) (LRB):  BIOPSY, MASS, HAND TF with [facility] partial knee team (Right)      OUTCOME: Patient tolerated treatment/procedure well without complication and is now ready for discharge.    DISPOSITION: Home or Self Care    FINAL DIAGNOSIS:  Right index finger mass excision    FOLLOWUP: In clinic    DISCHARGE INSTRUCTIONS:    Discharge Procedure Orders   Diet general     Activity as tolerated     Keep surgical extremity elevated     Ice to affected area     Lifting restrictions   Order Comments: No lifting, pushing, pulling with operative extremity     No driving, operating heavy equipment or signing legal documents while taking pain medication.     Other restrictions (specify):   Order Comments: Okay to wean sling as tolerated.     Leave dressing on - Keep it clean, dry, and intact until clinic visit     Call MD for:  temperature >100.4     Call MD for:  persistent nausea and vomiting     Call MD for:  severe uncontrolled pain     Call MD for:  difficulty breathing, headache or visual disturbances     Call MD for:  redness, tenderness, or signs of infection (pain, swelling, redness, odor or green/yellow discharge around incision site)     Call MD for:  hives     Call MD for:  persistent dizziness or light-headedness     Call MD for:  extreme fatigue     Shower on day dressing removed (No bath)        TIME SPENT ON DISCHARGE: 5 minutes

## 2024-03-12 ENCOUNTER — OFFICE VISIT (OUTPATIENT)
Dept: ENDOCRINOLOGY | Facility: CLINIC | Age: 74
End: 2024-03-12
Payer: MEDICARE

## 2024-03-12 VITALS
SYSTOLIC BLOOD PRESSURE: 123 MMHG | HEIGHT: 63 IN | BODY MASS INDEX: 23.04 KG/M2 | WEIGHT: 130 LBS | TEMPERATURE: 98 F | RESPIRATION RATE: 17 BRPM | DIASTOLIC BLOOD PRESSURE: 76 MMHG | HEART RATE: 71 BPM

## 2024-03-12 DIAGNOSIS — M81.0 OSTEOPOROSIS, UNSPECIFIED OSTEOPOROSIS TYPE, UNSPECIFIED PATHOLOGICAL FRACTURE PRESENCE: Primary | ICD-10-CM

## 2024-03-12 PROCEDURE — 99213 OFFICE O/P EST LOW 20 MIN: CPT | Mod: PBBFAC

## 2024-03-12 PROCEDURE — 96372 THER/PROPH/DIAG INJ SC/IM: CPT | Mod: PBBFAC

## 2024-03-12 RX ADMIN — DENOSUMAB 60 MG: 60 INJECTION SUBCUTANEOUS at 09:03

## 2024-03-12 NOTE — PROGRESS NOTES
"[facility] ENDOCRINOLOGY  OUTPATIENT OFFICE VISIT NOTE    SUBJECTIVE:      HPI: Christiana Chan is a 73 y.o. female w/ PMH of Postmenopausal Osteoporosis with history of vertebral fracture now on Prolia, h/o carcinoid tumor, Vitamin D deficiency, lumbar stenosis, hypertension, asthma, migraines, anxiety who presents to clinic for follow up visit.  Last seen 2/2023.      Last DXA scan documented was 8/30/2020 showing osteopenia in left and right femur   Here for Prolia injection, last dose received 8/29/2023   States she is doing well overall, does have some complaints of right hip pain and sciatic pain which is chronic in nature and has been episodic over the last few years since being diagnosed with a bulging disc.  States she occasionally takes tylenol for the pain but otherwise manages it well with stretching exercises.   Denies any other bone pains, fractures, falls, jaw symptoms    ROS:  (+)fatigue  (-) Chest pain, palpitations, SOB, syncope, fever, chills, abdominal pain, vomiting, diarrhea, dysuria, bleeding    OBJECTIVE:     Vital signs:   /76 (BP Location: Right arm, Patient Position: Sitting, BP Method: Medium (Automatic))   Pulse 71   Temp 98.1 °F (36.7 °C)   Resp 17   Ht 5' 3" (1.6 m)   Wt 59 kg (130 lb)   BMI 23.03 kg/m²      Physical Examination:  General:  Well-nourished, well-developed, no acute distress, on room air  HEENT: Normocephalic, atraumatic. EOMI.  MMM  Neck: Supple. No obvious JVD.  Normal range of motion.  Respiratory: CTAB.  No obvious crackles wheeze or rhonchi.    Cardiovascular:  RRR.  No obvious M/G/R. Warm extremities.  Peripheral pulses intact.  No edema.  Gastrointestinal:  Soft, nontender, nondistended.  Normal bowel sounds.  Neurologic: ANOx3.  Answering questions/following commands appropriately.  No FND      Current Outpatient Medications:     acetaminophen (TYLENOL) 500 MG tablet, Take 500 mg by mouth every 6 (six) hours as needed for Pain., Disp: , Rfl:     " ALPRAZolam (XANAX) 2 MG Tab, Take 2 mg by mouth nightly., Disp: , Rfl:     butalbital-acetaminophen-caffeine -40 mg (FIORICET, ESGIC) -40 mg per tablet, Take 1 tablet by mouth every 4 (four) hours as needed for Pain., Disp: , Rfl:     carvedilol (COREG) 25 MG tablet, Take 25 mg by mouth 2 (two) times daily., Disp: , Rfl:     citalopram (CELEXA) 20 MG tablet, Take 20 mg by mouth every evening., Disp: , Rfl:     CLENPIQ 10 mg-3.5 gram -12 gram/160 mL Soln, Take 1 kit by mouth., Disp: , Rfl:     cyanocobalamin 1,000 mcg/mL injection, Inject 1,000 mcg into the muscle every 14 (fourteen) days., Disp: , Rfl:     denosumab (PROLIA) 60 mg/mL Syrg, every 6 (six) months., Disp: , Rfl:     diclofenac sodium (VOLTAREN) 1 % Gel, Apply topically as needed., Disp: , Rfl:     dicyclomine (BENTYL) 20 mg tablet, Take 20 mg by mouth every 6 (six) hours as needed., Disp: , Rfl:     docusate sodium (COLACE) 100 MG capsule, Take 100 mg by mouth 2 (two) times daily. 1 TABLET IN THE MORNING AND 1 TABLET AT NIGHT., Disp: , Rfl:     gabapentin (NEURONTIN) 100 MG capsule, Take 100 mg by mouth., Disp: , Rfl:     lamoTRIgine (LAMICTAL) 100 MG tablet, Take 100 mg by mouth every evening. AT NIGHT, Disp: , Rfl:     LIDOcaine (LIDODERM) 5 %, Place 1 patch onto the skin once daily., Disp: , Rfl:     linaclotide (LINZESS ORAL), Take 1 capsule by mouth every other day., Disp: , Rfl:     lisinopril (PRINIVIL,ZESTRIL) 20 MG tablet, Take 10 mg by mouth Daily., Disp: , Rfl:     menthol 5 % PtMd, Apply 1 patch topically., Disp: , Rfl:     methocarbamoL (ROBAXIN) 750 MG Tab, Take 750 mg by mouth 2 (two) times daily., Disp: , Rfl:     pantoprazole (PROTONIX) 20 MG tablet, Take 20 mg by mouth once daily., Disp: , Rfl:     predniSONE (DELTASONE) 10 MG tablet, Take 10 mg by mouth., Disp: , Rfl:     PREMARIN vaginal cream, Place 30 mg vaginally. NIGHTLY., Disp: , Rfl:     propranolol (INDERAL) 10 MG tablet, Take 10 mg by mouth daily as needed., Disp:  , Rfl:     spironolactone (ALDACTONE) 25 MG tablet, Take 12.5 mg by mouth once daily., Disp: , Rfl:     terconazole (TERAZOL 3) 0.8 % vaginal cream, Place 1 applicator vaginally., Disp: , Rfl:     traMADoL (ULTRAM) 50 mg tablet, Take 50 mg by mouth every 6 (six) hours as needed., Disp: , Rfl:     traZODone (DESYREL) 100 MG tablet, Take 100 mg by mouth nightly., Disp: , Rfl:     betamethasone valerate 0.1% (VALISONE) 0.1 % Crea, as needed., Disp: , Rfl:     COMBIVENT RESPIMAT  mcg/actuation inhaler, Inhale 2 puffs into the lungs as needed for Shortness of Breath., Disp: 4 g, Rfl: 5    ergocalciferol (ERGOCALCIFEROL) 50,000 unit Cap, TAKE 1 CAPSULE BY MOUTH EVERY 14 DAYS, Disp: 6 capsule, Rfl: 0    fluticasone propionate (FLONASE) 50 mcg/actuation nasal spray, 2 sprays (100 mcg total) by Each Nostril route once daily. (Patient taking differently: 2 sprays by Each Nostril route as needed.), Disp: 18.2 mL, Rfl: 0    lipase-protease-amylase 24,000-76,000-120,000 units (CREON) 24,000-76,000 -120,000 unit capsule, Take 1 capsule by mouth 3 (three) times daily with meals., Disp: 90 capsule, Rfl: 11    rizatriptan (MAXALT) 10 MG tablet, Take 1 tablet (10 mg total) by mouth as needed for Migraine (one at onset of headache, then a second 2 hours later if necessary)., Disp: 6 tablet, Rfl: 5    simethicone (MYLICON) 80 MG chewable tablet, Take 1 tablet (80 mg total) by mouth every 8 (eight) hours as needed for Flatulence., Disp: 30 tablet, Rfl: 0    valACYclovir (VALTREX) 1000 MG tablet, At the earliest onset of a fever blister, take two tablets by mouth, followed by two more tablets by mouth 12 hours later., Disp: 10 tablet, Rfl: 5    Current Facility-Administered Medications:     denosumab (PROLIA) injection 60 mg, 60 mg, Subcutaneous, Q6 Months, Marlen Santamaria MD     ASSESSMENT & PLAN:     71 y.o. female w/ PMH of Postmenopausal Osteoporosis with history of vertebral fracture now on Prolia, h/o carcinoid tumor,  Vitamin D deficiency, lumbar stenosis, hypertension, asthma, migraines, anxiety who presents to clinic for follow up visit.    -providing Prolia injection today, continue q6 months   -has completed approx. 9 years of medication therapy   -ordered BMP and Vit D level prior to next visit  -continue vitamin D supplementation   -DXA scan ordered prior to next visit for continued monitoring   -RTC in 6 mnths for repeat Prolia injection     RTC 1 year for MD f/u     Marlen Santamaria MD  [facility], PGY-2

## 2024-04-03 ENCOUNTER — TELEPHONE (OUTPATIENT)
Dept: PRIMARY CARE CLINIC | Facility: CLINIC | Age: 74
End: 2024-04-03
Payer: MEDICARE

## 2024-04-03 NOTE — TELEPHONE ENCOUNTER
----- Message from Mindi Jimenez sent at 4/3/2024  7:19 AM CDT -----  Regarding: advice  Type:  Needs Medical Advice    Who Called: pt  Symptoms (please be specific): left side pain of neck to butt more by rib cage  How long has patient had these symptoms:  1 week  Pharmacy name and phone #:  [facility] [email or web address]  Would the patient rather a call back or a response via [facility]? C/b  Best Call Back Number: [phone]    Additional Information: pt  has tried OTC medications which has not helped

## 2024-04-03 NOTE — TELEPHONE ENCOUNTER
Right side torso pain. Pt told to go to [facility] due to no availability on pcp schedule. Pt stated she will go to [facility] and if not better she will call back

## 2024-04-22 RX ORDER — ERGOCALCIFEROL 1.25 MG/1
CAPSULE ORAL
Qty: 6 CAPSULE | Refills: 1 | Status: SHIPPED | OUTPATIENT
Start: 2024-04-22

## 2024-04-22 NOTE — TELEPHONE ENCOUNTER
Last visit in [facility] ENDOCRINOLOGY: 3/12/2024    Patient's next visit in [facility] ENDOCRINOLOGY: 9/16/2024

## 2024-06-11 ENCOUNTER — TELEPHONE (OUTPATIENT)
Dept: PRIMARY CARE CLINIC | Facility: CLINIC | Age: 74
End: 2024-06-11
Payer: MEDICARE

## 2024-06-11 NOTE — TELEPHONE ENCOUNTER
Pt called and stated she's been having issues with swallowing, something as simple as water. Pt stated she's been choking to the point of losing her breathe. Pt would like a call back to see if its something she should do or why this may be occurring. Pt did accept appt for 6/18 @ 2:15

## 2024-06-12 ENCOUNTER — TELEPHONE (OUTPATIENT)
Dept: PRIMARY CARE CLINIC | Facility: CLINIC | Age: 74
End: 2024-06-12
Payer: MEDICARE

## 2024-06-12 NOTE — TELEPHONE ENCOUNTER
Patient called stating she's been having pain on her left side. Wanting medical advice from nurse. Patient wants nurse to give her a call as soon as possible [phone]. She also have a upcoming appt on the June 18 patient is reminded.

## 2024-06-13 DIAGNOSIS — R13.14 DYSPHAGIA, PHARYNGOESOPHAGEAL PHASE: Primary | ICD-10-CM

## 2024-06-13 NOTE — PROGRESS NOTES
Subjective:       Patient ID: Christiana Chan is a 73 y.o. female.    Chief Complaint: Annual Exam    Established patient, presents to the clinic to discuss issues with difficulty swallowing, affecting her breathing.  Notably, she does have a swallowing study that was scheduled by her gastroenterologist office to be done fairly soon.    Her story is at a couple of times over the past year, rather infrequently she has had episodes where she had either water in her mouth, or in one instance she was eating watermelon when essentially she choked.  It is quite possible that the juice from the watermelon or the water in her mouth leaked into her tracheal/laryngeal area, causing her to choke, and have some difficulty reading for a brief period of time.  On a recent occasion, her  did the Heimlich maneuver, nothing came up, she just belched and felt better.  She contacted her gastroenterologist office, Dr. Chase, and a swallowing study was ordered, that is to be done in the next week or so.  Otherwise, she does not have any other symptoms, she has no issues with swallowing solid food, or even swallowing liquids.      Review of Systems   Constitutional: Negative.  Negative for fatigue and fever.   HENT:  Negative for sore throat and trouble swallowing.    Eyes: Negative.  Negative for redness and visual disturbance.   Respiratory:  Positive for choking. Negative for chest tightness.    Cardiovascular: Negative.  Negative for chest pain.   Gastrointestinal: Negative.  Negative for abdominal pain, blood in stool and nausea.   Endocrine: Negative.  Negative for cold intolerance, heat intolerance and polyuria.   Genitourinary: Negative.    Musculoskeletal: Negative.  Negative for arthralgias, gait problem and joint swelling.   Integumentary:  Negative for rash. Negative.   Neurological: Negative.  Negative for dizziness, weakness and headaches.   Psychiatric/Behavioral: Negative.  Negative for agitation and dysphoric  "mood.            Patient Care Team:  Cedric Mcclelland MD as PCP - General (Family Medicine)  Ashok Ramirez MD as Consulting Physician (Gastroenterology)  Jessi Tyler as Psychologist (Psychology)  Elvin Mishra MD as Consulting Physician (Neurosurgery)  Justin Schulte MD as Consulting Physician (Endocrinology)  Randolph Callahan DPM as Consulting Physician (Podiatry)  Objective:     Vitals:    06/14/24 0901   BP: 111/73   Pulse: 68   Resp: 18   SpO2: 98%   Weight: 60.3 kg (133 lb)   Height: 5' 3" (1.6 m)   Body mass index is 23.56 kg/m².     Physical Exam  Constitutional:       Appearance: Normal appearance.   Eyes:      Conjunctiva/sclera: Conjunctivae normal.   Cardiovascular:      Rate and Rhythm: Normal rate.   Pulmonary:      Effort: Pulmonary effort is normal.   Skin:     General: Skin is warm and dry.   Neurological:      Mental Status: She is alert.   Psychiatric:         Mood and Affect: Mood normal.         Behavior: Behavior normal.           Assessment:       ICD-10-CM ICD-9-CM   1. History of choking  Z87.898 V13.89       Current Outpatient Medications   Medication Instructions    acetaminophen (TYLENOL) 500 mg, Oral, Every 6 hours PRN    albuterol (PROVENTIL/VENTOLIN HFA) 90 mcg/actuation inhaler 1 puff, Inhalation, Every 6 hours PRN    ALPRAZolam (XANAX) 2 mg, Oral, Nightly    betamethasone valerate 0.1% (VALISONE) 0.1 % Crea As needed (PRN)    butalbital-acetaminophen-caffeine -40 mg (FIORICET, ESGIC) -40 mg per tablet 1 tablet, Oral, Every 4 hours PRN    carvediloL (COREG) 25 mg, Oral, 2 times daily    citalopram (CELEXA) 20 mg, Oral, Nightly    CLENPIQ 10 mg-3.5 gram -12 gram/160 mL Soln 1 kit, Oral    COMBIVENT RESPIMAT  mcg/actuation inhaler 2 puffs, Inhalation, As needed (PRN)    cyanocobalamin 1,000 mcg, Intramuscular, Every 14 days    denosumab (PROLIA) 60 mg/mL Syrg Every 6 months    diclofenac sodium (VOLTAREN) 1 % Gel Topical (Top), As needed (PRN)    dicyclomine " (BENTYL) 20 mg, Oral, Every 6 hours PRN    docusate sodium (COLACE) 100 mg, Oral, 2 times daily, 1 TABLET IN THE MORNING AND 1 TABLET AT NIGHT.    ergocalciferol (ERGOCALCIFEROL) 50,000 unit Cap TAKE 1 CAPSULE BY MOUTH EVERY 14 DAYS    fluticasone propionate (FLONASE) 100 mcg, Each Nostril, Daily    gabapentin (NEURONTIN) 100 mg, Oral    lamoTRIgine (LAMICTAL) 100 mg, Oral, Nightly, AT NIGHT    LIDOcaine (LIDODERM) 5 % 1 patch, Transdermal, Daily    linaclotide (LINZESS ORAL) 1 capsule, Oral, Every other day    lipase-protease-amylase 24,000-76,000-120,000 units (CREON) 24,000-76,000 -120,000 unit capsule 1 capsule, Oral, 3 times daily with meals    lisinopriL (PRINIVIL,ZESTRIL) 10 mg, Oral, Daily    menthol 5 % PtMd 1 patch, Topical    methocarbamoL (ROBAXIN) 750 mg, Oral, 2 times daily    pantoprazole (PROTONIX) 20 mg, Oral, Daily    predniSONE (DELTASONE) 10 mg, Oral    PREMARIN 30 mg, Vaginal, NIGHTLY.    propranoloL (INDERAL) 10 mg, Oral, Daily PRN    rizatriptan (MAXALT) 10 mg, Oral, As needed (PRN)    simethicone (MYLICON) 80 mg, Oral, Every 8 hours PRN    spironolactone (ALDACTONE) 12.5 mg, Oral, Daily    terconazole (TERAZOL 3) 0.8 % vaginal cream 1 applicator, Vaginal    traMADoL (ULTRAM) 50 mg, Oral, Every 6 hours PRN    traZODone (DESYREL) 100 mg, Oral, Nightly    valACYclovir (VALTREX) 1000 MG tablet At the earliest onset of a fever blister, take two tablets by mouth, followed by two more tablets by mouth 12 hours later.     Plan:     Problem List Items Addressed This Visit          Other    History of choking - Primary    Overview     In my opinion, these are episodes where she has some type of liquid in her mouth, and that liquids goes down into her laryngeal space with that her swallowing.  It does cause some laryngeal spasm, and choking for a brief period of time.  She will still do the swallowing study, but I do not hear anything that concerns me significantly.  Certainly, this will be evaluated by  her gastroenterologist if the tests show anything abnormal, or if she continues to have issues.                    Follow up for next appointment as scheduled or as needed.

## 2024-06-14 ENCOUNTER — OFFICE VISIT (OUTPATIENT)
Dept: PRIMARY CARE CLINIC | Facility: CLINIC | Age: 74
End: 2024-06-14
Payer: MEDICARE

## 2024-06-14 VITALS
HEART RATE: 68 BPM | SYSTOLIC BLOOD PRESSURE: 111 MMHG | DIASTOLIC BLOOD PRESSURE: 73 MMHG | WEIGHT: 133 LBS | BODY MASS INDEX: 23.57 KG/M2 | HEIGHT: 63 IN | RESPIRATION RATE: 18 BRPM | OXYGEN SATURATION: 98 %

## 2024-06-14 DIAGNOSIS — Z87.898 HISTORY OF CHOKING: Primary | ICD-10-CM

## 2024-06-14 RX ORDER — ALBUTEROL SULFATE 90 UG/1
1 AEROSOL, METERED RESPIRATORY (INHALATION) EVERY 6 HOURS PRN
COMMUNITY

## 2024-06-18 ENCOUNTER — HOSPITAL ENCOUNTER (OUTPATIENT)
Dept: RADIOLOGY | Facility: HOSPITAL | Age: 74
Discharge: HOME | End: 2024-06-18
Attending: NURSE PRACTITIONER
Payer: MEDICARE

## 2024-06-18 DIAGNOSIS — R13.14 DYSPHAGIA, PHARYNGOESOPHAGEAL PHASE: ICD-10-CM

## 2024-06-18 PROCEDURE — 25500020 PHARM REV CODE 255: Performed by: NURSE PRACTITIONER

## 2024-06-18 PROCEDURE — A9698 NON-RAD CONTRAST MATERIALNOC: HCPCS | Performed by: NURSE PRACTITIONER

## 2024-06-18 PROCEDURE — 74220 X-RAY XM ESOPHAGUS 1CNTRST: CPT | Mod: TC

## 2024-06-18 RX ADMIN — BARIUM SULFATE 100 ML: 980 POWDER, FOR SUSPENSION ORAL at 08:06

## 2024-06-18 RX ADMIN — BARIUM SULFATE 100 ML: 0.6 SUSPENSION ORAL at 08:06

## 2024-09-04 ENCOUNTER — TELEPHONE (OUTPATIENT)
Dept: PRIMARY CARE CLINIC | Facility: CLINIC | Age: 74
End: 2024-09-04
Payer: MEDICARE

## 2024-09-04 DIAGNOSIS — R06.02 SOB (SHORTNESS OF BREATH): Primary | ICD-10-CM

## 2024-09-04 RX ORDER — ALBUTEROL SULFATE 90 UG/1
1 INHALANT RESPIRATORY (INHALATION) EVERY 6 HOURS PRN
Qty: 18 G | Refills: 3 | Status: SHIPPED | OUTPATIENT
Start: 2024-09-04

## 2024-09-04 NOTE — TELEPHONE ENCOUNTER
----- Message from Maryellen Mederos sent at 9/4/2024  8:28 AM CDT -----  Regarding: refill  Who Called: Christiana Chan    Refill or New Rx:Refill  RX Name and Strength:albuterol (PROVENTIL/VENTOLIN HFA) 90 mcg/actuation inhaler   How is the patient currently taking it? (ex. 1XDay):  Is this a 30 day or 90 day RX:  Local or Mail Order:  List of preferred pharmacies on file (remove unneeded): [facility] [place]  If different Pharmacy is requested, enter Pharmacy information here including location and phone number:    Ordering Provider:    Preferred Method of Contact: Phone Call  Patient's Preferred Phone Number on File: [phone]   Best Call Back Number, if different:  Additional Information:

## 2024-09-23 ENCOUNTER — CLINICAL SUPPORT (OUTPATIENT)
Dept: ENDOCRINOLOGY | Facility: CLINIC | Age: 74
End: 2024-09-23
Payer: MEDICARE

## 2024-09-23 DIAGNOSIS — M81.0 OSTEOPOROSIS, UNSPECIFIED OSTEOPOROSIS TYPE, UNSPECIFIED PATHOLOGICAL FRACTURE PRESENCE: Primary | ICD-10-CM

## 2024-09-23 PROCEDURE — 96372 THER/PROPH/DIAG INJ SC/IM: CPT | Mod: PBBFAC

## 2024-09-23 RX ADMIN — DENOSUMAB 60 MG: 60 INJECTION SUBCUTANEOUS at 09:09

## 2024-09-24 ENCOUNTER — TELEPHONE (OUTPATIENT)
Dept: PRIMARY CARE CLINIC | Facility: CLINIC | Age: 74
End: 2024-09-24
Payer: MEDICARE

## 2024-09-24 NOTE — TELEPHONE ENCOUNTER
Pt stated she would like a call back in regards to symptoms she's been having. [facility] and is still not feeling well.

## 2024-09-26 RX ORDER — ERGOCALCIFEROL 1.25 MG/1
CAPSULE ORAL
Qty: 6 CAPSULE | Refills: 1 | Status: SHIPPED | OUTPATIENT
Start: 2024-09-26

## 2024-09-26 NOTE — PROGRESS NOTES
"Subjective:       Patient ID: Christiana Chan is a 73 y.o. female.    Chief Complaint: No chief complaint on file.    Established patient, presents to the clinic to discuss coughing and congestion, with a recent pneumonia diagnosis.    She has been ill for about a week, has been seen twice a day urgent care, given a mild steroid injection, prednisone for few days, doxycycline initially, and Levaquin currently.  Diagnosed with possible pneumonia.  Over-the-counter cough suppressant medication is helping.  She is using a bronchodilator, also helping a bit.  She is not wheezing, not short of breath.  No fever, but still complains of ongoing fatigue.      Review of Systems   Constitutional:  Positive for fatigue. Negative for activity change, chills and fever.   HENT:  Positive for postnasal drip. Negative for sinus pressure/congestion and sore throat.    Respiratory:  Positive for cough. Negative for shortness of breath.    Cardiovascular:  Negative for chest pain.   Gastrointestinal:  Negative for nausea and vomiting.           Patient Care Team:  Cedric Mcclelland MD as PCP - General (Family Medicine)  Ashok Ramirez MD as Consulting Physician (Gastroenterology)  Jessi Tyler as Psychologist (Psychology)  Elvin Mishra MD as Consulting Physician (Neurosurgery)  Justin Schulte MD as Consulting Physician (Endocrinology)  Randolph Callahan DPM as Consulting Physician (Podiatry)  Objective:     Vitals:    09/27/24 0826   BP: 104/65   Pulse: 80   Resp: 18   SpO2: 99%   Weight: 58.5 kg (129 lb)   Height: 5' 3" (1.6 m)   Body mass index is 22.85 kg/m².     Physical Exam  Constitutional:       Appearance: Normal appearance.   Eyes:      Conjunctiva/sclera: Conjunctivae normal.   Cardiovascular:      Rate and Rhythm: Normal rate and regular rhythm.   Pulmonary:      Effort: Pulmonary effort is normal.      Breath sounds: Normal breath sounds.   Skin:     General: Skin is warm and dry.   Neurological:      Mental " Status: She is alert.   Psychiatric:         Mood and Affect: Mood normal.         Behavior: Behavior normal.           Assessment:       ICD-10-CM ICD-9-CM   1. Sinobronchitis  J32.9 473.9    J40 490   2. Paroxysmal atrial fibrillation  I48.0 427.31       Current Outpatient Medications   Medication Instructions    acetaminophen (TYLENOL) 500 mg, Oral, Every 6 hours PRN    albuterol (PROVENTIL/VENTOLIN HFA) 90 mcg/actuation inhaler 1 puff, Inhalation, Every 6 hours PRN    ALPRAZolam (XANAX) 2 mg, Oral, Nightly    azelastine (ASTELIN) 137 mcg (0.1 %) nasal spray 1 spray, Nasal, 2 times daily    betamethasone valerate 0.1% (VALISONE) 0.1 % Crea As needed (PRN)    butalbital-acetaminophen-caffeine -40 mg (FIORICET, ESGIC) -40 mg per tablet 1 tablet, Oral, Every 4 hours PRN    carvediloL (COREG) 25 mg, Oral, 2 times daily    citalopram (CELEXA) 20 mg, Oral, Nightly    CLENPIQ 10 mg-3.5 gram -12 gram/160 mL Soln 1 kit, Oral    COMBIVENT RESPIMAT  mcg/actuation inhaler 2 puffs, Inhalation, As needed (PRN)    cyanocobalamin 1,000 mcg, Intramuscular, Every 14 days    denosumab (PROLIA) 60 mg/mL Syrg Every 6 months    diclofenac sodium (VOLTAREN) 1 % Gel Topical (Top), As needed (PRN)    dicyclomine (BENTYL) 20 mg, Oral, Every 6 hours PRN    docusate sodium (COLACE) 100 mg, Oral, 2 times daily, 1 TABLET IN THE MORNING AND 1 TABLET AT NIGHT.    ergocalciferol (ERGOCALCIFEROL) 50,000 unit Cap TAKE 1 CAPSULE BY MOUTH EVERY 14 DAYS    fluticasone propionate (FLONASE) 100 mcg, Each Nostril, Daily    gabapentin (NEURONTIN) 100 mg, Oral    lamoTRIgine (LAMICTAL) 100 mg, Oral, Nightly, AT NIGHT    lansoprazole (PREVACID) 30 mg, Oral, Daily    LIDOcaine (LIDODERM) 5 % 1 patch, Transdermal, Daily    linaclotide (LINZESS ORAL) 1 capsule, Oral, Every other day    lipase-protease-amylase 24,000-76,000-120,000 units (CREON) 24,000-76,000 -120,000 unit capsule 1 capsule, Oral, 3 times daily with meals    lisinopriL  (PRINIVIL,ZESTRIL) 10 mg, Oral, Daily    menthol 5 % PtMd 1 patch, Topical    methocarbamoL (ROBAXIN) 750 mg, Oral, 2 times daily    ondansetron (ZOFRAN-ODT) 8 mg, Oral, Every 4 hours PRN    pantoprazole (PROTONIX) 20 mg, Oral, Daily    PREMARIN 30 mg, Vaginal, NIGHTLY.    propranoloL (INDERAL) 10 mg, Oral, Daily PRN    rizatriptan (MAXALT) 10 mg, Oral, As needed (PRN)    simethicone (MYLICON) 80 mg, Oral, Every 8 hours PRN    sodium chloride for inhalation (SODIUM CHLORIDE 0.9%) 0.9 % nebulizer solution 3 mLs, Nebulization, As needed (PRN)    spironolactone (ALDACTONE) 12.5 mg, Oral, Daily    terconazole (TERAZOL 3) 0.8 % vaginal cream 1 applicator, Vaginal    traMADoL (ULTRAM) 50 mg, Oral, Every 6 hours PRN    traZODone (DESYREL) 100 mg, Oral, Nightly    valACYclovir (VALTREX) 1000 MG tablet At the earliest onset of a fever blister, take two tablets by mouth, followed by two more tablets by mouth 12 hours later.     Plan:     Problem List Items Addressed This Visit          ENT    Sinobronchitis - Primary    Overview     Her clinical presentation would strongly suggest that this was initially a viral infection, multiple ill family members, widespread symptoms.  Therefore, the antibiotics were probably not necessary.  However, she may continue and finish the Levaquin.    Patient very much requested to have something to help her improve, I gave her a low-dose Celestone injection.  Otherwise, it will take time for her to recover completely, she should continue to stay hydrated, she may slowly resume her normal level of activity, and she will contact this clinic or return for any further problems.         Relevant Medications    betamethasone acetate-betamethasone sodium phosphate injection 6 mg       Cardiac/Vascular    Paroxysmal atrial fibrillation (Chronic)    Overview     History of paroxysmal AFib, sees Cardiology.    Patient has a history of cardiac/cardiovascular disease.  Patient does see a cardiologist on a  regular basis, cardiovascular condition currently appears to be stable, with no signs of decompensation.  Patient will continue regular cardiology follow-up.         Current Assessment & Plan     Her current pulmonary issues has not affected her cardiac function or rhythm, she is in sinus rhythm today.                    Follow up for next appointment as scheduled or as needed.    This note was created with the assistance of [employer] voice recognition software or phone dictation. There may be transcription errors as a result of using this technology. Effort has been made to assure accuracy of transcription but any obvious errors or omissions should be clarified with the author of the document.

## 2024-09-27 ENCOUNTER — OFFICE VISIT (OUTPATIENT)
Dept: PRIMARY CARE CLINIC | Facility: CLINIC | Age: 74
End: 2024-09-27
Payer: MEDICARE

## 2024-09-27 VITALS
SYSTOLIC BLOOD PRESSURE: 104 MMHG | OXYGEN SATURATION: 99 % | RESPIRATION RATE: 18 BRPM | HEART RATE: 80 BPM | WEIGHT: 129 LBS | BODY MASS INDEX: 22.86 KG/M2 | HEIGHT: 63 IN | DIASTOLIC BLOOD PRESSURE: 65 MMHG

## 2024-09-27 DIAGNOSIS — I48.0 PAROXYSMAL ATRIAL FIBRILLATION: Chronic | ICD-10-CM

## 2024-09-27 DIAGNOSIS — J32.9 SINOBRONCHITIS: Primary | ICD-10-CM

## 2024-09-27 DIAGNOSIS — J40 SINOBRONCHITIS: Primary | ICD-10-CM

## 2024-09-27 PROBLEM — M81.0 MENOPAUSAL OSTEOPOROSIS: Status: ACTIVE | Noted: 2024-09-27

## 2024-09-27 RX ORDER — BETAMETHASONE SODIUM PHOSPHATE AND BETAMETHASONE ACETATE 3; 3 MG/ML; MG/ML
6 INJECTION, SUSPENSION INTRA-ARTICULAR; INTRALESIONAL; INTRAMUSCULAR; SOFT TISSUE
Status: COMPLETED | OUTPATIENT
Start: 2024-09-27 | End: 2024-09-27

## 2024-09-27 RX ORDER — SODIUM CHLORIDE FOR INHALATION 0.9 %
3 VIAL, NEBULIZER (ML) INHALATION
COMMUNITY
Start: 2024-09-17

## 2024-09-27 RX ORDER — LANSOPRAZOLE 30 MG/1
30 CAPSULE, DELAYED RELEASE ORAL DAILY
COMMUNITY

## 2024-09-27 RX ORDER — AZELASTINE 1 MG/ML
1 SPRAY, METERED NASAL 2 TIMES DAILY
COMMUNITY
Start: 2024-09-17

## 2024-09-27 RX ORDER — ONDANSETRON 8 MG/1
8 TABLET, ORALLY DISINTEGRATING ORAL EVERY 4 HOURS PRN
COMMUNITY
Start: 2024-08-14

## 2024-09-27 RX ADMIN — BETAMETHASONE SODIUM PHOSPHATE AND BETAMETHASONE ACETATE 6 MG: 3; 3 INJECTION, SUSPENSION INTRA-ARTICULAR; INTRALESIONAL; INTRAMUSCULAR; SOFT TISSUE at 09:09

## 2024-09-27 NOTE — ASSESSMENT & PLAN NOTE
Her current pulmonary issues has not affected her cardiac function or rhythm, she is in sinus rhythm today.

## 2024-10-10 ENCOUNTER — TELEPHONE (OUTPATIENT)
Dept: PRIMARY CARE CLINIC | Facility: CLINIC | Age: 74
End: 2024-10-10
Payer: MEDICARE

## 2024-10-10 NOTE — TELEPHONE ENCOUNTER
Pt has a home Covid test and will call me back with the results. Pt  tested positive for covid today. Pt was told if her test is negative to go to the [facility] tomorrow to get tested and for treatment.

## 2024-10-10 NOTE — TELEPHONE ENCOUNTER
----- Message from Mariana sent at 10/10/2024 11:56 AM CDT -----  Regarding: advice  Who Called: Christiana Chan    Caller is requesting assistance/information from provider's office.    Symptoms (please be specific): exposure to COVID, throat dryness, ear pain.    How long has patient had these symptoms:  ongoing  List of preferred pharmacies on file (remove unneeded): [unfilled]  If different, enter pharmacy into here including location and phone number:         Preferred Method of Contact: Phone Call  Patient's Preferred Phone Number on File: [phone]   Best Call Back Number, if different:  Additional Information: Pt states that her  tested positive for COVID on today and she'd like to know if she should be seen in the office or Urgent care; please advise.

## 2024-10-30 NOTE — TELEPHONE ENCOUNTER
Virtual appt rescheduled with pt today, 4/20/20, at 12pm.  Pt acknowledged understanding.    No previous uterine incision/Manzo Pregnancy/Less than or equal to 4 previous vaginal births/No known bleeding disorder

## 2024-11-08 ENCOUNTER — LAB VISIT (OUTPATIENT)
Dept: LAB | Facility: HOSPITAL | Age: 74
End: 2024-11-08
Attending: GENERAL PRACTICE
Payer: MEDICARE

## 2024-11-08 DIAGNOSIS — E55.9 HYPOVITAMINOSIS D: Chronic | ICD-10-CM

## 2024-11-08 DIAGNOSIS — E78.5 DYSLIPIDEMIA: Chronic | ICD-10-CM

## 2024-11-08 DIAGNOSIS — I10 PRIMARY HYPERTENSION: ICD-10-CM

## 2024-11-08 DIAGNOSIS — R79.9 ABNORMAL BLOOD CHEMISTRY: ICD-10-CM

## 2024-11-08 LAB
25(OH)D3+25(OH)D2 SERPL-MCNC: 28 NG/ML (ref 30–80)
ALBUMIN SERPL-MCNC: 3.4 G/DL (ref 3.4–4.8)
ALBUMIN/GLOB SERPL: 1.1 RATIO (ref 1.1–2)
ALP SERPL-CCNC: 47 UNIT/L (ref 40–150)
ALT SERPL-CCNC: 7 UNIT/L (ref 0–55)
ANION GAP SERPL CALC-SCNC: 7 MEQ/L
AST SERPL-CCNC: 12 UNIT/L (ref 5–34)
BASOPHILS # BLD AUTO: 0.04 X10(3)/MCL
BASOPHILS NFR BLD AUTO: 0.5 %
BILIRUB SERPL-MCNC: 0.3 MG/DL
BUN SERPL-MCNC: 8.5 MG/DL (ref 9.8–20.1)
CALCIUM SERPL-MCNC: 8.2 MG/DL (ref 8.4–10.2)
CHLORIDE SERPL-SCNC: 109 MMOL/L (ref 98–107)
CHOLEST SERPL-MCNC: 178 MG/DL
CHOLEST/HDLC SERPL: 3 {RATIO} (ref 0–5)
CO2 SERPL-SCNC: 24 MMOL/L (ref 23–31)
CREAT SERPL-MCNC: 0.97 MG/DL (ref 0.55–1.02)
CREAT/UREA NIT SERPL: 9
EOSINOPHIL # BLD AUTO: 0.07 X10(3)/MCL (ref 0–0.9)
EOSINOPHIL NFR BLD AUTO: 0.8 %
ERYTHROCYTE [DISTWIDTH] IN BLOOD BY AUTOMATED COUNT: 14.5 % (ref 11.5–17)
EST. AVERAGE GLUCOSE BLD GHB EST-MCNC: 111.2 MG/DL
GFR SERPLBLD CREATININE-BSD FMLA CKD-EPI: >60 ML/MIN/1.73/M2
GLOBULIN SER-MCNC: 3 GM/DL (ref 2.4–3.5)
GLUCOSE SERPL-MCNC: 77 MG/DL (ref 82–115)
HBA1C MFR BLD: 5.5 %
HCT VFR BLD AUTO: 34.6 % (ref 37–47)
HDLC SERPL-MCNC: 51 MG/DL (ref 35–60)
HGB BLD-MCNC: 10.9 G/DL (ref 12–16)
IMM GRANULOCYTES # BLD AUTO: 0.04 X10(3)/MCL (ref 0–0.04)
IMM GRANULOCYTES NFR BLD AUTO: 0.5 %
LDLC SERPL CALC-MCNC: 113 MG/DL (ref 50–140)
LYMPHOCYTES # BLD AUTO: 1.67 X10(3)/MCL (ref 0.6–4.6)
LYMPHOCYTES NFR BLD AUTO: 18.9 %
MCH RBC QN AUTO: 29.7 PG (ref 27–31)
MCHC RBC AUTO-ENTMCNC: 31.5 G/DL (ref 33–36)
MCV RBC AUTO: 94.3 FL (ref 80–94)
MONOCYTES # BLD AUTO: 0.83 X10(3)/MCL (ref 0.1–1.3)
MONOCYTES NFR BLD AUTO: 9.4 %
NEUTROPHILS # BLD AUTO: 6.17 X10(3)/MCL (ref 2.1–9.2)
NEUTROPHILS NFR BLD AUTO: 69.9 %
NRBC BLD AUTO-RTO: 0 %
PLATELET # BLD AUTO: 332 X10(3)/MCL (ref 130–400)
PMV BLD AUTO: 8.6 FL (ref 7.4–10.4)
POTASSIUM SERPL-SCNC: 4.4 MMOL/L (ref 3.5–5.1)
PROT SERPL-MCNC: 6.4 GM/DL (ref 5.8–7.6)
RBC # BLD AUTO: 3.67 X10(6)/MCL (ref 4.2–5.4)
SODIUM SERPL-SCNC: 140 MMOL/L (ref 136–145)
TRIGL SERPL-MCNC: 70 MG/DL (ref 37–140)
TSH SERPL-ACNC: 0.84 UIU/ML (ref 0.35–4.94)
VLDLC SERPL CALC-MCNC: 14 MG/DL
WBC # BLD AUTO: 8.82 X10(3)/MCL (ref 4.5–11.5)

## 2024-11-08 PROCEDURE — 80061 LIPID PANEL: CPT

## 2024-11-08 PROCEDURE — 84443 ASSAY THYROID STIM HORMONE: CPT

## 2024-11-08 PROCEDURE — 80053 COMPREHEN METABOLIC PANEL: CPT

## 2024-11-08 PROCEDURE — 82306 VITAMIN D 25 HYDROXY: CPT

## 2024-11-08 PROCEDURE — 83036 HEMOGLOBIN GLYCOSYLATED A1C: CPT

## 2024-11-08 PROCEDURE — 36415 COLL VENOUS BLD VENIPUNCTURE: CPT

## 2024-11-08 PROCEDURE — 85025 COMPLETE CBC W/AUTO DIFF WBC: CPT

## 2024-11-10 PROBLEM — J32.9 SINOBRONCHITIS: Status: RESOLVED | Noted: 2024-09-27 | Resolved: 2024-11-10

## 2024-11-10 PROBLEM — J40 SINOBRONCHITIS: Status: RESOLVED | Noted: 2024-09-27 | Resolved: 2024-11-10

## 2024-11-10 PROBLEM — M67.449 GANGLION CYST OF FINGER: Chronic | Status: RESOLVED | Noted: 2023-04-26 | Resolved: 2024-11-10

## 2024-11-10 PROBLEM — R22.31 SUBCUTANEOUS MASS OF FINGER OF RIGHT HAND: Status: RESOLVED | Noted: 2023-11-30 | Resolved: 2024-11-10

## 2024-11-10 NOTE — PROGRESS NOTES
Patient ID: [id number]     Chief Complaint: Annual Exam      HPI:     Christiana Chan is a 73 y.o. female here today for a Medicare Wellness. No other complaints today.       -------------------------------------    Abdominal bloating    Abdominal pain    Asthma    Atrial fibrillation    Cancer    colon    Chronic appendicitis    Colon polyps    COLONOSCOPY    Constipation    Depression    Diverticulosis of colon    Esophageal ulcer    Gastritis    GERD (gastroesophageal reflux disease)    Heartburn    Hiatal hernia    HTN (hypertension)    Incarcerated incisional hernia    Intermittent diarrhea    Kidney cysts    both    Osteoarthritis    Osteoporosis    Other and unspecified noninfectious gastroenteritis and colitis    Pancreatitis, chronic    Rectal bleeding    Rectal polyp    Ruptured lumbar disc    Small bowel lesion    Traumatic rupture of cervical intervertebral disc    Vitamin B12 deficiency anemia    Vitamin D deficiency        Past Surgical History:   Procedure Laterality Date    ANTEGRADE SINGLE BALLOON ENTEROSCOPY N/A 2019    Procedure: ENTEROSCOPY, DOUBLE BALLOON, ANTEGRADE;  Surgeon: Sivakumar Dorsey MD;  Location: [address];  Service: Endoscopy;  Laterality: N/A;    BIOPSY OF MASS OF HAND Right 2023    Procedure: BIOPSY, MASS, HAND TF with [facility] partial knee team;  Surgeon: Dao Bills MD;  Location: [facility];  Service: Orthopedics;  Laterality: Right;    BREAST BIOPSY Bilateral      SECTION   &     CHOLECYSTECTOMY      COLONOSCOPY      outside facility    COLONOSCOPY  2019    Ashok Ramirez MD    ENDOSCOPIC ULTRASOUND OF UPPER GASTROINTESTINAL TRACT N/A 2019    Procedure: ULTRASOUND-ENDOSCOPIC-UPPER;  Surgeon: Armando Bills MD;  Location: [facility];  Service: Endoscopy;  Laterality: N/A;  Carcinoid diagnosis    ESOPHAGOGASTRODUODENOSCOPY      outside facility    HYSTERECTOMY      LAPAROSCOPIC APPENDECTOMY N/A 2019     "Procedure: APPENDECTOMY, LAPAROSCOPIC;  Surgeon: Rebecca Valdez MD;  Location: [facility] OR;  Service: General;  Laterality: N/A;    LAPAROSCOPIC RESECTION OF SMALL INTESTINE N/A 04/11/2019    Procedure: EXCISION, SMALL INTESTINE, LAPAROSCOPIC;  Surgeon: Rebecca Valdez MD;  Location: [facility] OR;  Service: General;  Laterality: N/A;    ROBOT-ASSISTED LAPAROSCOPIC REPAIR OF INCISIONAL HERNIA N/A 03/13/2020    Procedure: ROBOTIC REPAIR, HERNIA, INCISIONAL;  Surgeon: Rebecca Valdez MD;  Location: [facility] OR;  Service: General;  Laterality: N/A;    SHOULDER SURGERY Right 03/13/2013    TOTAL KNEE ARTHROPLASTY Right 09/16/2013       Review of patient's allergies indicates:   Allergen Reactions    Amoxicillin Other (See Comments)     Taking digoxin.    Penicillins      Other reaction(s): "unable to take because of digoxin", Other (See Comments)  Taking digoxin.      Sulfa (sulfonamide antibiotics) Itching and Swelling    Codeine Nausea Only and Other (See Comments)     Nausea and constipation.    Hydrocodone Nausea Only and Other (See Comments)     Nausea and constipation.    Meperidine Nausea Only and Other (See Comments)     Nausea and constipation.  Other reaction(s): nausea and constipation    Nitrofurantoin monohyd/m-cryst Rash    Hydromorphone Other (See Comments)    Ketorolac Rash       Outpatient Medications Marked as Taking for the 11/11/24 encounter (Office Visit) with Cedric Mcclelland MD   Medication Sig Dispense Refill    acetaminophen (TYLENOL) 500 MG tablet Take 500 mg by mouth every 6 (six) hours as needed for Pain.      albuterol (PROVENTIL/VENTOLIN HFA) 90 mcg/actuation inhaler Inhale 1 puff into the lungs every 6 (six) hours as needed for Shortness of Breath. 18 g 3    ALPRAZolam (XANAX) 2 MG Tab Take 2 mg by mouth nightly.      azelastine (ASTELIN) 137 mcg (0.1 %) nasal spray 1 spray by Nasal route 2 (two) times daily.      butalbital-acetaminophen-caffeine -40 mg (FIORICET, ESGIC) " -40 mg per tablet Take 1 tablet by mouth every 4 (four) hours as needed for Pain.      carvedilol (COREG) 25 MG tablet Take 25 mg by mouth 2 (two) times daily.      citalopram (CELEXA) 40 MG tablet Take 40 mg by mouth every evening.      cyanocobalamin 1,000 mcg/mL injection Inject 1,000 mcg into the muscle every 14 (fourteen) days.      denosumab (PROLIA) 60 mg/mL Syrg every 6 (six) months.      dicyclomine (BENTYL) 20 mg tablet Take 20 mg by mouth every 6 (six) hours as needed.      docusate sodium (COLACE) 100 MG capsule Take 100 mg by mouth 2 (two) times daily. 1 TABLET IN THE MORNING AND 1 TABLET AT NIGHT.      ergocalciferol (ERGOCALCIFEROL) 50,000 unit Cap TAKE 1 CAPSULE BY MOUTH EVERY 14 DAYS 6 capsule 1    lamoTRIgine (LAMICTAL) 100 MG tablet Take 100 mg by mouth every evening. AT NIGHT      linaclotide (LINZESS ORAL) Take 1 capsule by mouth every other day.      lisinopril (PRINIVIL,ZESTRIL) 20 MG tablet Take 10 mg by mouth Daily.      pantoprazole (PROTONIX) 20 MG tablet Take 20 mg by mouth once daily.      PREMARIN vaginal cream Place 30 mg vaginally. NIGHTLY.      sodium chloride for inhalation (SODIUM CHLORIDE 0.9%) 0.9 % nebulizer solution Take 3 mLs by nebulization as needed for Wheezing.      spironolactone (ALDACTONE) 25 MG tablet Take 12.5 mg by mouth once daily.      traZODone (DESYREL) 150 MG tablet Take 150 mg by mouth every evening.         Social History     Socioeconomic History    Marital status:    Tobacco Use    Smoking status: Former    Smokeless tobacco: Never   Substance and Sexual Activity    Alcohol use: Not Currently     Comment: Not since 2018    Drug use: Never    Sexual activity: Yes        Family History   Problem Relation Name Age of Onset    Diabetes Mother      Multiple myeloma Father          Patient Care Team:  Cedric Mcclelland MD as PCP - General (Family Medicine)  Ashok Ramirez MD as Consulting Physician (Gastroenterology)  Jessi Tyler as Psychologist  (Psychology)  Elvin Mishra MD as Consulting Physician (Neurosurgery)  Justin Schulte MD as Consulting Physician (Endocrinology)  Randolph Callahan DPM as Consulting Physician (Podiatry)     Opioid Screening: Patient medication list reviewed, patient is not taking prescription opioids. Patient is at low risk of substance abuse based on this opioid use history.       Subjective:     Review of Systems   Constitutional: Negative.  Negative for malaise/fatigue and weight loss.   HENT: Negative.     Eyes: Negative.    Respiratory: Negative.  Negative for shortness of breath.    Cardiovascular: Negative.  Negative for chest pain.   Gastrointestinal: Negative.    Genitourinary: Negative.    Musculoskeletal: Negative.    Skin: Negative.    Neurological: Negative.  Negative for focal weakness, weakness and headaches.   Endo/Heme/Allergies: Negative.    Psychiatric/Behavioral: Negative.  Negative for depression and memory loss. The patient is not nervous/anxious.          Patient Reported Health Risk Assessment  What [age] 70-79  Are you male or female?: Female  During the past four weeks, how much have you been bothered by emotional problems such as feeling anxious, depressed, irritable, sad, or downhearted and blue?: Not at all  During the past five weeks, has your physical and/or emotional health limited your social activities with family, friends, neighbors, or groups?: Not at all  During the past four weeks, how much bodily pain have you generally had?: No pain  During the past four weeks, was someone available to help if you needed and wanted help?: Yes, as much as I wanted  During the past four weeks, what was the hardest physical activity you could do for at least two minutes?: Light  Can you get to places out of walking distance without help?  (For example, can you travel alone on buses or taxis, or drive your own car?): Yes  Can you go shopping for groceries or clothes without someone's help?: Yes  Can  you prepare your own meals?: Yes  Can you do your own housework without help?: Yes  Because of any health problems, do you need the help of another person with your personal care needs such as eating, bathing, dressing, or getting around the house?: No  Can you handle your own money without help?: Yes  During the past four weeks, how would you rate your health in general?: Good  How have things been going for you during the past four weeks?: Pretty well  Are you having difficulties driving your car?: No  Do you always fasten your seat belt when you are in a car?: Yes, usually  How often in the past four weeks have you been bothered by falling or dizzy when standing up?: Never  How often in the past four weeks have you been bothered by sexual problems?: Never  How often in the past four weeks have you been bothered by trouble eating well?: Never  How often in the past four weeks have you been bothered by teeth or denture problems?: Never  How often in the past four weeks have you been bothered with problems using the telephone?: Never  How often in the past four weeks have you been bothered by tiredness or fatigue?: Never  Have you fallen two or more times in the past year?: No  Are you afraid of falling?: No  Are you a smoker?: No  During the past four weeks, how many drinks of wine, beer, or other alcoholic beverages did you have?: One drink or less per week  Do you exercise for about 20 minutes three or more days a week?: No, I usually do not exercise this much  Have you been given any information to help you with hazards in your house that might hurt you?: No  Have you been given any information to help you with keeping track of your medications?: No  How often do you have trouble taking medicines the way you've been told to take them?: I always take them as prescribed  How confident are you that you can control and manage most of your health problems?: Very confident  What is your race? (Check all that apply.):  "    Objective:     /64   Pulse 68   Resp 18   Ht 5' 3" (1.6 m)   Wt 59.9 kg (132 lb)   SpO2 99%   BMI 23.38 kg/m²     Physical Exam  Constitutional:       Appearance: Normal appearance.   HENT:      Head: Normocephalic.      Mouth/Throat:      Mouth: Mucous membranes are moist.      Pharynx: Oropharynx is clear.   Eyes:      Conjunctiva/sclera: Conjunctivae normal.      Pupils: Pupils are equal, round, and reactive to light.   Cardiovascular:      Rate and Rhythm: Normal rate and regular rhythm.      Heart sounds: Normal heart sounds.   Pulmonary:      Effort: Pulmonary effort is normal.      Breath sounds: Normal breath sounds.   Abdominal:      General: Abdomen is flat.      Palpations: Abdomen is soft.   Musculoskeletal:         General: Normal range of motion.      Cervical back: Neck supple.   Skin:     General: Skin is warm and dry.   Neurological:      General: No focal deficit present.      Mental Status: She is alert and oriented to person, place, and time.   Psychiatric:         Mood and Affect: Mood normal.         Behavior: Behavior normal.         Thought Content: Thought content normal.         Judgment: Judgment normal.         Lab Results   Component Value Date    GLU 83 02/21/2022     11/08/2024    K 4.4 11/08/2024     (H) 11/08/2024    CO2 24 11/08/2024    BUN 8.5 (L) 11/08/2024    CREATININE 0.97 11/08/2024    CALCIUM 8.2 (L) 11/08/2024    PROT 6.5 02/21/2022    ALBUMIN 3.4 11/08/2024    BILITOT 0.3 11/08/2024    ALKPHOS 47 11/08/2024    AST 12 11/08/2024    ALT 7 11/08/2024    ANIONGAP 10 02/21/2022    ESTGFRAFRICA >60 02/21/2022    EGFRNORACEVR >60 11/08/2024     Lab Results   Component Value Date    WBC 8.82 11/08/2024    RBC 3.67 (L) 11/08/2024    HGB 10.9 (L) 11/08/2024    HCT 34.6 (L) 11/08/2024    MCV 94.3 (H) 11/08/2024    RDW 14.5 11/08/2024     11/08/2024      Lab Results   Component Value Date    CHOL 178 11/08/2024    TRIG 70 11/08/2024    " HDL 51 11/08/2024    .00 11/08/2024    TOTALCHOLEST 3 11/08/2024     Lab Results   Component Value Date    TSH 0.842 11/08/2024     Lab Results   Component Value Date    HGBA1C 5.5 11/08/2024           No data to display                  11/11/2024    10:30 AM 3/12/2024     8:20 AM 11/7/2023     9:00 AM 4/26/2023     9:00 AM 2/28/2023     8:20 AM 11/16/2022     8:30 AM 8/18/2022     8:40 AM   Fall Risk Assessment - Outpatient   Mobility Status Ambulatory Ambulatory Ambulatory Ambulatory Ambulatory Ambulatory Ambulatory   Number of falls 0  0 0 0 0 0   Identified as fall risk False  False False  False False   Wrist band applied  True                Depression Screening  Over the past two weeks, has the patient felt down, depressed, or hopeless?: No  Over the past two weeks, has the patient felt little interest or pleasure in doing things?: No  Functional Ability/Safety Screening  Was the patient's timed Up & Go test unsteady or longer than 30 seconds?: No  Does the patient need help with phone, transportation, shopping, preparing meals, housework, laundry, meds, or managing money?: No  Does the patient's home have rugs in the hallway, lack grab bars in the bathroom, lack handrails on the stairs or have poor lighting?: No  Have you noticed any hearing difficulties?: No  Cognitive Function (Assessed through direct observation with due consideration of information obtained by way of patient reports and/or concerns raised by family, friends, caretakers, or others)    Does the patient repeat questions/statements in the same day?: No  Does the patient have trouble remembering the date, year, and time?: No  Does the patient have difficulty managing finances?: No  Does the patient have a decreased sense of direction?: No  Assessment:       ICD-10-CM ICD-9-CM   1. Medicare annual wellness visit, subsequent  Z00.00 V70.0   2. Primary hypertension  I10 401.9   3. Dyslipidemia  E78.5 272.4   4. Anxiety state  F41.1 300.00    5. Mixed anxiety depressive disorder  F41.8 300.4   6. Mild intermittent asthma without complication  J45.20 493.90   7. Bipolar II disorder  F31.81 296.89   8. Abdominal aortic atherosclerosis  I70.0 440.0   9. Chronic combined systolic and diastolic congestive heart failure  I50.42 428.42     428.0   10. Left bundle branch block (LBBB)  I44.7 426.3   11. Paroxysmal atrial fibrillation  I48.0 427.31   12. Peripheral vascular disease, unspecified  I73.9 443.9   13. Anemia due to other cause, not classified  D64.89 285.8   14. Age-related osteoporosis without current pathological fracture  M81.0 733.01   15. Hypovitaminosis D  E55.9 268.9   16. Osteoarthritis, unspecified osteoarthritis type, unspecified site  M19.90 715.90   17. Fever blister  B00.1 054.9        Plan:     Medicare Annual Wellness and Personalized Prevention Plan:   Fall Risk + Home Safety + Hearing Impairment + Depression Screen + Cognitive Impairment Screen + Health Risk Assessment all reviewed.       Health Maintenance Topics with due status: Not Due       Topic Last Completion Date    Colorectal Cancer Screening 08/30/2022    Lipid Panel 11/08/2024      The patient's Health Maintenance was reviewed and the following appears to be due at this time:   Health Maintenance Due   Topic Date Due    Hepatitis C Screening  Never done    High Dose Statin  Never done    DEXA Scan  08/03/2023     Health risk assessment:  After review of the patient's medical record as it relates to health risk assessment over the next 5-10 years per recommendations of the USPSTF, it was determined that all pertinent recommendations have been appropriately addressed. The patient does not desire any further preventative care measures or screening tests at this time.  We will continue to reassess at each annual visit.    1. Medicare annual wellness visit, subsequent  Comments:  Overall health status was reviewed.  Good health habits reinforced.  Annual wellness exams  recommended.    2. Primary hypertension  Overview:  Prescribed lisinopril 10 mg daily (dose decreased on 11/07/2023), Coreg 20 mg b.i.d., spironolactone 12.5 mg daily, propranolol 10 mg daily p.r.n..    Blood pressures appear to be adequately controlled with current treatment plan, including prescription blood pressure medication.  Medication(s) appear to be effective at current dosages, and are not causing any side effects or problems.  Continue medical management and continue home blood pressure checks.  Average blood pressures at home should be no greater than 130/80.  Patient instructed to contact the clinic if blood pressures become elevated at any point prior to next clinic visit.    Medication will be continued at the current dosage.      3. Dyslipidemia  Overview:  Dyslipidemia is being treated using lifestyle modification only.  Patient is not being prescribed lipid-lowering medication.  As discussed, we will continue to monitor this, and may ultimately choose to prescribe medication if this becomes difficult to control utilizing lifestyle modification only.      4. Anxiety state  Overview:  Prescribed alprazolam 2 mg daily, regularly usage.  Patient does see another provider prescribed her alprazolam.    This medical condition is currently stable on prescription medication, continue current treatment plan.      5. Mixed anxiety depressive disorder  Overview:  Prescribed Lamictal 100 mg q.h.s., trazodone 100 mg q.h.s..  Sees another provider.    Patient sees a specialist for this condition.  This medical condition appears to be stable, patient will continue regular follow-up with the treating specialist.      6. Mild intermittent asthma without complication  Overview:  Prescribed albuterol MDI.    Patient is being followed and treated for asthma, being prescribed medications for use related to this medical condition.  Patient does not report any significant worsening of the severity or duration of asthmatic  events.    Patient advised to continue current medication(s) at current dose.      7. Bipolar II disorder  Overview:  Prescribed Lamictal 100 mg q.h.s., trazodone 100 mg q.h.s..  Sees another provider.      Patient does appear to be seeing a psychologist, Jessi Tyler,  condition appears to be stable.       8. Abdominal aortic atherosclerosis  Overview:  From CT of abdomen, December 2022:    Surgical clips are present in the small bowel mesentery.  No retroperitoneal lymphadenopathy or abdominal aortic aneurysm is observed.  Mild calcified atherosclerosis is present in the aorta.  Degenerative changes are present in the spine.  The subcutaneous soft tissues are unremarkable.     All risk factors for progression of aortic atherosclerosis have been or will be modified to achieve maximal medical management.      9. Chronic combined systolic and diastolic congestive heart failure  Overview:  From echocardiogram April 2019:      Summary    Mildly decreased left ventricular systolic function. The estimated ejection fraction is 45%  Concentric left ventricular hypertrophy.  Grade I (mild) left ventricular diastolic dysfunction consistent with impaired relaxation.  Septal wall has abnormal motion consistent with left bundle branch block.  Mild mitral regurgitation.  Mild left atrial enlargement.  The estimated PA systolic pressure is 22 mm Hg  Normal right ventricular systolic function.  Mild tricuspid regurgitation.  Normal central venous pressure (3 mm Hg).    From a cardiovascular standpoint, the patient does appear to be stable.      10. Left bundle branch block (LBBB)  Overview:  From recent cardiology documentation seeing Dr. Uriarte in 2023:    ENCOUNTER DIAGNOSIS    ENCOUNTER DIAGNOSIS  Problem Effective Dates Date resolved Problem Status   HTN (hypertension), [SNOMED-CT: 11793142] 4/5/2016 - Active   Cardiomyopathy, unspecified - CMO, [SNOMED-CT: 86496011] 4/5/2016 - Active   LBBB (left bundle branch block) - BBB,  [SNOMED-CT: 53731960] 4/5/2016 - Active   Mitral valve (nonrheumatic) insufficiency or regurgitation, [SNOMED-CT: 82242093] 4/5/2016 - Active         11. Paroxysmal atrial fibrillation  Overview:  History of paroxysmal AFib, sees Cardiology.    Patient has a history of cardiac/cardiovascular disease.  Patient does see a cardiologist on a regular basis, cardiovascular condition currently appears to be stable, with no signs of decompensation.  Patient will continue regular cardiology follow-up.      12. Peripheral vascular disease, unspecified  Overview:  From ultrasound of lower extremities August 2023:    Impressions    Impression - [id number]  The study quality is average.    Impression - [id number]  Less than 30% stenosis in the right Profunda, Popliteal, DPA, and dist PTA.   Impression - [id number]-[id number]  Less than 30% stenosis in the left CFA, Profunda, prox SFA, Popliteal, TIMBO, DPA, and dist PTA.   Impression - [id number]-TY  Multiphasic waveforms throughout the bilateral lower extremities.     We will continue to monitor all modifiable risk factors to reduce overall risk of cardiac/cardiovascular disease.      13. Anemia due to other cause, not classified  Overview:  Patient has a diagnosis of anemia, etiology uncertain.  Anemia has been mild and asymptomatic.  We will continue to monitor regularly.  If anemia changes in any significant way, or if any significant symptoms develop which could be attributed to the anemia, more aggressive evaluation, treatment, and possibly referral will be considered.      14. Age-related osteoporosis without current pathological fracture  Overview:  Prescribed Prolia 60 mg injection every six months.  From DEXA scan August 2020:    REASON FOR EXAM: Osteoporosis     COMPARISON: No relevant comparison studies available at the time of  dictation.     FINDINGS: Density measurements of the lumbar spine and bilateral hips  were obtained.     The L spine (L1-L4) BMD is 1.144,  this corresponds to a T score of  -0.4     The total left femoral BMD is 0.791, this corresponds to a T score of  -1.7.     The total right femoral BMD is 0.816, this corresponds to T score of  -1.5.      15. Hypovitaminosis D  Overview:  Advised to continue (if taking) vitamin-D supplementation at current level.  Levels will/may be checked annually.  If vitamin-D level is low, instructed to either start vitamin-D supplementation as directed or increase current level/dosage of supplementation.      16. Osteoarthritis, unspecified osteoarthritis type, unspecified site  Overview:  Patient has a diagnosis of osteoarthritis.  This is a chronic inflammatory joint condition.  Suggestions for treatment of this condition are:  -using medications as needed to control inflammation and pain, this would include either NSAID medications or Tylenol  -maintaining a good healthy amount of regular activity  -maintaining a healthy weight  -it may be helpful to start a supplement containing glucosamine, chondroitin, tumeric, and MSM (methylsulfonlmethane).  This is a dietary supplement which should have a positive effect on symptoms consistent with osteoarthritis  -in some instances, depending on the joint affected, intra-articular start injections can be helpful      17. Fever blister  Overview:  Prescribed valacyclovir to use as needed.    Orders:  -     valACYclovir (VALTREX) 1000 MG tablet; At the earliest onset of a fever blister, take two tablets by mouth, followed by two more tablets by mouth 12 hours later.  Dispense: 10 tablet; Refill: 5            Advance Care Planning     Date: 11/10/2024    Living Will  During this visit, I engaged the patient  in the voluntary advance care planning process.  The patient and I reviewed the role for advance directives and their purpose in directing future healthcare if the patient's unable to speak for him/herself.  At this point in time, the patient does have full decision-making capacity.   We discussed different extreme health states that she could experience, and reviewed what kind of medical care she would want in those situations.  The patient communicated that if she were comatose and had little chance of a meaningful recovery, she would not want machines/life-sustaining treatments used. In addition to the above preference, other important end-of-life issues for the patient include no other issues. The patient has completed a living will to reflect these preferences.  I spent a total of 5 minutes engaging the patient in this advance care planning discussion.              Current Outpatient Medications   Medication Instructions    acetaminophen (TYLENOL) 500 mg, Every 6 hours PRN    albuterol (PROVENTIL/VENTOLIN HFA) 90 mcg/actuation inhaler 1 puff, Inhalation, Every 6 hours PRN    ALPRAZolam (XANAX) 2 mg, Nightly    azelastine (ASTELIN) 137 mcg (0.1 %) nasal spray 1 spray, 2 times daily    betamethasone valerate 0.1% (VALISONE) 0.1 % Crea Topical (Top), As needed (PRN), Every other day    butalbital-acetaminophen-caffeine -40 mg (FIORICET, ESGIC) -40 mg per tablet 1 tablet, Every 4 hours PRN    carvediloL (COREG) 25 mg, 2 times daily    citalopram (CELEXA) 40 mg, Nightly    cyanocobalamin 1,000 mcg, Every 14 days    denosumab (PROLIA) 60 mg/mL Syrg Every 6 months    dicyclomine (BENTYL) 20 mg, Every 6 hours PRN    docusate sodium (COLACE) 100 mg, 2 times daily    ergocalciferol (ERGOCALCIFEROL) 50,000 unit Cap TAKE 1 CAPSULE BY MOUTH EVERY 14 DAYS    lamoTRIgine (LAMICTAL) 100 mg, Nightly    linaclotide (LINZESS ORAL) 1 capsule, Every other day    lipase-protease-amylase 24,000-76,000-120,000 units (CREON) 24,000-76,000 -120,000 unit capsule 1 capsule, Oral, 3 times daily with meals    lisinopriL (PRINIVIL,ZESTRIL) 10 mg, Daily    pantoprazole (PROTONIX) 20 mg, Daily    PREMARIN 30 mg    sodium chloride for inhalation (SODIUM CHLORIDE 0.9%) 0.9 % nebulizer solution 3 mLs, As needed  (PRN)    spironolactone (ALDACTONE) 12.5 mg, Daily    traZODone (DESYREL) 150 mg, Nightly    valACYclovir (VALTREX) 1000 MG tablet At the earliest onset of a fever blister, take two tablets by mouth, followed by two more tablets by mouth 12 hours later.        Follow up in about 26 weeks (around 5/12/2025) for Extended chronic condition F/up. In addition to their scheduled follow up, the patient has also been instructed to follow up on as needed basis.     This note was created with the assistance of [employer] voice recognition software or phone dictation. There may be transcription errors as a result of using this technology. Effort has been made to assure accuracy of transcription but any obvious errors or omissions should be clarified with the author of the document.

## 2024-11-11 ENCOUNTER — OFFICE VISIT (OUTPATIENT)
Dept: PRIMARY CARE CLINIC | Facility: CLINIC | Age: 74
End: 2024-11-11
Payer: MEDICARE

## 2024-11-11 VITALS
SYSTOLIC BLOOD PRESSURE: 107 MMHG | RESPIRATION RATE: 18 BRPM | DIASTOLIC BLOOD PRESSURE: 64 MMHG | WEIGHT: 132 LBS | HEART RATE: 68 BPM | HEIGHT: 63 IN | OXYGEN SATURATION: 99 % | BODY MASS INDEX: 23.39 KG/M2

## 2024-11-11 DIAGNOSIS — E55.9 HYPOVITAMINOSIS D: Chronic | ICD-10-CM

## 2024-11-11 DIAGNOSIS — I10 PRIMARY HYPERTENSION: Chronic | ICD-10-CM

## 2024-11-11 DIAGNOSIS — E78.5 DYSLIPIDEMIA: Chronic | ICD-10-CM

## 2024-11-11 DIAGNOSIS — I44.7 LEFT BUNDLE BRANCH BLOCK (LBBB): ICD-10-CM

## 2024-11-11 DIAGNOSIS — I73.9 PERIPHERAL VASCULAR DISEASE, UNSPECIFIED: Chronic | ICD-10-CM

## 2024-11-11 DIAGNOSIS — F41.8 MIXED ANXIETY DEPRESSIVE DISORDER: Chronic | ICD-10-CM

## 2024-11-11 DIAGNOSIS — I70.0 ABDOMINAL AORTIC ATHEROSCLEROSIS: Chronic | ICD-10-CM

## 2024-11-11 DIAGNOSIS — M81.0 AGE-RELATED OSTEOPOROSIS WITHOUT CURRENT PATHOLOGICAL FRACTURE: Chronic | ICD-10-CM

## 2024-11-11 DIAGNOSIS — I50.42 CHRONIC COMBINED SYSTOLIC AND DIASTOLIC CONGESTIVE HEART FAILURE: Chronic | ICD-10-CM

## 2024-11-11 DIAGNOSIS — Z00.00 MEDICARE ANNUAL WELLNESS VISIT, SUBSEQUENT: Primary | ICD-10-CM

## 2024-11-11 DIAGNOSIS — B00.1 FEVER BLISTER: ICD-10-CM

## 2024-11-11 DIAGNOSIS — D64.89 ANEMIA DUE TO OTHER CAUSE, NOT CLASSIFIED: Chronic | ICD-10-CM

## 2024-11-11 DIAGNOSIS — I48.0 PAROXYSMAL ATRIAL FIBRILLATION: Chronic | ICD-10-CM

## 2024-11-11 DIAGNOSIS — F31.81 BIPOLAR II DISORDER: Chronic | ICD-10-CM

## 2024-11-11 DIAGNOSIS — J45.20 MILD INTERMITTENT ASTHMA WITHOUT COMPLICATION: Chronic | ICD-10-CM

## 2024-11-11 DIAGNOSIS — F41.1 ANXIETY STATE: Chronic | ICD-10-CM

## 2024-11-11 DIAGNOSIS — M19.90 OSTEOARTHRITIS, UNSPECIFIED OSTEOARTHRITIS TYPE, UNSPECIFIED SITE: ICD-10-CM

## 2024-11-11 PROCEDURE — G0439 PPPS, SUBSEQ VISIT: HCPCS | Mod: ,,, | Performed by: GENERAL PRACTICE

## 2024-11-11 RX ORDER — TRAZODONE HYDROCHLORIDE 150 MG/1
150 TABLET ORAL NIGHTLY
COMMUNITY
Start: 2024-10-21

## 2024-11-11 RX ORDER — CITALOPRAM 40 MG/1
40 TABLET, FILM COATED ORAL NIGHTLY
COMMUNITY
Start: 2024-10-21

## 2024-11-11 RX ORDER — VALACYCLOVIR HYDROCHLORIDE 1 G/1
TABLET, FILM COATED ORAL
Qty: 10 TABLET | Refills: 5 | Status: SHIPPED | OUTPATIENT
Start: 2024-11-11

## 2024-12-16 ENCOUNTER — TELEPHONE (OUTPATIENT)
Dept: PRIMARY CARE CLINIC | Facility: CLINIC | Age: 74
End: 2024-12-16
Payer: MEDICARE

## 2024-12-16 NOTE — TELEPHONE ENCOUNTER
Pt just wanted to let us know [place] [facility] [place] was dx with acute bronchitis.

## 2024-12-16 NOTE — TELEPHONE ENCOUNTER
----- Message from [employer] sent at 12/16/2024  8:32 AM CST -----  Who Called: Christiana Chan    Patient is returning phone call    Who Left Message for Patient:  Does the patient know what this is regarding?:      Preferred Method of Contact: Phone Call  Patient's Preferred Phone Number on File: [phone]   Best Call Back Number, if different:  Additional Information: pt called to speak with nurse regarding acute bronchitis pls advise

## 2025-01-13 ENCOUNTER — TELEPHONE (OUTPATIENT)
Dept: ENDOCRINOLOGY | Facility: CLINIC | Age: 75
End: 2025-01-13
Payer: MEDICARE

## 2025-01-13 ENCOUNTER — HOSPITAL ENCOUNTER (OUTPATIENT)
Dept: RADIOLOGY | Facility: HOSPITAL | Age: 75
Discharge: HOME | End: 2025-01-13
Payer: MEDICARE

## 2025-01-13 DIAGNOSIS — M81.0 OSTEOPOROSIS, UNSPECIFIED OSTEOPOROSIS TYPE, UNSPECIFIED PATHOLOGICAL FRACTURE PRESENCE: ICD-10-CM

## 2025-01-13 PROCEDURE — 77080 DXA BONE DENSITY AXIAL: CPT | Mod: TC

## 2025-01-13 NOTE — TELEPHONE ENCOUNTER
----- Message from Mony sent at 1/13/2025  3:20 PM CST -----  Patient of Dr Schulte     Made two attempts to schedule sooner appointment for patient, before Dr Schulte goes out.    Patient stated, unable to schedule appointments sooner due to having other appointment already scheduled    Patient scheduled in July 2025 and would like to know when is next prolia injection    Thanks    [phone]

## 2025-01-13 NOTE — TELEPHONE ENCOUNTER
Pt is due for prolia March 2025. Office visit schedule on July.   We can schedule a nurse visit for Prolia in March and keep office visit in July. Is that Ok?

## 2025-01-16 NOTE — TELEPHONE ENCOUNTER
DXA reviewed and when accounting for bone marker placement overall likely is showing continued response to prolia.    Has been on prolia roughly a decade.    Pt cannot make earlier appt to sort next steps.  When labs were checked a few months back vit d was borderline w/ calcium correcting to normal for albumin.    Recommend as a compromise plan for pt to start supplements for her bones and vit d and get another prolia then we can regroup 7/25 as booked.    For supplements, make sure get 1000 IU of vit d per day and 1000 to 1200 mg of total calcium per day in divided doses at start of meals.    Schedule nurse visit for prolia in March when due.    Keep f/u 7/25 as booked with vit d and BMP prior.

## 2025-01-17 NOTE — TELEPHONE ENCOUNTER
I called pt and discussed the following results and recommendations:  DXA reviewed and when accounting for bone marker placement overall likely is showing continued response to prolia.     Has been on prolia roughly a decade.     Pt cannot make earlier appt to sort next steps.  When labs were checked a few months back vit d was borderline w/ calcium correcting to normal for albumin.     Recommend as a compromise plan for pt to start supplements for her bones and vit d and get another prolia then we can regroup 7/25 as booked.     For supplements, make sure get 1000 IU of vit d per day and 1000 to 1200 mg of total calcium per day in divided doses at start of meals.     Schedule nurse visit for prolia in March when due.     Keep f/u 7/25 as booked with vit d and BMP prior.    Pt verbalizes understanding of the above and is scheduled 3/26/2025 @ 8:15 for prolia injection and 7/18/2025 for follow up with Dr Schulte.

## 2025-03-14 ENCOUNTER — TELEPHONE (OUTPATIENT)
Dept: ENDOCRINOLOGY | Facility: CLINIC | Age: 75
End: 2025-03-14
Payer: MEDICARE

## 2025-03-14 NOTE — TELEPHONE ENCOUNTER
Patient states she was on a college tour with her grand-daughter and stumbled on broken sidewalk, she did not fall, she has had pain to her right side (low back, hip, and leg) she states the muscle and bones hurt.  She admits to doing a lot of walking that day and thinks she may have over done it with the stumble exacerbating the discomfort on the right side.    Scheduled for prolia injection on 3/26.

## 2025-03-14 NOTE — TELEPHONE ENCOUNTER
----- Message from Nadeen sent at 3/14/2025  8:10 AM CDT -----  Patient of [employer] stating her right side is hurting and is not sure if it is because she didn't take the Prolia injection.[phone],

## 2025-03-14 NOTE — TELEPHONE ENCOUNTER
Left voice message asking patient to either visit her PCP or present to urgent care to follow-up on the right side discomfort.

## 2025-03-19 ENCOUNTER — TELEPHONE (OUTPATIENT)
Dept: PRIMARY CARE CLINIC | Facility: CLINIC | Age: 75
End: 2025-03-19
Payer: MEDICARE

## 2025-03-19 NOTE — TELEPHONE ENCOUNTER
----- Message from Maryellen sent at 3/19/2025  9:01 AM CDT -----  Regarding: advice  Who Called: Christiana Martin is requesting assistance/information from provider's office.Symptoms (please be specific):  How long has patient had these symptoms:  List of preferred pharmacies on file (remove unneeded): [unfilled]If different, enter pharmacy into here including location and phone number: Preferred Method of Contact: Phone CallPatient's Preferred Phone Number on File: [phone] Best Call Back Number, if different:Additional Information: stated that she is having pain throughout her body. Stated that she tried to work out and be more active but it hurts to do so. Stated that she is taking OTC tylenol and using a heating pad, which helps a little for a short time. Stated that she talked to her Ortho physician but can't be seen until May. Please advise

## 2025-03-23 NOTE — PROGRESS NOTES
Subjective:       Patient ID: Christiana Chan is a 74 y.o. female.    Chief Complaint: No chief complaint on file.    Established patient, presents to the clinic to discuss muscular pain.  She was visiting a local college with her granddaughter a couple of weeks ago when she tripped, almost fell.  Shortly afterward, she began to feel pain in her right upper quadriceps, and her right lateral ribcage.  She was concerned that it could be her osteoporosis, she missed her recent Prolia shot, she will be seeing Dr. Schulte, her endocrinologist in a few days.  Pain is more significant if she moves or walks, particularly in the ribcage.  She is not short of breath, she did not fall or strike anything.      Review of Systems   Constitutional: Negative.  Negative for fatigue and fever.   HENT: Negative.  Negative for sore throat and trouble swallowing.    Eyes: Negative.  Negative for redness and visual disturbance.   Respiratory: Negative.  Negative for chest tightness and shortness of breath.    Cardiovascular: Negative.  Negative for chest pain.   Gastrointestinal: Negative.  Negative for abdominal pain, blood in stool and nausea.   Endocrine: Negative.  Negative for cold intolerance, heat intolerance and polyuria.   Genitourinary: Negative.    Musculoskeletal:  Negative for arthralgias, gait problem and joint swelling.        Right upper leg and right ribcage pain   Integumentary:  Negative for rash. Negative.   Neurological: Negative.  Negative for dizziness, weakness and headaches.   Psychiatric/Behavioral: Negative.  Negative for agitation and dysphoric mood.            Patient Care Team:  Cedric Mcclelland MD as PCP - General (Family Medicine)  Ashok Ramirez MD as Consulting Physician (Gastroenterology)  Jessi Tyler as Psychologist (Psychology)  Elvin Mishra MD as Consulting Physician (Neurosurgery)  Justin Schulte MD as Consulting Physician (Endocrinology)  Randolph Callahan DPM as Consulting Physician  "(Podiatry)  Objective:   Visit Vitals  /69   Pulse 70   Resp 18   Ht 5' 3" (1.6 m)   Wt 60.3 kg (133 lb)   SpO2 99%   BMI 23.56 kg/m²        Physical Exam  Constitutional:       Appearance: Normal appearance.   HENT:      Head: Normocephalic.      Mouth/Throat:      Mouth: Mucous membranes are moist.      Pharynx: Oropharynx is clear.   Eyes:      Conjunctiva/sclera: Conjunctivae normal.      Pupils: Pupils are equal, round, and reactive to light.   Cardiovascular:      Rate and Rhythm: Normal rate and regular rhythm.      Heart sounds: Normal heart sounds.   Pulmonary:      Effort: Pulmonary effort is normal.      Breath sounds: Normal breath sounds.   Abdominal:      General: Abdomen is flat.      Palpations: Abdomen is soft.   Musculoskeletal:         General: Normal range of motion.      Cervical back: Neck supple.      Comments: Tenderness to palpation of the right lower lateral costochondral joint region without step-off or abnormality.  Pain also with use and with palpation of the right upper quadriceps muscle.   Skin:     General: Skin is warm and dry.   Neurological:      General: No focal deficit present.      Mental Status: She is alert and oriented to person, place, and time.   Psychiatric:         Mood and Affect: Mood normal.         Behavior: Behavior normal.         Thought Content: Thought content normal.         Judgment: Judgment normal.           Assessment:       ICD-10-CM ICD-9-CM   1. Sprain of costochondral joint, initial encounter  S23.41XA 848.3   2. Quadriceps strain, right, initial encounter  S76.111A 843.8       Current Outpatient Medications   Medication Instructions    acetaminophen (TYLENOL) 500 mg, Every 6 hours PRN    albuterol (PROVENTIL/VENTOLIN HFA) 90 mcg/actuation inhaler 1 puff, Inhalation, Every 6 hours PRN    ALPRAZolam (XANAX) 2 mg, Nightly    azelastine (ASTELIN) 137 mcg (0.1 %) nasal spray 1 spray, 2 times daily    betamethasone valerate 0.1% (VALISONE) 0.1 % Crea " Topical (Top), As needed (PRN), Every other day    butalbital-acetaminophen-caffeine -40 mg (FIORICET, ESGIC) -40 mg per tablet 1 tablet, Every 4 hours PRN    carvediloL (COREG) 25 mg, 2 times daily    citalopram (CELEXA) 40 mg, Nightly    cyanocobalamin 1,000 mcg, Every 14 days    denosumab (PROLIA) 60 mg/mL Syrg Every 6 months    dicyclomine (BENTYL) 20 mg, Every 6 hours PRN    docusate sodium (COLACE) 100 mg, 2 times daily    ergocalciferol (ERGOCALCIFEROL) 50,000 unit Cap TAKE 1 CAPSULE BY MOUTH EVERY 14 DAYS    lamoTRIgine (LAMICTAL) 100 mg, Nightly    linaclotide (LINZESS ORAL) 1 capsule, Every other day    lipase-protease-amylase 24,000-76,000-120,000 units (CREON) 24,000-76,000 -120,000 unit capsule 1 capsule, Oral, 3 times daily with meals    lisinopriL (PRINIVIL,ZESTRIL) 10 mg, Daily    pantoprazole (PROTONIX) 20 mg, Daily    PREMARIN 30 mg    sodium chloride for inhalation (SODIUM CHLORIDE 0.9%) 0.9 % nebulizer solution 3 mLs, As needed (PRN)    spironolactone (ALDACTONE) 12.5 mg, Daily    traZODone (DESYREL) 150 mg, Nightly    valACYclovir (VALTREX) 1000 MG tablet At the earliest onset of a fever blister, take two tablets by mouth, followed by two more tablets by mouth 12 hours later.     Plan:     Problem List Items Addressed This Visit          Orthopedic    Costochondral joint sprain - Primary (Chronic)    Overview   Suggested that she obtain a comfortable rib belt, and where it as needed, and continue her Tylenol.         Quadriceps strain, right, initial encounter    Overview   Continue Tylenol, localized heat, should heal completely in the next several weeks.                    Follow up for next appointment as scheduled or as needed.    This note was created with the assistance of [employer] voice recognition software or phone dictation. There may be transcription errors as a result of using this technology. Effort has been made to assure accuracy of transcription but any obvious errors or  omissions should be clarified with the author of the document.

## 2025-03-24 ENCOUNTER — OFFICE VISIT (OUTPATIENT)
Dept: PRIMARY CARE CLINIC | Facility: CLINIC | Age: 75
End: 2025-03-24
Payer: MEDICARE

## 2025-03-24 VITALS
BODY MASS INDEX: 23.57 KG/M2 | HEIGHT: 63 IN | HEART RATE: 70 BPM | DIASTOLIC BLOOD PRESSURE: 69 MMHG | OXYGEN SATURATION: 99 % | WEIGHT: 133 LBS | RESPIRATION RATE: 18 BRPM | SYSTOLIC BLOOD PRESSURE: 108 MMHG

## 2025-03-24 DIAGNOSIS — S76.111A QUADRICEPS STRAIN, RIGHT, INITIAL ENCOUNTER: ICD-10-CM

## 2025-03-24 DIAGNOSIS — S23.41XA SPRAIN OF COSTOCHONDRAL JOINT, INITIAL ENCOUNTER: Primary | ICD-10-CM

## 2025-03-26 ENCOUNTER — HOSPITAL ENCOUNTER (OUTPATIENT)
Dept: RADIOLOGY | Facility: HOSPITAL | Age: 75
Discharge: HOME | End: 2025-03-26
Attending: NURSE PRACTITIONER
Payer: MEDICARE

## 2025-03-26 ENCOUNTER — RESULTS FOLLOW-UP (OUTPATIENT)
Dept: ENDOCRINOLOGY | Facility: CLINIC | Age: 75
End: 2025-03-26

## 2025-03-26 ENCOUNTER — OFFICE VISIT (OUTPATIENT)
Dept: ENDOCRINOLOGY | Facility: CLINIC | Age: 75
End: 2025-03-26
Payer: MEDICARE

## 2025-03-26 ENCOUNTER — TELEPHONE (OUTPATIENT)
Dept: ENDOCRINOLOGY | Facility: CLINIC | Age: 75
End: 2025-03-26
Payer: MEDICARE

## 2025-03-26 VITALS
WEIGHT: 133 LBS | SYSTOLIC BLOOD PRESSURE: 108 MMHG | RESPIRATION RATE: 12 BRPM | BODY MASS INDEX: 23.57 KG/M2 | HEART RATE: 64 BPM | TEMPERATURE: 98 F | HEIGHT: 63 IN | DIASTOLIC BLOOD PRESSURE: 74 MMHG

## 2025-03-26 DIAGNOSIS — M81.0 MENOPAUSAL OSTEOPOROSIS: Primary | ICD-10-CM

## 2025-03-26 DIAGNOSIS — E55.9 VITAMIN D DEFICIENCY: ICD-10-CM
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
## 2025-03-26 PROCEDURE — 73502 X-RAY EXAM HIP UNI 2-3 VIEWS: CPT | Mod: TC,RT

## 2025-03-26 PROCEDURE — 96372 THER/PROPH/DIAG INJ SC/IM: CPT | Mod: PBBFAC

## 2025-03-26 PROCEDURE — 72100 X-RAY EXAM L-S SPINE 2/3 VWS: CPT | Mod: TC

## 2025-03-26 PROCEDURE — 73552 X-RAY EXAM OF FEMUR 2/>: CPT | Mod: TC,RT

## 2025-03-26 PROCEDURE — 99214 OFFICE O/P EST MOD 30 MIN: CPT | Mod: S$PBB,,, | Performed by: NURSE PRACTITIONER

## 2025-03-26 PROCEDURE — 99215 OFFICE O/P EST HI 40 MIN: CPT | Mod: PBBFAC,25 | Performed by: NURSE PRACTITIONER

## 2025-03-26 PROCEDURE — 71100 X-RAY EXAM RIBS UNI 2 VIEWS: CPT | Mod: TC,RT

## 2025-03-26 RX ORDER — ERGOCALCIFEROL 1.25 MG/1
50000 CAPSULE ORAL
Qty: 12 CAPSULE | Refills: 2 | Status: SHIPPED | OUTPATIENT
Start: 2025-03-26

## 2025-03-26 RX ADMIN — DENOSUMAB 60 MG: 60 INJECTION SUBCUTANEOUS at 01:03

## 2025-03-26 NOTE — TELEPHONE ENCOUNTER
Spoke with Ms. Dustin and informed her that provider is requesting she completes CMP and Vit D before appt @ 8:45 am today. Pt stated that she was told her appt was @ 945 am. She stated, she received several calls regarding appointment change, which confused her. She stated that she will try to complete.

## 2025-03-26 NOTE — PROGRESS NOTES
PLEASE CALL PATIENTS WITH RESULTS,   Vitamin-D is low.  At 29.  Normal values between 30 and 80 and recommend for values to be in the 40s.  A prescription of vitamin D 15937 units to be taken once a week with a meal has been sent to preferred pharmacy.  Calcium level normal at 9.4.  Keep up the good work.  Keep your follow-up appointment in 6 months for Prolia injection.  Thank you and have a great day

## 2025-03-26 NOTE — PROGRESS NOTES
Right-sided ribs x-ray, lumbar x-rays, right hip, right femur no sign of fracture.  Discussed x-ray results at bedside with patient.  Questions solicited and answered, patient verbalized understanding.

## 2025-03-26 NOTE — TELEPHONE ENCOUNTER
On November 8, 2024 calcium level 8.2.  Patient is scheduled today to receive Prolia.  Will repeat CMP and vitamin-D.

## 2025-03-26 NOTE — PROGRESS NOTES
Patient Name: Christiana Chan   : 1950  MRN: [id number]     SUBJECTIVE DATA:    CHIEF COMPLAINT:   Christiana Chan is a 74 y.o. female who presents to clinic today with Follow-up (Osteoporosis )        HPI:  Christiana Chan is a 74 y.o. female w/ PMH of Postmenopausal Osteoporosis with history of vertebral fracture now on Prolia, h/o carcinoid tumor, Vitamin D deficiency, lumbar stenosis, hypertension, asthma, migraines, anxiety.  Patient is here to get Prolia injection and also requesting evaluation for all fall she has sustained over 3 weeks ago and requesting x-rays.      Patient is here for Prolia injection:  On 2024 calcium level 8.2.  Patient to go to lab prior to her appointment to complete.    Last DEXA bone scan was completed on 2025     CLINICAL HISTORY:  Age-related osteoporosis without current pathological fracture     COMPARISON:  3 August 2020     FINDINGS:  Lumbar Spine (L1-L4). BMD 1.287.  T score 0.8.  Previous BMD 1.144.     Total Left Femur.  BMD 0.771.  T score -1.9.  Previous BMD 0.791.     Total Right Femur.  BMD 0.839.  T score -1.3.  Previous BMD 0.816.     Impression:     Osteopenia based on the hips.  Moderately increased fracture risk.    Plan of care:  Labs and recent DEXA bone scan reviewed with patient.  CMP repeated, creatinine slightly elevated at 1.04, calcium 9.4, potassium 4.1, sodium 138, GFR 56, liver enzymes within normal range.  Vitamin-D 29.  Patient used to be on supplementation.  Discussed addition of vitamin D once a week.  Patient agreed.  Rx vitamin-D 86527 units p.o. once weekly, take with food and stay hydrated with water.   Prolia injection today.  Patient to return in 6 months for repeat injection.  Questions solicited and answered, patient verbalized understanding and agreed to plan of care.         Fall:  Patient state she saw her primary care provider on 2025 for a fall she sustained couple of weeks ago she tripped and  almost fell.  Patient denies any head trauma.  X-rays were not done.  Patient is worried she might have a fracture since she is on treatment for osteoporosis.   Patient report right-sided rib pain with movement, denies any short of breath or wheezing or fever or bruising.  Also report right-sided lower back pain that radiate to her right hip and thigh.  Reports stiffness to right hip and worried she might have a fracture.  Discussed x-rays of right sided ribs, lumbar, right hip, right femur.  Patient to complete x-rays today and return to clinic to discuss findings.    FINDINGS:  No acute displaced fractures or dislocations.     There is minimal narrowing of the inferior medial aspect of the hip joint articular spaces are otherwise preserved with smooth articular surfaces total knee prosthesis identified     No blastic or lytic lesions.     Soft tissues within normal limits.     Impression:     No acute osseous abnormality..     No fractures or dislocations.     Minimal degenerative changes        Electronically signed by:Tejas Dye  Date:                                            03/26/2025  Time:                                           12:46        Plan of care:  Reviewed x-rays at bedside with patient, no fractures.  Will proceed with Prolia.  Patient denies any need for oral medications such as muscle relaxers or anti-inflammatories.  Patient to continue with Tylenol as needed and as directed on the label.  Patient to continue to utilize heating pad as needed.  Patient to continue with yoga exercises daily and stretching.  Patient to follow-up with PCP if no improvement.  Patient requesting temporary handicap sticker due to muscle pain and joint pain from the fall.  Paperwork completed.  Stay hydrated with water.  Questions solicited and answered, patient agreed to plan and verbalized understanding.    Patient denies chest pain, shortness of breath, dyspnea on exertion, palpitations, peripheral edema,  "abdominal pain, nausea, vomiting, diarrhea, constipation, fatigue, fever, chills, dysuria,  hematuria, dark stools or bloody stools.        ALLERGIES:   Review of patient's allergies indicates:   Allergen Reactions    Amoxicillin Other (See Comments)     Taking digoxin.    Penicillins      Other reaction(s): "unable to take because of digoxin", Other (See Comments)  Taking digoxin.      Sulfa (sulfonamide antibiotics) Itching and Swelling    Codeine Nausea Only and Other (See Comments)     Nausea and constipation.    Hydrocodone Nausea Only and Other (See Comments)     Nausea and constipation.    Meperidine Nausea Only and Other (See Comments)     Nausea and constipation.  Other reaction(s): nausea and constipation    Nitrofurantoin monohyd/m-cryst Rash    Hydromorphone Other (See Comments)    Ketorolac Rash         ROS:  Review of Systems   Musculoskeletal:  Positive for back pain, falls and joint pain (Right hip pain).   All other systems reviewed and are negative.        OBJECTIVE DATA:  Vital signs  Vitals:    03/26/25 1029   BP: 108/74   BP Location: Left arm   Patient Position: Sitting   Pulse: 64   Resp: 12   Temp: 97.8 °F (36.6 °C)   TempSrc: Oral   Weight: 60.3 kg (133 lb)   Height: 5' 3" (1.6 m)      Body mass index is 23.56 kg/m².    PHYSICAL EXAM:   Physical Exam  Vitals and nursing note reviewed.   Constitutional:       General: She is awake. She is not in acute distress.     Appearance: Normal appearance. She is well-developed, well-groomed and normal weight. She is not ill-appearing, toxic-appearing or diaphoretic.   HENT:      Head: Normocephalic and atraumatic.      Right Ear: External ear normal.      Left Ear: External ear normal.      Nose: Nose normal.      Mouth/Throat:      Lips: Pink.      Mouth: Mucous membranes are moist.      Pharynx: Oropharynx is clear. Uvula midline.   Eyes:      General: Lids are normal. Gaze aligned appropriately.      Extraocular Movements: Extraocular movements " intact.      Conjunctiva/sclera: Conjunctivae normal.      Pupils: Pupils are equal, round, and reactive to light.   Neck:      Vascular: No carotid bruit.      Trachea: Trachea and phonation normal.   Cardiovascular:      Rate and Rhythm: Normal rate and regular rhythm.      Pulses: Normal pulses.           Radial pulses are 2+ on the right side and 2+ on the left side.        Dorsalis pedis pulses are 2+ on the right side and 2+ on the left side.      Heart sounds: Normal heart sounds. No murmur heard.  Pulmonary:      Effort: Pulmonary effort is normal. No respiratory distress.      Breath sounds: Normal breath sounds and air entry. No stridor. No wheezing, rhonchi or rales.   Abdominal:      General: Abdomen is flat.      Palpations: Abdomen is soft.      Tenderness: There is no abdominal tenderness.   Genitourinary:     Comments: Denies any urinary symptoms or bowel habit changes  Musculoskeletal:         General: Tenderness present. No swelling or deformity.      Cervical back: Normal, normal range of motion and neck supple. No rigidity or tenderness.      Thoracic back: Spasms and tenderness present.      Lumbar back: Spasms and tenderness present. Decreased range of motion (Stiffness).        Back:       Right lower leg: No edema.      Left lower leg: No edema.        Legs:       Comments: Discussed with patient possible sciatica pain or muscle strain induced by fall.   Lymphadenopathy:      Cervical: No cervical adenopathy.   Skin:     General: Skin is warm and dry.      Capillary Refill: Capillary refill takes less than 2 seconds.      Findings: No bruising.   Neurological:      General: No focal deficit present.      Mental Status: She is alert and oriented to person, place, and time. Mental status is at baseline.      GCS: GCS eye subscore is 4. GCS verbal subscore is 5. GCS motor subscore is 6.      Cranial Nerves: Cranial nerves 2-12 are intact. No cranial nerve deficit.      Sensory: Sensation is  intact. No sensory deficit.      Motor: Motor function is intact. No weakness.      Coordination: Coordination is intact. Coordination normal.      Gait: Gait is intact. Gait (Stiff gait due to pain) normal.   Psychiatric:         Attention and Perception: Attention and perception normal.         Mood and Affect: Mood and affect normal.         Speech: Speech normal.         Behavior: Behavior normal. Behavior is cooperative.         Thought Content: Thought content normal.         Cognition and Memory: Cognition and memory normal.         Judgment: Judgment normal.          ASSESSMENT/PLAN:  1. Menopausal osteoporosis  Assessment & Plan:  Labs and recent DEXA bone scan reviewed with patient.  CMP repeated, creatinine slightly elevated at 1.04, calcium 9.4, potassium 4.1, sodium 138, GFR 56, liver enzymes within normal range.  Vitamin-D 29.  Patient used to be on supplementation.  Discussed addition of vitamin D once a week.  Patient agreed.  Rx vitamin-D 28115 units p.o. once weekly, take with food and stay hydrated with water.   Prolia injection today.  Patient to return in 6 months for repeat injection.  Questions solicited and answered, patient verbalized understanding and agreed to plan of care.    Orders:  -     denosumab (PROLIA) injection 60 mg    2. Fall, initial encounter  Assessment & Plan:  Plan of care:  Reviewed x-rays at bedside with patient, no fractures.  Will proceed with Prolia.  Patient denies any need for oral medications such as muscle relaxers or anti-inflammatories.  Patient to continue with Tylenol as needed and as directed on the label.  Patient to continue to utilize heating pad as needed.  Patient to continue with yoga exercises daily and stretching.  Patient to follow-up with PCP if no improvement.  Patient requesting temporary handicap sticker due to muscle pain and joint pain from the fall.  Paperwork completed.  Stay hydrated with water.  Questions solicited and answered, patient  agreed to plan and verbalized understanding.    Orders:  -     X-Ray Ribs 2 View Right; Future; Expected date: 03/26/2025  -     X-Ray Lumbar Spine 2 Or 3 Views; Future; Expected date: 03/26/2025  -     X-Ray Hip 2 or 3 views Right with Pelvis when performed; Future; Expected date: 03/26/2025  -     X-Ray Femur 2 View Right; Future; Expected date: 03/26/2025  -     denosumab (PROLIA) injection 60 mg    3. Vitamin D deficiency  Overview:  Advised to continue (if taking) vitamin-D supplementation at current level.  Levels will/may be checked annually.  If vitamin-D level is low, instructed to either start vitamin-D supplementation as directed or increase current level/dosage of supplementation.    Assessment & Plan:  Vitamin-D 29.  Patient used to be on supplementation.  Discussed addition of vitamin D once a week.  Patient agreed.  Rx vitamin-D 91861 units p.o. once weekly, take with food and stay hydrated with water.     Orders:  -     denosumab (PROLIA) injection 60 mg  -     ergocalciferol (ERGOCALCIFEROL) 50,000 unit Cap; Take 1 capsule (50,000 Units total) by mouth every 7 days.  Dispense: 12 capsule; Refill: 2           RESULTS:  Recent Results (from the past 6 weeks)   Vitamin D    Collection Time: 03/26/25  9:23 AM   Result Value Ref Range    Vitamin D 29 (L) 30 - 80 ng/mL   Comprehensive Metabolic Panel    Collection Time: 03/26/25  9:23 AM   Result Value Ref Range    Sodium 138 136 - 145 mmol/L    Potassium 4.1 3.5 - 5.1 mmol/L    Chloride 104 98 - 107 mmol/L    CO2 29 23 - 31 mmol/L    Glucose 88 82 - 115 mg/dL    Blood Urea Nitrogen 11.9 9.8 - 20.1 mg/dL    Creatinine 1.04 (H) 0.55 - 1.02 mg/dL    Calcium 9.4 8.4 - 10.2 mg/dL    Protein Total 7.1 5.8 - 7.6 gm/dL    Albumin 3.4 3.4 - 4.8 g/dL    Globulin 3.7 (H) 2.4 - 3.5 gm/dL    Albumin/Globulin Ratio 0.9 (L) 1.1 - 2.0 ratio    Bilirubin Total 0.3 <=1.5 mg/dL    ALP 42 40 - 150 unit/L    ALT 5 0 - 55 unit/L    AST 11 11 - 45 unit/L    eGFR 56  mL/min/1.73/m2    Anion Gap 5.0 mEq/L    BUN/Creatinine Ratio 11          Follow Up:  No follow-ups on file.      Previous medical history/lab work/radiology reviewed and considered during medical management decisions.   Medication list reviewed and medication reconciliation performed.  Patient was provided  and care about his/her current diagnosis (es) and medications including risk/benefit and side effects/adverse events, over the counter medication uses/doses, home self-care and contact precautions,  and red flags and indications for when to seek immediate medical attention.   Patient was advised to continue compliance with current medication list and medical recommendations.  Patient dvised continued compliance with recommended eating habits/ diets for medical conditions and exercise 150 minutes/ week (if possible) for medical condition (s).  Educational handouts and instructions on selected disease management in AVS (After Visit Summary).    All of the patient's questions were answered to patient's satisfaction.   The patient was receptive, expressed verbal understanding and agreement the above plan.          This note was created with the assistance of a voice recognition software or phone dictation. There may be transcription errors as a result of using this technology however minimal. Effort has been made to assure accuracy of transcription but any obvious errors or omissions should be clarified with the author of the document

## 2025-03-27 NOTE — ASSESSMENT & PLAN NOTE
Vitamin-D 29.  Patient used to be on supplementation.  Discussed addition of vitamin D once a week.  Patient agreed.  Rx vitamin-D 97067 units p.o. once weekly, take with food and stay hydrated with water.

## 2025-03-27 NOTE — ASSESSMENT & PLAN NOTE
Labs and recent DEXA bone scan reviewed with patient.  CMP repeated, creatinine slightly elevated at 1.04, calcium 9.4, potassium 4.1, sodium 138, GFR 56, liver enzymes within normal range.  Vitamin-D 29.  Patient used to be on supplementation.  Discussed addition of vitamin D once a week.  Patient agreed.  Rx vitamin-D 54490 units p.o. once weekly, take with food and stay hydrated with water.   Prolia injection today.  Patient to return in 6 months for repeat injection.  Questions solicited and answered, patient verbalized understanding and agreed to plan of care.

## 2025-03-27 NOTE — ASSESSMENT & PLAN NOTE
Plan of care:  Reviewed x-rays at bedside with patient, no fractures.  Will proceed with Prolia.  Patient denies any need for oral medications such as muscle relaxers or anti-inflammatories.  Patient to continue with Tylenol as needed and as directed on the label.  Patient to continue to utilize heating pad as needed.  Patient to continue with yoga exercises daily and stretching.  Patient to follow-up with PCP if no improvement.  Patient requesting temporary handicap sticker due to muscle pain and joint pain from the fall.  Paperwork completed.  Stay hydrated with water.  Questions solicited and answered, patient agreed to plan and verbalized understanding.

## 2025-06-03 ENCOUNTER — OFFICE VISIT (OUTPATIENT)
Dept: PRIMARY CARE CLINIC | Facility: CLINIC | Age: 75
End: 2025-06-03
Payer: MEDICARE

## 2025-06-03 VITALS
DIASTOLIC BLOOD PRESSURE: 80 MMHG | WEIGHT: 133 LBS | BODY MASS INDEX: 23.57 KG/M2 | SYSTOLIC BLOOD PRESSURE: 120 MMHG | HEART RATE: 76 BPM | HEIGHT: 63 IN

## 2025-06-03 DIAGNOSIS — I10 PRIMARY HYPERTENSION: Primary | Chronic | ICD-10-CM

## 2025-06-03 DIAGNOSIS — F41.1 ANXIETY STATE: Chronic | ICD-10-CM

## 2025-06-03 DIAGNOSIS — D64.89 ANEMIA DUE TO OTHER CAUSE, NOT CLASSIFIED: Chronic | ICD-10-CM

## 2025-06-03 DIAGNOSIS — J45.20 MILD INTERMITTENT ASTHMA WITHOUT COMPLICATION: Chronic | ICD-10-CM

## 2025-06-03 DIAGNOSIS — F31.81 BIPOLAR II DISORDER: Chronic | ICD-10-CM

## 2025-06-03 DIAGNOSIS — M19.90 OSTEOARTHRITIS, UNSPECIFIED OSTEOARTHRITIS TYPE, UNSPECIFIED SITE: ICD-10-CM

## 2025-06-03 DIAGNOSIS — I70.0 ABDOMINAL AORTIC ATHEROSCLEROSIS: Chronic | ICD-10-CM

## 2025-06-03 DIAGNOSIS — I48.0 PAROXYSMAL ATRIAL FIBRILLATION: Chronic | ICD-10-CM

## 2025-06-03 DIAGNOSIS — E78.5 DYSLIPIDEMIA: Chronic | ICD-10-CM

## 2025-06-03 DIAGNOSIS — F41.8 MIXED ANXIETY DEPRESSIVE DISORDER: Chronic | ICD-10-CM

## 2025-06-03 DIAGNOSIS — I50.42 CHRONIC COMBINED SYSTOLIC AND DIASTOLIC CONGESTIVE HEART FAILURE: Chronic | ICD-10-CM

## 2025-06-03 DIAGNOSIS — M81.0 AGE-RELATED OSTEOPOROSIS WITHOUT CURRENT PATHOLOGICAL FRACTURE: Chronic | ICD-10-CM

## 2025-06-03 DIAGNOSIS — E55.9 VITAMIN D DEFICIENCY: ICD-10-CM

## 2025-06-03 RX ORDER — ERGOCALCIFEROL 1.25 MG/1
50000 CAPSULE ORAL
Qty: 12 CAPSULE | Refills: 2 | Status: SHIPPED | OUTPATIENT
Start: 2025-06-03

## 2025-06-09 DIAGNOSIS — B00.1 FEVER BLISTER: Primary | ICD-10-CM

## 2025-06-09 RX ORDER — VALACYCLOVIR HYDROCHLORIDE 1 G/1
TABLET, FILM COATED ORAL
Qty: 10 TABLET | Refills: 5 | Status: SHIPPED | OUTPATIENT
Start: 2025-06-09

## (undated) DEVICE — MANIFOLD 4 PORT

## (undated) DEVICE — SUT 4/0 27IN PDS II VIO MON

## (undated) DEVICE — APPLICATOR CHLORAPREP ORN 26ML

## (undated) DEVICE — PACK BASIC

## (undated) DEVICE — COVER TIP CURVED SCISSORS XI

## (undated) DEVICE — BANDAGE ESMARK LATEX FREE 4INX

## (undated) DEVICE — GLOVE PROTEXIS LTX MICRO  7.5

## (undated) DEVICE — BLADE SURG STAINLESS STEEL #15

## (undated) DEVICE — SUT PROLENE 2-0 SH 36IN BLU

## (undated) DEVICE — SUT CTD VICRYL 0 UND BR

## (undated) DEVICE — NDL 22GA X1 1/2 REG BEVEL

## (undated) DEVICE — GOWN NONREINF SET-IN SLV XL

## (undated) DEVICE — ADHESIVE DERMABOND ADVANCED

## (undated) DEVICE — DRESSING AQUACEL SACRAL 9 X 9

## (undated) DEVICE — CATH ALL PUR URTHL 20FR

## (undated) DEVICE — SUT ETHIBOND EXCEL 1 CTX 18

## (undated) DEVICE — CONTAINER PATHOLOGY W/LID 32OZ

## (undated) DEVICE — ELECTRODE PATIENT RETURN DISP

## (undated) DEVICE — SUPPORT ULNA NERVE PROTECTOR

## (undated) DEVICE — GLOVE SENSICARE PI GRN 6.5

## (undated) DEVICE — SUT 4/0 27IN PDS II VIO MO

## (undated) DEVICE — FILTER STRAW

## (undated) DEVICE — DRAPE ABDOMINAL TIBURON 14X11

## (undated) DEVICE — RELOAD 60MM ARTICULATING VAS

## (undated) DEVICE — KIT ANTIFOG

## (undated) DEVICE — SUT 2 60IN PROLENE BL MONO

## (undated) DEVICE — SHELL POWER SIGNIA

## (undated) DEVICE — SUT MONOCRYL 3-0 PS-1

## (undated) DEVICE — UNDERGLOVES BIOGEL PI SIZE 8

## (undated) DEVICE — SYR LUER LOCK 1CC

## (undated) DEVICE — CORD BIPOLAR 12 FOOT

## (undated) DEVICE — TRAY FOLEY 16FR INFECTION CONT

## (undated) DEVICE — GLOVE SURG BIOGEL LATEX SZ 7.5

## (undated) DEVICE — PAD CAST SOF-ROL RAYON 4INX4YD

## (undated) DEVICE — STAPLER GIA HANDLE STD

## (undated) DEVICE — CONTAINER SPECIMEN STR 3 0Z

## (undated) DEVICE — DRAPE INCISE IOBAN 2 23X33IN

## (undated) DEVICE — BANDAGE VELCLOSE ELAS 2INX5YD

## (undated) DEVICE — COVER OVERHEAD SURG LT BLUE

## (undated) DEVICE — SYS CLSR WND ENDOSCP XL

## (undated) DEVICE — FORCEP BIPOLAR

## (undated) DEVICE — SUT ETHILON 4-0 BLK MONO

## (undated) DEVICE — SUT 1 36IN PDS II VIO MONO

## (undated) DEVICE — FORCEP PROGRASP XI

## (undated) DEVICE — DRAPE FLUID WARMER ORS 44X44IN

## (undated) DEVICE — SPONGE KERLIX SUPER 6X6.75IN

## (undated) DEVICE — DRAPE INCISE IOBAN 2 23X23IN

## (undated) DEVICE — CORD MONOPOLAR ENERGY INST XI

## (undated) DEVICE — KIT POWDER ABSORBABLE GELATIN

## (undated) DEVICE — KIT WING PAD POSITIONING

## (undated) DEVICE — GLOVE SENSICARE PI SURG 6.5

## (undated) DEVICE — NDL INSUF ULTRA VERESS 120MM

## (undated) DEVICE — DRAPE ARM DAVINCI XI

## (undated) DEVICE — TUBING INSUFFLATION 10

## (undated) DEVICE — STAPLER EGIA 45 ARTIC VASCULAR

## (undated) DEVICE — DRESSING XEROFORM FOIL PK 1X8

## (undated) DEVICE — SPONGE DERMACEA GAUZE 4X4

## (undated) DEVICE — SCISSOR HOT SHEARS XI

## (undated) DEVICE — GLOVE 8 PROTEXIS PI BLUE

## (undated) DEVICE — SYR 10CC LUER LOCK

## (undated) DEVICE — NDL HYPDRM 23X15

## (undated) DEVICE — SEE MEDLINE ITEM 156955

## (undated) DEVICE — GLOVE PROTEXIS PI CRM 7

## (undated) DEVICE — DRAPE HAND STERILE

## (undated) DEVICE — CUFF ATS 2 PORT SNGL BLDR 18IN

## (undated) DEVICE — SEE MEDLINE ITEM 157125

## (undated) DEVICE — KIT GELPORT LAPAROSCOPIC ABD

## (undated) DEVICE — NDL DRIVE LARGE XI

## (undated) DEVICE — Device

## (undated) DEVICE — SUT PROLENE 5-0 36IN C-1

## (undated) DEVICE — ELECTRODE REM PLYHSV RETURN 9

## (undated) DEVICE — DEVICE ENSEAL X1 LARGE JAW

## (undated) DEVICE — SUT PROLENE 6-0 C-1 30IN BL

## (undated) DEVICE — DRAPE SURG W/TWL 17 5/8X23

## (undated) DEVICE — SOL NACL IRR 1000ML BTL

## (undated) DEVICE — CORD BIPOLAR ENERGY INST XI

## (undated) DEVICE — SEE MEDLINE ITEM 157117

## (undated) DEVICE — OBTURATOR 8MM BLUNT XI

## (undated) DEVICE — NDL HYPO REG 25G X 1 1/2

## (undated) DEVICE — SUT 0 VICRYL / UR6 (J603)

## (undated) DEVICE — SEE MEDLINE ITEM 157116

## (undated) DEVICE — SUT PROLENE 3-0 30SH

## (undated) DEVICE — SUT PROLENE 2-0 36IN MH BLU

## (undated) DEVICE — SEAL UNIVERSAL 5MM-8MM XI

## (undated) DEVICE — SUT 4-0 ETHILON 18 PS-2

## (undated) DEVICE — SUT MCRYL PLUS 4-0 PS2 27IN